# Patient Record
Sex: FEMALE | Race: WHITE | NOT HISPANIC OR LATINO | Employment: OTHER | URBAN - METROPOLITAN AREA
[De-identification: names, ages, dates, MRNs, and addresses within clinical notes are randomized per-mention and may not be internally consistent; named-entity substitution may affect disease eponyms.]

---

## 2017-01-16 RX ORDER — LIRAGLUTIDE 6 MG/ML
INJECTION SUBCUTANEOUS
Qty: 9 PEN | Refills: 1 | Status: SHIPPED | OUTPATIENT
Start: 2017-01-16 | End: 2017-06-19 | Stop reason: SDUPTHER

## 2017-01-19 ENCOUNTER — TELEPHONE (OUTPATIENT)
Dept: ENDOCRINOLOGY | Age: 72
End: 2017-01-19

## 2017-01-19 DIAGNOSIS — IMO0002 UNCONTROLLED TYPE 2 DIABETES MELLITUS WITH DIABETIC NEUROPATHY, WITH LONG-TERM CURRENT USE OF INSULIN: Primary | ICD-10-CM

## 2017-01-19 NOTE — TELEPHONE ENCOUNTER
----- Message from Alva Saez sent at 1/19/2017 10:32 AM EST -----  Contact: patient  harsha LUXEMIR FLEXPEN 100 UNIT/ML solution pen    She wants the flexpen if it last as long as the vials  If not then she wants the vial    30 day supply       [facility] [address] [address] [facility] - [phone]  - [phone]  [phone] (Phone)  [phone] (Fax)      SCRIPT SENT

## 2017-01-23 ENCOUNTER — TELEPHONE (OUTPATIENT)
Dept: ENDOCRINOLOGY | Age: 72
End: 2017-01-23

## 2017-01-23 DIAGNOSIS — IMO0002 UNCONTROLLED TYPE 2 DIABETES MELLITUS WITH DIABETIC NEUROPATHY, WITH LONG-TERM CURRENT USE OF INSULIN: ICD-10-CM

## 2017-01-23 NOTE — TELEPHONE ENCOUNTER
----- Message from Alexandra Celestin sent at 1/23/2017  9:41 AM EST -----  Contact: PATIENT  THE PATIENT IS UNABLE TO GET HER LEVEMIR REFILLED BECAUSE, ACCORDING TO THE PHARMACY, THERE ARE 2 SETS OF DIRECTIONS ON THE SCRIPT. PLEASE CALL TO CLARIFY. THE PATIENT IS OUT OF HER LEVEMIR AND WILL NEED TO PICK THIS UP TODAY.     PHARMACY  [place], [place] - [address] [facility]  [phone] (Phone)  [phone] (Fax)          SCRIPT SENT WITH DIRECTIONS CORRECTED

## 2017-02-14 ENCOUNTER — OFFICE VISIT (OUTPATIENT)
Dept: ENDOCRINOLOGY | Age: 72
End: 2017-02-14

## 2017-02-14 VITALS
BODY MASS INDEX: 37.84 KG/M2 | DIASTOLIC BLOOD PRESSURE: 64 MMHG | SYSTOLIC BLOOD PRESSURE: 122 MMHG | OXYGEN SATURATION: 98 % | HEIGHT: 61 IN | WEIGHT: 200.4 LBS | HEART RATE: 69 BPM

## 2017-02-14 DIAGNOSIS — E03.8 OTHER SPECIFIED HYPOTHYROIDISM: ICD-10-CM

## 2017-02-14 DIAGNOSIS — E16.1 HYPERINSULINISM: ICD-10-CM

## 2017-02-14 DIAGNOSIS — IMO0002 UNCONTROLLED TYPE 2 DIABETES MELLITUS WITH COMPLICATION, WITH LONG-TERM CURRENT USE OF INSULIN: Primary | ICD-10-CM

## 2017-02-14 DIAGNOSIS — Z79.4 ENCOUNTER FOR LONG-TERM (CURRENT) USE OF INSULIN (HCC): ICD-10-CM

## 2017-02-14 DIAGNOSIS — IMO0002 UNCONTROLLED TYPE 2 DIABETES MELLITUS WITH DIABETIC NEUROPATHY, WITH LONG-TERM CURRENT USE OF INSULIN: ICD-10-CM

## 2017-02-14 PROCEDURE — 99214 OFFICE O/P EST MOD 30 MIN: CPT | Performed by: INTERNAL MEDICINE

## 2017-02-14 NOTE — PROGRESS NOTES
71 y.o.    Patient Care Team:  Claudio Anne MD as PCP - General  Claudio Anne MD as PCP - Family Medicine    Chief Complaint:      F/U TYPE 2 DIABETES, UNCONTROLLED.  NO RECENT LABS.  Subjective     HPI     Patient is a 71-year-old white female with a past history of uncontrolled type 2 diabetes mellitus came for followup.  Patient is currently taking Victoza 1.8 mg daily and Levemir 40 units in the morning.  She's not taking any NovoLog any longer.    Uncontrolled type 2 diabetes mellitus with neuropathy.  Patient had symptoms of tingling and numbness in both lower extremities. She's currently not on any medication. She is to take gabapentin in the past.  Fatigue.  Patient reported that fatigue is improved significantly since. Her blood sugars have improved.  Abnormal weight gain.  Patient gained an additional 4 pounds since last visit. She reports noncompliance with her diet but she still feeling very good.  Hypoglycemia.  Patient denied any episodes of hypoglycemia. Since. She stopped taking NovoLog        Interval History    SUMMARY  Patient is a known type II diabetic on oral hypoglycemics. Her blood sugars have been out of control for the past several months most of the sugars are greater than 200. She has been very hesitant to start insulin.  Patient started Levemir 7 units at bedtime along with NovoLog 3 times a day before each meal. She appears to have tolerated Levemir very well. she reported that when the blood sugar dropped to 87 she has a typical hypoglycemic reaction recently.   But she reports that the sugars are still very high. She feels very thirsty and fatigue along with the high numbers.she managed to increase the NovoLog by herself to 8-10 units before meal.patient reports that her insurance company switch the insulin to Humalog since January.  fasting blood sugars are still elevated in the 200 range  patient is currently taking Levemir 35 units at bedtime and NovoLog 14 units before each  meal  Diabetic neuropathy  patient continues to complain of tingling and numbness in both lower extremities. She is currently on gabapentin  Fatigue  Especially worse in the blood sugars are elevated usually towards the end of the day.  Abnormal weight gain  Patient currently weighs 210 pounds   she feels very frustrated regarding the weight gain but is more concerned about her blood sugars at this time.  Patient reports that she has never tried victoza.  She reported no history of pancreatitis or thyroid cancer  patient also reported that due to her 's illness she has been taking him to different office visits and has not been able to focus on herself. She has not been checking her Accu-Cheks on a regular basis. She also reports swelling in the feet for the past few months. Her last hemoglobin A1c was 8.6%      Patient started victoza in the previous visit. She reports no side effects other than mild nausea initially. She reports she lost nearly 12 pounds in the blood sugars have improved significantly. She still currently taking NovoLog 7 units before each meal if the blood sugars are greater than 100 and Levemir 40 units at bedtime.  Her victoza dose is 1.2 mg daily at night      Patient reports that January 2015 and she had a left ankle fracture and has been through rehabilitation for nearly one month and still has pain on walking and weight-bearing.. During that time her blood sugars apparently were high but for the past 2-3 weeks they have significantly improved  Patient had one episode of hypoglycemia 2 days ago when she overreacted to blood sugar of 285 and took nearly 19 units of NovoLog      Interval history  Patient reported that she is currently in the Medicare gap and her insulin and Victoza is costing almost $160 each and is not able to afford that.  She loves Victoza since it made her lose nearly 32 pounds and controls her appetite  She is back on taking Levemir 40 units at night and victoza  1.8 mg daily  Her blood sugars have significantly improved majority are less than 160  She also reported that she is not needing NovoLog at all on most of the days.  She recently received a cortisone injection in the knee in August 2015 and her blood sugars have been elevated for a few days after that.  Patient forgot to bring her glucometer today but most blood sugars are in the 115 range         The following portions of the patient's history were reviewed and updated as appropriate: allergies, current medications, past family history, past medical history, past social history, past surgical history and problem list.    Past Medical History   Diagnosis Date   • Arthropathy of knee 05/22/2014     unspecified   • Arthropathy, multiple sites 04/12/2012     unspecified   • Chronic kidney disease, stage III (moderate) 05/26/2011   • Controlled diabetes mellitus type II without complication    • Depression 04/02/2001   • Essential hypertension    • History of complete eye exam 03/29/2012   • History of gynecological procedure 03/01/2010     special invesitation and examinations; gynecological examination; routine gynecological examination   • Hyperlipidemia      other and unspecified   • Hypothyroidism      unspecified   • Personal history of malignant neoplasm of breast 2001     R breast (negative nodes) [place] with radiation and Tamoxifen   • Type II diabetes mellitus with neurological manifestations, uncontrolled 01/03/2013     Family History   Problem Relation Age of Onset   • Hypertension Sister    • Thyroid disease Daughter      Social History     Social History   • Marital status:      Spouse name: N/A   • Number of children: N/A   • Years of education: N/A     Occupational History   • Not on file.     Social History Main Topics   • Smoking status: Never Smoker   • Smokeless tobacco: Not on file   • Alcohol use No   • Drug use: No   • Sexual activity: Not on file     Other Topics Concern   • Not on file  "    Social History Narrative     No Known Allergies    Current Outpatient Prescriptions:   •  acetaminophen (TYLENOL) 500 MG tablet, Take 1,000 mg by mouth every 8 (eight) hours as needed for mild pain (1-3). Take 2 tablets by oral route every 8 hours as needed, Disp: , Rfl:   •  amLODIPine (NORVASC) 5 MG tablet, Take 1 tablet by mouth Daily for 180 days., Disp: 90 tablet, Rfl: 1  •  aspirin 81 MG EC tablet, Take 81 mg by mouth daily. Take 1 tablet by oral route once daily, Disp: , Rfl:   •  atorvastatin (LIPITOR) 20 MG tablet, Take 1 tablet by mouth Every Night for 180 days., Disp: 90 tablet, Rfl: 1  •  diclofenac (VOLTAREN) 1 % gel gel, , Disp: , Rfl:   •  glucose blood (FREESTYLE LITE) test strip, 1 each by Other route 4 (Four) Times a Day. Use as instructed, Disp: 150 each, Rfl: 6  •  Insulin Aspart (NOVOLOG FLEXPEN) 100 UNIT/ML solution pen-injector, Inject by subcutaneous route as per insulin sliding scale protocol, Disp: , Rfl:   •  insulin detemir (LEVEMIR FLEXPEN) 100 UNIT/ML injection, Inject 40 Units under the skin Every Night., Disp: 5 pen, Rfl: 3  •  Insulin Syringe-Needle U-100 (BD INSULIN SYRINGE ULTRAFINE) 31G X 5/16\" 1 ML misc, Inject 1 each under the skin daily. As directed, Disp: 100 each, Rfl: 1  •  irbesartan (AVAPRO) 300 MG tablet, Take 1 tablet by mouth Daily for 180 days., Disp: 90 tablet, Rfl: 1  •  levothyroxine (SYNTHROID, LEVOTHROID) 75 MCG tablet, Take 1 tablet by mouth Daily for 180 days., Disp: 90 tablet, Rfl: 1  •  ONE TOUCH ULTRA TEST test strip, TEST FOUR TIMES A DAY, Disp: 100 each, Rfl: 3  •  ONETOUCH DELICA LANCETS 33G misc, USE AS DIRECTED FOUR TIMES A DAY, Disp: 150 each, Rfl: 4  •  PARoxetine (PAXIL) 10 MG tablet, Take 1 tablet by mouth Daily for 180 days., Disp: 90 tablet, Rfl: 1  •  VICTOZA 18 MG/3ML solution pen-injector, INJECT 1.8MG UNDER THE SKIN DAILY, Disp: 9 pen, Rfl: 1        Review of Systems   Constitutional: Negative for chills, fatigue and fever. " "  Cardiovascular: Negative for chest pain and palpitations.   Gastrointestinal: Negative for abdominal pain, constipation, nausea and vomiting.   Endocrine: Negative for cold intolerance and heat intolerance.   All other systems reviewed and are negative.      Objective       Vitals:    02/14/17 1117   BP: 122/64   Pulse: 69   SpO2: 98%   Weight: 200 lb 6.4 oz (90.9 kg)   Height: 61\" (154.9 cm)     Body mass index is 37.87 kg/(m^2).      Physical Exam   Constitutional: She is oriented to person, place, and time. She appears well-developed and well-nourished.   obese   HENT:   Head: Normocephalic and atraumatic.   Eyes: EOM are normal. Pupils are equal, round, and reactive to light.   Neck: Normal range of motion. Neck supple. No thyromegaly present.   Cardiovascular: Normal rate, regular rhythm, normal heart sounds and intact distal pulses.    Pulmonary/Chest: Effort normal and breath sounds normal.   Abdominal: Soft. Bowel sounds are normal. She exhibits distension. There is no tenderness.   Musculoskeletal: Normal range of motion. She exhibits no edema.   Neurological: She is alert and oriented to person, place, and time.   Skin: Skin is warm and dry.   Psychiatric: She has a normal mood and affect. Her behavior is normal.   Nursing note and vitals reviewed.    Results Review:     I reviewed the patient's new clinical results.    Medical records reviewed  Summary:      Office Visit on 11/21/2016   Component Date Value Ref Range Status   • Glucose 11/21/2016 133* 65 - 99 mg/dL Final   • BUN 11/21/2016 17  8 - 23 mg/dL Final   • Creatinine 11/21/2016 0.96  0.57 - 1.00 mg/dL Final   • eGFR Non African Am 11/21/2016 57* >60 mL/min/1.73 Final    Comment: The MDRD GFR formula is only valid for adults with stable  renal function between ages 18 and 70.     • eGFR  Am 11/21/2016 69  >60 mL/min/1.73 Final   • BUN/Creatinine Ratio 11/21/2016 17.7  7.0 - 25.0 Final   • Sodium 11/21/2016 141  136 - 145 mmol/L Final   • " Potassium 11/21/2016 4.9  3.5 - 5.2 mmol/L Final   • Chloride 11/21/2016 99  98 - 107 mmol/L Final   • Total CO2 11/21/2016 28.1  22.0 - 29.0 mmol/L Final   • Calcium 11/21/2016 10.1  8.6 - 10.5 mg/dL Final   • Total Protein 11/21/2016 7.0  6.0 - 8.5 g/dL Final   • Albumin 11/21/2016 4.60  3.50 - 5.20 g/dL Final   • Globulin 11/21/2016 2.4  gm/dL Final   • A/G Ratio 11/21/2016 1.9  g/dL Final   • Total Bilirubin 11/21/2016 0.9  0.1 - 1.2 mg/dL Final   • Alkaline Phosphatase 11/21/2016 135* 39 - 117 U/L Final   • AST (SGOT) 11/21/2016 20  1 - 32 U/L Final   • ALT (SGPT) 11/21/2016 24  1 - 33 U/L Final   • Hemoglobin A1C 11/21/2016 5.80* 4.80 - 5.60 % Final    Comment: Hemoglobin A1C Ranges:  Increased Risk for Diabetes  5.7% to 6.4%  Diabetes                     >= 6.5%  Diabetic Goal                < 7.0%     • Creatinine, Urine 11/21/2016 156.9  Not Estab. mg/dL Final   • Microalbumin, Urine 11/21/2016 4.3  Not Estab. ug/mL Final   • Microalbumin/Creatinine Ratio Urine 11/21/2016 2.7  0.0 - 30.0 mg/g creat Final   • Free T4 11/21/2016 1.50  0.93 - 1.70 ng/dL Final   • TSH 11/21/2016 1.380  0.270 - 4.200 mIU/mL Final     Lab Results   Component Value Date    HGBA1C 5.80 (H) 11/21/2016    HGBA1C 6.34 (H) 06/15/2016    HGBA1C 6.2 (H) 12/08/2015     Lab Results   Component Value Date    MICROALBUR 4.3 11/21/2016    CREATININE 0.96 11/21/2016     Imaging Results (most recent)     None                Assessment and Plan:    Iraida was seen today for diabetes.    Diagnoses and all orders for this visit:    Uncontrolled type 2 diabetes mellitus with complication, with long-term current use of insulin    Uncontrolled type 2 diabetes mellitus with diabetic neuropathy, with long-term current use of insulin  -     Comprehensive Metabolic Panel  -     Hemoglobin A1c  -     T4, Free  -     TSH  -     Microalbumin / Creatinine Urine Ratio    Other specified hypothyroidism  -     Comprehensive Metabolic Panel  -     Hemoglobin  A1c  -     T4, Free  -     TSH  -     Microalbumin / Creatinine Urine Ratio    Encounter for long-term (current) use of insulin    Hyperinsulinism        #1 uncontrolled type 2 diabetes mellitus.  #2 uncontrolled diabetic neuropathy. Most likely related to uncontrolled hyperglycemia.   #3 fatigue  #4 abnormal weight gain:   #5 hypoglycemia: not many recently  #6 hypothyroidism on medication  #7 heart murmur ejection systolic         Patient reported that she is currently taking Levemir 40 units at breakfast and victoza 1.8  milligrams at bedtime  Accu-Chek log reviewed  Most of the blood sugars are ranging from   's last hemoglobin A1c was 5.7%    I advised the patient to decrease Levemir to 30 units in the morning.  I also advised her to decrease victoza to 1.2 mg daily.  Patient will get lab testing done today  She will return to follow-up in 6 months.    The total time spent for old record and lab review and face- to- face was more than 25 min of which greater than 50% of time was spent on counseling the patient on recommended evaluation and treatment options, instructions for management/treatment and /or follow up  and importance of compliance with chosen management or treatment options       Robert Garcia MD. FACE    02/14/17      EMR Dragon / transcription disclaimer:    Much of this encounter note is an electronic transcription/ translation of spoken language to printed text.  Electronic translation of spoken language may permit erroneous, or at times, nonsensical words or phrases to be inadvertently transcribed; although I have reviewed the note for such errors, some may still exist.

## 2017-02-15 LAB
ALBUMIN SERPL-MCNC: 4.5 G/DL (ref 3.5–5.2)
ALBUMIN/CREAT UR: 5.2 MG/G CREAT (ref 0–30)
ALBUMIN/GLOB SERPL: 2 G/DL
ALP SERPL-CCNC: 128 U/L (ref 39–117)
ALT SERPL-CCNC: 25 U/L (ref 1–33)
AST SERPL-CCNC: 21 U/L (ref 1–32)
BILIRUB SERPL-MCNC: 0.8 MG/DL (ref 0.1–1.2)
BUN SERPL-MCNC: 18 MG/DL (ref 8–23)
BUN/CREAT SERPL: 16.5 (ref 7–25)
CALCIUM SERPL-MCNC: 9.6 MG/DL (ref 8.6–10.5)
CHLORIDE SERPL-SCNC: 98 MMOL/L (ref 98–107)
CO2 SERPL-SCNC: 27.5 MMOL/L (ref 22–29)
CREAT SERPL-MCNC: 1.09 MG/DL (ref 0.57–1)
CREAT UR-MCNC: 331.9 MG/DL
GLOBULIN SER CALC-MCNC: 2.3 GM/DL
GLUCOSE SERPL-MCNC: 200 MG/DL (ref 65–99)
HBA1C MFR BLD: 6.34 % (ref 4.8–5.6)
MICROALBUMIN UR-MCNC: 17.1 UG/ML
POTASSIUM SERPL-SCNC: 4.9 MMOL/L (ref 3.5–5.2)
PROT SERPL-MCNC: 6.8 G/DL (ref 6–8.5)
SODIUM SERPL-SCNC: 141 MMOL/L (ref 136–145)
T4 FREE SERPL-MCNC: 1.5 NG/DL (ref 0.93–1.7)
TSH SERPL DL<=0.005 MIU/L-ACNC: 2.12 MIU/ML (ref 0.27–4.2)

## 2017-06-17 RX ORDER — LIRAGLUTIDE 6 MG/ML
INJECTION SUBCUTANEOUS
Refills: 0 | Status: CANCELLED | OUTPATIENT
Start: 2017-06-17

## 2017-06-21 ENCOUNTER — OFFICE VISIT (OUTPATIENT)
Dept: FAMILY MEDICINE CLINIC | Facility: CLINIC | Age: 72
End: 2017-06-21

## 2017-06-21 ENCOUNTER — TELEPHONE (OUTPATIENT)
Dept: ENDOCRINOLOGY | Age: 72
End: 2017-06-21

## 2017-06-21 VITALS
BODY MASS INDEX: 38.89 KG/M2 | HEART RATE: 77 BPM | DIASTOLIC BLOOD PRESSURE: 71 MMHG | SYSTOLIC BLOOD PRESSURE: 150 MMHG | WEIGHT: 206 LBS | RESPIRATION RATE: 16 BRPM | TEMPERATURE: 98.3 F | HEIGHT: 61 IN

## 2017-06-21 DIAGNOSIS — I10 ESSENTIAL HYPERTENSION: Primary | Chronic | ICD-10-CM

## 2017-06-21 DIAGNOSIS — F32.5 MAJOR DEPRESSIVE DISORDER WITH SINGLE EPISODE, IN FULL REMISSION (HCC): ICD-10-CM

## 2017-06-21 DIAGNOSIS — E78.49 OTHER HYPERLIPIDEMIA: ICD-10-CM

## 2017-06-21 DIAGNOSIS — Z00.00 MEDICARE ANNUAL WELLNESS VISIT, INITIAL: ICD-10-CM

## 2017-06-21 DIAGNOSIS — E03.9 ACQUIRED HYPOTHYROIDISM: Chronic | ICD-10-CM

## 2017-06-21 PROCEDURE — 99214 OFFICE O/P EST MOD 30 MIN: CPT | Performed by: FAMILY MEDICINE

## 2017-06-21 PROCEDURE — G0438 PPPS, INITIAL VISIT: HCPCS | Performed by: FAMILY MEDICINE

## 2017-06-21 RX ORDER — ATORVASTATIN CALCIUM 20 MG/1
20 TABLET, FILM COATED ORAL NIGHTLY
Qty: 90 TABLET | Refills: 1 | Status: SHIPPED | OUTPATIENT
Start: 2017-06-21 | End: 2017-08-31 | Stop reason: SDUPTHER

## 2017-06-21 RX ORDER — AMLODIPINE BESYLATE 10 MG/1
10 TABLET ORAL DAILY
Qty: 90 TABLET | Refills: 1 | Status: SHIPPED | OUTPATIENT
Start: 2017-06-21 | End: 2017-08-31 | Stop reason: ALTCHOICE

## 2017-06-21 RX ORDER — LEVOTHYROXINE SODIUM 0.07 MG/1
75 TABLET ORAL DAILY
Qty: 90 TABLET | Refills: 1 | Status: SHIPPED | OUTPATIENT
Start: 2017-06-21 | End: 2017-08-31 | Stop reason: SDUPTHER

## 2017-06-21 RX ORDER — PAROXETINE 10 MG/1
10 TABLET, FILM COATED ORAL DAILY
Qty: 90 TABLET | Refills: 1 | Status: SHIPPED | OUTPATIENT
Start: 2017-06-21 | End: 2017-08-31 | Stop reason: SDUPTHER

## 2017-06-21 RX ORDER — IRBESARTAN 300 MG/1
300 TABLET ORAL DAILY
Qty: 90 TABLET | Refills: 1 | Status: SHIPPED | OUTPATIENT
Start: 2017-06-21 | End: 2017-08-31 | Stop reason: ALTCHOICE

## 2017-06-21 NOTE — PATIENT INSTRUCTIONS
Medicare Wellness  Personal Prevention Plan of Service     Date of Office Visit:  2017  Encounter Provider:  Claudio Anne MD  Place of Service:  [facility]  Patient Name: Iraida Elizabeth  :  1945    As part of the Medicare Wellness portion of your visit today, we are providing you with this personalized preventive plan of services (PPPS). This plan is based upon recommendations of the [place] Preventive Services Task Force (USPSTF) and the Advisory Committee on Immunization Practices (ACIP).    This lists the preventive care services that should be considered, and provides dates of when you are due. Items listed as completed are up-to-date and do not require any further intervention.    Health Maintenance   Topic Date Due   • MEDICARE ANNUAL WELLNESS  2016   • LIPID PANEL  06/15/2017   • DIABETIC FOOT EXAM  2017 (Originally 12/10/2016)   • DIABETIC EYE EXAM  2017 (Originally 2017)   • ZOSTER VACCINE  2018 (Originally 2016)   • INFLUENZA VACCINE  2017   • HEMOGLOBIN A1C  2017   • MAMMOGRAM  2018   • URINE MICROALBUMIN  2018   • DXA SCAN  2018   • COLONOSCOPY  2020   • TDAP/TD VACCINES (2 - Td) 2022   • PNEUMOCOCCAL VACCINES (65+ LOW/MEDIUM RISK)  Completed       Orders Placed This Encounter   Procedures   • Comprehensive metabolic panel   • Lipid Panel With LDL/HDL Ratio   • TSH   • CK   • CBC and Differential     Order Specific Question:   Manual Differential     Answer:   No       Return in about 2 months (around 2017) for Recheck blood pressure.      Check on coverage for  zostavax(shingles vaccine)        Exercising to Lose Weight  Exercising can help you to lose weight. In order to lose weight through exercise, you need to do vigorous-intensity exercise. You can tell that you are exercising with vigorous intensity if you are breathing very hard and fast and cannot hold a  conversation while exercising.  Moderate-intensity exercise helps to maintain your current weight. You can tell that you are exercising at a moderate level if you have a higher heart rate and faster breathing, but you are still able to hold a conversation.  HOW OFTEN SHOULD I EXERCISE?  Choose an activity that you enjoy and set realistic goals. Your health care provider can help you to make an activity plan that works for you. Exercise regularly as directed by your health care provider. This may include:  · Doing resistance training twice each week, such as:    Push-ups.    Sit-ups.    Lifting weights.    Using resistance bands.  · Doing a given intensity of exercise for a given amount of time. Choose from these options:    150 minutes of moderate-intensity exercise every week.    75 minutes of vigorous-intensity exercise every week.    A mix of moderate-intensity and vigorous-intensity exercise every week.  Children, pregnant women, people who are out of shape, people who are overweight, and older adults may need to consult a health care provider for individual recommendations. If you have any sort of medical condition, be sure to consult your health care provider before starting a new exercise program.  WHAT ARE SOME ACTIVITIES THAT CAN HELP ME TO LOSE WEIGHT?   · Walking at a rate of at least 4.5 miles an hour.  · Jogging or running at a rate of 5 miles per hour.  · Biking at a rate of at least 10 miles per hour.  · Lap swimming.  · Roller-skating or in-line skating.  · Cross-country skiing.  · Vigorous competitive sports, such as football, basketball, and soccer.  · Jumping rope.  · Aerobic dancing.  HOW CAN I BE MORE ACTIVE IN MY DAY-TO-DAY ACTIVITIES?  · Use the stairs instead of the elevator.  · Take a walk during your lunch break.  · If you drive, park your car farther away from work or school.  · If you take public transportation, get off one stop early and walk the rest of the way.  · Make all of your  phone calls while standing up and walking around.  · Get up, stretch, and walk around every 30 minutes throughout the day.  WHAT GUIDELINES SHOULD I FOLLOW WHILE EXERCISING?  · Do not exercise so much that you hurt yourself, feel dizzy, or get very short of breath.  · Consult your health care provider prior to starting a new exercise program.  · Wear comfortable clothes and shoes with good support.  · Drink plenty of water while you exercise to prevent dehydration or heat stroke. Body water is lost during exercise and must be replaced.  · Work out until you breathe faster and your heart beats faster.     This information is not intended to replace advice given to you by your health care provider. Make sure you discuss any questions you have with your health care provider.     Document Released: 01/20/2012 Document Revised: 01/08/2016 Document Reviewed: 05/21/2015  [employer] Interactive Patient Education ©2017 [employer]      Fall Prevention in the Home   Falls can cause injuries and can affect people from all age groups. There are many simple things that you can do to make your home safe and to help prevent falls.  WHAT CAN I DO ON THE OUTSIDE OF MY HOME?  · Regularly repair the edges of walkways and driveways and fix any cracks.  · Remove high doorway thresholds.  · Trim any shrubbery on the main path into your home.  · Use bright outdoor lighting.  · Clear walkways of debris and clutter, including tools and rocks.  · Regularly check that handrails are securely fastened and in good repair. Both sides of any steps should have handrails.  · Install guardrails along the edges of any raised decks or porches.  · Have leaves, snow, and ice cleared regularly.  · Use sand or salt on walkways during winter months.  · In the garage, clean up any spills right away, including grease or oil spills.  WHAT CAN I DO IN THE BATHROOM?  · Use night lights.  · Install grab bars by the toilet and in the tub and shower. Do not use  towel bars as grab bars.  · Use non-skid mats or decals on the floor of the tub or shower.  · If you need to sit down while you are in the shower, use a plastic, non-slip stool.  · Keep the floor dry. Immediately clean up any water that spills on the floor.  · Remove soap buildup in the tub or shower on a regular basis.  · Attach bath mats securely with double-sided non-slip rug tape.  · Remove throw rugs and other tripping hazards from the floor.  WHAT CAN I DO IN THE BEDROOM?  · Use night lights.  · Make sure that a bedside light is easy to reach.  · Do not use oversized bedding that drapes onto the floor.  · Have a firm chair that has side arms to use for getting dressed.  · Remove throw rugs and other tripping hazards from the floor.  WHAT CAN I DO IN THE KITCHEN?   · Clean up any spills right away.  · Avoid walking on wet floors.  · Place frequently used items in easy-to-reach places.  · If you need to reach for something above you, use a sturdy step stool that has a grab bar.  · Keep electrical cables out of the way.  · Do not use floor polish or wax that makes floors slippery. If you have to use wax, make sure that it is non-skid floor wax.  · Remove throw rugs and other tripping hazards from the floor.  WHAT CAN I DO IN THE STAIRWAYS?  · Do not leave any items on the stairs.  · Make sure that there are handrails on both sides of the stairs. Fix handrails that are broken or loose. Make sure that handrails are as long as the stairways.  · Check any carpeting to make sure that it is firmly attached to the stairs. Fix any carpet that is loose or worn.  · Avoid having throw rugs at the top or bottom of stairways, or secure the rugs with carpet tape to prevent them from moving.  · Make sure that you have a light switch at the top of the stairs and the bottom of the stairs. If you do not have them, have them installed.  WHAT ARE SOME OTHER FALL PREVENTION TIPS?  · Wear closed-toe shoes that fit well and support  your feet. Wear shoes that have rubber soles or low heels.  · When you use a stepladder, make sure that it is completely opened and that the sides are firmly locked. Have someone hold the ladder while you are using it. Do not climb a closed stepladder.  · Add color or contrast paint or tape to grab bars and handrails in your home. Place contrasting color strips on the first and last steps.  · Use mobility aids as needed, such as canes, walkers, scooters, and crutches.  · Turn on lights if it is dark. Replace any light bulbs that burn out.  · Set up furniture so that there are clear paths. Keep the furniture in the same spot.  · Fix any uneven floor surfaces.  · Choose a carpet design that does not hide the edge of steps of a stairway.  · Be aware of any and all pets.  · Review your medicines with your healthcare provider. Some medicines can cause dizziness or changes in blood pressure, which increase your risk of falling.  Talk with your health care provider about other ways that you can decrease your risk of falls. This may include working with a physical therapist or  to improve your strength, balance, and endurance.     This information is not intended to replace advice given to you by your health care provider. Make sure you discuss any questions you have with your health care provider.     Document Released: 12/08/2003 Document Revised: 04/10/2017 Document Reviewed: 01/22/2016  [employer] Interactive Patient Education ©2017 [employer]

## 2017-06-21 NOTE — PROGRESS NOTES
QUICK REFERENCE INFORMATION:  The ABCs of the Annual Wellness Visit    Initial Medicare Wellness Visit    HEALTH RISK ASSESSMENT    1945    Recent Hospitalizations:  No hospitalization(s) within the last year..        Current Medical Providers:  Patient Care Team:  Claudio Anne MD as PCP - General  Claudio Anne MD as PCP - Family Medicine  Robert SHINE MD as Consulting Physician (Endocrinology)        Smoking Status:  History   Smoking Status   • Never Smoker   Smokeless Tobacco   • Never Used       Alcohol Consumption:  History   Alcohol Use No       Depression Screen:   PHQ-9 Depression Screening 6/21/2017   Little interest or pleasure in doing things 0   Feeling down, depressed, or hopeless 0   PHQ-9 Total Score 0       Health Habits and Functional and Cognitive Screening:  Functional & Cognitive Status 6/21/2017   Do you have difficulty preparing food and eating? No   Do you have difficulty bathing yourself? No   Do you have difficulty getting dressed? No   Do you have difficulty using the toilet? No   Do you have difficulty moving around from place to place? No   In the past year have you fallen or experienced a near fall? No   Do you need help using the phone?  No   Are you deaf or do you have serious difficulty hearing?  No   Do you need help with transportation? No   Do you need help shopping? No   Do you need help preparing meals?  No   Do you need help with housework?  No   Do you need help with laundry? No   Do you need help taking your medications? No   Do you need help managing money? No   Do you have difficulty concentrating, remembering or making decisions? No       Health Habits  Current Diet: Unhealthy Diet  Dental Exam: Up to date  Eye Exam: Up to date  Exercise (times per week): 5 times per week  Current Exercise Activities Include: Walking          Does the patient have evidence of cognitive impairment? No    Asiprin use counseling: Taking ASA appropriately as  "indicated      Recent Lab Results:    Visual Acuity:  No exam data present    Age-appropriate Screening Schedule:  Refer to the list below for future screening recommendations based on patient's age, sex and/or medical conditions. Orders for these recommended tests are listed in the plan section. The patient has been provided with a written plan.    Health Maintenance   Topic Date Due   • LIPID PANEL  06/15/2017   • DIABETIC FOOT EXAM  07/20/2017 (Originally 12/10/2016)   • DIABETIC EYE EXAM  08/31/2017 (Originally 1/21/2017)   • ZOSTER VACCINE  11/01/2018 (Originally 2/1/2016)   • INFLUENZA VACCINE  08/01/2017   • HEMOGLOBIN A1C  08/14/2017   • MAMMOGRAM  02/05/2018   • URINE MICROALBUMIN  02/14/2018   • DXA SCAN  06/24/2018   • COLONOSCOPY  01/01/2020   • TDAP/TD VACCINES (2 - Td) 04/12/2022   • PNEUMOCOCCAL VACCINES (65+ LOW/MEDIUM RISK)  Completed        Subjective   History of Present Illness    Iraida Elizabeth is a 72 y.o. female who presents for an Annual Wellness Visit.    The following portions of the patient's history were reviewed and updated as appropriate: allergies, current medications, past family history, past medical history, past social history, past surgical history and problem list.    Outpatient Medications Prior to Visit   Medication Sig Dispense Refill   • acetaminophen (TYLENOL) 500 MG tablet Take 1,000 mg by mouth every 8 (eight) hours as needed for mild pain (1-3). Take 2 tablets by oral route every 8 hours as needed     • aspirin 81 MG EC tablet Take 81 mg by mouth daily. Take 1 tablet by oral route once daily     • glucose blood (FREESTYLE LITE) test strip 1 each by Other route 4 (Four) Times a Day. Use as instructed 150 each 6   • insulin detemir (LEVEMIR FLEXPEN) 100 UNIT/ML injection Inject 40 Units under the skin Every Night. 5 pen 3   • Insulin Syringe-Needle U-100 (BD INSULIN SYRINGE ULTRAFINE) 31G X 5/16\" 1 ML misc Inject 1 each under the skin daily. As directed 100 each 1   • " Liraglutide (VICTOZA) 18 MG/3ML solution pen-injector Inject 1.8 mg under the skin Daily. 9 pen 1   • ONE TOUCH ULTRA TEST test strip TEST FOUR TIMES A  each 3   • ONETOUCH DELICA LANCETS 33G misc USE AS DIRECTED FOUR TIMES A  each 4   • amLODIPine (NORVASC) 5 MG tablet Take 1 tablet by mouth Daily for 180 days. 90 tablet 1   • atorvastatin (LIPITOR) 20 MG tablet Take 1 tablet by mouth Every Night for 180 days. 90 tablet 1   • irbesartan (AVAPRO) 300 MG tablet Take 1 tablet by mouth Daily for 180 days. 90 tablet 1   • levothyroxine (SYNTHROID, LEVOTHROID) 75 MCG tablet Take 1 tablet by mouth Daily for 180 days. 90 tablet 1   • PARoxetine (PAXIL) 10 MG tablet Take 1 tablet by mouth Daily for 180 days. 90 tablet 1     No facility-administered medications prior to visit.        Patient Active Problem List   Diagnosis   • Depression   • Controlled diabetes mellitus type II without complication   • Uncontrolled type 2 diabetes mellitus   • Essential hypertension   • Hyperlipidemia   • Personal history of malignant neoplasm of breast   • Hypothyroidism   • Hypoglycemia due to endogenous hyperinsulinemia   • Systolic murmur   • Grief reaction   • Menopause   • Osteopenia   • Encounter for long-term (current) use of insulin   • Hyperinsulinism   • Type II diabetes mellitus with neurological manifestations, uncontrolled   • Uncontrolled type 2 diabetes mellitus with complication, with long-term current use of insulin       Advance Care Planning:  has NO advance directive - add't info requested. Referral to ACP.    Identification of Risk Factors:  Risk factors include: weight , unhealthy diet, cardiovascular risk, increased fall risk and chronic pain.    Review of Systems    Compared to one year ago, the patient feels her physical health is the same.  Compared to one year ago, the patient feels her mental health is the same.    Objective     Physical Exam    Vitals:    06/21/17 1358   BP: 150/71   Pulse: 77  "  Resp: 16   Temp: 98.3 °F (36.8 °C)   TempSrc: Oral   Weight: 206 lb (93.4 kg)   Height: 61\" (154.9 cm)   PainSc:   6   PainLoc: Leg       Body mass index is 38.92 kg/(m^2).  Discussed the patient's BMI with her. The BMI is above average; BMI management plan is completed.    Assessment/Plan   Patient Self-Management and Personalized Health Advice  The patient has been provided with information about: diet, exercise, weight management and fall prevention and preventive services including:   · Advance directive, Fall Risk assessment done, Fall Risk plan of care done.  Pt is due for shingles vaccine( will get at the pharmacy)  Visit Diagnoses:    ICD-10-CM ICD-9-CM   1. Essential hypertension I10 401.9   2. Acquired hypothyroidism E03.9 244.9   3. Major depressive disorder with single episode, in full remission F32.5 296.26   4. Other hyperlipidemia E78.4 272.4   5. Medicare annual wellness visit, initial Z00.00 V70.0       Orders Placed This Encounter   Procedures   • Comprehensive metabolic panel   • Lipid Panel With LDL/HDL Ratio   • TSH   • CK   • CBC and Differential     Order Specific Question:   Manual Differential     Answer:   No       Outpatient Encounter Prescriptions as of 6/21/2017   Medication Sig Dispense Refill   • acetaminophen (TYLENOL) 500 MG tablet Take 1,000 mg by mouth every 8 (eight) hours as needed for mild pain (1-3). Take 2 tablets by oral route every 8 hours as needed     • aspirin 81 MG EC tablet Take 81 mg by mouth daily. Take 1 tablet by oral route once daily     • glucose blood (FREESTYLE LITE) test strip 1 each by Other route 4 (Four) Times a Day. Use as instructed 150 each 6   • insulin detemir (LEVEMIR FLEXPEN) 100 UNIT/ML injection Inject 40 Units under the skin Every Night. 5 pen 3   • Insulin Syringe-Needle U-100 (BD INSULIN SYRINGE ULTRAFINE) 31G X 5/16\" 1 ML misc Inject 1 each under the skin daily. As directed 100 each 1   • Liraglutide (VICTOZA) 18 MG/3ML solution pen-injector " Inject 1.8 mg under the skin Daily. 9 pen 1   • ONE TOUCH ULTRA TEST test strip TEST FOUR TIMES A  each 3   • ONETOUCH DELICA LANCETS 33G misc USE AS DIRECTED FOUR TIMES A  each 4   • amLODIPine (NORVASC) 10 MG tablet Take 1 tablet by mouth Daily for 180 days. 90 tablet 1   • atorvastatin (LIPITOR) 20 MG tablet Take 1 tablet by mouth Every Night for 180 days. 90 tablet 1   • irbesartan (AVAPRO) 300 MG tablet Take 1 tablet by mouth Daily for 180 days. 90 tablet 1   • levothyroxine (SYNTHROID, LEVOTHROID) 75 MCG tablet Take 1 tablet by mouth Daily for 180 days. 90 tablet 1   • PARoxetine (PAXIL) 10 MG tablet Take 1 tablet by mouth Daily for 180 days. 90 tablet 1   • [DISCONTINUED] amLODIPine (NORVASC) 5 MG tablet Take 1 tablet by mouth Daily for 180 days. 90 tablet 1   • [DISCONTINUED] atorvastatin (LIPITOR) 20 MG tablet Take 1 tablet by mouth Every Night for 180 days. 90 tablet 1   • [DISCONTINUED] irbesartan (AVAPRO) 300 MG tablet Take 1 tablet by mouth Daily for 180 days. 90 tablet 1   • [DISCONTINUED] levothyroxine (SYNTHROID, LEVOTHROID) 75 MCG tablet Take 1 tablet by mouth Daily for 180 days. 90 tablet 1   • [DISCONTINUED] PARoxetine (PAXIL) 10 MG tablet Take 1 tablet by mouth Daily for 180 days. 90 tablet 1     No facility-administered encounter medications on file as of 6/21/2017.        Reviewed use of high risk medication in the elderly: not applicable  Reviewed for potential of harmful drug interactions in the elderly: not applicable    Follow Up:  Return in about 2 months (around 8/21/2017) for Recheck blood pressure.     An After Visit Summary and PPPS with all of these plans were given to the patient.

## 2017-06-21 NOTE — TELEPHONE ENCOUNTER
----- Message from Taylor Harris sent at 6/20/2017  9:11 AM EDT -----  Contact: PT  SAID SHE WENT TO  [facility] FROM PHARMACY AND SHE WAS TOLD SHE IS IN THE DONUT HOLE. SHE IS ASKING FOR SAMPLES OF LEVEMIR AND VICTOZA UNTIL SHE CAN CONTACT [employer], PT WILL START PROCESS FOR PT ASSISTANCE.    WE DO NOT HAVE ANY SAMPLES OF LEVEMIR BUT, WE DO HAVE VICTOZA. PT WAS WONDERING IF THERE IS THERE AN ALTERNATIVE FOR LEVEMIR SHE CAN TAKE FOR THE TIME BEING.    [phone]      Patient picked up samples

## 2017-06-21 NOTE — PROGRESS NOTES
"Chief Complaint   Patient presents with   • Hypertension   • Hyperlipidemia       Subjective   This patient presents the office for medicine refill.  Labs from February of been reviewed but did not include lipids.  We will reorder labs.  Blood pressure is elevated.  We will increase amlodipine to 10 mg daily.  Lipids are stable pending labs.  Depression is controlled with current medication. She remains under the care of endocrinology for her diabetes.     Social History   Substance Use Topics   • Smoking status: Never Smoker   • Smokeless tobacco: Never Used   • Alcohol use No       Review of Systems   Constitutional: Negative for fatigue.   Cardiovascular: Negative for chest pain.   Neurological: Positive for weakness (legs).       Objective   /71  Pulse 77  Temp 98.3 °F (36.8 °C) (Oral)   Resp 16  Ht 61\" (154.9 cm)  Wt 206 lb (93.4 kg)  BMI 38.92 kg/m2  Body mass index is 38.92 kg/(m^2).  Physical Exam   Constitutional: She is cooperative. No distress.   Eyes: Conjunctivae and lids are normal.   Neck: Carotid bruit is not present. No tracheal deviation present.   Cardiovascular: Normal rate, regular rhythm and normal heart sounds.    No murmur heard.  Pulmonary/Chest: Effort normal and breath sounds normal.   Neurological: She is alert. She is not disoriented.   Skin: Skin is warm and dry.   Psychiatric: She has a normal mood and affect. Her speech is normal and behavior is normal.   Vitals reviewed.      Data Reviewed:        Assessment/Plan     Problem List Items Addressed This Visit        Cardiovascular and Mediastinum    Essential hypertension - Primary (Chronic)    Relevant Medications    irbesartan (AVAPRO) 300 MG tablet    amLODIPine (NORVASC) 10 MG tablet    Other Relevant Orders    Comprehensive metabolic panel    CBC and Differential    Hyperlipidemia (Chronic)    Relevant Medications    atorvastatin (LIPITOR) 20 MG tablet    Other Relevant Orders    Lipid Panel With LDL/HDL Ratio    CK    " "   Endocrine    Hypothyroidism (Chronic)    Relevant Medications    levothyroxine (SYNTHROID, LEVOTHROID) 75 MCG tablet    Other Relevant Orders    TSH       Other    Depression (Chronic)    Relevant Medications    PARoxetine (PAXIL) 10 MG tablet          Outpatient Encounter Prescriptions as of 6/21/2017   Medication Sig Dispense Refill   • acetaminophen (TYLENOL) 500 MG tablet Take 1,000 mg by mouth every 8 (eight) hours as needed for mild pain (1-3). Take 2 tablets by oral route every 8 hours as needed     • aspirin 81 MG EC tablet Take 81 mg by mouth daily. Take 1 tablet by oral route once daily     • glucose blood (FREESTYLE LITE) test strip 1 each by Other route 4 (Four) Times a Day. Use as instructed 150 each 6   • insulin detemir (LEVEMIR FLEXPEN) 100 UNIT/ML injection Inject 40 Units under the skin Every Night. 5 pen 3   • Insulin Syringe-Needle U-100 (BD INSULIN SYRINGE ULTRAFINE) 31G X 5/16\" 1 ML misc Inject 1 each under the skin daily. As directed 100 each 1   • Liraglutide (VICTOZA) 18 MG/3ML solution pen-injector Inject 1.8 mg under the skin Daily. 9 pen 1   • ONE TOUCH ULTRA TEST test strip TEST FOUR TIMES A  each 3   • ONETOUCH DELICA LANCETS 33G misc USE AS DIRECTED FOUR TIMES A  each 4   • amLODIPine (NORVASC) 10 MG tablet Take 1 tablet by mouth Daily for 180 days. 90 tablet 1   • atorvastatin (LIPITOR) 20 MG tablet Take 1 tablet by mouth Every Night for 180 days. 90 tablet 1   • irbesartan (AVAPRO) 300 MG tablet Take 1 tablet by mouth Daily for 180 days. 90 tablet 1   • levothyroxine (SYNTHROID, LEVOTHROID) 75 MCG tablet Take 1 tablet by mouth Daily for 180 days. 90 tablet 1   • PARoxetine (PAXIL) 10 MG tablet Take 1 tablet by mouth Daily for 180 days. 90 tablet 1   • [DISCONTINUED] amLODIPine (NORVASC) 5 MG tablet Take 1 tablet by mouth Daily for 180 days. 90 tablet 1   • [DISCONTINUED] atorvastatin (LIPITOR) 20 MG tablet Take 1 tablet by mouth Every Night for 180 days. 90 tablet 1 "   • [DISCONTINUED] irbesartan (AVAPRO) 300 MG tablet Take 1 tablet by mouth Daily for 180 days. 90 tablet 1   • [DISCONTINUED] levothyroxine (SYNTHROID, LEVOTHROID) 75 MCG tablet Take 1 tablet by mouth Daily for 180 days. 90 tablet 1   • [DISCONTINUED] PARoxetine (PAXIL) 10 MG tablet Take 1 tablet by mouth Daily for 180 days. 90 tablet 1     No facility-administered encounter medications on file as of 6/21/2017.        Orders Placed This Encounter   Procedures   • Comprehensive metabolic panel   • Lipid Panel With LDL/HDL Ratio   • TSH   • CK   • CBC and Differential     Order Specific Question:   Manual Differential     Answer:   No       Continue with current treatment plan.         F/U in 2 months

## 2017-07-11 ENCOUNTER — TELEPHONE (OUTPATIENT)
Dept: ENDOCRINOLOGY | Age: 72
End: 2017-07-11

## 2017-07-11 NOTE — TELEPHONE ENCOUNTER
----- Message from Alexandra Fernandes sent at 7/7/2017 10:03 AM EDT -----  Contact: PATIENT  THE PATIENT IS WONDERING IF SHE CAN SUBSTITUTE LEVEMIR WITH EXTRA NOVOLOG THAT SHE HAS. THE LEVEMIR IS TOO EXPENSIVE. PLEASE CALL THE PATIENT TO ADVISE.       SPOKE WITH PATIENT AND ADVISED THAT WE WILL HAVE TO CHANGE INSULIN TO SOMETHING SHE CAN AFORD. DR. ANDREA WILL SEND SCRIPTS TO [place]

## 2017-07-13 DIAGNOSIS — E16.1 HYPOGLYCEMIA DUE TO ENDOGENOUS HYPERINSULINEMIA: ICD-10-CM

## 2017-07-13 RX ORDER — SYRINGE-NEEDLE,INSULIN,0.5 ML 27GX1/2"
SYRINGE, EMPTY DISPOSABLE MISCELLANEOUS
Qty: 200 EACH | Refills: 1 | Status: SHIPPED | OUTPATIENT
Start: 2017-07-13 | End: 2018-04-18 | Stop reason: SDUPTHER

## 2017-07-18 ENCOUNTER — OFFICE VISIT (OUTPATIENT)
Dept: ENDOCRINOLOGY | Age: 72
End: 2017-07-18

## 2017-07-18 VITALS
OXYGEN SATURATION: 97 % | DIASTOLIC BLOOD PRESSURE: 60 MMHG | SYSTOLIC BLOOD PRESSURE: 146 MMHG | HEIGHT: 61 IN | HEART RATE: 71 BPM | WEIGHT: 208 LBS | BODY MASS INDEX: 39.27 KG/M2

## 2017-07-18 DIAGNOSIS — E03.8 OTHER SPECIFIED HYPOTHYROIDISM: Chronic | ICD-10-CM

## 2017-07-18 DIAGNOSIS — IMO0002 UNCONTROLLED TYPE 2 DIABETES MELLITUS WITH DIABETIC NEUROPATHY, WITH LONG-TERM CURRENT USE OF INSULIN: ICD-10-CM

## 2017-07-18 DIAGNOSIS — IMO0002 UNCONTROLLED TYPE 2 DIABETES MELLITUS WITH COMPLICATION, WITH LONG-TERM CURRENT USE OF INSULIN: Primary | ICD-10-CM

## 2017-07-18 DIAGNOSIS — Z79.4 ENCOUNTER FOR LONG-TERM (CURRENT) USE OF INSULIN (HCC): ICD-10-CM

## 2017-07-18 DIAGNOSIS — E16.1 HYPERINSULINISM: ICD-10-CM

## 2017-07-18 PROCEDURE — 99214 OFFICE O/P EST MOD 30 MIN: CPT | Performed by: INTERNAL MEDICINE

## 2017-07-19 LAB
ALBUMIN SERPL-MCNC: 4.4 G/DL (ref 3.5–5.2)
ALBUMIN/GLOB SERPL: 1.7 G/DL
ALP SERPL-CCNC: 119 U/L (ref 39–117)
ALT SERPL-CCNC: 24 U/L (ref 1–33)
AST SERPL-CCNC: 17 U/L (ref 1–32)
BILIRUB SERPL-MCNC: 0.8 MG/DL (ref 0.1–1.2)
BUN SERPL-MCNC: 23 MG/DL (ref 8–23)
BUN/CREAT SERPL: 18.4 (ref 7–25)
CALCIUM SERPL-MCNC: 9.9 MG/DL (ref 8.6–10.5)
CHLORIDE SERPL-SCNC: 100 MMOL/L (ref 98–107)
CO2 SERPL-SCNC: 23.8 MMOL/L (ref 22–29)
CREAT SERPL-MCNC: 1.25 MG/DL (ref 0.57–1)
GLOBULIN SER CALC-MCNC: 2.6 GM/DL
GLUCOSE SERPL-MCNC: 299 MG/DL (ref 65–99)
HBA1C MFR BLD: 7 % (ref 4.8–5.6)
POTASSIUM SERPL-SCNC: 4.7 MMOL/L (ref 3.5–5.2)
PROT SERPL-MCNC: 7 G/DL (ref 6–8.5)
SODIUM SERPL-SCNC: 140 MMOL/L (ref 136–145)
T4 FREE SERPL-MCNC: 1.3 NG/DL (ref 0.93–1.7)
TSH SERPL DL<=0.005 MIU/L-ACNC: 0.98 MIU/ML (ref 0.27–4.2)

## 2017-08-28 LAB
BASOPHILS # BLD AUTO: 0.01 10*3/MM3 (ref 0–0.2)
BASOPHILS NFR BLD AUTO: 0.2 % (ref 0–1.5)
CHOLEST SERPL-MCNC: 150 MG/DL (ref 0–200)
CK SERPL-CCNC: 75 U/L (ref 20–180)
EOSINOPHIL # BLD AUTO: 0.12 10*3/MM3 (ref 0–0.7)
EOSINOPHIL NFR BLD AUTO: 2.3 % (ref 0.3–6.2)
ERYTHROCYTE [DISTWIDTH] IN BLOOD BY AUTOMATED COUNT: 14.2 % (ref 11.7–13)
HCT VFR BLD AUTO: 44.4 % (ref 35.6–45.5)
HDLC SERPL-MCNC: 67 MG/DL (ref 40–60)
HGB BLD-MCNC: 14.4 G/DL (ref 11.9–15.5)
IMM GRANULOCYTES # BLD: 0 10*3/MM3 (ref 0–0.03)
IMM GRANULOCYTES NFR BLD: 0 % (ref 0–0.5)
LDLC SERPL CALC-MCNC: 64 MG/DL (ref 0–100)
LDLC/HDLC SERPL: 0.96 {RATIO}
LYMPHOCYTES # BLD AUTO: 1.2 10*3/MM3 (ref 0.9–4.8)
LYMPHOCYTES NFR BLD AUTO: 22.6 % (ref 19.6–45.3)
MCH RBC QN AUTO: 34 PG (ref 26.9–32)
MCHC RBC AUTO-ENTMCNC: 32.4 G/DL (ref 32.4–36.3)
MCV RBC AUTO: 105 FL (ref 80.5–98.2)
MONOCYTES # BLD AUTO: 0.45 10*3/MM3 (ref 0.2–1.2)
MONOCYTES NFR BLD AUTO: 8.5 % (ref 5–12)
NEUTROPHILS # BLD AUTO: 3.53 10*3/MM3 (ref 1.9–8.1)
NEUTROPHILS NFR BLD AUTO: 66.4 % (ref 42.7–76)
PLATELET # BLD AUTO: 231 10*3/MM3 (ref 140–500)
RBC # BLD AUTO: 4.23 10*6/MM3 (ref 3.9–5.2)
TRIGL SERPL-MCNC: 95 MG/DL (ref 0–150)
VLDLC SERPL CALC-MCNC: 19 MG/DL (ref 5–40)
WBC # BLD AUTO: 5.31 10*3/MM3 (ref 4.5–10.7)

## 2017-08-31 ENCOUNTER — OFFICE VISIT (OUTPATIENT)
Dept: FAMILY MEDICINE CLINIC | Facility: CLINIC | Age: 72
End: 2017-08-31

## 2017-08-31 VITALS
TEMPERATURE: 97.5 F | BODY MASS INDEX: 38.71 KG/M2 | WEIGHT: 205 LBS | HEART RATE: 68 BPM | HEIGHT: 61 IN | RESPIRATION RATE: 16 BRPM | DIASTOLIC BLOOD PRESSURE: 75 MMHG | SYSTOLIC BLOOD PRESSURE: 153 MMHG

## 2017-08-31 DIAGNOSIS — I10 ESSENTIAL HYPERTENSION: Primary | Chronic | ICD-10-CM

## 2017-08-31 DIAGNOSIS — E03.9 ACQUIRED HYPOTHYROIDISM: Chronic | ICD-10-CM

## 2017-08-31 DIAGNOSIS — F32.5 MAJOR DEPRESSIVE DISORDER WITH SINGLE EPISODE, IN FULL REMISSION (HCC): ICD-10-CM

## 2017-08-31 DIAGNOSIS — E78.49 OTHER HYPERLIPIDEMIA: Chronic | ICD-10-CM

## 2017-08-31 PROCEDURE — 99214 OFFICE O/P EST MOD 30 MIN: CPT | Performed by: FAMILY MEDICINE

## 2017-08-31 RX ORDER — ATORVASTATIN CALCIUM 20 MG/1
20 TABLET, FILM COATED ORAL NIGHTLY
Qty: 90 TABLET | Refills: 1 | Status: SHIPPED | OUTPATIENT
Start: 2017-08-31 | End: 2017-11-13 | Stop reason: SDUPTHER

## 2017-08-31 RX ORDER — OLMESARTAN MEDOXOMIL, AMLODIPINE AND HYDROCHLOROTHIAZIDE TABLET 40/10/25 MG 40; 10; 25 MG/1; MG/1; MG/1
1 TABLET ORAL DAILY
Qty: 30 TABLET | Refills: 5 | Status: SHIPPED | OUTPATIENT
Start: 2017-08-31 | End: 2017-11-13 | Stop reason: ALTCHOICE

## 2017-08-31 RX ORDER — PAROXETINE 10 MG/1
10 TABLET, FILM COATED ORAL DAILY
Qty: 90 TABLET | Refills: 1 | Status: SHIPPED | OUTPATIENT
Start: 2017-08-31 | End: 2017-11-13 | Stop reason: SDUPTHER

## 2017-08-31 RX ORDER — LEVOTHYROXINE SODIUM 0.07 MG/1
75 TABLET ORAL DAILY
Qty: 90 TABLET | Refills: 1 | Status: SHIPPED | OUTPATIENT
Start: 2017-08-31 | End: 2017-11-13 | Stop reason: SDUPTHER

## 2017-09-06 DIAGNOSIS — I10 ESSENTIAL HYPERTENSION: Chronic | ICD-10-CM

## 2017-09-06 RX ORDER — OLMESARTAN MEDOXOMIL, AMLODIPINE AND HYDROCHLOROTHIAZIDE TABLET 40/10/25 MG 40; 10; 25 MG/1; MG/1; MG/1
1 TABLET ORAL DAILY
Qty: 30 TABLET | Refills: 5 | OUTPATIENT
Start: 2017-09-06 | End: 2018-03-05

## 2017-09-08 NOTE — TELEPHONE ENCOUNTER
Pt asked if there was anything you could RX that is cheaper, she has continued with her regular medication.  She has a 6wk follow up with you on 11/13/2017

## 2017-09-12 RX ORDER — HYDROCHLOROTHIAZIDE 25 MG/1
25 TABLET ORAL DAILY
Qty: 90 TABLET | Refills: 1 | Status: SHIPPED | OUTPATIENT
Start: 2017-09-12 | End: 2017-11-13 | Stop reason: SDUPTHER

## 2017-11-13 ENCOUNTER — OFFICE VISIT (OUTPATIENT)
Dept: FAMILY MEDICINE CLINIC | Facility: CLINIC | Age: 72
End: 2017-11-13

## 2017-11-13 ENCOUNTER — TELEPHONE (OUTPATIENT)
Dept: FAMILY MEDICINE CLINIC | Facility: CLINIC | Age: 72
End: 2017-11-13

## 2017-11-13 VITALS
BODY MASS INDEX: 40.78 KG/M2 | HEIGHT: 61 IN | RESPIRATION RATE: 16 BRPM | OXYGEN SATURATION: 95 % | SYSTOLIC BLOOD PRESSURE: 113 MMHG | DIASTOLIC BLOOD PRESSURE: 67 MMHG | TEMPERATURE: 97 F | HEART RATE: 80 BPM | WEIGHT: 216 LBS

## 2017-11-13 DIAGNOSIS — F32.5 MAJOR DEPRESSIVE DISORDER WITH SINGLE EPISODE, IN FULL REMISSION (HCC): ICD-10-CM

## 2017-11-13 DIAGNOSIS — E78.49 OTHER HYPERLIPIDEMIA: Chronic | ICD-10-CM

## 2017-11-13 DIAGNOSIS — E03.9 ACQUIRED HYPOTHYROIDISM: Chronic | ICD-10-CM

## 2017-11-13 DIAGNOSIS — I10 ESSENTIAL HYPERTENSION: Primary | Chronic | ICD-10-CM

## 2017-11-13 DIAGNOSIS — I10 ESSENTIAL HYPERTENSION: Chronic | ICD-10-CM

## 2017-11-13 DIAGNOSIS — R06.09 EXERTIONAL DYSPNEA: ICD-10-CM

## 2017-11-13 PROBLEM — IMO0002 UNCONTROLLED TYPE 2 DIABETES MELLITUS WITH COMPLICATION, WITH LONG-TERM CURRENT USE OF INSULIN: Status: RESOLVED | Noted: 2017-02-14 | Resolved: 2017-11-13

## 2017-11-13 PROCEDURE — 99214 OFFICE O/P EST MOD 30 MIN: CPT | Performed by: FAMILY MEDICINE

## 2017-11-13 RX ORDER — HYDROCHLOROTHIAZIDE 25 MG/1
25 TABLET ORAL DAILY
Qty: 90 TABLET | Refills: 1 | Status: SHIPPED | OUTPATIENT
Start: 2017-11-13 | End: 2017-12-14

## 2017-11-13 RX ORDER — ATORVASTATIN CALCIUM 20 MG/1
20 TABLET, FILM COATED ORAL NIGHTLY
Qty: 90 TABLET | Refills: 1 | Status: SHIPPED | OUTPATIENT
Start: 2017-11-13 | End: 2018-05-14 | Stop reason: SDUPTHER

## 2017-11-13 RX ORDER — IRBESARTAN 300 MG/1
300 TABLET ORAL DAILY
Qty: 90 TABLET | Refills: 1 | Status: SHIPPED | OUTPATIENT
Start: 2017-11-13 | End: 2017-11-13 | Stop reason: SDUPTHER

## 2017-11-13 RX ORDER — AMLODIPINE BESYLATE 10 MG/1
10 TABLET ORAL DAILY
Qty: 90 TABLET | Refills: 1 | Status: SHIPPED | OUTPATIENT
Start: 2017-11-13 | End: 2017-11-13 | Stop reason: SDUPTHER

## 2017-11-13 RX ORDER — PAROXETINE 10 MG/1
10 TABLET, FILM COATED ORAL DAILY
Qty: 90 TABLET | Refills: 1 | Status: SHIPPED | OUTPATIENT
Start: 2017-11-13 | End: 2018-05-14 | Stop reason: ALTCHOICE

## 2017-11-13 RX ORDER — IRBESARTAN 300 MG/1
300 TABLET ORAL DAILY
COMMUNITY
End: 2017-11-13 | Stop reason: SDUPTHER

## 2017-11-13 RX ORDER — IRBESARTAN 300 MG/1
300 TABLET ORAL DAILY
Qty: 90 TABLET | Refills: 1 | Status: SHIPPED | OUTPATIENT
Start: 2017-11-13 | End: 2018-05-14 | Stop reason: SDUPTHER

## 2017-11-13 RX ORDER — PAROXETINE 10 MG/1
10 TABLET, FILM COATED ORAL DAILY
Qty: 90 TABLET | Refills: 1 | Status: SHIPPED | OUTPATIENT
Start: 2017-11-13 | End: 2017-11-13 | Stop reason: SDUPTHER

## 2017-11-13 RX ORDER — HYDROCHLOROTHIAZIDE 25 MG/1
25 TABLET ORAL DAILY
Qty: 90 TABLET | Refills: 1 | Status: SHIPPED | OUTPATIENT
Start: 2017-11-13 | End: 2017-11-13 | Stop reason: SDUPTHER

## 2017-11-13 RX ORDER — LEVOTHYROXINE SODIUM 0.07 MG/1
75 TABLET ORAL DAILY
Qty: 90 TABLET | Refills: 1 | Status: SHIPPED | OUTPATIENT
Start: 2017-11-13 | End: 2017-11-13 | Stop reason: SDUPTHER

## 2017-11-13 RX ORDER — AMLODIPINE BESYLATE 10 MG/1
10 TABLET ORAL DAILY
Qty: 90 TABLET | Refills: 1 | Status: SHIPPED | OUTPATIENT
Start: 2017-11-13 | End: 2018-05-14 | Stop reason: SDUPTHER

## 2017-11-13 RX ORDER — ATORVASTATIN CALCIUM 20 MG/1
20 TABLET, FILM COATED ORAL NIGHTLY
Qty: 90 TABLET | Refills: 1 | Status: SHIPPED | OUTPATIENT
Start: 2017-11-13 | End: 2017-11-13 | Stop reason: SDUPTHER

## 2017-11-13 RX ORDER — LEVOTHYROXINE SODIUM 0.07 MG/1
75 TABLET ORAL DAILY
Qty: 90 TABLET | Refills: 1 | Status: SHIPPED | OUTPATIENT
Start: 2017-11-13 | End: 2018-05-14 | Stop reason: SDUPTHER

## 2017-11-13 RX ORDER — AMLODIPINE BESYLATE 10 MG/1
10 TABLET ORAL DAILY
COMMUNITY
End: 2017-11-13 | Stop reason: SDUPTHER

## 2017-11-13 NOTE — PROGRESS NOTES
"Chief Complaint   Patient presents with   • Hypertension   • Depression   • Hyperlipidemia   • Hypothyroidism       Madelaine Khoury presents to the office today to refill her medications. No medication side effects are reported.  Her insurance would not let her take Tribenzor.  She called in and had a new water pill added to her amlodipine and irbesartan.  She is tolerating the medicine and her blood pressures control.  Lipids are stable.  Her depression is controlled on current therapy.  Thyroid is controlled on current therapy. She has nocturia x 4. She is SOA with minimal activity.   She has been having exertional shortness of breath and fatigue over the past month.  Currently she does not have chest pain.  She has noticed slight increased swelling of her legs.  She also has nocturia ×4 each evening.    I have reviewed and updated her medications, medical history and problem list during today's office visit.        Social History   Substance Use Topics   • Smoking status: Never Smoker   • Smokeless tobacco: Never Used   • Alcohol use No       Review of Systems   Constitutional: Negative for fatigue.   Respiratory: Positive for shortness of breath.    Cardiovascular: Negative for chest pain.   Genitourinary: Negative for difficulty urinating.       Objective   /67  Pulse 80  Temp 97 °F (36.1 °C) (Oral)   Resp 16  Ht 61\" (154.9 cm)  Wt 216 lb (98 kg)  SpO2 95% Comment: walking  BMI 40.81 kg/m2  Body mass index is 40.81 kg/(m^2).  Physical Exam   Constitutional: She is cooperative. No distress.   Eyes: Conjunctivae and lids are normal.   Neck: Carotid bruit is not present. No tracheal deviation present.   Cardiovascular: Normal rate, regular rhythm and normal heart sounds.    No murmur heard.  Pulmonary/Chest: Effort normal and breath sounds normal.   Musculoskeletal: She exhibits edema (non pitting).   Neurological: She is alert. She is not disoriented.   Skin: Skin is warm and dry. "   Psychiatric: She has a normal mood and affect. Her speech is normal and behavior is normal.   Vitals reviewed.      Data Reviewed:    CMP:  Lab Results   Component Value Date     (H) 07/18/2017    BUN 23 07/18/2017    CREATININE 1.25 (H) 07/18/2017    EGFRIFNONA 42 (L) 07/18/2017    EGFRIFAFRI 51 (L) 07/18/2017     07/18/2017    K 4.7 07/18/2017     07/18/2017    CALCIUM 9.9 07/18/2017    PROTENTOTREF 7.0 07/18/2017    ALBUMIN 4.40 07/18/2017    LABGLOBREF 2.6 07/18/2017    BILITOT 0.8 07/18/2017    ALKPHOS 119 (H) 07/18/2017    AST 17 07/18/2017    ALT 24 07/18/2017     CBC w/ diff:   Lab Results   Component Value Date    WBC 5.31 08/28/2017    RBC 4.23 08/28/2017    HGB 14.4 08/28/2017    HCT 44.4 08/28/2017    .0 (H) 08/28/2017    MCH 34.0 (H) 08/28/2017    MCHC 32.4 08/28/2017    RDW 14.2 (H) 08/28/2017     08/28/2017    NEUTRORELPCT 66.4 08/28/2017    LYMPHORELPCT 22.6 08/28/2017    MONORELPCT 8.5 08/28/2017    EOSRELPCT 2.3 08/28/2017    BASORELPCT 0.2 08/28/2017     LIPID PANEL:  Lab Results   Component Value Date    CHLPL 150 08/28/2017    TRIG 95 08/28/2017    HDL 67 (H) 08/28/2017    VLDL 19 08/28/2017    LDL 64 08/28/2017    LDLHDL 0.96 08/28/2017     TSH:  Lab Results   Component Value Date    TSH 0.982 07/18/2017       Assessment/Plan     Problem List Items Addressed This Visit        Cardiovascular and Mediastinum    Essential hypertension - Primary (Chronic)    Relevant Orders    Comprehensive metabolic panel    CBC and Differential    Hyperlipidemia (Chronic)    Relevant Orders    Lipid Panel With LDL/HDL Ratio       Respiratory    Exertional dyspnea    Relevant Orders    Ambulatory Referral to Cardiology       Endocrine    Hypothyroidism (Chronic)    Relevant Orders    TSH       Other    Depression (Chronic)          Outpatient Encounter Prescriptions as of 11/13/2017   Medication Sig Dispense Refill   • acetaminophen (TYLENOL) 500 MG tablet Take 1,000 mg by mouth  "every 8 (eight) hours as needed for mild pain (1-3). Take 2 tablets by oral route every 8 hours as needed     • aspirin 81 MG EC tablet Take 81 mg by mouth daily. Take 1 tablet by oral route once daily     • glucose blood (FREESTYLE LITE) test strip 1 each by Other route 4 (Four) Times a Day. Use as instructed 150 each 6   • insulin NPH (NOVOLIN N RELION) 100 UNIT/ML injection 14 UNITS BEFORE BREAKFAST AND BEDTIME SC 10 mL 5   • insulin regular (NOVOLIN R RELION) 100 UNIT/ML injection 6 UNITS SC BEFORE BREAKFAST AND SUPPER DAILY 10 mL 5   • Insulin Syringe-Needle U-100 (BD INSULIN SYRINGE ULTRAFINE) 31G X 5/16\" 1 ML misc 4 times daily sc As directed 200 each 1   • ONE TOUCH ULTRA TEST test strip TEST FOUR TIMES A  each 3   • ONETOUCH DELICA LANCETS 33G misc USE AS DIRECTED FOUR TIMES A  each 4   • [DISCONTINUED] amLODIPine (NORVASC) 10 MG tablet Take 10 mg by mouth Daily.     • [DISCONTINUED] amLODIPine (NORVASC) 10 MG tablet Take 1 tablet by mouth Daily for 180 days. 90 tablet 1   • [DISCONTINUED] atorvastatin (LIPITOR) 20 MG tablet Take 1 tablet by mouth Every Night for 180 days. 90 tablet 1   • [DISCONTINUED] atorvastatin (LIPITOR) 20 MG tablet Take 1 tablet by mouth Every Night for 180 days. 90 tablet 1   • [DISCONTINUED] hydrochlorothiazide (HYDRODIURIL) 25 MG tablet Take 1 tablet by mouth Daily. 90 tablet 1   • [DISCONTINUED] hydrochlorothiazide (HYDRODIURIL) 25 MG tablet Take 1 tablet by mouth Daily. 90 tablet 1   • [DISCONTINUED] irbesartan (AVAPRO) 300 MG tablet Take 300 mg by mouth Daily.     • [DISCONTINUED] irbesartan (AVAPRO) 300 MG tablet Take 1 tablet by mouth Daily for 180 days. 90 tablet 1   • [DISCONTINUED] levothyroxine (SYNTHROID, LEVOTHROID) 75 MCG tablet Take 1 tablet by mouth Daily for 180 days. 90 tablet 1   • [DISCONTINUED] levothyroxine (SYNTHROID, LEVOTHROID) 75 MCG tablet Take 1 tablet by mouth Daily for 180 days. 90 tablet 1   • [DISCONTINUED] PARoxetine (PAXIL) 10 MG " tablet Take 1 tablet by mouth Daily for 180 days. 90 tablet 1   • [DISCONTINUED] PARoxetine (PAXIL) 10 MG tablet Take 1 tablet by mouth Daily for 180 days. 90 tablet 1   • [DISCONTINUED] Olmesartan-Amlodipine-HCTZ 40-10-25 MG tablet Take 1 tablet by mouth Daily for 180 days. 30 tablet 5     No facility-administered encounter medications on file as of 11/13/2017.        Orders Placed This Encounter   Procedures   • Comprehensive metabolic panel     Standing Status:   Future     Standing Expiration Date:   8/10/2018   • Lipid Panel With LDL/HDL Ratio     Standing Status:   Future     Standing Expiration Date:   8/10/2018   • TSH     Standing Status:   Future     Standing Expiration Date:   8/10/2018   • Ambulatory Referral to Cardiology     Referral Priority:   Routine     Referral Type:   Consultation     Referral Reason:   Specialty Services Required     Referred to Provider:   Chuck Gamboa MD     Requested Specialty:   Cardiology     Number of Visits Requested:   1   • CBC and Differential     Standing Status:   Future     Standing Expiration Date:   8/10/2018     Order Specific Question:   Manual Differential     Answer:   No            F/U in 6 months

## 2017-12-14 ENCOUNTER — HOSPITAL ENCOUNTER (OUTPATIENT)
Dept: GENERAL RADIOLOGY | Facility: HOSPITAL | Age: 72
Discharge: HOME | End: 2017-12-14
Attending: INTERNAL MEDICINE | Admitting: INTERNAL MEDICINE

## 2017-12-14 ENCOUNTER — OFFICE VISIT (OUTPATIENT)
Dept: CARDIOLOGY | Facility: CLINIC | Age: 72
End: 2017-12-14

## 2017-12-14 ENCOUNTER — LAB (OUTPATIENT)
Dept: LAB | Facility: HOSPITAL | Age: 72
End: 2017-12-14

## 2017-12-14 VITALS
HEIGHT: 61 IN | SYSTOLIC BLOOD PRESSURE: 160 MMHG | HEART RATE: 64 BPM | WEIGHT: 216.2 LBS | DIASTOLIC BLOOD PRESSURE: 74 MMHG | BODY MASS INDEX: 40.82 KG/M2

## 2017-12-14 DIAGNOSIS — I10 ESSENTIAL HYPERTENSION: Chronic | ICD-10-CM

## 2017-12-14 DIAGNOSIS — R06.09 EXERTIONAL DYSPNEA: Primary | ICD-10-CM

## 2017-12-14 DIAGNOSIS — R06.09 EXERTIONAL DYSPNEA: ICD-10-CM

## 2017-12-14 DIAGNOSIS — E78.49 OTHER HYPERLIPIDEMIA: Chronic | ICD-10-CM

## 2017-12-14 LAB
ANION GAP SERPL CALCULATED.3IONS-SCNC: 13.5 MMOL/L
BUN BLD-MCNC: 18 MG/DL (ref 8–23)
BUN/CREAT SERPL: 17.1 (ref 7–25)
CALCIUM SPEC-SCNC: 9.4 MG/DL (ref 8.6–10.5)
CHLORIDE SERPL-SCNC: 98 MMOL/L (ref 98–107)
CO2 SERPL-SCNC: 27.5 MMOL/L (ref 22–29)
CREAT BLD-MCNC: 1.05 MG/DL (ref 0.57–1)
D DIMER PPP FEU-MCNC: 1.12 MCGFEU/ML (ref 0–0.49)
GFR SERPL CREATININE-BSD FRML MDRD: 51 ML/MIN/1.73
GLUCOSE BLD-MCNC: 123 MG/DL (ref 65–99)
NT-PROBNP SERPL-MCNC: 177.6 PG/ML (ref 0–900)
POTASSIUM BLD-SCNC: 4 MMOL/L (ref 3.5–5.2)
SODIUM BLD-SCNC: 139 MMOL/L (ref 136–145)

## 2017-12-14 PROCEDURE — 83880 ASSAY OF NATRIURETIC PEPTIDE: CPT | Performed by: INTERNAL MEDICINE

## 2017-12-14 PROCEDURE — 36415 COLL VENOUS BLD VENIPUNCTURE: CPT

## 2017-12-14 PROCEDURE — 85379 FIBRIN DEGRADATION QUANT: CPT

## 2017-12-14 PROCEDURE — 71020 HC CHEST PA AND LATERAL: CPT

## 2017-12-14 PROCEDURE — 99204 OFFICE O/P NEW MOD 45 MIN: CPT | Performed by: INTERNAL MEDICINE

## 2017-12-14 PROCEDURE — 93000 ELECTROCARDIOGRAM COMPLETE: CPT | Performed by: INTERNAL MEDICINE

## 2017-12-14 PROCEDURE — 80048 BASIC METABOLIC PNL TOTAL CA: CPT

## 2017-12-14 RX ORDER — CHLORTHALIDONE 50 MG/1
50 TABLET ORAL DAILY
Qty: 30 TABLET | Refills: 6 | Status: SHIPPED | OUTPATIENT
Start: 2017-12-14 | End: 2018-05-14

## 2017-12-14 NOTE — PROGRESS NOTES
Iraida Elizabeth  1945  Date of Office Visit: 12/14/2017  Encounter Provider: Alex Snell MD  Place of Service: [facility] CARDIOLOGY      CHIEF COMPLAINT:  Dyspnea on exertion    HISTORY OF PRESENT ILLNESS:Dear Dr. Anne,     I had the pleasure of seeing your patient in consultation today.  As you well know, she is a very pleasant 72-year-old woman with medical history of diabetes, hypertension, hyperlipidemia who presents to me secondary to dyspnea on exertion.  The patient states that since about June she has noticed increasing intensity dyspnea on exertion.  She states that when she does minimal activity, she has at least moderate dyspnea which requires her to rest.  She has no chest pain with that activity.  She denies any orthopnea or PND.  She does report lower extremity edema which is chronic and more prominent on the left.  She has previously had a normal venous duplex of that leg.      She denies any cough, fever or hemoptysis.  She has had no recent long plane or car trips.  She has never been diagnosed with coronary artery disease in the past.      She states her blood pressure has been fluctuating over the past month or two, and it sounds like most of the time has been slightly elevated.  Today it is 160/74.        Review of Systems   Constitution: Negative for fever, weakness and malaise/fatigue.   HENT: Negative for nosebleeds and sore throat.    Eyes: Negative for blurred vision and double vision.   Cardiovascular: Negative for chest pain, claudication, palpitations and syncope.   Respiratory: Negative for cough, shortness of breath and snoring.    Endocrine: Negative for cold intolerance, heat intolerance and polydipsia.   Skin: Negative for itching, poor wound healing and rash.   Musculoskeletal: Negative for joint pain, joint swelling, muscle weakness and myalgias.   Gastrointestinal: Negative for abdominal pain, melena, nausea and vomiting.    Neurological: Negative for light-headedness, loss of balance, seizures and vertigo.   Psychiatric/Behavioral: Negative for altered mental status and depression.          Past Medical History:   Diagnosis Date   • Arthropathy of knee 05/22/2014    unspecified   • Arthropathy, multiple sites 04/12/2012    unspecified   • Cancer     breast   • Chronic kidney disease, stage III (moderate) 05/26/2011   • Controlled diabetes mellitus type II without complication    • Depression 04/02/2001   • Dyspnea    • Essential hypertension    • History of complete eye exam 03/29/2012   • History of gynecological procedure 03/01/2010    special invesitation and examinations; gynecological examination; routine gynecological examination   • Hyperlipidemia     other and unspecified   • Hypothyroidism     unspecified   • Personal history of malignant neoplasm of breast 2001    R breast (negative nodes) [place] with radiation and Tamoxifen   • Thyroid disease    • Type II diabetes mellitus with neurological manifestations, uncontrolled 01/03/2013       The following portions of the patient's history were reviewed and updated as appropriate: Social history , Family history and Surgical history     Current Outpatient Prescriptions on File Prior to Visit   Medication Sig Dispense Refill   • acetaminophen (TYLENOL) 500 MG tablet Take 1,000 mg by mouth every 8 (eight) hours as needed for mild pain (1-3). Take 2 tablets by oral route every 8 hours as needed     • amLODIPine (NORVASC) 10 MG tablet Take 1 tablet by mouth Daily for 180 days. 90 tablet 1   • aspirin 81 MG EC tablet Take 81 mg by mouth daily. Take 1 tablet by oral route once daily     • atorvastatin (LIPITOR) 20 MG tablet Take 1 tablet by mouth Every Night for 180 days. 90 tablet 1   • glucose blood (FREESTYLE LITE) test strip 1 each by Other route 4 (Four) Times a Day. Use as instructed 150 each 6   • hydrochlorothiazide (HYDRODIURIL) 25 MG tablet Take 1 tablet by mouth Daily. 90  "tablet 1   • insulin NPH (NOVOLIN N RELION) 100 UNIT/ML injection 14 UNITS BEFORE BREAKFAST AND BEDTIME SC 10 mL 5   • insulin regular (NOVOLIN R RELION) 100 UNIT/ML injection 6 UNITS SC BEFORE BREAKFAST AND SUPPER DAILY 10 mL 5   • Insulin Syringe-Needle U-100 (BD INSULIN SYRINGE ULTRAFINE) 31G X 5/16\" 1 ML misc 4 times daily sc As directed 200 each 1   • irbesartan (AVAPRO) 300 MG tablet Take 1 tablet by mouth Daily for 180 days. 90 tablet 1   • levothyroxine (SYNTHROID, LEVOTHROID) 75 MCG tablet Take 1 tablet by mouth Daily for 180 days. 90 tablet 1   • ONE TOUCH ULTRA TEST test strip TEST FOUR TIMES A  each 3   • ONETOUCH DELICA LANCETS 33G misc USE AS DIRECTED FOUR TIMES A  each 4   • PARoxetine (PAXIL) 10 MG tablet Take 1 tablet by mouth Daily for 180 days. 90 tablet 1     No current facility-administered medications on file prior to visit.        No Known Allergies    Vitals:    12/14/17 1303   BP: 160/74   Pulse: 64   Weight: 98.1 kg (216 lb 3.2 oz)   Height: 154.9 cm (61\")     Physical Exam   Constitutional: She is oriented to person, place, and time. She appears well-developed and well-nourished.   HENT:   Head: Normocephalic and atraumatic.   Eyes: Conjunctivae and EOM are normal. No scleral icterus.   Neck: Normal range of motion. Neck supple. Normal carotid pulses, no hepatojugular reflux and no JVD present. Carotid bruit is not present. No tracheal deviation present. No thyromegaly present.   Cardiovascular: Normal rate and regular rhythm.  Exam reveals no gallop and no friction rub.    No murmur heard.  Pulses:       Carotid pulses are 2+ on the right side, and 2+ on the left side.       Radial pulses are 2+ on the right side, and 2+ on the left side.        Femoral pulses are 2+ on the right side, and 2+ on the left side.       Dorsalis pedis pulses are 2+ on the right side, and 2+ on the left side.        Posterior tibial pulses are 2+ on the right side, and 2+ on the left side. "   Pulmonary/Chest: Breath sounds normal. No respiratory distress. She has no decreased breath sounds. She has no wheezes. She has no rhonchi. She has no rales. She exhibits no tenderness.   Abdominal: Soft. Bowel sounds are normal. She exhibits no distension. There is no tenderness. There is no rebound.   Musculoskeletal: She exhibits edema (Trace LLE edema). She exhibits no deformity.   Neurological: She is alert and oriented to person, place, and time. She has normal strength. No sensory deficit.   Skin: No rash noted. No erythema.   Venous stasis changes on the left.   Psychiatric: She has a normal mood and affect. Her behavior is normal.       No components found for: CBC  No results found for: CMP  No components found for: LIPID  No results found for: BMP      ECG 12 Lead  Date/Time: 12/14/2017 1:23 PM  Performed by: MIRNA CADET  Authorized by: MIRNA CADET   Comparison: compared with previous ECG from 1/5/2015  Comparison to previous ECG: Premature ventricular complexes are new  Rhythm: sinus rhythm  Ectopy: unifocal PVCs  Rate: normal  Conduction: 1st degree  QRS axis: normal  Other findings: PRWP  Clinical impression: abnormal ECG              3/31/15  TTE  Conclusions  There is normal left ventricular systolic function.  There is evidence of an atrial septal aneurysm.   The right ventricular cavity size is mildly enlarged.  The right ventricular global systolic function is normal.  There is mild mitral regurgitation.   There is moderate tricuspid regurgitation.    DISCUSSION/SUMMARY  A very pleasant 72-year-old female with a medical history of diabetes mellitus, hypertension, hyperlipidemia, prior breast cancer with lumpectomy, radiation therapy, who presents to me secondary to dyspnea on exertion. She states that her dyspnea has been progressively worsening over the past 6 months. It is at least moderate in intensity. She appears today on my exam to be euvolemic and had no significant  findings on her pulmonary exam. She has no angina with activity.      1. Dyspnea on exertion: Unclear etiology. Previously she has been documented to have normal left ventricular systolic function and mild-to-moderate valvular heart disease. I will repeat an echo to reassess her LV function but also her valvular disease. Chest x-ray, D-dimer and proBNP have been ordered. In the setting of her exertional symptoms, I think that ordering a myocardial perfusion stress test is also reasonable considering her diabetes mellitus. If our workup is unrevealing for a clear cause of dyspnea, I would recommend pulmonary evaluation by one of our pulmonologists including PFTs.   2. Essential hypertension: Not at goal. I will move her hydrochlorothiazide over to chlorthalidone at 50 mg daily. This is more potent and has a longer duration of therapy. I will repeat a BMP on her in 4 weeks to reassess her sodium level but also creatinine with this alteration.      I will see her back in 6 months or earlier with problems

## 2017-12-17 DIAGNOSIS — R06.02 SHORTNESS OF BREATH: Primary | ICD-10-CM

## 2017-12-17 DIAGNOSIS — R79.89 D-DIMER, ELEVATED: ICD-10-CM

## 2017-12-20 ENCOUNTER — HOSPITAL ENCOUNTER (OUTPATIENT)
Dept: CARDIOLOGY | Facility: HOSPITAL | Age: 72
Discharge: HOME | End: 2017-12-20
Attending: INTERNAL MEDICINE | Admitting: INTERNAL MEDICINE

## 2017-12-20 ENCOUNTER — HOSPITAL ENCOUNTER (OUTPATIENT)
Dept: CARDIOLOGY | Facility: HOSPITAL | Age: 72
Discharge: HOME | End: 2017-12-20
Attending: INTERNAL MEDICINE

## 2017-12-20 VITALS
DIASTOLIC BLOOD PRESSURE: 70 MMHG | HEIGHT: 65 IN | WEIGHT: 216 LBS | HEART RATE: 75 BPM | SYSTOLIC BLOOD PRESSURE: 140 MMHG | BODY MASS INDEX: 35.99 KG/M2

## 2017-12-20 DIAGNOSIS — I10 ESSENTIAL HYPERTENSION: Chronic | ICD-10-CM

## 2017-12-20 DIAGNOSIS — R06.09 EXERTIONAL DYSPNEA: ICD-10-CM

## 2017-12-20 LAB
ASCENDING AORTA: 2.4 CM
BH CV ECHO MEAS - ACS: 1.7 CM
BH CV ECHO MEAS - AO MAX PG (FULL): 3.7 MMHG
BH CV ECHO MEAS - AO MAX PG: 10.3 MMHG
BH CV ECHO MEAS - AO MEAN PG (FULL): 3 MMHG
BH CV ECHO MEAS - AO MEAN PG: 6.2 MMHG
BH CV ECHO MEAS - AO ROOT AREA (BSA CORRECTED): 1.4
BH CV ECHO MEAS - AO ROOT AREA: 6.8 CM^2
BH CV ECHO MEAS - AO ROOT DIAM: 2.9 CM
BH CV ECHO MEAS - AO V2 MAX: 160.7 CM/SEC
BH CV ECHO MEAS - AO V2 MEAN: 116.7 CM/SEC
BH CV ECHO MEAS - AO V2 VTI: 37.4 CM
BH CV ECHO MEAS - AVA(I,A): 2 CM^2
BH CV ECHO MEAS - AVA(I,D): 2 CM^2
BH CV ECHO MEAS - AVA(V,A): 2.3 CM^2
BH CV ECHO MEAS - AVA(V,D): 2.3 CM^2
BH CV ECHO MEAS - BSA(HAYCOCK): 2.2 M^2
BH CV ECHO MEAS - BSA: 2 M^2
BH CV ECHO MEAS - BZI_BMI: 35.9 KILOGRAMS/M^2
BH CV ECHO MEAS - BZI_METRIC_HEIGHT: 165.1 CM
BH CV ECHO MEAS - BZI_METRIC_WEIGHT: 98 KG
BH CV ECHO MEAS - CONTRAST EF (2CH): 63.6 ML/M^2
BH CV ECHO MEAS - CONTRAST EF 4CH: 59.6 ML/M^2
BH CV ECHO MEAS - EDV(MOD-SP2): 44 ML
BH CV ECHO MEAS - EDV(MOD-SP4): 47 ML
BH CV ECHO MEAS - EDV(TEICH): 120.8 ML
BH CV ECHO MEAS - EF(CUBED): 74.9 %
BH CV ECHO MEAS - EF(MOD-SP2): 63.6 %
BH CV ECHO MEAS - EF(MOD-SP4): 59.6 %
BH CV ECHO MEAS - EF(TEICH): 66.5 %
BH CV ECHO MEAS - ESV(MOD-SP2): 16 ML
BH CV ECHO MEAS - ESV(MOD-SP4): 19 ML
BH CV ECHO MEAS - ESV(TEICH): 40.5 ML
BH CV ECHO MEAS - FS: 36.9 %
BH CV ECHO MEAS - IVS/LVPW: 0.92
BH CV ECHO MEAS - IVSD: 0.79 CM
BH CV ECHO MEAS - LAT PEAK E' VEL: 10 CM/SEC
BH CV ECHO MEAS - LV DIASTOLIC VOL/BSA (35-75): 23 ML/M^2
BH CV ECHO MEAS - LV MASS(C)D: 143.1 GRAMS
BH CV ECHO MEAS - LV MASS(C)DI: 70 GRAMS/M^2
BH CV ECHO MEAS - LV MAX PG: 6.6 MMHG
BH CV ECHO MEAS - LV MEAN PG: 3.3 MMHG
BH CV ECHO MEAS - LV SYSTOLIC VOL/BSA (12-30): 9.3 ML/M^2
BH CV ECHO MEAS - LV V1 MAX: 128.5 CM/SEC
BH CV ECHO MEAS - LV V1 MEAN: 82.1 CM/SEC
BH CV ECHO MEAS - LV V1 VTI: 26.6 CM
BH CV ECHO MEAS - LVIDD: 5 CM
BH CV ECHO MEAS - LVIDS: 3.2 CM
BH CV ECHO MEAS - LVLD AP2: 6.6 CM
BH CV ECHO MEAS - LVLD AP4: 6.2 CM
BH CV ECHO MEAS - LVLS AP2: 5.1 CM
BH CV ECHO MEAS - LVLS AP4: 5.2 CM
BH CV ECHO MEAS - LVOT AREA (M): 2.8 CM^2
BH CV ECHO MEAS - LVOT AREA: 2.8 CM^2
BH CV ECHO MEAS - LVOT DIAM: 1.9 CM
BH CV ECHO MEAS - LVPWD: 0.86 CM
BH CV ECHO MEAS - MED PEAK E' VEL: 8 CM/SEC
BH CV ECHO MEAS - MV A DUR: 0.11 SEC
BH CV ECHO MEAS - MV A MAX VEL: 86.3 CM/SEC
BH CV ECHO MEAS - MV DEC SLOPE: 410.9 CM/SEC^2
BH CV ECHO MEAS - MV DEC TIME: 0.21 SEC
BH CV ECHO MEAS - MV E MAX VEL: 84.4 CM/SEC
BH CV ECHO MEAS - MV E/A: 0.98
BH CV ECHO MEAS - MV MAX PG: 4 MMHG
BH CV ECHO MEAS - MV MEAN PG: 1.7 MMHG
BH CV ECHO MEAS - MV P1/2T MAX VEL: 84.9 CM/SEC
BH CV ECHO MEAS - MV P1/2T: 60.5 MSEC
BH CV ECHO MEAS - MV V2 MAX: 99.9 CM/SEC
BH CV ECHO MEAS - MV V2 MEAN: 60.9 CM/SEC
BH CV ECHO MEAS - MV V2 VTI: 25.5 CM
BH CV ECHO MEAS - MVA P1/2T LCG: 2.6 CM^2
BH CV ECHO MEAS - MVA(P1/2T): 3.6 CM^2
BH CV ECHO MEAS - MVA(VTI): 3 CM^2
BH CV ECHO MEAS - PA MAX PG (FULL): 5.8 MMHG
BH CV ECHO MEAS - PA MAX PG: 7.5 MMHG
BH CV ECHO MEAS - PA V2 MAX: 136.8 CM/SEC
BH CV ECHO MEAS - PULM A REVS DUR: 0.12 SEC
BH CV ECHO MEAS - PULM A REVS VEL: 32.5 CM/SEC
BH CV ECHO MEAS - PULM DIAS VEL: 50.4 CM/SEC
BH CV ECHO MEAS - PULM S/D: 1.3
BH CV ECHO MEAS - PULM SYS VEL: 64.5 CM/SEC
BH CV ECHO MEAS - PVA(V,A): 1.8 CM^2
BH CV ECHO MEAS - PVA(V,D): 1.8 CM^2
BH CV ECHO MEAS - QP/QS: 0.72
BH CV ECHO MEAS - RAP SYSTOLE: 3 MMHG
BH CV ECHO MEAS - RV MAX PG: 1.7 MMHG
BH CV ECHO MEAS - RV MEAN PG: 0.99 MMHG
BH CV ECHO MEAS - RV V1 MAX: 65 CM/SEC
BH CV ECHO MEAS - RV V1 MEAN: 46.3 CM/SEC
BH CV ECHO MEAS - RV V1 VTI: 14.5 CM
BH CV ECHO MEAS - RVOT AREA: 3.8 CM^2
BH CV ECHO MEAS - RVOT DIAM: 2.2 CM
BH CV ECHO MEAS - RVSP: 32 MMHG
BH CV ECHO MEAS - SI(AO): 123.8 ML/M^2
BH CV ECHO MEAS - SI(CUBED): 47.1 ML/M^2
BH CV ECHO MEAS - SI(LVOT): 37.1 ML/M^2
BH CV ECHO MEAS - SI(MOD-SP2): 13.7 ML/M^2
BH CV ECHO MEAS - SI(MOD-SP4): 13.7 ML/M^2
BH CV ECHO MEAS - SI(TEICH): 39.3 ML/M^2
BH CV ECHO MEAS - SUP REN AO DIAM: 1.4 CM
BH CV ECHO MEAS - SV(AO): 252.9 ML
BH CV ECHO MEAS - SV(CUBED): 96.2 ML
BH CV ECHO MEAS - SV(LVOT): 75.8 ML
BH CV ECHO MEAS - SV(MOD-SP2): 28 ML
BH CV ECHO MEAS - SV(MOD-SP4): 28 ML
BH CV ECHO MEAS - SV(RVOT): 55 ML
BH CV ECHO MEAS - SV(TEICH): 80.3 ML
BH CV ECHO MEAS - TAPSE (>1.6): 1.8 CM2
BH CV ECHO MEAS - TR MAX VEL: 258.4 CM/SEC
BH CV NUCLEAR PRIOR STUDY: 2
BH CV STRESS BP STAGE 1: NORMAL
BH CV STRESS COMMENTS STAGE 1: NORMAL
BH CV STRESS DOSE REGADENOSON STAGE 1: 0.4
BH CV STRESS DURATION MIN STAGE 1: 0
BH CV STRESS DURATION SEC STAGE 1: 15
BH CV STRESS HR STAGE 1: 88
BH CV STRESS PROTOCOL 1: NORMAL
BH CV STRESS RECOVERY BP: NORMAL MMHG
BH CV STRESS RECOVERY HR: 81 BPM
BH CV STRESS STAGE 1: 1
BH CV XLRA - RV BASE: 2.8 CM
BH CV XLRA - TDI S': 13 CM/SEC
E/E' RATIO: 9
LEFT ATRIUM VOLUME INDEX: 21 ML/M2
LV EF 2D ECHO EST: 60 %
LV EF NUC BP: 78 %
MAXIMAL PREDICTED HEART RATE: 146 BPM
PERCENT MAX PREDICTED HR: 60.27 %
SINUS: 2.5 CM
STJ: 2.5 CM
STRESS BASELINE BP: NORMAL MMHG
STRESS BASELINE HR: 72 BPM
STRESS PERCENT HR: 71 %
STRESS POST EXERCISE DUR SEC: 15 SEC
STRESS POST PEAK BP: NORMAL MMHG
STRESS POST PEAK HR: 88 BPM
STRESS TARGET HR: 124 BPM

## 2017-12-20 PROCEDURE — 25010000002 REGADENOSON 0.4 MG/5ML SOLUTION: Performed by: INTERNAL MEDICINE

## 2017-12-20 PROCEDURE — 93306 TTE W/DOPPLER COMPLETE: CPT | Performed by: INTERNAL MEDICINE

## 2017-12-20 PROCEDURE — 93018 CV STRESS TEST I&R ONLY: CPT | Performed by: INTERNAL MEDICINE

## 2017-12-20 PROCEDURE — 93017 CV STRESS TEST TRACING ONLY: CPT

## 2017-12-20 PROCEDURE — 0 RUBIDIUM CHLORIDE: Performed by: INTERNAL MEDICINE

## 2017-12-20 PROCEDURE — 93016 CV STRESS TEST SUPVJ ONLY: CPT | Performed by: INTERNAL MEDICINE

## 2017-12-20 PROCEDURE — 78492 MYOCRD IMG PET MLT RST&STRS: CPT | Performed by: INTERNAL MEDICINE

## 2017-12-20 PROCEDURE — 78492 MYOCRD IMG PET MLT RST&STRS: CPT

## 2017-12-20 PROCEDURE — 93306 TTE W/DOPPLER COMPLETE: CPT

## 2017-12-20 PROCEDURE — A9555 RB82 RUBIDIUM: HCPCS | Performed by: INTERNAL MEDICINE

## 2017-12-20 RX ADMIN — RUBIDIUM CHLORIDE RB-82 1 DOSE: 150 INJECTION, SOLUTION INTRAVENOUS at 12:25

## 2017-12-20 RX ADMIN — RUBIDIUM CHLORIDE RB-82 1 DOSE: 150 INJECTION, SOLUTION INTRAVENOUS at 12:15

## 2017-12-20 RX ADMIN — REGADENOSON 0.4 MG: 0.08 INJECTION, SOLUTION INTRAVENOUS at 12:25

## 2017-12-22 ENCOUNTER — HOSPITAL ENCOUNTER (OUTPATIENT)
Dept: CT IMAGING | Facility: HOSPITAL | Age: 72
Discharge: HOME | End: 2017-12-22
Attending: INTERNAL MEDICINE | Admitting: INTERNAL MEDICINE

## 2017-12-22 DIAGNOSIS — I10 ESSENTIAL HYPERTENSION: Chronic | ICD-10-CM

## 2017-12-22 LAB — CREAT BLDA-MCNC: 1.3 MG/DL (ref 0.6–1.3)

## 2017-12-22 PROCEDURE — 82565 ASSAY OF CREATININE: CPT

## 2017-12-22 PROCEDURE — 0 IOPAMIDOL PER 1 ML: Performed by: INTERNAL MEDICINE

## 2017-12-22 PROCEDURE — 71275 CT ANGIOGRAPHY CHEST: CPT

## 2017-12-22 RX ORDER — IRBESARTAN 300 MG/1
TABLET ORAL
Qty: 90 TABLET | Refills: 0 | OUTPATIENT
Start: 2017-12-22

## 2017-12-22 RX ADMIN — IOPAMIDOL 95 ML: 755 INJECTION, SOLUTION INTRAVENOUS at 14:50

## 2018-01-05 ENCOUNTER — TELEPHONE (OUTPATIENT)
Dept: ENDOCRINOLOGY | Age: 73
End: 2018-01-05

## 2018-01-05 RX ORDER — LANCETS 33 GAUGE
EACH MISCELLANEOUS
Qty: 150 EACH | Refills: 3 | Status: SHIPPED | OUTPATIENT
Start: 2018-01-05 | End: 2018-08-07

## 2018-01-05 NOTE — TELEPHONE ENCOUNTER
PATIENT CALLED IN WITH HIGH BLOOD SUGARS, ADVISED PATIENT TO INCREASE HER INSULIN AS DR. ANDREA HAS ADVISED IN HIS LAST OFFICE NOTE AND I WILL CALL HER ON Monday WITH ANY CHANGES FROM DR. ANDREA.

## 2018-01-11 ENCOUNTER — OFFICE VISIT (OUTPATIENT)
Dept: ENDOCRINOLOGY | Age: 73
End: 2018-01-11

## 2018-01-11 VITALS
WEIGHT: 216.6 LBS | SYSTOLIC BLOOD PRESSURE: 132 MMHG | HEART RATE: 76 BPM | HEIGHT: 61 IN | DIASTOLIC BLOOD PRESSURE: 82 MMHG | BODY MASS INDEX: 40.89 KG/M2

## 2018-01-11 DIAGNOSIS — IMO0002 UNCONTROLLED TYPE 2 DIABETES MELLITUS WITH DIABETIC NEUROPATHY, WITH LONG-TERM CURRENT USE OF INSULIN: ICD-10-CM

## 2018-01-11 DIAGNOSIS — IMO0002 UNCONTROLLED TYPE 2 DIABETES MELLITUS WITH COMPLICATION, WITH LONG-TERM CURRENT USE OF INSULIN: Primary | ICD-10-CM

## 2018-01-11 DIAGNOSIS — E03.9 ACQUIRED HYPOTHYROIDISM: ICD-10-CM

## 2018-01-11 DIAGNOSIS — E16.1 HYPERINSULINISM: ICD-10-CM

## 2018-01-11 PROCEDURE — 99214 OFFICE O/P EST MOD 30 MIN: CPT | Performed by: INTERNAL MEDICINE

## 2018-01-11 NOTE — PROGRESS NOTES
72 y.o.    Patient Care Team:  Claudio Anne MD as PCP - General  Claudio Anne MD as PCP - Family Medicine  Robert SHINE MD as Consulting Physician (Endocrinology)    Chief Complaint:      F/U TYPE 2 DIABETES, UNCONTROLLED.  NO RECENT LABS.  C/O HIGH BLOOD SUGARS, SHORTNESS OF BREATH AND WEAKNESS.  Subjective     HPI   Patient is a 72-year-old white female with a past medical history of uncontrolled type 2 diabetes mellitus came for follow-up.  Patient is currently taking NPH and regular insulin  She stopped taking victoza and Levemir   She reported that she was taking NPH insulin 20 units twice daily and regular insulin 10 units twice daily  Apparently her blood sugars are not under control so starting January 1 week she went back to Lantus and Humalog  She is currently taking Lantus 40 units at night and Humalog 12 units before each meal  She reports her blood sugars have started getting better though still elevated   Uncontrolled type 2 diabetes mellitus with neuropathy  Patient is significantly symptomatic of numbness and tingling and burning in both lower extremities  She is currently not taking any medication but he used to be on gabapentin in the past  Hypothyroidism  Patient is currently on medication for hypothyroidism  She is compliant with the medication  She denies any side effects.  Abnormal weight gain  Patient currently weighs 216 pounds with a BMI of 40.9  Patient has not been able to lose weight      Interval History      SUMMARY  Patient is a known type II diabetic on oral hypoglycemics. Her blood sugars have been out of control for the past several months most of the sugars are greater than 200. She has been very hesitant to start insulin.  Patient started Levemir 7 units at bedtime along with NovoLog 3 times a day before each meal. She appears to have tolerated Levemir very well. she reported that when the blood sugar dropped to 87 she has a typical hypoglycemic reaction recently.    But she reports that the sugars are still very high. She feels very thirsty and fatigue along with the high numbers.she managed to increase the NovoLog by herself to 8-10 units before meal.patient reports that her insurance company switch the insulin to Humalog since January.  fasting blood sugars are still elevated in the 200 range  patient is currently taking Levemir 35 units at bedtime and NovoLog 14 units before each meal  Diabetic neuropathy  patient continues to complain of tingling and numbness in both lower extremities. She is currently on gabapentin  Fatigue  Especially worse in the blood sugars are elevated usually towards the end of the day.  Abnormal weight gain  Patient currently weighs 210 pounds   she feels very frustrated regarding the weight gain but is more concerned about her blood sugars at this time.  Patient reports that she has never tried victoza.  She reported no history of pancreatitis or thyroid cancer  patient also reported that due to her 's illness she has been taking him to different office visits and has not been able to focus on herself. She has not been checking her Accu-Cheks on a regular basis. She also reports swelling in the feet for the past few months. Her last hemoglobin A1c was 8.6%      Patient started victoza in the previous visit. She reports no side effects other than mild nausea initially. She reports she lost nearly 12 pounds in the blood sugars have improved significantly. She still currently taking NovoLog 7 units before each meal if the blood sugars are greater than 100 and Levemir 40 units at bedtime.  Her victoza dose is 1.2 mg daily at night      Patient reports that January 2015 and she had a left ankle fracture and has been through rehabilitation for nearly one month and still has pain on walking and weight-bearing.. During that time her blood sugars apparently were high but for the past 2-3 weeks they have significantly improved  Patient had one  episode of hypoglycemia 2 days ago when she overreacted to blood sugar of 285 and took nearly 19 units of NovoLog      Interval history  Patient reported that she is currently in the Medicare gap and her insulin and Victoza is costing almost $160 each and is not able to afford that.  She loves Victoza since it made her lose nearly 32 pounds and controls her appetite  She is back on taking Levemir 40 units at night and victoza 1.8 mg daily  Her blood sugars have significantly improved majority are less than 160  She also reported that she is not needing NovoLog at all on most of the days.  She recently received a cortisone injection in the knee in August 2015 and her blood sugars have been elevated for a few days after that.  Patient forgot to bring her glucometer today but most blood sugars are in the 115 range   The following portions of the patient's history were reviewed and updated as appropriate: allergies, current medications, past family history, past medical history, past social history, past surgical history and problem list.    Past Medical History:   Diagnosis Date   • Arthropathy of knee 05/22/2014    unspecified   • Arthropathy, multiple sites 04/12/2012    unspecified   • Cancer     breast   • Chronic kidney disease, stage III (moderate) 05/26/2011   • Controlled diabetes mellitus type II without complication    • Depression 04/02/2001   • Dyspnea    • Essential hypertension    • History of complete eye exam 03/29/2012   • History of gynecological procedure 03/01/2010    special invesitation and examinations; gynecological examination; routine gynecological examination   • Hyperlipidemia     other and unspecified   • Hypothyroidism     unspecified   • Personal history of malignant neoplasm of breast 2001    R breast (negative nodes) [place] with radiation and Tamoxifen   • Thyroid disease    • Type II diabetes mellitus with neurological manifestations, uncontrolled 01/03/2013     Family History   Problem  "Relation Age of Onset   • Hypertension Sister    • Thyroid disease Daughter    • Cancer Mother    • Heart disease Father      Social History     Social History   • Marital status:      Spouse name: N/A   • Number of children: 4   • Years of education: N/A     Occupational History   • Not on file.     Social History Main Topics   • Smoking status: Never Smoker   • Smokeless tobacco: Never Used   • Alcohol use No   • Drug use: No   • Sexual activity: Not on file     Other Topics Concern   • Not on file     Social History Narrative     No Known Allergies    Current Outpatient Prescriptions:   •  acetaminophen (TYLENOL) 500 MG tablet, Take 1,000 mg by mouth every 8 (eight) hours as needed for mild pain (1-3). Take 2 tablets by oral route every 8 hours as needed, Disp: , Rfl:   •  amLODIPine (NORVASC) 10 MG tablet, Take 1 tablet by mouth Daily for 180 days., Disp: 90 tablet, Rfl: 1  •  aspirin 81 MG EC tablet, Take 81 mg by mouth daily. Take 1 tablet by oral route once daily, Disp: , Rfl:   •  atorvastatin (LIPITOR) 20 MG tablet, Take 1 tablet by mouth Every Night for 180 days., Disp: 90 tablet, Rfl: 1  •  chlorthalidone (HYGROTEN) 50 MG tablet, Take 1 tablet by mouth Daily., Disp: 30 tablet, Rfl: 6  •  glucose blood (FREESTYLE LITE) test strip, 1 each by Other route 4 (Four) Times a Day. Use as instructed, Disp: 150 each, Rfl: 6  •  insulin NPH (NOVOLIN N RELION) 100 UNIT/ML injection, 14 UNITS BEFORE BREAKFAST AND BEDTIME SC, Disp: 10 mL, Rfl: 5  •  insulin regular (NOVOLIN R RELION) 100 UNIT/ML injection, 6 UNITS SC BEFORE BREAKFAST AND SUPPER DAILY, Disp: 10 mL, Rfl: 5  •  Insulin Syringe-Needle U-100 (BD INSULIN SYRINGE ULTRAFINE) 31G X 5/16\" 1 ML misc, 4 times daily sc As directed, Disp: 200 each, Rfl: 1  •  irbesartan (AVAPRO) 300 MG tablet, Take 1 tablet by mouth Daily for 180 days., Disp: 90 tablet, Rfl: 1  •  levothyroxine (SYNTHROID, LEVOTHROID) 75 MCG tablet, Take 1 tablet by mouth Daily for 180 days., " "Disp: 90 tablet, Rfl: 1  •  ONE TOUCH ULTRA TEST test strip, TEST FOUR TIMES A DAY, Disp: 100 each, Rfl: 3  •  ONETOUCH DELICA LANCETS 33G misc, USE ONE LANCET TO TEST FOUR TIMES A DAY AS DIRECTED BY THE DOCTOR., Disp: 150 each, Rfl: 3  •  PARoxetine (PAXIL) 10 MG tablet, Take 1 tablet by mouth Daily for 180 days., Disp: 90 tablet, Rfl: 1        Review of Systems   Constitutional: Positive for fatigue. Negative for chills and fever.   Cardiovascular: Negative for chest pain and palpitations.   Gastrointestinal: Positive for diarrhea (LOOSE STOOLS). Negative for abdominal pain, constipation, nausea and vomiting.   Endocrine: Negative for cold intolerance and heat intolerance.   Genitourinary: Positive for frequency.   All other systems reviewed and are negative.      Objective       Vitals:    01/11/18 1453   BP: 132/82   Pulse: 76   Weight: 98.2 kg (216 lb 9.6 oz)   Height: 154.9 cm (61\")     Body mass index is 40.93 kg/(m^2).      Physical Exam   Constitutional: She is oriented to person, place, and time. She appears well-developed and well-nourished.   obese   HENT:   Head: Normocephalic and atraumatic.   Eyes: EOM are normal. Pupils are equal, round, and reactive to light.   Neck: Normal range of motion. Neck supple. No thyromegaly present.   Cardiovascular: Normal rate, regular rhythm, normal heart sounds and intact distal pulses.    Pulmonary/Chest: Effort normal and breath sounds normal.   Abdominal: Soft. Bowel sounds are normal. She exhibits distension. There is no tenderness.   Musculoskeletal: Normal range of motion. She exhibits no edema.   Neurological: She is alert and oriented to person, place, and time.   Skin: Skin is warm and dry.   Psychiatric: She has a normal mood and affect. Her behavior is normal.   Nursing note and vitals reviewed.    Results Review:     I reviewed the patient's new clinical results.    Medical records reviewed  Summary:      Hospital Outpatient Visit on 12/22/2017   Component " Date Value Ref Range Status   • Creatinine 12/22/2017 1.30  0.60 - 1.30 mg/dL Final    Serial Number: [id number]:  [id number]     Lab Results   Component Value Date    HGBA1C 7.00 (H) 07/18/2017    HGBA1C 6.34 (H) 02/14/2017    HGBA1C 5.80 (H) 11/21/2016     Lab Results   Component Value Date    MICROALBUR 17.1 02/14/2017    CREATININE 1.30 12/22/2017     Imaging Results (most recent)     None                Assessment and Plan:    Iraida was seen today for diabetes.    Diagnoses and all orders for this visit:    Uncontrolled type 2 diabetes mellitus with complication, with long-term current use of insulin  -     Comprehensive Metabolic Panel  -     Hemoglobin A1c  -     T4, Free  -     TSH  -     Microalbumin / Creatinine Urine Ratio - Urine, Clean Catch    Acquired hypothyroidism  -     Comprehensive Metabolic Panel  -     Hemoglobin A1c  -     T4, Free  -     TSH  -     Microalbumin / Creatinine Urine Ratio - Urine, Clean Catch    Hyperinsulinism    Uncontrolled type 2 diabetes mellitus with diabetic neuropathy, with long-term current use of insulin    Other orders  -     Insulin Glargine (TOUJEO SOLOSTAR) 300 UNIT/ML solution pen-injector; Inject 40 Units under the skin Every Night.  -     Insulin Lispro (HUMALOG KWIKPEN) 200 UNIT/ML solution pen-injector; Inject 12 Units under the skin 3 (Three) Times a Day Before Meals.          #1 uncontrolled type 2 diabetes mellitus.  #2 uncontrolled diabetic neuropathy. Most likely related to uncontrolled hyperglycemia.   #3 fatigue  #4 abnormal weight gain:   #5 hypoglycemia: not many recently  #6 hypothyroidism on medication  #7 heart murmur ejection systolic      Glucometer download reviewed  Patient's blood sugars are in the 200-300 range most of the day  She is very concerned and thinks that the insulin is not working  She's currently taking NPH 20 units twice daily and regular insulin 10 units twice daily  She adjusted the insulin on her own but still not getting  "good results    She wants to go back to insulin pens now that she is out of the Medicare gap  She will take toujeo 40 units at bedtime  She will also take Humalog 12 units before each meal  She will increase the insulin by 2 units every 3 days if the blood sugars are greater than 200  She will decrease the insulin by 5 units if the blood sugar is below 100    Patient will return to follow-up in 3 months  The total time spent for old record and lab review and face- to- face was more than 25 min of which greater than 15 min of time was spent on counseling the patient on recommended evaluation and treatment options, instructions for management/treatment and /or follow up  and importance of compliance with chosen management or treatment options  Robert Garcia MD. FACE    01/11/18      [facility] Dragon / transcription disclaimer:     \"Dictated utilizing Dragon dictation\".         "

## 2018-01-12 LAB
ALBUMIN SERPL-MCNC: 4.2 G/DL (ref 3.5–5.2)
ALBUMIN/CREAT UR: 4.8 MG/G CREAT (ref 0–30)
ALBUMIN/GLOB SERPL: 1.5 G/DL
ALP SERPL-CCNC: 114 U/L (ref 39–117)
ALT SERPL-CCNC: 24 U/L (ref 1–33)
AST SERPL-CCNC: 20 U/L (ref 1–32)
BILIRUB SERPL-MCNC: 0.7 MG/DL (ref 0.1–1.2)
BUN SERPL-MCNC: 33 MG/DL (ref 8–23)
BUN/CREAT SERPL: 21.9 (ref 7–25)
CALCIUM SERPL-MCNC: 9.6 MG/DL (ref 8.6–10.5)
CHLORIDE SERPL-SCNC: 96 MMOL/L (ref 98–107)
CO2 SERPL-SCNC: 27.5 MMOL/L (ref 22–29)
CREAT SERPL-MCNC: 1.51 MG/DL (ref 0.57–1)
CREAT UR-MCNC: 313 MG/DL
GLOBULIN SER CALC-MCNC: 2.8 GM/DL
GLUCOSE SERPL-MCNC: 292 MG/DL (ref 65–99)
HBA1C MFR BLD: 7.93 % (ref 4.8–5.6)
MICROALBUMIN UR-MCNC: 15.1 UG/ML
POTASSIUM SERPL-SCNC: 4.6 MMOL/L (ref 3.5–5.2)
PROT SERPL-MCNC: 7 G/DL (ref 6–8.5)
SODIUM SERPL-SCNC: 139 MMOL/L (ref 136–145)
T4 FREE SERPL-MCNC: 1.42 NG/DL (ref 0.93–1.7)
TSH SERPL DL<=0.005 MIU/L-ACNC: 2.89 MIU/ML (ref 0.27–4.2)

## 2018-01-16 PROBLEM — IMO0002 UNCONTROLLED TYPE 2 DIABETES MELLITUS WITH COMPLICATION, WITH LONG-TERM CURRENT USE OF INSULIN: Status: ACTIVE | Noted: 2018-01-16

## 2018-01-16 PROBLEM — IMO0002 UNCONTROLLED TYPE 2 DIABETES MELLITUS WITH DIABETIC NEUROPATHY, WITH LONG-TERM CURRENT USE OF INSULIN: Status: ACTIVE | Noted: 2018-01-16

## 2018-01-29 DIAGNOSIS — IMO0002 UNCONTROLLED TYPE 2 DIABETES MELLITUS WITH DIABETIC NEUROPATHY, WITH LONG-TERM CURRENT USE OF INSULIN: Primary | ICD-10-CM

## 2018-01-29 RX ORDER — LANCING DEVICE/LANCETS
1 KIT MISCELLANEOUS 4 TIMES DAILY
Qty: 1 EACH | Refills: 1 | Status: SHIPPED | OUTPATIENT
Start: 2018-01-29 | End: 2018-08-07

## 2018-04-05 ENCOUNTER — LAB (OUTPATIENT)
Dept: ENDOCRINOLOGY | Age: 73
End: 2018-04-05

## 2018-04-05 DIAGNOSIS — IMO0002 UNCONTROLLED TYPE 2 DIABETES MELLITUS WITH DIABETIC NEUROPATHY, WITH LONG-TERM CURRENT USE OF INSULIN: Primary | ICD-10-CM

## 2018-04-05 DIAGNOSIS — E03.8 OTHER SPECIFIED HYPOTHYROIDISM: ICD-10-CM

## 2018-04-05 DIAGNOSIS — IMO0002 UNCONTROLLED TYPE 2 DIABETES MELLITUS WITH DIABETIC NEUROPATHY, WITH LONG-TERM CURRENT USE OF INSULIN: ICD-10-CM

## 2018-04-06 LAB
ALBUMIN SERPL-MCNC: 4 G/DL (ref 3.5–5.2)
ALBUMIN/CREAT UR: 7.3 MG/G CREAT (ref 0–30)
ALBUMIN/GLOB SERPL: 1.7 G/DL
ALP SERPL-CCNC: 95 U/L (ref 39–117)
ALT SERPL-CCNC: 22 U/L (ref 1–33)
AST SERPL-CCNC: 19 U/L (ref 1–32)
BILIRUB SERPL-MCNC: 0.8 MG/DL (ref 0.1–1.2)
BUN SERPL-MCNC: 29 MG/DL (ref 8–23)
BUN/CREAT SERPL: 26.9 (ref 7–25)
CALCIUM SERPL-MCNC: 10.2 MG/DL (ref 8.6–10.5)
CHLORIDE SERPL-SCNC: 98 MMOL/L (ref 98–107)
CO2 SERPL-SCNC: 29.4 MMOL/L (ref 22–29)
CREAT SERPL-MCNC: 1.08 MG/DL (ref 0.57–1)
CREAT UR-MCNC: 295.5 MG/DL
GFR SERPLBLD CREATININE-BSD FMLA CKD-EPI: 49 ML/MIN/1.73
GFR SERPLBLD CREATININE-BSD FMLA CKD-EPI: 60 ML/MIN/1.73
GLOBULIN SER CALC-MCNC: 2.3 GM/DL
GLUCOSE SERPL-MCNC: 102 MG/DL (ref 65–99)
HBA1C MFR BLD: 7.17 % (ref 4.8–5.6)
MICROALBUMIN UR-MCNC: 21.5 UG/ML
POTASSIUM SERPL-SCNC: 4.6 MMOL/L (ref 3.5–5.2)
PROT SERPL-MCNC: 6.3 G/DL (ref 6–8.5)
SODIUM SERPL-SCNC: 140 MMOL/L (ref 136–145)
T4 FREE SERPL-MCNC: 1.46 NG/DL (ref 0.93–1.7)
TSH SERPL DL<=0.005 MIU/L-ACNC: 4.1 MIU/ML (ref 0.27–4.2)

## 2018-04-12 ENCOUNTER — OFFICE VISIT (OUTPATIENT)
Dept: ENDOCRINOLOGY | Age: 73
End: 2018-04-12

## 2018-04-12 ENCOUNTER — RESULTS ENCOUNTER (OUTPATIENT)
Dept: FAMILY MEDICINE CLINIC | Facility: CLINIC | Age: 73
End: 2018-04-12

## 2018-04-12 VITALS
HEIGHT: 61 IN | WEIGHT: 222.4 LBS | SYSTOLIC BLOOD PRESSURE: 138 MMHG | BODY MASS INDEX: 41.99 KG/M2 | HEART RATE: 96 BPM | DIASTOLIC BLOOD PRESSURE: 60 MMHG

## 2018-04-12 DIAGNOSIS — E78.49 OTHER HYPERLIPIDEMIA: Chronic | ICD-10-CM

## 2018-04-12 DIAGNOSIS — IMO0002 UNCONTROLLED TYPE 2 DIABETES MELLITUS WITH DIABETIC NEUROPATHY, WITH LONG-TERM CURRENT USE OF INSULIN: ICD-10-CM

## 2018-04-12 DIAGNOSIS — E03.9 ACQUIRED HYPOTHYROIDISM: Chronic | ICD-10-CM

## 2018-04-12 DIAGNOSIS — IMO0002 UNCONTROLLED TYPE 2 DIABETES MELLITUS WITH COMPLICATION, WITH LONG-TERM CURRENT USE OF INSULIN: Primary | ICD-10-CM

## 2018-04-12 DIAGNOSIS — Z79.4 ENCOUNTER FOR LONG-TERM (CURRENT) USE OF INSULIN (HCC): ICD-10-CM

## 2018-04-12 DIAGNOSIS — I10 ESSENTIAL HYPERTENSION: Chronic | ICD-10-CM

## 2018-04-12 DIAGNOSIS — E16.1 HYPERINSULINISM: ICD-10-CM

## 2018-04-12 PROCEDURE — 99214 OFFICE O/P EST MOD 30 MIN: CPT | Performed by: INTERNAL MEDICINE

## 2018-04-12 RX ORDER — GABAPENTIN 300 MG/1
300 CAPSULE ORAL NIGHTLY
Qty: 30 CAPSULE | Refills: 3 | Status: SHIPPED | OUTPATIENT
Start: 2018-04-12 | End: 2018-05-14 | Stop reason: HOSPADM

## 2018-04-12 NOTE — PROGRESS NOTES
73 y.o.    Patient Care Team:  Claudio Anne MD as PCP - General  Claudio Anne MD as PCP - Family Medicine  Robert SHINE MD as Consulting Physician (Endocrinology)    Chief Complaint:      F/U TYPE 2 DIABETES, UNCONTROLLED.  HERE TO DISCUSS LAB RESULTS.  Subjective     HPI   Patient is a 73-year-old white female with a past history of uncontrolled type 2 diabetes mellitus came for follow-up  Patient is currently taking toujeo and Humalog  She takes Humalog 12 units before each meal and toujeo 40 units at bedtime  She reports her blood sugars are mostly in the  range but occasionally she has blood sugars in the 200  Hypoglycemia  Patient has an occasional blood sugar in the 40 range  He occasionally misses during the day in the late afternoon  Uncontrolled type 2 diabetes mellitus with neuropathy  Patient is significantly symptomatic of tingling and numbness and burning in both lower extremities  She used to be on gabapentin but is currently not taking any  Patient denied any knowledge of diabetic nephropathy or retinopathy.  Hypothyroidism  Patient is currently taking levothyroxine 75 µg daily.  She reports compliance with the medication and denies 6.  Abnormal weight gain  Patient currently weighs 222 pounds with a BMI of 42.        Interval History      SUMMARY  Patient is a known type II diabetic on oral hypoglycemics. Her blood sugars have been out of control for the past several months most of the sugars are greater than 200. She has been very hesitant to start insulin.  Patient started Levemir 7 units at bedtime along with NovoLog 3 times a day before each meal. She appears to have tolerated Levemir very well. she reported that when the blood sugar dropped to 87 she has a typical hypoglycemic reaction recently.   But she reports that the sugars are still very high. She feels very thirsty and fatigue along with the high numbers.she managed to increase the NovoLog by herself to 8-10 units  before meal.patient reports that her insurance company switch the insulin to Humalog since January.  fasting blood sugars are still elevated in the 200 range  patient is currently taking Levemir 35 units at bedtime and NovoLog 14 units before each meal  Diabetic neuropathy  patient continues to complain of tingling and numbness in both lower extremities. She is currently on gabapentin  Fatigue  Especially worse in the blood sugars are elevated usually towards the end of the day.  Abnormal weight gain  Patient currently weighs 210 pounds   she feels very frustrated regarding the weight gain but is more concerned about her blood sugars at this time.  Patient reports that she has never tried victoza.  She reported no history of pancreatitis or thyroid cancer  patient also reported that due to her 's illness she has been taking him to different office visits and has not been able to focus on herself. She has not been checking her Accu-Cheks on a regular basis. She also reports swelling in the feet for the past few months. Her last hemoglobin A1c was 8.6%      Patient started victoza in the previous visit. She reports no side effects other than mild nausea initially. She reports she lost nearly 12 pounds in the blood sugars have improved significantly. She still currently taking NovoLog 7 units before each meal if the blood sugars are greater than 100 and Levemir 40 units at bedtime.  Her victoza dose is 1.2 mg daily at night      Patient reports that January 2015 and she had a left ankle fracture and has been through rehabilitation for nearly one month and still has pain on walking and weight-bearing.. During that time her blood sugars apparently were high but for the past 2-3 weeks they have significantly improved  Patient had one episode of hypoglycemia 2 days ago when she overreacted to blood sugar of 285 and took nearly 19 units of NovoLog      Interval history  Patient reported that she is currently in the  Medicare gap and her insulin and Victoza is costing almost $160 each and is not able to afford that.  She loves Victoza since it made her lose nearly 32 pounds and controls her appetite  She is back on taking Levemir 40 units at night and victoza 1.8 mg daily  Her blood sugars have significantly improved majority are less than 160  She also reported that she is not needing NovoLog at all on most of the days.  She recently received a cortisone injection in the knee in August 2015 and her blood sugars have been elevated for a few days after that.  Patient forgot to bring her glucometer today but most blood sugars are in the 115 range   The following portions of the patient's history were reviewed and updated as appropriate: allergies, current medications, past family history, past medical history, past social history, past surgical history and problem list.    Past Medical History:   Diagnosis Date   • Arthropathy of knee 05/22/2014    unspecified   • Arthropathy, multiple sites 04/12/2012    unspecified   • Cancer     breast   • Chronic kidney disease, stage III (moderate) 05/26/2011   • Controlled diabetes mellitus type II without complication    • Depression 04/02/2001   • Dyspnea    • Essential hypertension    • History of complete eye exam 03/29/2012   • History of gynecological procedure 03/01/2010    special invesitation and examinations; gynecological examination; routine gynecological examination   • Hyperlipidemia     other and unspecified   • Hypothyroidism     unspecified   • Personal history of malignant neoplasm of breast 2001    R breast (negative nodes) [place] with radiation and Tamoxifen   • Thyroid disease    • Type II diabetes mellitus with neurological manifestations, uncontrolled 01/03/2013     Family History   Problem Relation Age of Onset   • Hypertension Sister    • Thyroid disease Daughter    • Cancer Mother    • Heart disease Father      Social History     Social History   • Marital status:       Spouse name: N/A   • Number of children: 4   • Years of education: N/A     Occupational History   • Not on file.     Social History Main Topics   • Smoking status: Never Smoker   • Smokeless tobacco: Never Used   • Alcohol use No   • Drug use: No   • Sexual activity: Not on file     Other Topics Concern   • Not on file     Social History Narrative   • No narrative on file     No Known Allergies    Current Outpatient Prescriptions:   •  acetaminophen (TYLENOL) 500 MG tablet, Take 1,000 mg by mouth every 8 (eight) hours as needed for mild pain (1-3). Take 2 tablets by oral route every 8 hours as needed, Disp: , Rfl:   •  amLODIPine (NORVASC) 10 MG tablet, Take 1 tablet by mouth Daily for 180 days., Disp: 90 tablet, Rfl: 1  •  aspirin 81 MG EC tablet, Take 81 mg by mouth daily. Take 1 tablet by oral route once daily, Disp: , Rfl:   •  atorvastatin (LIPITOR) 20 MG tablet, Take 1 tablet by mouth Every Night for 180 days., Disp: 90 tablet, Rfl: 1  •  chlorthalidone (HYGROTEN) 50 MG tablet, Take 1 tablet by mouth Daily., Disp: 30 tablet, Rfl: 6  •  glucose blood (FREESTYLE LITE) test strip, 1 each by Other route 4 (Four) Times a Day. Use as instructed, Disp: 150 each, Rfl: 6  •  Insulin Glargine (TOUJEO SOLOSTAR) 300 UNIT/ML solution pen-injector, Inject 40 Units under the skin Every Night., Disp: 15 mL, Rfl: 4  •  Insulin Lispro (HUMALOG KWIKPEN) 200 UNIT/ML solution pen-injector, Inject 12 Units under the skin 3 (Three) Times a Day Before Meals., Disp: 15 mL, Rfl: 4  •  insulin NPH (NOVOLIN N RELION) 100 UNIT/ML injection, 14 UNITS BEFORE BREAKFAST AND BEDTIME SC (Patient taking differently: 20 UNITS BEFORE BREAKFAST AND BEDTIME SC), Disp: 10 mL, Rfl: 5  •  insulin regular (NOVOLIN R RELION) 100 UNIT/ML injection, 6 UNITS SC BEFORE BREAKFAST AND SUPPER DAILY (Patient taking differently: 12 UNITS SC BEFORE BREAKFAST AND SUPPER DAILY), Disp: 10 mL, Rfl: 5  •  Insulin Syringe-Needle U-100 (BD INSULIN SYRINGE  "ULTRAFINE) 31G X 5/16\" 1 ML misc, 4 times daily sc As directed, Disp: 200 each, Rfl: 1  •  irbesartan (AVAPRO) 300 MG tablet, Take 1 tablet by mouth Daily for 180 days., Disp: 90 tablet, Rfl: 1  •  Lancet Devices (ONE TOUCH DELICA LANCING DEV) misc, 1 each 4 (Four) Times a Day. e11.40, Disp: 1 each, Rfl: 1  •  levothyroxine (SYNTHROID, LEVOTHROID) 75 MCG tablet, Take 1 tablet by mouth Daily for 180 days., Disp: 90 tablet, Rfl: 1  •  ONE TOUCH ULTRA TEST test strip, USE ONE STRIP TO TEST FOUR TIMES A DAY, Disp: 100 each, Rfl: 5  •  ONETOUCH DELICA LANCETS 33G misc, USE ONE LANCET TO TEST FOUR TIMES A DAY AS DIRECTED BY THE DOCTOR., Disp: 150 each, Rfl: 3  •  PARoxetine (PAXIL) 10 MG tablet, Take 1 tablet by mouth Daily for 180 days., Disp: 90 tablet, Rfl: 1        Review of Systems   Constitutional: Negative for chills, fatigue and fever.   Cardiovascular: Negative for chest pain and palpitations.   Gastrointestinal: Negative for abdominal pain, constipation, diarrhea, nausea and vomiting.   Endocrine: Negative for cold intolerance and heat intolerance.   All other systems reviewed and are negative.      Objective       Vitals:    04/12/18 1355   BP: 138/60   Pulse: 96   Weight: 101 kg (222 lb 6.4 oz)   Height: 154.9 cm (61\")     Body mass index is 42.02 kg/m².      Physical Exam   Constitutional: She is oriented to person, place, and time. She appears well-developed and well-nourished.   obese   HENT:   Head: Normocephalic and atraumatic.   Eyes: EOM are normal. Pupils are equal, round, and reactive to light.   Neck: Normal range of motion. Neck supple. No thyromegaly present.   Cardiovascular: Normal rate, regular rhythm, normal heart sounds and intact distal pulses.    Pulmonary/Chest: Effort normal and breath sounds normal.   Abdominal: Soft. Bowel sounds are normal. She exhibits distension. There is no tenderness.   Musculoskeletal: Normal range of motion. She exhibits no edema.   Neurological: She is alert and " oriented to person, place, and time.   Skin: Skin is warm and dry.   Psychiatric: She has a normal mood and affect. Her behavior is normal.   Nursing note and vitals reviewed.    Results Review:     I reviewed the patient's new clinical results.    Medical records reviewed  Summary:      Lab on 04/05/2018   Component Date Value Ref Range Status   • Glucose 04/06/2018 102* 65 - 99 mg/dL Final   • BUN 04/06/2018 29* 8 - 23 mg/dL Final   • Creatinine 04/06/2018 1.08* 0.57 - 1.00 mg/dL Final   • eGFR Non African Am 04/06/2018 49* >60 mL/min/1.73 Final    Comment: The MDRD GFR formula is only valid for adults with stable  renal function between ages 18 and 70.     • eGFR  Am 04/06/2018 60* >60 mL/min/1.73 Final   • BUN/Creatinine Ratio 04/06/2018 26.9* 7.0 - 25.0 Final   • Sodium 04/06/2018 140  136 - 145 mmol/L Final   • Potassium 04/06/2018 4.6  3.5 - 5.2 mmol/L Final   • Chloride 04/06/2018 98  98 - 107 mmol/L Final   • Total CO2 04/06/2018 29.4* 22.0 - 29.0 mmol/L Final   • Calcium 04/06/2018 10.2  8.6 - 10.5 mg/dL Final   • Total Protein 04/06/2018 6.3  6.0 - 8.5 g/dL Final   • Albumin 04/06/2018 4.00  3.50 - 5.20 g/dL Final   • Globulin 04/06/2018 2.3  gm/dL Final   • A/G Ratio 04/06/2018 1.7  g/dL Final   • Total Bilirubin 04/06/2018 0.8  0.1 - 1.2 mg/dL Final   • Alkaline Phosphatase 04/06/2018 95  39 - 117 U/L Final   • AST (SGOT) 04/06/2018 19  1 - 32 U/L Final   • ALT (SGPT) 04/06/2018 22  1 - 33 U/L Final   • Hemoglobin A1C 04/06/2018 7.17* 4.80 - 5.60 % Final    Comment: Hemoglobin A1C Ranges:  Increased Risk for Diabetes  5.7% to 6.4%  Diabetes                     >= 6.5%  Diabetic Goal                < 7.0%     • Free T4 04/06/2018 1.46  0.93 - 1.70 ng/dL Final   • TSH 04/06/2018 4.100  0.270 - 4.200 mIU/mL Final   • Creatinine, Urine 04/06/2018 295.5  Not Estab. mg/dL Final   • Microalbumin, Urine 04/06/2018 21.5  Not Estab. ug/mL Final   • Microalbumin/Creatinine Ratio 04/06/2018 7.3  0.0 - 30.0  mg/g creat Final     Lab Results   Component Value Date    HGBA1C 7.17 (H) 04/05/2018    HGBA1C 7.93 (H) 01/11/2018    HGBA1C 7.00 (H) 07/18/2017     Lab Results   Component Value Date    MICROALBUR 21.5 04/05/2018    CREATININE 1.08 (H) 04/05/2018     Imaging Results (most recent)     None          Assessment and Plan:    Iraida was seen today for diabetes.    Diagnoses and all orders for this visit:    Uncontrolled type 2 diabetes mellitus with complication, with long-term current use of insulin    Uncontrolled type 2 diabetes mellitus with diabetic neuropathy, with long-term current use of insulin    Acquired hypothyroidism    Hyperinsulinism    Encounter for long-term (current) use of insulin    Other orders  -     insulin NPH (NOVOLIN N RELION) 100 UNIT/ML injection; 20 UNITS BEFORE BREAKFAST AND BEDTIME SC  -     Insulin Lispro (HUMALOG KWIKPEN) 200 UNIT/ML solution pen-injector; Inject 12 Units under the skin 3 (Three) Times a Day Before Meals.  -     gabapentin (NEURONTIN) 300 MG capsule; Take 1 capsule by mouth Every Night.       #1 uncontrolled type 2 diabetes mellitus.  #2 uncontrolled diabetic neuropathy. Most likely related to uncontrolled hyperglycemia.   #3 fatigue  #4 abnormal weight gain:   #5 hypoglycemia: not many recently  #6 hypothyroidism on medication  #7 heart murmur ejection systolic      Patient's glucometer download reviewed  Most of his blood sugars are in the  range  She occasionally has blood sugars in the 200 range  She also said blood sugar in the 49 range    Patient is currently taking Humalog insulin 12 units before each meal and toujeo at night.  She takes nearly 40 units at night  She reports that this insulin is costing a lot of money and she is also experiencing daytime hypoglycemia    Patient wants to try NPH insulin  I recommend that she try NPH twice daily before breakfast and bedtime and continue Humalog before breakfast and supper  Patient will call for this insulin  "regimen after the current insulin supply has been used up    Patient will get lab testing done before next visit  She will return to follow-up in 3-4 months    The total time spent for old record and lab review and face- to- face was more than 25 min of which greater than 15 min of time ( greater than 50% of the total time )  was spent face to face with the patient counseling and coordination of care on recommended evaluation and treatment options, instructions for management/treatment and /or follow up  and importance of compliance with chosen management or treatment options    Robert Garcia MD. FACE    04/12/18      EMR Dragon / transcription disclaimer:     \"Dictated utilizing Dragon dictation\".         "

## 2018-04-18 DIAGNOSIS — E16.1 HYPOGLYCEMIA DUE TO ENDOGENOUS HYPERINSULINEMIA: ICD-10-CM

## 2018-04-18 RX ORDER — SYRINGE-NEEDLE,INSULIN,0.5 ML 27GX1/2"
SYRINGE, EMPTY DISPOSABLE MISCELLANEOUS
Qty: 200 EACH | Refills: 1 | Status: SHIPPED | OUTPATIENT
Start: 2018-04-18 | End: 2018-08-07

## 2018-05-14 ENCOUNTER — OFFICE VISIT (OUTPATIENT)
Dept: FAMILY MEDICINE CLINIC | Facility: CLINIC | Age: 73
End: 2018-05-14

## 2018-05-14 VITALS
BODY MASS INDEX: 42.29 KG/M2 | WEIGHT: 224 LBS | TEMPERATURE: 97.7 F | HEART RATE: 71 BPM | HEIGHT: 61 IN | DIASTOLIC BLOOD PRESSURE: 65 MMHG | RESPIRATION RATE: 16 BRPM | SYSTOLIC BLOOD PRESSURE: 119 MMHG

## 2018-05-14 DIAGNOSIS — E03.9 ACQUIRED HYPOTHYROIDISM: ICD-10-CM

## 2018-05-14 DIAGNOSIS — E78.49 OTHER HYPERLIPIDEMIA: ICD-10-CM

## 2018-05-14 DIAGNOSIS — Z12.31 ENCOUNTER FOR SCREENING MAMMOGRAM FOR MALIGNANT NEOPLASM OF BREAST: ICD-10-CM

## 2018-05-14 DIAGNOSIS — I10 ESSENTIAL HYPERTENSION: Primary | Chronic | ICD-10-CM

## 2018-05-14 DIAGNOSIS — F32.5 MAJOR DEPRESSIVE DISORDER WITH SINGLE EPISODE, IN FULL REMISSION (HCC): ICD-10-CM

## 2018-05-14 DIAGNOSIS — M25.50 ARTHRALGIA OF MULTIPLE SITES: ICD-10-CM

## 2018-05-14 PROCEDURE — 99214 OFFICE O/P EST MOD 30 MIN: CPT | Performed by: FAMILY MEDICINE

## 2018-05-14 RX ORDER — DULOXETIN HYDROCHLORIDE 60 MG/1
60 CAPSULE, DELAYED RELEASE ORAL NIGHTLY
Qty: 90 CAPSULE | Refills: 1 | Status: SHIPPED | OUTPATIENT
Start: 2018-05-14 | End: 2018-05-21

## 2018-05-14 RX ORDER — IRBESARTAN 300 MG/1
300 TABLET ORAL DAILY
Qty: 90 TABLET | Refills: 1 | Status: SHIPPED | OUTPATIENT
Start: 2018-05-14 | End: 2018-08-16 | Stop reason: SDUPTHER

## 2018-05-14 RX ORDER — LEVOTHYROXINE SODIUM 0.07 MG/1
75 TABLET ORAL DAILY
Qty: 90 TABLET | Refills: 1 | Status: SHIPPED | OUTPATIENT
Start: 2018-05-14 | End: 2018-08-16 | Stop reason: SDUPTHER

## 2018-05-14 RX ORDER — ATORVASTATIN CALCIUM 20 MG/1
20 TABLET, FILM COATED ORAL NIGHTLY
Qty: 90 TABLET | Refills: 1 | Status: SHIPPED | OUTPATIENT
Start: 2018-05-14 | End: 2018-08-16 | Stop reason: SDUPTHER

## 2018-05-14 RX ORDER — AMLODIPINE BESYLATE 10 MG/1
10 TABLET ORAL DAILY
Qty: 90 TABLET | Refills: 1 | Status: SHIPPED | OUTPATIENT
Start: 2018-05-14 | End: 2018-08-16 | Stop reason: SDUPTHER

## 2018-05-14 NOTE — PATIENT INSTRUCTIONS
Duloxetine delayed-release capsules  What is this medicine?  DULOXETINE (doo LOX e teen) is used to treat depression, anxiety, and different types of chronic pain.  This medicine may be used for other purposes; ask your health care provider or pharmacist if you have questions.  COMMON BRAND NAME(S): Ray Duran  What should I tell my health care provider before I take this medicine?  They need to know if you have any of these conditions:  -bipolar disorder or a family history of bipolar disorder  -glaucoma  -kidney disease  -liver disease  -suicidal thoughts or a previous suicide attempt  -taken medicines called MAOIs like Carbex, Eldepryl, Marplan, Nardil, and Parnate within 14 days  -an unusual reaction to duloxetine, other medicines, foods, dyes, or preservatives  -pregnant or trying to get pregnant  -breast-feeding  How should I use this medicine?  Take this medicine by mouth with a glass of water. Follow the directions on the prescription label. Do not cut, crush or chew this medicine. You can take this medicine with or without food. Take your medicine at regular intervals. Do not take your medicine more often than directed. Do not stop taking this medicine suddenly except upon the advice of your doctor. Stopping this medicine too quickly may cause serious side effects or your condition may worsen.  A special MedGuide will be given to you by the pharmacist with each prescription and refill. Be sure to read this information carefully each time.  Talk to your pediatrician regarding the use of this medicine in children. While this drug may be prescribed for children as young as 7 years of age for selected conditions, precautions do apply.  Overdosage: If you think you have taken too much of this medicine contact a poison control center or emergency room at once.  NOTE: This medicine is only for you. Do not share this medicine with others.  What if I miss a dose?  If you miss a dose, take it as soon as you  can. If it is almost time for your next dose, take only that dose. Do not take double or extra doses.  What may interact with this medicine?  Do not take this medicine with any of the following medications:  -desvenlafaxine  -levomilnacipran  -linezolid  -MAOIs like Carbex, Eldepryl, Marplan, Nardil, and Parnate  -methylene blue (injected into a vein)  -milnacipran  -thioridazine  -venlafaxine  This medicine may also interact with the following medications:  -alcohol  -amphetamines  -aspirin and aspirin-like medicines  -certain antibiotics like ciprofloxacin and enoxacin  -certain medicines for blood pressure, heart disease, irregular heart beat  -certain medicines for depression, anxiety, or psychotic disturbances  -certain medicines for migraine headache like almotriptan, eletriptan, frovatriptan, naratriptan, rizatriptan, sumatriptan, zolmitriptan  -certain medicines that treat or prevent blood clots like warfarin, enoxaparin, and dalteparin  -cimetidine  -fentanyl  -lithium  -NSAIDS, medicines for pain and inflammation, like ibuprofen or naproxen  -phentermine  -procarbazine  -rasagiline  -sibutramine  -Staples's wort  -theophylline  -tramadol  -tryptophan  This list may not describe all possible interactions. Give your health care provider a list of all the medicines, herbs, non-prescription drugs, or dietary supplements you use. Also tell them if you smoke, drink alcohol, or use illegal drugs. Some items may interact with your medicine.  What should I watch for while using this medicine?  Tell your doctor if your symptoms do not get better or if they get worse. Visit your doctor or health care professional for regular checks on your progress. Because it may take several weeks to see the full effects of this medicine, it is important to continue your treatment as prescribed by your doctor.  Patients and their families should watch out for new or worsening thoughts of suicide or depression. Also watch out for  sudden changes in feelings such as feeling anxious, agitated, panicky, irritable, hostile, aggressive, impulsive, severely restless, overly excited and hyperactive, or not being able to sleep. If this happens, especially at the beginning of treatment or after a change in dose, call your health care professional.  You may get drowsy or dizzy. Do not drive, use machinery, or do anything that needs mental alertness until you know how this medicine affects you. Do not stand or sit up quickly, especially if you are an older patient. This reduces the risk of dizzy or fainting spells. Alcohol may interfere with the effect of this medicine. Avoid alcoholic drinks.  This medicine can cause an increase in blood pressure. This medicine can also cause a sudden drop in your blood pressure, which may make you feel faint and increase the chance of a fall. These effects are most common when you first start the medicine or when the dose is increased, or during use of other medicines that can cause a sudden drop in blood pressure. Check with your doctor for instructions on monitoring your blood pressure while taking this medicine.  Your mouth may get dry. Chewing sugarless gum or sucking hard candy, and drinking plenty of water may help. Contact your doctor if the problem does not go away or is severe.  What side effects may I notice from receiving this medicine?  Side effects that you should report to your doctor or health care professional as soon as possible:  -allergic reactions like skin rash, itching or hives, swelling of the face, lips, or tongue  -anxious  -breathing problems  -confusion  -changes in vision  -chest pain  -confusion  -elevated mood, decreased need for sleep, racing thoughts, impulsive behavior  -eye pain  -fast, irregular heartbeat  -feeling faint or lightheaded, falls  -feeling agitated, angry, or irritable  -hallucination, loss of contact with reality  -high blood pressure  -loss of balance or  coordination  -palpitations  -redness, blistering, peeling or loosening of the skin, including inside the mouth  -restlessness, pacing, inability to keep still  -seizures  -stiff muscles  -suicidal thoughts or other mood changes  -trouble passing urine or change in the amount of urine  -trouble sleeping  -unusual bleeding or bruising  -unusually weak or tired  -vomiting  -yellowing of the eyes or skin  Side effects that usually do not require medical attention (report to your doctor or health care professional if they continue or are bothersome):  -change in sex drive or performance  -change in appetite or weight  -constipation  -dizziness  -dry mouth  -headache  -increased sweating  -nausea  -tired  This list may not describe all possible side effects. Call your doctor for medical advice about side effects. You may report side effects to FDA at 8-262-FDA-9997.  Where should I keep my medicine?  Keep out of the reach of children.  Store at room temperature between 20 and 25 degrees C (68 to 77 degrees F). Throw away any unused medicine after the expiration date.  NOTE: This sheet is a summary. It may not cover all possible information. If you have questions about this medicine, talk to your doctor, pharmacist, or health care provider.  © 2018 [employer] (2017-05-18 18:16:03)

## 2018-05-14 NOTE — PROGRESS NOTES
"Chief Complaint   Patient presents with   • Hypertension     Medication refills   • Hyperlipidemia   • Arthritis       Subjective   Patient presents the office to refill medications.  Her recent labs from endocrinology have been reviewed and are seen below.  Her blood pressure is controlled.  Lipids are controlled.  Thyroid is controlled.  She continues to have some pretty significant pain in her knees back and neck.  Tylenol alone is not helpful.  We will switch her depression medicine from peroxide teen to duloxetine causes of its indication for pain.  Short-term follow-up to document continued stability of depression control will be needed.  I have reviewed and updated her medications, medical history and problem list during today's office visit.     Patient Care Team:  Claudio Anne MD as PCP - General  Claudio Anne MD as PCP - Family Medicine  Robert SHINE MD as Consulting Physician (Endocrinology)    Social History   Substance Use Topics   • Smoking status: Never Smoker   • Smokeless tobacco: Never Used   • Alcohol use No       Review of Systems   Constitutional: Negative for fatigue.   Cardiovascular: Negative for chest pain.   Musculoskeletal: Positive for arthralgias.       Objective     /65   Pulse 71   Temp 97.7 °F (36.5 °C) (Oral)   Resp 16   Ht 154.9 cm (61\")   Wt 102 kg (224 lb)   BMI 42.32 kg/m²     Body mass index is 42.32 kg/m².    Physical Exam   Constitutional: She is oriented to person, place, and time. She appears well-developed. No distress.   Eyes: Conjunctivae and lids are normal.   Neck: Carotid bruit is not present.   Cardiovascular: Normal rate, regular rhythm and normal heart sounds.    Pulmonary/Chest: Effort normal and breath sounds normal.   Neurological: She is alert and oriented to person, place, and time.   Skin: Skin is warm and dry.   Psychiatric: She has a normal mood and affect. Her behavior is normal.   Vitals reviewed.       Data Reviewed:     "         Assessment/Plan     Problem List Items Addressed This Visit     Essential hypertension - Primary (Chronic)    Relevant Medications    amLODIPine (NORVASC) 10 MG tablet    irbesartan (AVAPRO) 300 MG tablet    Major depressive disorder with single episode, in full remission    Relevant Medications    DULoxetine (CYMBALTA) 60 MG capsule    Other hyperlipidemia    Relevant Medications    atorvastatin (LIPITOR) 20 MG tablet    Acquired hypothyroidism    Relevant Medications    levothyroxine (SYNTHROID, LEVOTHROID) 75 MCG tablet    Arthralgia of multiple sites    Relevant Medications    DULoxetine (CYMBALTA) 60 MG capsule      Other Visit Diagnoses     Encounter for screening mammogram for malignant neoplasm of breast        Relevant Orders    Mammo Screening Bilateral With CAD          Orders Placed This Encounter   Procedures   • Mammo Screening Bilateral With CAD     Order Specific Question:   Reason for Exam:     Answer:   due for screening         Current Outpatient Prescriptions:   •  acetaminophen (TYLENOL) 500 MG tablet, Take 1,000 mg by mouth every 8 (eight) hours as needed for mild pain (1-3). Take 2 tablets by oral route every 8 hours as needed, Disp: , Rfl:   •  aspirin 81 MG EC tablet, Take 81 mg by mouth daily. Take 1 tablet by oral route once daily, Disp: , Rfl:   •  glucose blood (FREESTYLE LITE) test strip, 1 each by Other route 4 (Four) Times a Day. Use as instructed, Disp: 150 each, Rfl: 6  •  Insulin Lispro (HUMALOG KWIKPEN) 200 UNIT/ML solution pen-injector, Inject 12 Units under the skin 3 (Three) Times a Day Before Meals., Disp: 15 mL, Rfl: 4  •  insulin NPH (NOVOLIN N RELION) 100 UNIT/ML injection, 20 UNITS BEFORE BREAKFAST AND BEDTIME SC, Disp: 20 mL, Rfl: 5  •  insulin regular (NOVOLIN R RELION) 100 UNIT/ML injection, 6 UNITS SC BEFORE BREAKFAST AND SUPPER DAILY (Patient taking differently: 12 UNITS SC BEFORE BREAKFAST AND SUPPER DAILY), Disp: 10 mL, Rfl: 5  •  Insulin Syringe-Needle  "U-100 (BD INSULIN SYRINGE ULTRAFINE) 31G X 5/16\" 1 ML misc, 4 times daily sc As directed, Disp: 200 each, Rfl: 1  •  Lancet Devices (ONE TOUCH DELICA LANCING DEV) misc, 1 each 4 (Four) Times a Day. e11.40, Disp: 1 each, Rfl: 1  •  ONE TOUCH ULTRA TEST test strip, USE ONE STRIP TO TEST FOUR TIMES A DAY, Disp: 100 each, Rfl: 5  •  ONETOUCH DELICA LANCETS 33G misc, USE ONE LANCET TO TEST FOUR TIMES A DAY AS DIRECTED BY THE DOCTOR., Disp: 150 each, Rfl: 3  •  amLODIPine (NORVASC) 10 MG tablet, Take 1 tablet by mouth Daily for 180 days., Disp: 90 tablet, Rfl: 1  •  atorvastatin (LIPITOR) 20 MG tablet, Take 1 tablet by mouth Every Night for 180 days., Disp: 90 tablet, Rfl: 1  •  DULoxetine (CYMBALTA) 60 MG capsule, Take 1 capsule by mouth Every Night for 180 days., Disp: 90 capsule, Rfl: 1  •  irbesartan (AVAPRO) 300 MG tablet, Take 1 tablet by mouth Daily for 180 days., Disp: 90 tablet, Rfl: 1  •  levothyroxine (SYNTHROID, LEVOTHROID) 75 MCG tablet, Take 1 tablet by mouth Daily for 180 days., Disp: 90 tablet, Rfl: 1    Return in about 6 weeks (around 6/25/2018), or if symptoms worsen or fail to improve, for Recheck.               "

## 2018-05-15 ENCOUNTER — TELEPHONE (OUTPATIENT)
Dept: FAMILY MEDICINE CLINIC | Facility: CLINIC | Age: 73
End: 2018-05-15

## 2018-05-15 DIAGNOSIS — F32.5 MAJOR DEPRESSIVE DISORDER WITH SINGLE EPISODE, IN FULL REMISSION (HCC): Primary | ICD-10-CM

## 2018-05-15 NOTE — TELEPHONE ENCOUNTER
Pt states that the new medicine that you gave her yesterday duloxetine is $250 at the pharmacy. Please advise?

## 2018-05-18 ENCOUNTER — APPOINTMENT (OUTPATIENT)
Dept: MAMMOGRAPHY | Facility: HOSPITAL | Age: 73
End: 2018-05-18
Attending: FAMILY MEDICINE

## 2018-05-21 RX ORDER — VENLAFAXINE HYDROCHLORIDE 75 MG/1
75 CAPSULE, EXTENDED RELEASE ORAL DAILY
Qty: 30 CAPSULE | Refills: 1 | Status: SHIPPED | OUTPATIENT
Start: 2018-05-21 | End: 2018-08-07

## 2018-05-21 NOTE — TELEPHONE ENCOUNTER
New prescription of venlafaxine xr sent to pharmacy of choice. Keep same follow up instructions. This medication will replace duloxetine and previous depression medication.

## 2018-07-18 ENCOUNTER — TRANSCRIBE ORDERS (OUTPATIENT)
Dept: ADMINISTRATIVE | Facility: HOSPITAL | Age: 73
End: 2018-07-18

## 2018-07-18 DIAGNOSIS — M54.5 LOW BACK PAIN, UNSPECIFIED BACK PAIN LATERALITY, UNSPECIFIED CHRONICITY, WITH SCIATICA PRESENCE UNSPECIFIED: Primary | ICD-10-CM

## 2018-07-25 ENCOUNTER — HOSPITAL ENCOUNTER (OUTPATIENT)
Dept: MRI IMAGING | Facility: HOSPITAL | Age: 73
Discharge: HOME | End: 2018-07-25
Attending: SPECIALIST | Admitting: SPECIALIST

## 2018-07-25 DIAGNOSIS — M54.5 LOW BACK PAIN, UNSPECIFIED BACK PAIN LATERALITY, UNSPECIFIED CHRONICITY, WITH SCIATICA PRESENCE UNSPECIFIED: ICD-10-CM

## 2018-07-25 PROCEDURE — 82565 ASSAY OF CREATININE: CPT

## 2018-07-25 PROCEDURE — A9577 INJ MULTIHANCE: HCPCS | Performed by: SPECIALIST

## 2018-07-25 PROCEDURE — 72158 MRI LUMBAR SPINE W/O & W/DYE: CPT

## 2018-07-25 PROCEDURE — 0 GADOBENATE DIMEGLUMINE 529 MG/ML SOLUTION: Performed by: SPECIALIST

## 2018-07-25 RX ADMIN — GADOBENATE DIMEGLUMINE 20 ML: 529 INJECTION, SOLUTION INTRAVENOUS at 12:04

## 2018-07-26 LAB — CREAT BLDA-MCNC: 1.2 MG/DL (ref 0.6–1.3)

## 2018-07-30 ENCOUNTER — LAB (OUTPATIENT)
Dept: ENDOCRINOLOGY | Age: 73
End: 2018-07-30

## 2018-07-30 DIAGNOSIS — IMO0002 UNCONTROLLED TYPE 2 DIABETES MELLITUS WITH DIABETIC NEUROPATHY, WITH LONG-TERM CURRENT USE OF INSULIN: Primary | ICD-10-CM

## 2018-07-30 DIAGNOSIS — IMO0002 UNCONTROLLED TYPE 2 DIABETES MELLITUS WITH DIABETIC NEUROPATHY, WITH LONG-TERM CURRENT USE OF INSULIN: ICD-10-CM

## 2018-07-30 DIAGNOSIS — E03.9 ACQUIRED HYPOTHYROIDISM: ICD-10-CM

## 2018-07-31 LAB
ALBUMIN SERPL-MCNC: 4.1 G/DL (ref 3.5–5.2)
ALBUMIN/CREAT UR: 3.1 MG/G CREAT (ref 0–30)
ALBUMIN/GLOB SERPL: 2.1 G/DL
ALP SERPL-CCNC: 78 U/L (ref 39–117)
ALT SERPL-CCNC: 26 U/L (ref 1–33)
AST SERPL-CCNC: 22 U/L (ref 1–32)
BILIRUB SERPL-MCNC: 0.7 MG/DL (ref 0.1–1.2)
BUN SERPL-MCNC: 22 MG/DL (ref 8–23)
BUN/CREAT SERPL: 21.4 (ref 7–25)
CALCIUM SERPL-MCNC: 9.8 MG/DL (ref 8.6–10.5)
CHLORIDE SERPL-SCNC: 98 MMOL/L (ref 98–107)
CO2 SERPL-SCNC: 28.1 MMOL/L (ref 22–29)
CREAT SERPL-MCNC: 1.03 MG/DL (ref 0.57–1)
CREAT UR-MCNC: 157.4 MG/DL
GLOBULIN SER CALC-MCNC: 2 GM/DL
GLUCOSE SERPL-MCNC: 142 MG/DL (ref 65–99)
HBA1C MFR BLD: 7.15 % (ref 4.8–5.6)
MICROALBUMIN UR-MCNC: 4.9 UG/ML
POTASSIUM SERPL-SCNC: 4.8 MMOL/L (ref 3.5–5.2)
PROT SERPL-MCNC: 6.1 G/DL (ref 6–8.5)
SODIUM SERPL-SCNC: 141 MMOL/L (ref 136–145)
T4 FREE SERPL-MCNC: 1.46 NG/DL (ref 0.93–1.7)
TSH SERPL DL<=0.005 MIU/L-ACNC: 3.5 MIU/ML (ref 0.27–4.2)

## 2018-08-06 ENCOUNTER — HOSPITAL ENCOUNTER (EMERGENCY)
Facility: HOSPITAL | Age: 73
Discharge: HOME | End: 2018-08-06
Attending: EMERGENCY MEDICINE | Admitting: EMERGENCY MEDICINE

## 2018-08-06 VITALS
TEMPERATURE: 98.7 F | HEIGHT: 61 IN | OXYGEN SATURATION: 99 % | RESPIRATION RATE: 20 BRPM | DIASTOLIC BLOOD PRESSURE: 96 MMHG | HEART RATE: 88 BPM | BODY MASS INDEX: 35.87 KG/M2 | SYSTOLIC BLOOD PRESSURE: 189 MMHG | WEIGHT: 190 LBS

## 2018-08-06 DIAGNOSIS — M54.50 ACUTE RIGHT-SIDED LOW BACK PAIN WITHOUT SCIATICA: Primary | ICD-10-CM

## 2018-08-06 LAB
HOLD SPECIMEN: NORMAL
HOLD SPECIMEN: NORMAL
WHOLE BLOOD HOLD SPECIMEN: NORMAL
WHOLE BLOOD HOLD SPECIMEN: NORMAL

## 2018-08-06 PROCEDURE — 99283 EMERGENCY DEPT VISIT LOW MDM: CPT

## 2018-08-06 PROCEDURE — 96372 THER/PROPH/DIAG INJ SC/IM: CPT

## 2018-08-06 PROCEDURE — 25010000002 HYDROMORPHONE PER 4 MG: Performed by: EMERGENCY MEDICINE

## 2018-08-06 RX ORDER — TRAMADOL HYDROCHLORIDE 50 MG/1
50 TABLET ORAL NIGHTLY PRN
COMMUNITY
End: 2018-08-10 | Stop reason: HOSPADM

## 2018-08-06 RX ORDER — HYDROMORPHONE HYDROCHLORIDE 1 MG/ML
1 INJECTION, SOLUTION INTRAMUSCULAR; INTRAVENOUS; SUBCUTANEOUS
Status: DISCONTINUED | OUTPATIENT
Start: 2018-08-06 | End: 2018-08-06 | Stop reason: HOSPADM

## 2018-08-06 RX ORDER — HYDROCODONE BITARTRATE AND ACETAMINOPHEN 5; 325 MG/1; MG/1
1 TABLET ORAL EVERY 6 HOURS PRN
Qty: 20 TABLET | Refills: 0 | Status: SHIPPED | OUTPATIENT
Start: 2018-08-06 | End: 2018-08-10 | Stop reason: HOSPADM

## 2018-08-06 RX ORDER — CYCLOBENZAPRINE HCL 10 MG
10 TABLET ORAL ONCE
Status: COMPLETED | OUTPATIENT
Start: 2018-08-06 | End: 2018-08-06

## 2018-08-06 RX ORDER — ONDANSETRON 4 MG/1
4 TABLET, ORALLY DISINTEGRATING ORAL ONCE
Status: COMPLETED | OUTPATIENT
Start: 2018-08-06 | End: 2018-08-06

## 2018-08-06 RX ADMIN — CYCLOBENZAPRINE 10 MG: 10 TABLET, FILM COATED ORAL at 13:45

## 2018-08-06 RX ADMIN — HYDROMORPHONE HYDROCHLORIDE 1 MG: 1 INJECTION, SOLUTION INTRAMUSCULAR; INTRAVENOUS; SUBCUTANEOUS at 13:45

## 2018-08-06 RX ADMIN — ONDANSETRON 4 MG: 4 TABLET, ORALLY DISINTEGRATING ORAL at 13:45

## 2018-08-06 NOTE — ED NOTES
Pt to ED with c/o R lumbar back pain, worse since Sunday am, pt has hx of bulging disc, recent MRI done here. Pt reports has consult on Wednesday with pain management, taking tramadol at home for pain but not improving pain, none taken this am. Denies falls d/t pain. Pain worse with ambulation. Good strength to bilat feet on eval, +distal pulses. Not on blood thinners. Sees matheus rockwell, Irene Love, RN  08/06/18 1340

## 2018-08-06 NOTE — ED PROVIDER NOTES
EMERGENCY DEPARTMENT ENCOUNTER    CHIEF COMPLAINT  Chief Complaint: Back Pain  History given by: Patient   History limited by: none  Room Number: 40/40  PMD: Claudio Anne MD      HPI:  Pt is a 73 y.o. female who presents with hx of chronic back pain complaining of R sided lumbar back pain that has been constant for the past several months and worsened yesterday morning. Pt states pain worsens on exertion and pt has been unable to stand up straight since worsening of pain. Pt confirms pain management appointment in two days but presented to ED due to worsening of pain. Pt states she had MRI done by PCP last week and was given Tramadol. Pt states pain has not improved with Tramadol.     Duration:  Several months  Onset: gradual  Timing: constant  Location: R sided lumbar pain  Radiation: none  Quality: pain  Intensity/Severity: mild  Progression: worsened last night  Associated Symptoms: none  Aggravating Factors: exertion, standing upright  Alleviating Factors: none  Previous Episodes: none  Treatment before arrival: Pt had negative MRI performed last week, and was given Tramadol by PCP, to no effect.     PAST MEDICAL HISTORY  Active Ambulatory Problems     Diagnosis Date Noted   • Major depressive disorder with single episode, in full remission (CMS/[facility]) 04/02/2001   • Controlled type 2 diabetes mellitus with diabetic neuropathy, with long-term current use of insulin (CMS/[facility])    • Essential hypertension    • Other hyperlipidemia    • Personal history of malignant neoplasm of breast 01/01/2001   • Acquired hypothyroidism    • Hypoglycemia due to endogenous hyperinsulinemia 12/10/2015   • Systolic murmur 12/10/2015   • Grief reaction 06/21/2016   • Menopause 06/21/2016   • Osteopenia 07/17/2016   • Encounter for long-term (current) use of insulin (CMS/[facility]) 08/29/2016   • Hyperinsulinism 08/29/2016   • Exertional dyspnea 11/13/2017   • Uncontrolled type 2 diabetes mellitus with diabetic neuropathy, with long-term  current use of insulin (CMS/[facility]) 01/16/2018   • Uncontrolled type 2 diabetes mellitus with complication, with long-term current use of insulin (CMS/[facility]) 01/16/2018   • Arthralgia of multiple sites 05/14/2018     Resolved Ambulatory Problems     Diagnosis Date Noted   • Arthropathy of knee 05/22/2014   • Arthropathy, multiple sites 04/12/2012   • Chronic kidney disease, stage III (moderate) 05/26/2011   • Uncontrolled type 2 diabetes mellitus (CMS/[facility]) 01/03/2013   • History of gynecological procedure 03/01/2010   • Abnormal weight gain 12/10/2015   • Diabetes (CMS/[facility]) 12/10/2015   • Fatigue 12/10/2015   • Type II diabetes mellitus with neurological manifestations, uncontrolled (CMS/[facility]) 08/29/2016   • Uncontrolled type 2 diabetes mellitus with complication, with long-term current use of insulin (CMS/[facility]) 02/14/2017     Past Medical History:   Diagnosis Date   • Arthropathy of knee 05/22/2014   • Arthropathy, multiple sites 04/12/2012   • Bulging lumbar disc    • Cancer (CMS/[facility])    • Chronic kidney disease, stage III (moderate) 05/26/2011   • Controlled diabetes mellitus type II without complication (CMS/[facility])    • Depression 04/02/2001   • Dyspnea    • Essential hypertension    • History of complete eye exam 03/29/2012   • History of gynecological procedure 03/01/2010   • Hyperlipidemia    • Hypothyroidism    • Personal history of malignant neoplasm of breast 2001   • Thyroid disease    • Type II diabetes mellitus with neurological manifestations, uncontrolled (CMS/[facility]) 01/03/2013       PAST SURGICAL HISTORY  Past Surgical History:   Procedure Laterality Date   • BREAST LUMPECTOMY Right 2001   • OTHER SURGICAL HISTORY Left 01/2015    ankle fracture repair   • VARICOSE VEIN SURGERY         FAMILY HISTORY  Family History   Problem Relation Age of Onset   • Hypertension Sister    • Thyroid disease Daughter    • Cancer Mother    • Heart disease Father        SOCIAL HISTORY  Social History     Social History   • Marital  status:      Spouse name: N/A   • Number of children: 4   • Years of education: N/A     Occupational History   • Not on file.     Social History Main Topics   • Smoking status: Never Smoker   • Smokeless tobacco: Never Used   • Alcohol use No   • Drug use: No   • Sexual activity: Defer     Other Topics Concern   • Not on file     Social History Narrative   • No narrative on file       ALLERGIES  Patient has no known allergies.    REVIEW OF SYSTEMS  Review of Systems   Constitutional: Negative for fever.   HENT: Negative for sore throat.    Eyes: Negative.    Respiratory: Negative for cough and shortness of breath.    Cardiovascular: Negative for chest pain.   Gastrointestinal: Negative for abdominal pain, diarrhea and vomiting.   Genitourinary: Negative for dysuria.   Musculoskeletal: Positive for back pain (R sided lumbar pain). Negative for neck pain.   Skin: Negative for rash.   Allergic/Immunologic: Negative.    Neurological: Negative for weakness, numbness and headaches.   Hematological: Negative.    Psychiatric/Behavioral: Negative.    All other systems reviewed and are negative.      PHYSICAL EXAM  ED Triage Vitals [08/06/18 0936]   Temp Heart Rate Resp BP SpO2   98.7 °F (37.1 °C) 88 20 152/92 99 %      Temp src Heart Rate Source Patient Position BP Location FiO2 (%)   Tympanic -- -- -- --       Physical Exam   Constitutional: She is oriented to person, place, and time. She appears distressed (mild, due to pain).   Eyes: EOM are normal.   Neck: Normal range of motion.   Cardiovascular: Normal rate and regular rhythm.    Pulmonary/Chest: Effort normal and breath sounds normal. No respiratory distress.   Musculoskeletal:        Lumbar back: She exhibits tenderness (R SI joint), pain and spasm (at SI joint).   Pt has hard time finding comfortable position.   Neurological: She is alert and oriented to person, place, and time. She has normal sensation and normal strength.   Skin: Skin is warm and dry.    Psychiatric: Affect normal.   Nursing note and vitals reviewed.    Procedures      PROGRESS AND CONSULTS  ED Course as of Aug 06 1417   Mon Aug 06, 2018   1017 Chronic right lower back pain times months. Sunday morning got out of bed and had acute worsening.  Has pain management appointment in 2 days, had MRI through PCP last week.  Ultram not helping. No neuro symptoms  [KA]      ED Course User Index  [KA] Mary Kate Trimble PA     1316: Flexeril, Zofran, and Dilaudid ordered for pain management.     1420: Pt rechecked and resting comfortably, pain improved following medication. Discussed with pt the plan for discharge with several pain pills and outpatient follow up with pain management in 2 days as scheduled. Pt understands and agrees with plan, all questions addressed.       MEDICAL DECISION MAKING  Results were reviewed/discussed with the patient and they were also made aware of online access. Pt also made aware that some labs, such as cultures, will not be resulted during ER visit and follow up with PMD is necessary.     MDM  Number of Diagnoses or Management Options     Amount and/or Complexity of Data Reviewed  Review and summarize past medical records: yes (7/25/18 - MRI done at PCP shows multilevel DJD but no chord compression or stenosis)           DIAGNOSIS  Final diagnoses:   Acute right-sided low back pain without sciatica       DISPOSITION  DISCHARGE    Patient discharged in stable condition.    Reviewed implications of results, diagnosis, meds, responsibility to follow up, warning signs and symptoms of possible worsening, potential complications and reasons to return to ER.    Patient/Family voiced understanding of above instructions.    Discussed plan for discharge, as there is no emergent indication for admission. Patient referred to primary care provider for BP management due to today's BP. Pt/family is agreeable and understands need for follow up and repeat testing.  Pt is aware that discharge  does not mean that nothing is wrong but it indicates no emergency is present that requires admission and they must continue care with follow-up as given below or physician of their choice.     FOLLOW-UP  Claudio Anne MD  [id number] [address]  [phone]    Call            Medication List      New Prescriptions    HYDROcodone-acetaminophen 5-325 MG per tablet  Commonly known as:  NORCO  Take 1 tablet by mouth Every 6 (Six) Hours As Needed for Severe Pain .        Changed    insulin regular 100 UNIT/ML injection  Commonly known as:  NOVOLIN R RELION  6 UNITS SC BEFORE BREAKFAST AND SUPPER DAILY  What changed:  how much to take  additional instructions              Latest Documented Vital Signs:  As of 2:17 PM  BP- 178/97 HR- 88 Temp- 98.7 °F (37.1 °C) (Tympanic) O2 sat- 99%    --  Documentation assistance provided by emily Prince for Dr. Wild.  Information recorded by the scribe was done at my direction and has been verified and validated by me.            Deja Prince  08/06/18 9048       Anthony Wild MD  08/06/18 2678

## 2018-08-07 ENCOUNTER — HOSPITAL ENCOUNTER (OUTPATIENT)
Facility: HOSPITAL | Age: 73
Setting detail: OBSERVATION
Discharge: HOME | End: 2018-08-10
Attending: EMERGENCY MEDICINE | Admitting: EMERGENCY MEDICINE

## 2018-08-07 ENCOUNTER — APPOINTMENT (OUTPATIENT)
Dept: CT IMAGING | Facility: HOSPITAL | Age: 73
End: 2018-08-07

## 2018-08-07 DIAGNOSIS — K52.9 COLITIS: Primary | ICD-10-CM

## 2018-08-07 DIAGNOSIS — M54.50 ACUTE MIDLINE LOW BACK PAIN WITHOUT SCIATICA: ICD-10-CM

## 2018-08-07 DIAGNOSIS — Z74.09 IMPAIRED FUNCTIONAL MOBILITY, BALANCE, GAIT, AND ENDURANCE: ICD-10-CM

## 2018-08-07 PROBLEM — A09 INFECTIOUS COLITIS: Status: ACTIVE | Noted: 2018-08-07

## 2018-08-07 PROBLEM — M51.36 DDD (DEGENERATIVE DISC DISEASE), LUMBAR: Status: ACTIVE | Noted: 2018-08-07

## 2018-08-07 PROBLEM — R29.898 RIGHT LEG WEAKNESS: Status: ACTIVE | Noted: 2018-08-07

## 2018-08-07 LAB
ALBUMIN SERPL-MCNC: 4.4 G/DL (ref 3.5–5.2)
ALBUMIN/GLOB SERPL: 1.8 G/DL
ALP SERPL-CCNC: 87 U/L (ref 39–117)
ALT SERPL W P-5'-P-CCNC: 26 U/L (ref 1–33)
ANION GAP SERPL CALCULATED.3IONS-SCNC: 14.3 MMOL/L
AST SERPL-CCNC: 19 U/L (ref 1–32)
BASOPHILS # BLD AUTO: 0.03 10*3/MM3 (ref 0–0.2)
BASOPHILS NFR BLD AUTO: 0.3 % (ref 0–1.5)
BILIRUB SERPL-MCNC: 0.5 MG/DL (ref 0.1–1.2)
BUN BLD-MCNC: 31 MG/DL (ref 8–23)
BUN/CREAT SERPL: 25.8 (ref 7–25)
CALCIUM SPEC-SCNC: 9.4 MG/DL (ref 8.6–10.5)
CHLORIDE SERPL-SCNC: 98 MMOL/L (ref 98–107)
CO2 SERPL-SCNC: 26.7 MMOL/L (ref 22–29)
CREAT BLD-MCNC: 1.2 MG/DL (ref 0.57–1)
D-LACTATE SERPL-SCNC: 1.9 MMOL/L (ref 0.5–2)
DEPRECATED RDW RBC AUTO: 57.6 FL (ref 37–54)
EOSINOPHIL # BLD AUTO: 0.12 10*3/MM3 (ref 0–0.7)
EOSINOPHIL NFR BLD AUTO: 1 % (ref 0.3–6.2)
ERYTHROCYTE [DISTWIDTH] IN BLOOD BY AUTOMATED COUNT: 15.1 % (ref 11.7–13)
GFR SERPL CREATININE-BSD FRML MDRD: 44 ML/MIN/1.73
GLOBULIN UR ELPH-MCNC: 2.5 GM/DL
GLUCOSE BLD-MCNC: 219 MG/DL (ref 65–99)
HCT VFR BLD AUTO: 44 % (ref 35.6–45.5)
HGB BLD-MCNC: 14 G/DL (ref 11.9–15.5)
IMM GRANULOCYTES # BLD: 0 10*3/MM3 (ref 0–0.03)
IMM GRANULOCYTES NFR BLD: 0 % (ref 0–0.5)
LIPASE SERPL-CCNC: 37 U/L (ref 13–60)
LYMPHOCYTES # BLD AUTO: 0.87 10*3/MM3 (ref 0.9–4.8)
LYMPHOCYTES NFR BLD AUTO: 7.6 % (ref 19.6–45.3)
MCH RBC QN AUTO: 32.9 PG (ref 26.9–32)
MCHC RBC AUTO-ENTMCNC: 31.8 G/DL (ref 32.4–36.3)
MCV RBC AUTO: 103.5 FL (ref 80.5–98.2)
MONOCYTES # BLD AUTO: 0.63 10*3/MM3 (ref 0.2–1.2)
MONOCYTES NFR BLD AUTO: 5.5 % (ref 5–12)
NEUTROPHILS # BLD AUTO: 9.82 10*3/MM3 (ref 1.9–8.1)
NEUTROPHILS NFR BLD AUTO: 85.6 % (ref 42.7–76)
PLATELET # BLD AUTO: 257 10*3/MM3 (ref 140–500)
PMV BLD AUTO: 10.5 FL (ref 6–12)
POTASSIUM BLD-SCNC: 4.3 MMOL/L (ref 3.5–5.2)
PROT SERPL-MCNC: 6.9 G/DL (ref 6–8.5)
RBC # BLD AUTO: 4.25 10*6/MM3 (ref 3.9–5.2)
SODIUM BLD-SCNC: 139 MMOL/L (ref 136–145)
WBC NRBC COR # BLD: 11.47 10*3/MM3 (ref 4.5–10.7)

## 2018-08-07 PROCEDURE — 83605 ASSAY OF LACTIC ACID: CPT | Performed by: EMERGENCY MEDICINE

## 2018-08-07 PROCEDURE — 85025 COMPLETE CBC W/AUTO DIFF WBC: CPT | Performed by: EMERGENCY MEDICINE

## 2018-08-07 PROCEDURE — P9612 CATHETERIZE FOR URINE SPEC: HCPCS

## 2018-08-07 PROCEDURE — G0378 HOSPITAL OBSERVATION PER HR: HCPCS

## 2018-08-07 PROCEDURE — 83690 ASSAY OF LIPASE: CPT | Performed by: EMERGENCY MEDICINE

## 2018-08-07 PROCEDURE — 25010000002 LEVOFLOXACIN PER 250 MG: Performed by: EMERGENCY MEDICINE

## 2018-08-07 PROCEDURE — 99284 EMERGENCY DEPT VISIT MOD MDM: CPT

## 2018-08-07 PROCEDURE — 25010000002 ONDANSETRON PER 1 MG: Performed by: EMERGENCY MEDICINE

## 2018-08-07 PROCEDURE — 74176 CT ABD & PELVIS W/O CONTRAST: CPT

## 2018-08-07 PROCEDURE — 87040 BLOOD CULTURE FOR BACTERIA: CPT | Performed by: EMERGENCY MEDICINE

## 2018-08-07 PROCEDURE — 80053 COMPREHEN METABOLIC PANEL: CPT | Performed by: EMERGENCY MEDICINE

## 2018-08-07 PROCEDURE — 25010000002 HYDROMORPHONE PER 4 MG: Performed by: EMERGENCY MEDICINE

## 2018-08-07 RX ORDER — LEVOTHYROXINE SODIUM 0.07 MG/1
75 TABLET ORAL
Status: DISCONTINUED | OUTPATIENT
Start: 2018-08-08 | End: 2018-08-10 | Stop reason: HOSPADM

## 2018-08-07 RX ORDER — HYDROCHLOROTHIAZIDE 25 MG/1
25 TABLET ORAL DAILY
COMMUNITY
End: 2018-08-16 | Stop reason: SDUPTHER

## 2018-08-07 RX ORDER — LEVOFLOXACIN 5 MG/ML
500 INJECTION, SOLUTION INTRAVENOUS ONCE
Status: COMPLETED | OUTPATIENT
Start: 2018-08-07 | End: 2018-08-07

## 2018-08-07 RX ORDER — PAROXETINE 10 MG/1
10 TABLET, FILM COATED ORAL DAILY
Status: DISCONTINUED | OUTPATIENT
Start: 2018-08-08 | End: 2018-08-10 | Stop reason: HOSPADM

## 2018-08-07 RX ORDER — HYDROCODONE BITARTRATE AND ACETAMINOPHEN 5; 325 MG/1; MG/1
1 TABLET ORAL EVERY 4 HOURS PRN
Status: DISCONTINUED | OUTPATIENT
Start: 2018-08-07 | End: 2018-08-08

## 2018-08-07 RX ORDER — SODIUM CHLORIDE 0.9 % (FLUSH) 0.9 %
1-10 SYRINGE (ML) INJECTION AS NEEDED
Status: DISCONTINUED | OUTPATIENT
Start: 2018-08-07 | End: 2018-08-10 | Stop reason: HOSPADM

## 2018-08-07 RX ORDER — LOSARTAN POTASSIUM 100 MG/1
100 TABLET ORAL
Status: DISCONTINUED | OUTPATIENT
Start: 2018-08-08 | End: 2018-08-10 | Stop reason: HOSPADM

## 2018-08-07 RX ORDER — ONDANSETRON 4 MG/1
4 TABLET, FILM COATED ORAL EVERY 6 HOURS PRN
Status: DISCONTINUED | OUTPATIENT
Start: 2018-08-07 | End: 2018-08-10 | Stop reason: HOSPADM

## 2018-08-07 RX ORDER — HYDROMORPHONE HYDROCHLORIDE 1 MG/ML
1 INJECTION, SOLUTION INTRAMUSCULAR; INTRAVENOUS; SUBCUTANEOUS ONCE
Status: COMPLETED | OUTPATIENT
Start: 2018-08-07 | End: 2018-08-07

## 2018-08-07 RX ORDER — ONDANSETRON 2 MG/ML
4 INJECTION INTRAMUSCULAR; INTRAVENOUS EVERY 6 HOURS PRN
Status: DISCONTINUED | OUTPATIENT
Start: 2018-08-07 | End: 2018-08-10 | Stop reason: HOSPADM

## 2018-08-07 RX ORDER — ACETAMINOPHEN 325 MG/1
650 TABLET ORAL EVERY 4 HOURS PRN
Status: DISCONTINUED | OUTPATIENT
Start: 2018-08-07 | End: 2018-08-10 | Stop reason: HOSPADM

## 2018-08-07 RX ORDER — DEXTROSE MONOHYDRATE 25 G/50ML
25 INJECTION, SOLUTION INTRAVENOUS
Status: DISCONTINUED | OUTPATIENT
Start: 2018-08-07 | End: 2018-08-10 | Stop reason: HOSPADM

## 2018-08-07 RX ORDER — AMLODIPINE BESYLATE 10 MG/1
10 TABLET ORAL DAILY
Status: DISCONTINUED | OUTPATIENT
Start: 2018-08-08 | End: 2018-08-10 | Stop reason: HOSPADM

## 2018-08-07 RX ORDER — ATORVASTATIN CALCIUM 20 MG/1
20 TABLET, FILM COATED ORAL NIGHTLY
Status: DISCONTINUED | OUTPATIENT
Start: 2018-08-07 | End: 2018-08-10 | Stop reason: HOSPADM

## 2018-08-07 RX ORDER — ONDANSETRON 2 MG/ML
4 INJECTION INTRAMUSCULAR; INTRAVENOUS ONCE
Status: COMPLETED | OUTPATIENT
Start: 2018-08-07 | End: 2018-08-07

## 2018-08-07 RX ORDER — PAROXETINE 10 MG/1
10 TABLET, FILM COATED ORAL DAILY
COMMUNITY
End: 2018-08-16 | Stop reason: SDUPTHER

## 2018-08-07 RX ORDER — NICOTINE POLACRILEX 4 MG
15 LOZENGE BUCCAL
Status: DISCONTINUED | OUTPATIENT
Start: 2018-08-07 | End: 2018-08-10 | Stop reason: HOSPADM

## 2018-08-07 RX ORDER — ONDANSETRON 4 MG/1
4 TABLET, ORALLY DISINTEGRATING ORAL EVERY 6 HOURS PRN
Status: DISCONTINUED | OUTPATIENT
Start: 2018-08-07 | End: 2018-08-10 | Stop reason: HOSPADM

## 2018-08-07 RX ORDER — SENNOSIDES 8.6 MG
1300 CAPSULE ORAL EVERY 8 HOURS PRN
COMMUNITY
End: 2018-08-10 | Stop reason: HOSPADM

## 2018-08-07 RX ADMIN — LEVOFLOXACIN 500 MG: 5 INJECTION, SOLUTION INTRAVENOUS at 23:10

## 2018-08-07 RX ADMIN — HYDROMORPHONE HYDROCHLORIDE 1 MG: 1 INJECTION, SOLUTION INTRAMUSCULAR; INTRAVENOUS; SUBCUTANEOUS at 20:57

## 2018-08-07 RX ADMIN — SODIUM CHLORIDE 1000 ML: 9 INJECTION, SOLUTION INTRAVENOUS at 20:54

## 2018-08-07 RX ADMIN — METRONIDAZOLE 500 MG: 500 INJECTION, SOLUTION INTRAVENOUS at 21:58

## 2018-08-07 RX ADMIN — ONDANSETRON HYDROCHLORIDE 4 MG: 2 INJECTION, SOLUTION INTRAMUSCULAR; INTRAVENOUS at 20:57

## 2018-08-08 LAB
ANION GAP SERPL CALCULATED.3IONS-SCNC: 11.3 MMOL/L
BASOPHILS # BLD AUTO: 0.03 10*3/MM3 (ref 0–0.2)
BASOPHILS NFR BLD AUTO: 0.4 % (ref 0–1.5)
BILIRUB UR QL STRIP: NEGATIVE
BUN BLD-MCNC: 24 MG/DL (ref 8–23)
BUN/CREAT SERPL: 23.3 (ref 7–25)
C DIFF TOX GENS STL QL NAA+PROBE: NEGATIVE
CALCIUM SPEC-SCNC: 8.5 MG/DL (ref 8.6–10.5)
CHLORIDE SERPL-SCNC: 101 MMOL/L (ref 98–107)
CLARITY UR: CLEAR
CO2 SERPL-SCNC: 26.7 MMOL/L (ref 22–29)
COLOR UR: YELLOW
CREAT BLD-MCNC: 1.03 MG/DL (ref 0.57–1)
DEPRECATED RDW RBC AUTO: 57.8 FL (ref 37–54)
EOSINOPHIL # BLD AUTO: 0.27 10*3/MM3 (ref 0–0.7)
EOSINOPHIL NFR BLD AUTO: 3.5 % (ref 0.3–6.2)
ERYTHROCYTE [DISTWIDTH] IN BLOOD BY AUTOMATED COUNT: 15.2 % (ref 11.7–13)
GFR SERPL CREATININE-BSD FRML MDRD: 52 ML/MIN/1.73
GLUCOSE BLD-MCNC: 145 MG/DL (ref 65–99)
GLUCOSE BLDC GLUCOMTR-MCNC: 121 MG/DL (ref 70–130)
GLUCOSE BLDC GLUCOMTR-MCNC: 135 MG/DL (ref 70–130)
GLUCOSE BLDC GLUCOMTR-MCNC: 138 MG/DL (ref 70–130)
GLUCOSE BLDC GLUCOMTR-MCNC: 160 MG/DL (ref 70–130)
GLUCOSE BLDC GLUCOMTR-MCNC: 97 MG/DL (ref 70–130)
GLUCOSE UR STRIP-MCNC: NEGATIVE MG/DL
HCT VFR BLD AUTO: 39.8 % (ref 35.6–45.5)
HGB BLD-MCNC: 12.3 G/DL (ref 11.9–15.5)
HGB UR QL STRIP.AUTO: NEGATIVE
IMM GRANULOCYTES # BLD: 0 10*3/MM3 (ref 0–0.03)
IMM GRANULOCYTES NFR BLD: 0 % (ref 0–0.5)
KETONES UR QL STRIP: NEGATIVE
LEUKOCYTE ESTERASE UR QL STRIP.AUTO: NEGATIVE
LYMPHOCYTES # BLD AUTO: 1.31 10*3/MM3 (ref 0.9–4.8)
LYMPHOCYTES NFR BLD AUTO: 17.2 % (ref 19.6–45.3)
MCH RBC QN AUTO: 32.1 PG (ref 26.9–32)
MCHC RBC AUTO-ENTMCNC: 30.9 G/DL (ref 32.4–36.3)
MCV RBC AUTO: 103.9 FL (ref 80.5–98.2)
MONOCYTES # BLD AUTO: 0.6 10*3/MM3 (ref 0.2–1.2)
MONOCYTES NFR BLD AUTO: 7.9 % (ref 5–12)
NEUTROPHILS # BLD AUTO: 5.4 10*3/MM3 (ref 1.9–8.1)
NEUTROPHILS NFR BLD AUTO: 71 % (ref 42.7–76)
NITRITE UR QL STRIP: NEGATIVE
PH UR STRIP.AUTO: <=5 [PH] (ref 5–8)
PLATELET # BLD AUTO: 217 10*3/MM3 (ref 140–500)
PMV BLD AUTO: 10.8 FL (ref 6–12)
POTASSIUM BLD-SCNC: 4.3 MMOL/L (ref 3.5–5.2)
PROT UR QL STRIP: NEGATIVE
RBC # BLD AUTO: 3.83 10*6/MM3 (ref 3.9–5.2)
SODIUM BLD-SCNC: 139 MMOL/L (ref 136–145)
SP GR UR STRIP: >=1.03 (ref 1–1.03)
UROBILINOGEN UR QL STRIP: NORMAL
WBC NRBC COR # BLD: 7.61 10*3/MM3 (ref 4.5–10.7)

## 2018-08-08 PROCEDURE — 97162 PT EVAL MOD COMPLEX 30 MIN: CPT

## 2018-08-08 PROCEDURE — 87046 STOOL CULTR AEROBIC BACT EA: CPT | Performed by: EMERGENCY MEDICINE

## 2018-08-08 PROCEDURE — G0378 HOSPITAL OBSERVATION PER HR: HCPCS

## 2018-08-08 PROCEDURE — 82962 GLUCOSE BLOOD TEST: CPT

## 2018-08-08 PROCEDURE — G8978 MOBILITY CURRENT STATUS: HCPCS

## 2018-08-08 PROCEDURE — 87045 FECES CULTURE AEROBIC BACT: CPT | Performed by: EMERGENCY MEDICINE

## 2018-08-08 PROCEDURE — 87899 AGENT NOS ASSAY W/OPTIC: CPT | Performed by: EMERGENCY MEDICINE

## 2018-08-08 PROCEDURE — 99222 1ST HOSP IP/OBS MODERATE 55: CPT | Performed by: INTERNAL MEDICINE

## 2018-08-08 PROCEDURE — 87493 C DIFF AMPLIFIED PROBE: CPT | Performed by: EMERGENCY MEDICINE

## 2018-08-08 PROCEDURE — 80048 BASIC METABOLIC PNL TOTAL CA: CPT | Performed by: INTERNAL MEDICINE

## 2018-08-08 PROCEDURE — 63710000001 INSULIN ISOPHANE HUMAN PER 5 UNITS: Performed by: INTERNAL MEDICINE

## 2018-08-08 PROCEDURE — 81003 URINALYSIS AUTO W/O SCOPE: CPT | Performed by: EMERGENCY MEDICINE

## 2018-08-08 PROCEDURE — 97110 THERAPEUTIC EXERCISES: CPT

## 2018-08-08 PROCEDURE — 63710000001 INSULIN ASPART PER 5 UNITS: Performed by: INTERNAL MEDICINE

## 2018-08-08 PROCEDURE — G8979 MOBILITY GOAL STATUS: HCPCS

## 2018-08-08 PROCEDURE — 85025 COMPLETE CBC W/AUTO DIFF WBC: CPT | Performed by: INTERNAL MEDICINE

## 2018-08-08 RX ORDER — LIDOCAINE 50 MG/G
1 PATCH TOPICAL
Status: DISCONTINUED | OUTPATIENT
Start: 2018-08-08 | End: 2018-08-10 | Stop reason: HOSPADM

## 2018-08-08 RX ORDER — LEVOFLOXACIN 500 MG/1
500 TABLET, FILM COATED ORAL EVERY 24 HOURS
Status: DISCONTINUED | OUTPATIENT
Start: 2018-08-08 | End: 2018-08-10 | Stop reason: HOSPADM

## 2018-08-08 RX ORDER — METRONIDAZOLE 500 MG/1
500 TABLET ORAL EVERY 8 HOURS SCHEDULED
Status: DISCONTINUED | OUTPATIENT
Start: 2018-08-08 | End: 2018-08-10 | Stop reason: HOSPADM

## 2018-08-08 RX ORDER — HYDROCODONE BITARTRATE AND ACETAMINOPHEN 10; 325 MG/1; MG/1
1 TABLET ORAL EVERY 6 HOURS PRN
Status: DISCONTINUED | OUTPATIENT
Start: 2018-08-08 | End: 2018-08-10

## 2018-08-08 RX ADMIN — ACETAMINOPHEN 650 MG: 325 TABLET, FILM COATED ORAL at 16:01

## 2018-08-08 RX ADMIN — LEVOTHYROXINE SODIUM 75 MCG: 75 TABLET ORAL at 06:26

## 2018-08-08 RX ADMIN — HYDROCODONE BITARTRATE AND ACETAMINOPHEN 1 TABLET: 5; 325 TABLET ORAL at 04:43

## 2018-08-08 RX ADMIN — PAROXETINE HYDROCHLORIDE 10 MG: 10 TABLET, FILM COATED ORAL at 08:45

## 2018-08-08 RX ADMIN — HYDROCODONE BITARTRATE AND ACETAMINOPHEN 1 TABLET: 10; 325 TABLET ORAL at 18:21

## 2018-08-08 RX ADMIN — AMLODIPINE BESYLATE 10 MG: 10 TABLET ORAL at 08:45

## 2018-08-08 RX ADMIN — ATORVASTATIN CALCIUM 20 MG: 20 TABLET, FILM COATED ORAL at 02:08

## 2018-08-08 RX ADMIN — ACETAMINOPHEN 650 MG: 325 TABLET, FILM COATED ORAL at 10:59

## 2018-08-08 RX ADMIN — LOSARTAN POTASSIUM 100 MG: 100 TABLET, FILM COATED ORAL at 08:45

## 2018-08-08 RX ADMIN — METRONIDAZOLE 500 MG: 500 TABLET, FILM COATED ORAL at 23:57

## 2018-08-08 RX ADMIN — HYDROCODONE BITARTRATE AND ACETAMINOPHEN 1 TABLET: 5; 325 TABLET ORAL at 15:29

## 2018-08-08 RX ADMIN — HUMAN INSULIN 20 UNITS: 100 INJECTION, SUSPENSION SUBCUTANEOUS at 22:45

## 2018-08-08 RX ADMIN — LEVOFLOXACIN 500 MG: 500 TABLET, FILM COATED ORAL at 13:30

## 2018-08-08 RX ADMIN — LIDOCAINE 1 PATCH: 50 PATCH CUTANEOUS at 20:16

## 2018-08-08 RX ADMIN — HYDROCODONE BITARTRATE AND ACETAMINOPHEN 1 TABLET: 5; 325 TABLET ORAL at 08:52

## 2018-08-08 RX ADMIN — METRONIDAZOLE 500 MG: 500 TABLET, FILM COATED ORAL at 13:30

## 2018-08-08 RX ADMIN — HYDROCODONE BITARTRATE AND ACETAMINOPHEN 1 TABLET: 5; 325 TABLET ORAL at 00:27

## 2018-08-08 RX ADMIN — INSULIN ASPART 12 UNITS: 100 INJECTION, SOLUTION INTRAVENOUS; SUBCUTANEOUS at 18:20

## 2018-08-08 RX ADMIN — ATORVASTATIN CALCIUM 20 MG: 20 TABLET, FILM COATED ORAL at 20:16

## 2018-08-08 RX ADMIN — INSULIN ASPART 12 UNITS: 100 INJECTION, SOLUTION INTRAVENOUS; SUBCUTANEOUS at 08:45

## 2018-08-08 NOTE — ED NOTES
Pt updated on status, aware that a urine and stool sample are still needed, pt states no need to void or move bowels at this time.     Emily Rae RN  08/07/18 4679

## 2018-08-08 NOTE — PLAN OF CARE
Problem: Patient Care Overview  Goal: Plan of Care Review  Outcome: Ongoing (interventions implemented as appropriate)   08/08/18 0312   Coping/Psychosocial   Plan of Care Reviewed With patient   Plan of Care Review   Progress no change   OTHER   Outcome Summary PO pain medication given x1. No c/o nausea. Up with asst to bathroom. BA present. Woody reg food. Currently resting well.      Goal: Individualization and Mutuality  Outcome: Ongoing (interventions implemented as appropriate)    Goal: Discharge Needs Assessment  Outcome: Ongoing (interventions implemented as appropriate)      Problem: Fall Risk (Adult)  Goal: Identify Related Risk Factors and Signs and Symptoms  Outcome: Outcome(s) achieved Date Met: 08/08/18    Goal: Absence of Fall  Outcome: Ongoing (interventions implemented as appropriate)      Problem: Pain, Acute (Adult)  Goal: Identify Related Risk Factors and Signs and Symptoms  Outcome: Outcome(s) achieved Date Met: 08/08/18    Goal: Acceptable Pain Control/Comfort Level  Outcome: Ongoing (interventions implemented as appropriate)

## 2018-08-08 NOTE — PROGRESS NOTES
"    DAILY PROGRESS NOTE  [facility]    Patient Identification:  Name: Iraida Elizabeth  Age: 73 y.o.  Sex: female  :  1945  MRN: [id number]         Primary Care Physician: Claudio Anne MD    Subjective:  Interval History: Her complaints do not revolve around her abdomen.  She talks about her back pain.  After further history it appears that this is radiating down her right leg and she is unable to get comfortable in any position.  Apparently she is more at ease with bending as opposed to standing vertically.  She denies any saddle anesthesia or bowel incontinence.  No issues with nausea vomiting chest pain or shortness of breath.       Objective:daughter at      Scheduled Meds:  amLODIPine 10 mg Oral Daily   atorvastatin 20 mg Oral Nightly   insulin aspart 12 Units Subcutaneous BID With Meals   insulin NPH 20 Units Subcutaneous BID   levoFLOXacin 500 mg Oral Q24H   levothyroxine 75 mcg Oral Q AM   losartan 100 mg Oral Q24H   metroNIDAZOLE 500 mg Oral Q8H   PARoxetine 10 mg Oral Daily     Continuous Infusions:     Vital signs in last 24 hours:  Temp:  [97.8 °F (36.6 °C)-100.2 °F (37.9 °C)] 98 °F (36.7 °C)  Heart Rate:  [77-99] 78  Resp:  [16-18] 18  BP: (133-181)/(61-88) 133/75    Intake/Output:    Intake/Output Summary (Last 24 hours) at 18 1705  Last data filed at 18 1500   Gross per 24 hour   Intake             1560 ml   Output             1200 ml   Net              360 ml       Exam:  /75 (BP Location: Right arm, Patient Position: Lying)   Pulse 78   Temp 98 °F (36.7 °C) (Oral)   Resp 18   Ht 154.9 cm (61\")   Wt 101 kg (221 lb 9.6 oz)   SpO2 100%   BMI 41.87 kg/m²     General Appearance:    Alert, cooperative, AAOx3, seems to be quite uncomfy - leans forward                          Throat:   Lips, tongue, gums normal; oral mucosa pink and moist                           Neck:   No JVD                         Lungs:    Clear to auscultation bilaterally, " respirations unlabored                          Heart:    Regular rate and rhythm, S1 and S2 normal                  Abdomen:     Soft, non-tender w/out r/g, bowel sounds active, no masses, no                                                        organomegaly                  Extremities:   No cyanosis or edema                        Pulses:   Pulses palpable in lower extremities                         Data Review:  Labs in chart were reviewed.    Assessment:  Active Hospital Problems    Diagnosis Date Noted   • Infectious colitis [A09] 08/07/2018   • DDD (degenerative disc disease), lumbar [M51.36] 08/07/2018   • Right leg weakness [R29.898] 08/07/2018   • Essential hypertension [I10]    • Controlled type 2 diabetes mellitus with diabetic neuropathy, with long-term current use of insulin (CMS/[facility]) [E11.40, Z79.4]       Resolved Hospital Problems    Diagnosis Date Noted Date Resolved   No resolved problems to display.       Plan:  Colitis - short course 5-7days of LQN/Flagyl w/ outpt GI scopes 6-8weeks   -agree w GI    -stool Cx pending - CDiff pending     Radicular Back pain sounds like spinal stenosis per Hx - reviewed previous MRI reviewed    -no reliefs w/ previous steroids    -increase Norco for now - add Lidoderm    -get JOSE CRUZ input    -has rescheduled pain mgnt appt     HTN - controlled     DM2 - #s stable     Dispo - planing for home tomorrow - PT - CCP for DC needs     Shakeel Álvarez MD  8/8/2018  5:05 PM

## 2018-08-08 NOTE — THERAPY EVALUATION
Acute Care - Physical Therapy Initial Evaluation  [facility]     Patient Name: Iraida Elizabeth  : 1945  MRN: [id number]  Today's Date: 2018      Date of Referral to PT: 18  Referring Physician: Dr. Maldonado      Admit Date: 2018    Visit Dx:     ICD-10-CM ICD-9-CM   1. Colitis K52.9 558.9   2. Acute midline low back pain without sciatica M54.5 724.2   3. Impaired functional mobility, balance, gait, and endurance Z74.09 V49.89     Patient Active Problem List   Diagnosis   • Major depressive disorder with single episode, in full remission (CMS/[facility])   • Controlled type 2 diabetes mellitus with diabetic neuropathy, with long-term current use of insulin (CMS/[facility])   • Essential hypertension   • Other hyperlipidemia   • Personal history of malignant neoplasm of breast   • Acquired hypothyroidism   • Hypoglycemia due to endogenous hyperinsulinemia   • Systolic murmur   • Grief reaction   • Menopause   • Osteopenia   • Encounter for long-term (current) use of insulin (CMS/[facility])   • Hyperinsulinism   • Exertional dyspnea   • Uncontrolled type 2 diabetes mellitus with diabetic neuropathy, with long-term current use of insulin (CMS/[facility])   • Uncontrolled type 2 diabetes mellitus with complication, with long-term current use of insulin (CMS/[facility])   • Arthralgia of multiple sites   • Infectious colitis   • DDD (degenerative disc disease), lumbar   • Right leg weakness     Past Medical History:   Diagnosis Date   • Arthropathy of knee 2014    unspecified   • Arthropathy, multiple sites 2012    unspecified   • Bulging lumbar disc    • Cancer (CMS/[facility])     breast   • Chronic kidney disease, stage III (moderate) 2011   • Controlled diabetes mellitus type II without complication (CMS/HCC)    • Depression 2001   • Dyspnea    • Essential hypertension    • History of complete eye exam 2012   • History of gynecological procedure 2010    special invesitation and examinations;  gynecological examination; routine gynecological examination   • Hyperlipidemia     other and unspecified   • Hypothyroidism     unspecified   • Personal history of malignant neoplasm of breast 2001    R breast (negative nodes) TX with radiation and Tamoxifen   • Thyroid disease    • Type II diabetes mellitus with neurological manifestations, uncontrolled (CMS/[facility]) 01/03/2013     Past Surgical History:   Procedure Laterality Date   • BREAST LUMPECTOMY Right 2001   • OTHER SURGICAL HISTORY Left 01/2015    ankle fracture repair   • VARICOSE VEIN SURGERY          PT ASSESSMENT (last 12 hours)      Physical Therapy Evaluation     Row Name 08/08/18 1500          PT Evaluation Time/Intention    Subjective Information complains of;pain   lower back pain  -MA     Document Type evaluation  -MA     Mode of Treatment physical therapy  -MA     Patient Effort good  -MA     Symptoms Noted During/After Treatment increased pain  -MA     Comment Unable to tolerate standing requiring patient to be in a forward flexed posture- described pain as sharp and neurological in origin. Notified RN regarding possible benefit from JOSE CRUZ consult for potential disc herniation.  -MA     Row Name 08/08/18 1500          General Information    Patient Profile Reviewed? yes  -MA     Referring Physician Dr. Maldonado  -MA     Patient Observations alert;cooperative;agree to therapy  -MA     General Observations of Patient Supine in bed with HOB elevated, no acute distress noted at rest, exit alarm on  -MA     Prior Level of Function independent:;all household mobility   very ind  -MA     Equipment Currently Used at Home cane, straight   does not currently use  -MA     Pertinent History of Current Functional Problem Admission from home for infectious colitis. Patient's primary complaint involves lower back pain- describes as stabbing. Apparently  was supposed to attend pain management this week.  -MA     Existing Precautions/Restrictions fall;spinal  -MA      Limitations/Impairments safety/cognitive  -MA     Risks Reviewed patient:  -MA     Benefits Reviewed patient:  -MA     Barriers to Rehab none identified  -MA     Row Name 08/08/18 1500          Home Main Entrance    Number of Stairs, Main Entrance five  -MA     Row Name 08/08/18 1500          Cognitive Assessment/Intervention- PT/OT    Orientation Status (Cognition) oriented x 4  -MA     Follows Commands (Cognition) WFL  -MA     Safety Deficit (Cognitive) mild deficit  -MA     Personal Safety Interventions fall prevention program maintained;gait belt;nonskid shoes/slippers when out of bed  -MA     Row Name 08/08/18 1500          Safety Issues, Functional Mobility    Impairments Affecting Function (Mobility) pain;strength;endurance/activity tolerance  -MA     Row Name 08/08/18 1500          Bed Mobility Assessment/Treatment    Bed Mobility Assessment/Treatment supine-sit;sit-supine  -MA     Supine-Sit [place] (Bed Mobility) supervision;verbal cues  -MA     Sit-Supine [place] (Bed Mobility) supervision;verbal cues  -MA     Assistive Device (Bed Mobility) bed rails;head of bed elevated  -MA     Row Name 08/08/18 1500          Transfer Assessment/Treatment    Transfer Assessment/Treatment sit-stand transfer;stand-sit transfer  -MA     Comment (Transfers) Stood once, unable to achieve upright posture due to level of back pain- demonstrates a forward flexed posture. Deferred further mobility due to level of lower back pain.  -MA     Sit-Stand Baker (Transfers) contact guard;verbal cues  -MA     Stand-Sit Baker (Transfers) contact guard;verbal cues  -MA     Row Name 08/08/18 1500          Sit-Stand Transfer    Assistive Device (Sit-Stand Transfers) walker, front-wheeled  -MA     Row Name 08/08/18 1500          Stand-Sit Transfer    Assistive Device (Stand-Sit Transfers) walker, front-wheeled  -MA     Row Name 08/08/18 1500          Gait/Stairs Assessment/Training    Comment (Gait/Stairs) Did not  trial today due to level of back pain- notified RN regarding presentation and recommendation for further consult.  -MA     Row Name 08/08/18 1500          General ROM    GENERAL ROM COMMENTS B LE WFL  -[facility] 08/08/18 1500          General Assessment (Manual Muscle Testing)    Comment, General Manual Muscle Testing (MMT) Assessment L LE 4+/5, R LE 4+/5 except hip flexion- unable to assess due to pain  -MA     Row Name 08/08/18 1500          Motor Assessment/Intervention    Additional Documentation Balance (Group)  -MA     Row Name 08/08/18 1500          Balance    Balance static sitting balance;static standing balance  -MA     Row Name 08/08/18 1500          Static Sitting Balance    Level of [place] (Unsupported Sitting, Static Balance) supervision  -MA     Sitting Position (Unsupported Sitting, Static Balance) sitting on edge of bed  -MA     Level of [place] (Supported Sitting, Static Balance) supervision  -MA     Sitting Position (Supported Sitting, Static Balance) sitting on edge of bed  -MA     Row Name 08/08/18 1500          Static Standing Balance    Level of [place] (Supported Standing, Static Balance) contact guard assist  -MA     Assistive Device Utilized (Supported Standing, Static Balance) rolling walker  -MA     Row Name 08/08/18 1500          Sensory Assessment/Intervention    Sensory General Assessment no sensation deficits identified  -MA     Row Name 08/08/18 1500          Vision Assessment/Intervention    Visual Impairment/Limitations corrective lenses full time  -MA     Row Name 08/08/18 1500          Pain Assessment    Additional Documentation Pain Scale: FACES Pre/Post-Treatment (Group)  -MA     Row Name 08/08/18 1500          Pain Scale: Numbers Pre/Post-Treatment    Pain Location - Side Left  -MA     Pain Location - Orientation lower  -MA     Pain Location back  -MA     Pain Intervention(s) Repositioned;Ambulation/increased activity;Rest  -MA     Row Name 08/08/18 1500           Pain Scale: FACES Pre/Post-Treatment    Pain: FACES Scale, Pretreatment 10-->hurts worst  -MA     Pain: FACES Scale, Post-Treatment 10-->hurts worst  -MA     Row Name 08/08/18 1500          Plan of Care Review    Plan of Care Reviewed With patient  -MA     Row Name 08/08/18 1500          Physical Therapy Clinical Impression    Date of Referral to PT 08/08/18  -MA     PT Diagnosis (PT Clinical Impression) impaired functional mobility 2' LBP  -MA     Patient/Family Goals Statement (PT Clinical Impression) Return home upon DC  -MA     Criteria for Skilled Interventions Met (PT Clinical Impression) yes;treatment indicated  -MA     Pathology/Pathophysiology Noted (Describe Specifically for Each System) musculoskeletal;neuromuscular  -MA     Impairments Found (describe specific impairments) aerobic capacity/endurance;ergonomics and body mechanics;gait, locomotion, and balance  -MA     Rehab Potential (PT Clinical Summary) good, to achieve stated therapy goals  -MA     Care Plan Review (PT) patient/other agree to care plan  -MA     Row Name 08/08/18 1500          Vital Signs    Pre SpO2 (%) 86  -MA     O2 Delivery Pre Treatment supplemental O2  -MA     Post SpO2 (%) 90  -MA     O2 Delivery Post Treatment supplemental O2  -MA     Row Name 08/08/18 1500          Physical Therapy Goals    Bed Mobility Goal Selection (PT) bed mobility, PT goal 1  -MA     Transfer Goal Selection (PT) transfer, PT goal 1  -MA     Gait Training Goal Selection (PT) gait training, PT goal 1  -MA     Stairs Goal Selection (PT) stairs, PT goal 1  -MA     Additional Documentation Stairs Goal Selection (PT) (Row)  -MA     Row Name 08/08/18 1500          Bed Mobility Goal 1 (PT)    Activity/Assistive Device (Bed Mobility Goal 1, PT) bed mobility activities, all  -MA     [place] Level/Cues Needed (Bed Mobility Goal 1, PT) supervision required  -MA     Time Frame (Bed Mobility Goal 1, PT) 1 week  -MA     Progress/Outcomes (Bed Mobility Goal  1, PT) goal ongoing  -MA     Row Name 08/08/18 1500          Transfer Goal 1 (PT)    Activity/Assistive Device (Transfer Goal 1, PT) transfers, all  -MA     [place] Level/Cues Needed (Transfer Goal 1, PT) supervision required  -MA     Time Frame (Transfer Goal 1, PT) 1 week  -MA     Progress/Outcome (Transfer Goal 1, PT) goal ongoing  -MA     Row Name 08/08/18 1500          Gait Training Goal 1 (PT)    Activity/Assistive Device (Gait Training Goal 1, PT) gait (walking locomotion)  -MA     [place] Level (Gait Training Goal 1, PT) supervision required  -MA     Distance (Gait Goal 1, PT) 150  -MA     Time Frame (Gait Training Goal 1, PT) 1 week  -MA     Progress/Outcome (Gait Training Goal 1, PT) goal ongoing  -MA     Row Name 08/08/18 1500          Stairs Goal 1 (PT)    Activity/Assistive Device (Stairs Goal 1, PT) stairs, all skills  -MA     [place] Level/Cues Needed (Stairs Goal 1, PT) contact guard assist  -MA     Number of Stairs (Stairs Goal 1, PT) 5  -MA     Time Frame (Stairs Goal 1, PT) 1 week  -MA     Progress/Outcome (Stairs Goal 1, PT) goal ongoing  -MA     Row Name 08/08/18 1500          Positioning and Restraints    Pre-Treatment Position in bed  -MA     Post Treatment Position bed  -MA     In Bed notified nsg;fowlers;call light within reach;encouraged to call for assist;exit alarm on  -MA     Row Name 08/08/18 1500          Living Environment    Home Accessibility stairs to enter home  -MA       User Key  (r) = Recorded By, (t) = Taken By, (c) = Cosigned By    Initials Name Provider Type    Guadalupe Diaz, PT Physical Therapist          Physical Therapy Education     Title: PT OT SLP Therapies (Active)     Topic: Physical Therapy (Active)     Point: Mobility training (Active)    Learning Progress Summary     Learner Status Readiness Method Response Comment Documented by    Patient Active Acceptance E NR  MA 08/08/18 2495          Point: Home exercise program (Active)    Learning  "Progress Summary     Learner Status Readiness Method Response Comment Documented by    Patient Active Acceptance E NR  MA 08/08/18 1546          Point: Body mechanics (Active)    Learning Progress Summary     Learner Status Readiness Method Response Comment Documented by    Patient Active Acceptance E NR  MA 08/08/18 1546          Point: Precautions (Active)    Learning Progress Summary     Learner Status Readiness Method Response Comment Documented by    Patient Active Acceptance E NR  MA 08/08/18 1546                      User Key     Initials Effective Dates Name Provider Type Discipline    MA 04/03/18 -  Guadalupe Casey, PT Physical Therapist PT                PT Recommendation and Plan  Anticipated Discharge Disposition (PT): home with OP services, home with assist  Planned Therapy Interventions (PT Eval): balance training, bed mobility training, gait training, home exercise program, patient/family education, postural re-education, stair training, strengthening, transfer training  Therapy Frequency (PT Clinical Impression): 5 times/wk  Outcome Summary/Treatment Plan (PT)  Anticipated Discharge Disposition (PT): home with OP services, home with assist  Plan of Care Reviewed With: patient  Outcome Summary: Patient is a pleasant 73 y.o. female who presents with impaired functional mobility secondary to sharp lower back pain that patient describes as \"catching\". Patient admitted for infectious colitis but her level of back pain appears to be affecting her the most. Denies any recent falls and voiced she is independent with all mobility and ADLs at baseline- appeared to be slightly frustrated regarding the back pain and severity of pain level. Pain appaeared to limit all mobility today- unable to stand upright due to pain level and did not perform ambulation. Notified RN regarding clinical presentation and possible benefit from further consult. May benefit from skilled PT services acutely to address deficits as " able and improve level of independence prior to returning home.          Outcome Measures     Row Name 08/08/18 1500             How much help from another person do you currently need...    Turning from your back to your side while in flat bed without using bedrails? 4  -MA      Moving from lying on back to sitting on the side of a flat bed without bedrails? 4  -MA      Moving to and from a bed to a chair (including a wheelchair)? 2  -MA      Standing up from a chair using your arms (e.g., wheelchair, bedside chair)? 2  -MA      Climbing 3-5 steps with a railing? 1  -MA      To walk in hospital room? 2  -MA      AM-PAC 6 Clicks Score 15  -MA         Functional Assessment    Outcome Measure Options AM-PAC 6 Clicks Basic Mobility (PT)  -MA        User Key  (r) = Recorded By, (t) = Taken By, (c) = Cosigned By    Initials Name Provider Type    Guadalupe Diaz, PT Physical Therapist           Time Calculation:         PT Charges     Row Name 08/08/18 1526             Time Calculation    Start Time 1500  -MA      Stop Time 1526  -MA      Time Calculation (min) 26 min  -MA      PT Received On 08/08/18  -MA      PT - Next Appointment 08/09/18  -MA      PT Goal Re-Cert Due Date 08/15/18  -MA         Time Calculation- PT    Total Timed Code Minutes- PT 20 minute(s)  -MA        User Key  (r) = Recorded By, (t) = Taken By, (c) = Cosigned By    Initials Name Provider Type    Guadalupe Diaz, PT Physical Therapist        Therapy Suggested Charges     Code   Minutes Charges    None           Therapy Charges for Today     Code Description Service Date Service Provider Modifiers Qty    [id number]  PT EVAL MOD COMPLEXITY 2 8/8/2018 Guadalupe Casey, PT GP 1    [id number]  PT THER PROC EA 15 MIN 8/8/2018 Guadalupe Casey, PT GP 1          PT G-Codes  Outcome Measure Options: AM-PAC 6 Clicks Basic Mobility (PT)      Guadalupe Casey PT  8/8/2018

## 2018-08-08 NOTE — ED PROVIDER NOTES
EMERGENCY DEPARTMENT ENCOUNTER    CHIEF COMPLAINT  Chief Complaint: Back pain  History given by: Patient  History limited by: None  Room Number: 32/32  PMD: Claudio Anne MD      HPI:  Pt is a 73 y.o. female who presents complaining of a flare up of chronic back pain which worsens with movement and started today. Pt reports she took prednisone and hydrocodone with no relief. Pt  Also reports that she had dilaudid with no relief yesterday when he visited the ED. Pt also notes a low grade fever (subective) and diarrhea with blood present which started today but denies further sx at this time.    Duration:  1 day  Onset: Gradual  Timing: Constant  Location: Back  Radiation: None specifed  Quality: Pain  Intensity/Severity: Moderate  Progression: Unchanged  Associated Symptoms:  Grade fever (subjective) and diarrhea with blood present  Aggravating Factors: Movement  Alleviating Factors: None specified  Previous Episodes: Pt has hx of chronic back pain  Treatment before arrival: Pt reports she took prednisone and hydrocodone with no relief. Pt  Also reports that she had dilaudid with no relief yesterday when he visited the ED.     PAST MEDICAL HISTORY  Active Ambulatory Problems     Diagnosis Date Noted   • Major depressive disorder with single episode, in full remission (CMS/[facility]) 04/02/2001   • Controlled type 2 diabetes mellitus with diabetic neuropathy, with long-term current use of insulin (CMS/[facility])    • Essential hypertension    • Other hyperlipidemia    • Personal history of malignant neoplasm of breast 01/01/2001   • Acquired hypothyroidism    • Hypoglycemia due to endogenous hyperinsulinemia 12/10/2015   • Systolic murmur 12/10/2015   • Grief reaction 06/21/2016   • Menopause 06/21/2016   • Osteopenia 07/17/2016   • Encounter for long-term (current) use of insulin (CMS/[facility]) 08/29/2016   • Hyperinsulinism 08/29/2016   • Exertional dyspnea 11/13/2017   • Uncontrolled type 2 diabetes mellitus with diabetic  neuropathy, with long-term current use of insulin (CMS/[facility]) 01/16/2018   • Uncontrolled type 2 diabetes mellitus with complication, with long-term current use of insulin (CMS/[facility]) 01/16/2018   • Arthralgia of multiple sites 05/14/2018     Resolved Ambulatory Problems     Diagnosis Date Noted   • Arthropathy of knee 05/22/2014   • Arthropathy, multiple sites 04/12/2012   • Chronic kidney disease, stage III (moderate) 05/26/2011   • Uncontrolled type 2 diabetes mellitus (CMS/[facility]) 01/03/2013   • History of gynecological procedure 03/01/2010   • Abnormal weight gain 12/10/2015   • Diabetes (CMS/[facility]) 12/10/2015   • Fatigue 12/10/2015   • Type II diabetes mellitus with neurological manifestations, uncontrolled (CMS/[facility]) 08/29/2016   • Uncontrolled type 2 diabetes mellitus with complication, with long-term current use of insulin (CMS/[facility]) 02/14/2017     Past Medical History:   Diagnosis Date   • Arthropathy of knee 05/22/2014   • Arthropathy, multiple sites 04/12/2012   • Bulging lumbar disc    • Cancer (CMS/[facility])    • Chronic kidney disease, stage III (moderate) 05/26/2011   • Controlled diabetes mellitus type II without complication (CMS/[facility])    • Depression 04/02/2001   • Dyspnea    • Essential hypertension    • History of complete eye exam 03/29/2012   • History of gynecological procedure 03/01/2010   • Hyperlipidemia    • Hypothyroidism    • Personal history of malignant neoplasm of breast 2001   • Thyroid disease    • Type II diabetes mellitus with neurological manifestations, uncontrolled (CMS/[facility]) 01/03/2013       PAST SURGICAL HISTORY  Past Surgical History:   Procedure Laterality Date   • BREAST LUMPECTOMY Right 2001   • OTHER SURGICAL HISTORY Left 01/2015    ankle fracture repair   • VARICOSE VEIN SURGERY         FAMILY HISTORY  Family History   Problem Relation Age of Onset   • Hypertension Sister    • Thyroid disease Daughter    • Cancer Mother    • Heart disease Father        SOCIAL HISTORY  Social History      Social History   • Marital status:      Spouse name: N/A   • Number of children: 4   • Years of education: N/A     Occupational History   • Not on file.     Social History Main Topics   • Smoking status: Never Smoker   • Smokeless tobacco: Never Used   • Alcohol use No   • Drug use: No   • Sexual activity: Defer     Other Topics Concern   • Not on file     Social History Narrative   • No narrative on file       ALLERGIES  Patient has no known allergies.    REVIEW OF SYSTEMS  Review of Systems   Constitutional: Positive for fever (subjective).   HENT: Negative for sore throat.    Eyes: Negative.    Respiratory: Negative for cough and shortness of breath.    Cardiovascular: Negative for chest pain.   Gastrointestinal: Positive for diarrhea (blood present). Negative for abdominal pain and vomiting.   Genitourinary: Negative for dysuria.   Musculoskeletal: Positive for back pain. Negative for neck pain.   Skin: Negative for rash.   Allergic/Immunologic: Negative.    Neurological: Negative for weakness, numbness and headaches.   Hematological: Negative.    Psychiatric/Behavioral: Negative.    All other systems reviewed and are negative.      PHYSICAL EXAM  ED Triage Vitals   Temp Heart Rate Resp BP SpO2   08/07/18 1957 08/07/18 1957 08/07/18 2016 08/07/18 2017 08/07/18 1957   100.2 °F (37.9 °C) 99 18 (!) 181/88 94 %      Temp src Heart Rate Source Patient Position BP Location FiO2 (%)   -- -- -- -- --              Physical Exam   Constitutional: She is oriented to person, place, and time. No distress.   HENT:   Head: Normocephalic and atraumatic.   Eyes: Pupils are equal, round, and reactive to light. EOM are normal.   Neck: Normal range of motion. Neck supple.   Cardiovascular: Normal rate, regular rhythm and normal heart sounds.    Pulmonary/Chest: Effort normal and breath sounds normal. No respiratory distress.   Abdominal: Soft. There is tenderness (lower). There is no rebound and no guarding.    Musculoskeletal: Normal range of motion. She exhibits no edema.   Lumbar pain to palpation and pain with SLR. Motor strength and pulses of bilateral lower extremities are intact.   Neurological: She is alert and oriented to person, place, and time. She has normal sensation and normal strength.   Skin: Skin is warm and dry. No rash noted.   Psychiatric: Mood and affect normal.   Nursing note and vitals reviewed.      LAB RESULTS  Lab Results (last 24 hours)     Procedure Component Value Units Date/Time    CBC & Differential [[id number]] Collected:  08/07/18 2038    Specimen:  Blood Updated:  08/07/18 2050    Narrative:       The following orders were created for panel order CBC & Differential.  Procedure                               Abnormality         Status                     ---------                               -----------         ------                     CBC Auto Differential[[id number]]        Abnormal            Final result                 Please view results for these tests on the individual orders.    Comprehensive Metabolic Panel [[id number]]  (Abnormal) Collected:  08/07/18 2038    Specimen:  Blood Updated:  08/07/18 2115     Glucose 219 (H) mg/dL      BUN 31 (H) mg/dL      Creatinine 1.20 (H) mg/dL      Sodium 139 mmol/L      Potassium 4.3 mmol/L      Chloride 98 mmol/L      CO2 26.7 mmol/L      Calcium 9.4 mg/dL      Total Protein 6.9 g/dL      Albumin 4.40 g/dL      ALT (SGPT) 26 U/L      AST (SGOT) 19 U/L      Alkaline Phosphatase 87 U/L      Total Bilirubin 0.5 mg/dL      eGFR Non African Amer 44 (L) mL/min/1.73      Globulin 2.5 gm/dL      A/G Ratio 1.8 g/dL      BUN/Creatinine Ratio 25.8 (H)     Anion Gap 14.3 mmol/L     Narrative:       The MDRD GFR formula is only valid for adults with stable renal function between ages 18 and 70.    Lipase [[id number]]  (Normal) Collected:  08/07/18 2038    Specimen:  Blood Updated:  08/07/18 2115     Lipase 37 U/L     Blood Culture - Blood, [[id number]]  Collected:  08/07/18 2038    Specimen:  Blood from Arm, Left Updated:  08/07/18 2042    Lactic Acid, Plasma [[id number]]  (Normal) Collected:  08/07/18 2038    Specimen:  Blood Updated:  08/07/18 2057     Lactate 1.9 mmol/L     CBC Auto Differential [[id number]]  (Abnormal) Collected:  08/07/18 2038    Specimen:  Blood Updated:  08/07/18 2050     WBC 11.47 (H) 10*3/mm3      RBC 4.25 10*6/mm3      Hemoglobin 14.0 g/dL      Hematocrit 44.0 %      .5 (H) fL      MCH 32.9 (H) pg      MCHC 31.8 (L) g/dL      RDW 15.1 (H) %      RDW-SD 57.6 (H) fl      MPV 10.5 fL      Platelets 257 10*3/mm3      Neutrophil % 85.6 (H) %      Lymphocyte % 7.6 (L) %      Monocyte % 5.5 %      Eosinophil % 1.0 %      Basophil % 0.3 %      Immature Grans % 0.0 %      Neutrophils, Absolute 9.82 (H) 10*3/mm3      Lymphocytes, Absolute 0.87 (L) 10*3/mm3      Monocytes, Absolute 0.63 10*3/mm3      Eosinophils, Absolute 0.12 10*3/mm3      Basophils, Absolute 0.03 10*3/mm3      Immature Grans, Absolute 0.00 10*3/mm3     Blood Culture - Blood, [[id number]] Collected:  08/07/18 2102    Specimen:  Blood from Arm, Left Updated:  08/07/18 2106        I ordered the above labs and reviewed the results    RADIOLOGY  CT Abdomen Pelvis Without Contrast            I ordered the above noted radiological studies. Interpreted by radiologist. Reviewed by me in PACS.     PROCEDURES  Procedures    PROGRESS AND CONSULTS   2025-Checked patient and discussed that Dilaudid is the best available pain medication for her chronic back pain and plan to evaluate her fever and bloody diarrhea with further testing. Pt understands and agrees with the plan, all questions answered.    2026-Ordered IVF for hydration, Dilaudid, zofran, CT abd/pel, stool culture, and blood work.     2133-Rechecked patient and discussed plan to admit to treat colitis. Pt understands and agrees with the plan, all questions answered.    2205-Discussed patient's case with Dr. Maldonado who agrees to  admit the admit.      MEDICAL DECISION MAKING  Results were reviewed/discussed with the patient and they were also made aware of online access. Pt also made aware that some labs, such as cultures, will not be resulted during ER visit and follow up with PMD is necessary.     MDM  Number of Diagnoses or Management Options  Acute midline low back pain without sciatica:   Colitis:      Amount and/or Complexity of Data Reviewed  Clinical lab tests: reviewed and ordered (Lactate=1.9, lipase=37, WBC=11.47, BUN=31, Creatinine=1.20, glucose=219)  Tests in the radiology section of CPT®: reviewed and ordered (CT abd/pel shows a decompressed colon with mild thickening of the transverse and transcending consistent with colitis)  Decide to obtain previous medical records or to obtain history from someone other than the patient: yes  Review and summarize past medical records: yes (MRI of back done July 25 results were viewed. Pt has a appointment with pain management tomorrow.)  Discuss the patient with other providers: yes (Dr. Terrell (radiology))    Patient Progress  Patient progress: stable       DIAGNOSIS  Final diagnoses:   Colitis   Acute midline low back pain without sciatica     DISPOSITION  ADMISSION    Discussed treatment plan and reason for admission with pt/family and admitting physician.  Pt/family voiced understanding of the plan for admission for further testing/treatment as needed.     Latest Documented Vital Signs:  As of 10:13 PM  BP- 150/67 HR- 99 Temp- 100.2 °F (37.9 °C) O2 sat- 94%    --  Documentation assistance provided by emily Gerber for Dr Avery.  Information recorded by the scribe was done at my direction and has been verified and validated by me.              Althea Gerber  08/07/18 2064       Tam Avery MD  08/07/18 5162

## 2018-08-08 NOTE — ED NOTES
Attempted to call report to floor, nurse unavailable to take report at this time, will call back.     Emily Rae, RN  08/07/18 3309

## 2018-08-08 NOTE — PLAN OF CARE
"Problem: Patient Care Overview  Goal: Plan of Care Review   08/08/18 1543   Coping/Psychosocial   Plan of Care Reviewed With patient   OTHER   Outcome Summary Patient is a pleasant 73 y.o. female who presents with impaired functional mobility secondary to sharp lower back pain that patient describes as \"catching\". Patient admitted for infectious colitis but her level of back pain appears to be affecting her the most. Denies any recent falls and voiced she is independent with all mobility and ADLs at baseline- appeared to be slightly frustrated regarding the back pain. Mobility limited primarily due to pain today- unable to stand upright due to pain level and did not perform ambulation. Notified RN regarding clinical presentation and debility from back pain. May benefit from skilled PT services acutely to address deficits as able and improve level of independence prior to returning home.         "

## 2018-08-08 NOTE — ED NOTES
Pt placed on O2 at 2LPM for O2 sats 89%. Pt denies SOB, able to hold a conversation with daughter, breathing nonlabored.     Emily Rae RN  08/07/18 6867

## 2018-08-08 NOTE — PLAN OF CARE
Problem: Patient Care Overview  Goal: Plan of Care Review  Outcome: Ongoing (interventions implemented as appropriate)   08/08/18 6190   Coping/Psychosocial   Plan of Care Reviewed With patient   OTHER   Outcome Summary smear of loose BM, voiding w/o difficulty, severe back pain, med increased awaiting results, neurosurgery consult MRI ordered     Goal: Individualization and Mutuality  Outcome: Ongoing (interventions implemented as appropriate)    Goal: Interprofessional Rounds/Family Conf  Outcome: Ongoing (interventions implemented as appropriate)      Problem: Pain, Acute (Adult)  Goal: Acceptable Pain Control/Comfort Level  Outcome: Ongoing (interventions implemented as appropriate)

## 2018-08-08 NOTE — PROGRESS NOTES
Clinical Pharmacy Services: Medication History    Iraida Elizabeth is a 73 y.o. female presenting to [facility] for   Chief Complaint   Patient presents with   • Back Pain   • Black or Bloody Stool       She  has a past medical history of Arthropathy of knee (05/22/2014); Arthropathy, multiple sites (04/12/2012); Bulging lumbar disc; Cancer (CMS/[facility]); Chronic kidney disease, stage III (moderate) (05/26/2011); Controlled diabetes mellitus type II without complication (CMS/[facility]); Depression (04/02/2001); Dyspnea; Essential hypertension; History of complete eye exam (03/29/2012); History of gynecological procedure (03/01/2010); Hyperlipidemia; Hypothyroidism; Personal history of malignant neoplasm of breast (2001); Thyroid disease; and Type II diabetes mellitus with neurological manifestations, uncontrolled (CMS/[facility]) (01/03/2013).    Allergies as of 08/07/2018   • (No Known Allergies)       Medication information was obtained from: Patient, medication bottles  Pharmacy and Phone Number: [facility] [phone]    Prior to Admission Medications     Prescriptions Last Dose Informant Patient Reported? Taking?    acetaminophen (TYLENOL) 650 MG 8 hr tablet  Medication Bottle Yes Yes    Take 1,300 mg by mouth Every 8 (Eight) Hours As Needed for Mild Pain .    amLODIPine (NORVASC) 10 MG tablet  Medication Bottle No Yes    Take 1 tablet by mouth Daily for 180 days.    aspirin 81 MG EC tablet  Medication Bottle Yes Yes    Take 81 mg by mouth daily. Take 1 tablet by oral route once daily    atorvastatin (LIPITOR) 20 MG tablet  Medication Bottle No Yes    Take 1 tablet by mouth Every Night for 180 days.    Black Pepper-Turmeric (TURMERIC COMPLEX/BLACK PEPPER) 3-500 MG capsule  Medication Bottle Yes Yes    Take 1 capsule by mouth 2 (Two) Times a Day.    hydrochlorothiazide (HYDRODIURIL) 25 MG tablet  Medication Bottle Yes Yes    Take 25 mg by mouth Daily.    HYDROcodone-acetaminophen (NORCO) 5-325 MG per tablet   Medication Bottle No Yes    Take 1 tablet by mouth Every 6 (Six) Hours As Needed for Severe Pain .    Insulin Lispro (HUMALOG KWIKPEN) 200 UNIT/ML solution pen-injector  Self No Yes    Inject 12 Units under the skin 3 (Three) Times a Day Before Meals.    Patient taking differently:  Inject 12 Units under the skin into the appropriate area as directed Daily With Breakfast & Lunch.    insulin NPH (humuLIN N,novoLIN N) 100 UNIT/ML injection  Self Yes Yes    Inject 20 Units under the skin into the appropriate area as directed 2 (Two) Times a Day. Before Breakfast and at Bedtime    irbesartan (AVAPRO) 300 MG tablet  Medication Bottle No Yes    Take 1 tablet by mouth Daily for 180 days.    levothyroxine (SYNTHROID, LEVOTHROID) 75 MCG tablet  Medication Bottle No Yes    Take 1 tablet by mouth Daily for 180 days.    PARoxetine (PAXIL) 10 MG tablet  Medication Bottle Yes Yes    Take 10 mg by mouth Daily.    traMADol (ULTRAM) 50 MG tablet  Medication Bottle Yes Yes    Take 50 mg by mouth At Night As Needed for Moderate Pain .            Medication notes: Effexor complete-patient now taking Paxil. Added HCTZ and Turmeric per patient medication bottles.    This medication list is complete to the best of my knowledge as of 8/7/2018    Please call if questions.    Hillary Barrera, Medication History Technician  8/7/2018 9:52 PM

## 2018-08-08 NOTE — ED NOTES
To room 32 with c/o ongoing back pain, NKI not improving with steroid regime. Pt is scheduled to see pain management tomorrow but started having increased back pain and diarrhea. Pt reports noting blood in stool this am. Reports fever today and chills. No N/V.     Emily Rae, RN  08/07/18 2045

## 2018-08-08 NOTE — CONSULTS
[facility], [place], operated by [facility]  Initial Inpatient Consult Note    Referring Provider: Dr Maldonado    Reason for Consultation: Colitis    Subjective     History of present illness:    73 y.o. female with hx of morbid obesity and chronic back pain, presented to ER last evening with worsening back pain.  Apparently she endorsed episodes of bloody diarrhea that have been occurring off/on for few months, but larger volume yesterday.    This prompted CT A/P which showed the followin. The patient is markedly obese. There is a small amount of herniated  fat at the left lung base posteriorly unchanged from the CT scan dated  2017. There is a moderate-sized hiatus hernia measuring 6.3 cm  which is unchanged.  2. The liver, spleen, pancreas, both adrenal glands, both kidneys are  unremarkable without intravenous contrast. The gallbladder shows no  gallstones or wall thickening.  3. There is no aortic aneurysm or retroperitoneal lymphadenopathy. The  large and small bowel loops are normal in caliber. The colon is somewhat  decompressed and there is a suspicion of mild generalized wall  thickening predominantly involving the left portion of the transverse  colon and the descending colon and consistent with nonspecific changes  of mild colitis. The remainder of the pelvis is unremarkable.    WBC normal.  Hb normal.  She has some occasional lower abdominal pain.  She denies fevers/chills. Pt was started on empric abx and stool cultures have been ordered.  She denies recent abx exposure.  Last colonoscopy .      Past Medical History:  Past Medical History:   Diagnosis Date   • Arthropathy of knee 2014    unspecified   • Arthropathy, multiple sites 2012    unspecified   • Bulging lumbar disc    • Cancer (CMS/HCC)     breast   • Chronic kidney disease, stage III (moderate) 2011   • Controlled diabetes mellitus type II without complication (CMS/HCC)    • Depression 2001   • Dyspnea    •  Essential hypertension    • History of complete eye exam 03/29/2012   • History of gynecological procedure 03/01/2010    special invesitation and examinations; gynecological examination; routine gynecological examination   • Hyperlipidemia     other and unspecified   • Hypothyroidism     unspecified   • Personal history of malignant neoplasm of breast 2001    R breast (negative nodes) TX with radiation and Tamoxifen   • Thyroid disease    • Type II diabetes mellitus with neurological manifestations, uncontrolled (CMS/HCC) 01/03/2013     Past Surgical History:  Past Surgical History:   Procedure Laterality Date   • BREAST LUMPECTOMY Right 2001   • OTHER SURGICAL HISTORY Left 01/2015    ankle fracture repair   • VARICOSE VEIN SURGERY        Social History:   Social History   Substance Use Topics   • Smoking status: Never Smoker   • Smokeless tobacco: Never Used   • Alcohol use No      Family History:  Family History   Problem Relation Age of Onset   • Hypertension Sister    • Thyroid disease Daughter    • Cancer Mother    • Heart disease Father        Home Meds:  Prescriptions Prior to Admission   Medication Sig Dispense Refill Last Dose   • acetaminophen (TYLENOL) 650 MG 8 hr tablet Take 1,300 mg by mouth Every 8 (Eight) Hours As Needed for Mild Pain .      • amLODIPine (NORVASC) 10 MG tablet Take 1 tablet by mouth Daily for 180 days. 90 tablet 1 8/5/2018 at Unknown time   • aspirin 81 MG EC tablet Take 81 mg by mouth daily. Take 1 tablet by oral route once daily   8/5/2018 at Unknown time   • atorvastatin (LIPITOR) 20 MG tablet Take 1 tablet by mouth Every Night for 180 days. 90 tablet 1 8/5/2018 at Unknown time   • Black Pepper-Turmeric (TURMERIC COMPLEX/BLACK PEPPER) 3-500 MG capsule Take 1 capsule by mouth 2 (Two) Times a Day.      • hydrochlorothiazide (HYDRODIURIL) 25 MG tablet Take 25 mg by mouth Daily.      • HYDROcodone-acetaminophen (NORCO) 5-325 MG per tablet Take 1 tablet by mouth Every 6 (Six) Hours As  Needed for Severe Pain . 20 tablet 0    • Insulin Lispro (HUMALOG KWIKPEN) 200 UNIT/ML solution pen-injector Inject 12 Units under the skin 3 (Three) Times a Day Before Meals. (Patient taking differently: Inject 12 Units under the skin into the appropriate area as directed Daily With Breakfast & Lunch.) 15 mL 4 8/5/2018 at Unknown time   • insulin NPH (humuLIN N,novoLIN N) 100 UNIT/ML injection Inject 20 Units under the skin into the appropriate area as directed 2 (Two) Times a Day. Before Breakfast and at Bedtime      • irbesartan (AVAPRO) 300 MG tablet Take 1 tablet by mouth Daily for 180 days. 90 tablet 1 8/5/2018 at Unknown time   • levothyroxine (SYNTHROID, LEVOTHROID) 75 MCG tablet Take 1 tablet by mouth Daily for 180 days. 90 tablet 1 8/5/2018 at Unknown time   • PARoxetine (PAXIL) 10 MG tablet Take 10 mg by mouth Daily.      • traMADol (ULTRAM) 50 MG tablet Take 50 mg by mouth At Night As Needed for Moderate Pain .   8/6/2018 at Unknown time     Current Meds:     amLODIPine 10 mg Oral Daily   atorvastatin 20 mg Oral Nightly   insulin aspart 12 Units Subcutaneous BID With Meals   insulin NPH 20 Units Subcutaneous BID   levothyroxine 75 mcg Oral Q AM   losartan 100 mg Oral Q24H   PARoxetine 10 mg Oral Daily     Allergies:  No Known Allergies  Review of Systems  All systems were reviewed and negative except for:  Gastrointestinal: postitive for  bright red blood per rectum and pain  Musculoskeletal: positive for  back pain     Objective     Vital Signs  Temp:  [98.8 °F (37.1 °C)-100.2 °F (37.9 °C)] 98.8 °F (37.1 °C)  Heart Rate:  [84-99] 84  Resp:  [16-18] 16  BP: (150-181)/(61-88) 179/79  Physical Exam:  General Appearance:    Alert, cooperative, in no acute distress   Head:    Normocephalic, without obvious abnormality, atraumatic   Eyes:            Lids and lashes normal, conjunctivae and sclerae normal, no   icterus   Throat:   No oral lesions, no thrush, oral mucosa moist   Neck:   No adenopathy, supple,  trachea midline, no thyromegaly, no   carotid bruit, no JVD   Lungs:     Clear to auscultation,respirations regular, even and                   unlabored    Heart:    Regular rhythm and normal rate, normal S1 and S2, no            murmur, no gallop, no rub, no click   Chest Wall:    No abnormalities observed   Abdomen:     Obese, Normal bowel sounds, no masses, no organomegaly, soft        non-tender, non-distended, no guarding, no rebound                 tenderness   Rectal:     Deferred   Extremities:   no edema, no cyanosis, no redness   Skin:   No bleeding, bruising or rash   Lymph nodes:   No palpable adenopathy   Psychiatric:  Judgement and insight: normal   Orientation to person place and time: normal   Mood and affect: normal   Results Review:   I reviewed the patient's new clinical results.  I reviewed the patient's new imaging results and agree with the interpretation.  (Admission CT A/P)      Results from last 7 days  Lab Units 08/08/18  0534 08/07/18 2038   WBC 10*3/mm3 7.61 11.47*   HEMOGLOBIN g/dL 12.3 14.0   HEMATOCRIT % 39.8 44.0   PLATELETS 10*3/mm3 217 257       Results from last 7 days  Lab Units 08/08/18  0534 08/07/18 2038   SODIUM mmol/L 139 139   POTASSIUM mmol/L 4.3 4.3   CHLORIDE mmol/L 101 98   CO2 mmol/L 26.7 26.7   BUN mg/dL 24* 31*   CREATININE mg/dL 1.03* 1.20*   CALCIUM mg/dL 8.5* 9.4   BILIRUBIN mg/dL  --  0.5   ALK PHOS U/L  --  87   ALT (SGPT) U/L  --  26   AST (SGOT) U/L  --  19   GLUCOSE mg/dL 145* 219*           Lab Results  Lab Value Date/Time   LIPASE 37 08/07/2018 2038       Radiology:  CT Abdomen Pelvis Without Contrast   Final Result          Assessment/Plan   Assessment:   1.  ? Colitis - CT not overly impressive.  WBC normal  2.  Blood in stool - chronic, normal Hb  3. Chronic back pain    Plan:   Await stool studies  Agree with short course of abx, can transition to oral  Will arrange for f/u in office to discuss outpt colonoscopy in 6-8 weeks to evaluate her chronic GI  sx and ? Abnormal CT scan.     I discussed the patients findings and my recommendations with patient.         Alban Harry M.D.  [facility]  [address] [place] [address]  Office: [phone]

## 2018-08-08 NOTE — PROGRESS NOTES
Discharge Planning Assessment  [facility]     Patient Name: Iraida Elizabeth  MRN: [id number]  Today's Date: 8/8/2018    Admit Date: 8/7/2018          Discharge Needs Assessment     Row Name 08/08/18 1621       Living Environment    Lives With alone    Current Living Arrangements home/apartment/condo    Primary Care Provided by self    Provides Primary Care For no one    Family Caregiver if Needed child(geovanna), adult;grandchild(geovanna), adult    Quality of Family Relationships supportive    Able to Return to Prior Arrangements yes       Resource/Environmental Concerns    Transportation Concerns car, none;other (see comments)       Transition Planning    Patient/Family Anticipates Transition to home    Patient/Family Anticipated Services at Transition home health care       Discharge Needs Assessment    Readmission Within the Last 30 Days no previous admission in last 30 days    Concerns to be Addressed adjustment to diagnosis/illness;home safety    Equipment Currently Used at Home --   Pt said she has a cane and walker but does not use them    Anticipated Changes Related to Illness none    Equipment Needed After Discharge none    Discharge Facility/Level of Care Needs home with home health    Offered/Gave Vendor List yes            Discharge Plan     Row Name 08/08/18 6230       Plan    Plan Comments Pt confirmed the address and PCP are correct, she said she uses [facility] on [facility] & can afford her Rx, pt said she lives alone in a ground level apartment, is IADL, uses no DME but has a cane and walker from a previous ankle injury. Pt said she has never had home health but has been to a SNF but can't recall the facility. I advised I spoke with the therapist who did her PT eval and it sounds like she may need therapy at discharge, pt said she hopes to be able to return home at discharge and would be agreeable to home health if needed. Pt said she has 2 dtr and grandchildren that check on her often. Pt said  she does not have a HCS or Medical POA.   Sagrario Dunlap, GEOVANNA      Row Name 08/08/18 1620       Plan    Plan Plans home, agreeable to home health but wants to wait until tomorrow to select  agency                Demographic Summary     Row Name 08/08/18 1627       General Information    Admission Type inpatient    Arrived From home    Row Name 08/08/18 1621       General Information    Admission Type inpatient    Arrived From home    Referral Source admission list    Reason for Consult discharge planning    Preferred Language English     Used During This Interaction no       Contact Information    Permission Granted to Share Info With facility ;family/designee            Functional Status     Row Name 08/08/18 1622       Functional Status, IADL    Medications independent    Meal Preparation independent    Housekeeping independent    Laundry independent    Shopping independent        Patient Forms     Row Name 08/08/18 1621       Patient Forms    Provider Choice List Delivered    Delivered to Patient    Method of delivery In person          Sagrario Dunlap, RN

## 2018-08-08 NOTE — ED TRIAGE NOTES
"Pt to ED with c/o low back pain and diarrhea.  Pt reports she has been treated for the back pain since about June by PCP and recently prescribed prednisone for a month, with no relief.  Pt states \"i can't stand up, I woke up Sunday and that's when it started.\"  Pt also reports she has an appt with pain management tomorrow but was unable to wait r/t the increased back pain and diarrhea.  Pt also reports diarrhea started today and she saw blood in it, also states \"i have a low grade fever.\"  "

## 2018-08-08 NOTE — H&P
Patient Name:  Iraida Elizabeth  YOB: 1945  MRN:  [id number]  Admit Date:  8/7/2018  Patient Care Team:  Claudio Anne MD as PCP - General  Claudio Anne MD as PCP - Family Medicine  Robert Garcia MD as Consulting Physician (Endocrinology)      Chief Complaint   Patient presents with   • Back Pain   • Black or Bloody Stool     Subjective   Ms. Elizabeth is a 73 y.o. female with a history of diabetes, hypertension, chronic back pain, breast cancer, chronic bloody diarrhea for about 6 months he came to the hospital today for worsening back pain.  She had an MRI on July 25 showing mild lumbar spondylosis without significant lumbar canal or foraminal narrowing.  She had mild right and moderate left facet overgrowth at L4-L5.  She had been on tramadol without improvement.  She actually came to the hospital yesterday for worsening back pain and was given Norco.  The pain continued to worsen in which she is having difficulty even walking even with a walker.  She has weakness in the right leg.  The pain is more so on the right.  She had a course of steroids which did not help at all.  She also reports bloody diarrhea for about 6 months but today the blood was much worse than usual.  She had CAT scan in the emergency room which showed mild colitis and admitted to our service for further care.  She did have a temperature of 100 prior to arrival.  Of note, she has appointment with pain management tomorrow at 2 PM.        History of Present Illness    Past Medical History:   Diagnosis Date   • Arthropathy of knee 05/22/2014    unspecified   • Arthropathy, multiple sites 04/12/2012    unspecified   • Bulging lumbar disc    • Cancer (CMS/HCC)     breast   • Chronic kidney disease, stage III (moderate) 05/26/2011   • Controlled diabetes mellitus type II without complication (CMS/HCC)    • Depression 04/02/2001   • Dyspnea    • Essential hypertension    • History of complete eye exam 03/29/2012   • History  of gynecological procedure 03/01/2010    special invesitation and examinations; gynecological examination; routine gynecological examination   • Hyperlipidemia     other and unspecified   • Hypothyroidism     unspecified   • Personal history of malignant neoplasm of breast 2001    R breast (negative nodes) TX with radiation and Tamoxifen   • Thyroid disease    • Type II diabetes mellitus with neurological manifestations, uncontrolled (CMS/HCC) 01/03/2013     Past Surgical History:   Procedure Laterality Date   • BREAST LUMPECTOMY Right 2001   • OTHER SURGICAL HISTORY Left 01/2015    ankle fracture repair   • VARICOSE VEIN SURGERY       Family History   Problem Relation Age of Onset   • Hypertension Sister    • Thyroid disease Daughter    • Cancer Mother    • Heart disease Father      Social History   Substance Use Topics   • Smoking status: Never Smoker   • Smokeless tobacco: Never Used   • Alcohol use No       (Not in a hospital admission)  Allergies:  No Known Allergies    Review of Systems   Constitutional: Positive for activity change, appetite change, fatigue and fever. Negative for unexpected weight change.   HENT: Negative for nosebleeds, sore throat and trouble swallowing.    Eyes: Negative for pain and visual disturbance.   Respiratory: Positive for shortness of breath. Negative for cough and chest tightness.    Cardiovascular: Positive for leg swelling. Negative for chest pain and palpitations.   Gastrointestinal: Positive for abdominal pain, blood in stool and diarrhea. Negative for constipation, nausea and vomiting.   Endocrine:        + for diabetes and thyroid disease   Genitourinary: Negative for hematuria.   Musculoskeletal: Positive for back pain and gait problem. Negative for neck pain and neck stiffness.   Skin: Negative for rash and wound.   Neurological: Positive for dizziness and weakness (Right leg). Negative for syncope, light-headedness and headaches.   Hematological: Negative for  adenopathy. Does not bruise/bleed easily.   Psychiatric/Behavioral: Negative for agitation, behavioral problems and confusion.        Objective    Vital Signs  Temp:  [100.2 °F (37.9 °C)] 100.2 °F (37.9 °C)  Heart Rate:  [99] 99  Resp:  [18] 18  BP: (150-181)/(61-88) 150/67  SpO2:  [94 %-97 %] 95 %  on  Flow (L/min):  [2] 2;   Device (Oxygen Therapy): nasal cannula  Body mass index is 41.87 kg/m².    Physical Exam   Constitutional: She is oriented to person, place, and time. She appears well-developed and well-nourished.   HENT:   Head: Normocephalic and atraumatic.   Mouth/Throat: Oropharynx is clear and moist. No oropharyngeal exudate.   Eyes: Pupils are equal, round, and reactive to light. Conjunctivae and EOM are normal. No scleral icterus.   Neck: Normal range of motion. Neck supple. No JVD present. No thyromegaly present.   Cardiovascular: Normal rate, regular rhythm and normal heart sounds.  Exam reveals no friction rub.    No murmur heard.  Pulmonary/Chest: Effort normal and breath sounds normal. No stridor. No respiratory distress.   Abdominal: Soft. Bowel sounds are normal. She exhibits no distension. There is no tenderness.   Musculoskeletal: She exhibits edema (1+ bilaterally) and tenderness (Ankles and shins tender to palpation).   Paraspinal tenderness along the right lumbar spine which causes radiation of pain down to the right leg   Lymphadenopathy:     She has no cervical adenopathy.   Neurological: She is alert and oriented to person, place, and time. No cranial nerve deficit.   3-4 out of 5 strength in the right lower extremity.  Sensation is intact.  Gait not tested   Skin: Skin is warm and dry.   Psychiatric: She has a normal mood and affect. Her behavior is normal.       Results Review:   I reviewed the patient's new clinical results including all labs and xray's.    Lab Results (last 24 hours)     Procedure Component Value Units Date/Time    CBC & Differential [[id number]] Collected:  08/07/18  2038    Specimen:  Blood Updated:  08/07/18 2050    Narrative:       The following orders were created for panel order CBC & Differential.  Procedure                               Abnormality         Status                     ---------                               -----------         ------                     CBC Auto Differential[[id number]]        Abnormal            Final result                 Please view results for these tests on the individual orders.    Comprehensive Metabolic Panel [[id number]]  (Abnormal) Collected:  08/07/18 2038    Specimen:  Blood Updated:  08/07/18 2115     Glucose 219 (H) mg/dL      BUN 31 (H) mg/dL      Creatinine 1.20 (H) mg/dL      Sodium 139 mmol/L      Potassium 4.3 mmol/L      Chloride 98 mmol/L      CO2 26.7 mmol/L      Calcium 9.4 mg/dL      Total Protein 6.9 g/dL      Albumin 4.40 g/dL      ALT (SGPT) 26 U/L      AST (SGOT) 19 U/L      Alkaline Phosphatase 87 U/L      Total Bilirubin 0.5 mg/dL      eGFR Non African Amer 44 (L) mL/min/1.73      Globulin 2.5 gm/dL      A/G Ratio 1.8 g/dL      BUN/Creatinine Ratio 25.8 (H)     Anion Gap 14.3 mmol/L     Narrative:       The MDRD GFR formula is only valid for adults with stable renal function between ages 18 and 70.    Lipase [[id number]]  (Normal) Collected:  08/07/18 2038    Specimen:  Blood Updated:  08/07/18 2115     Lipase 37 U/L     Blood Culture - Blood, [[id number]] Collected:  08/07/18 2038    Specimen:  Blood from Arm, Left Updated:  08/07/18 2042    Lactic Acid, Plasma [[id number]]  (Normal) Collected:  08/07/18 2038    Specimen:  Blood Updated:  08/07/18 2057     Lactate 1.9 mmol/L     CBC Auto Differential [[id number]]  (Abnormal) Collected:  08/07/18 2038    Specimen:  Blood Updated:  08/07/18 2050     WBC 11.47 (H) 10*3/mm3      RBC 4.25 10*6/mm3      Hemoglobin 14.0 g/dL      Hematocrit 44.0 %      .5 (H) fL      MCH 32.9 (H) pg      MCHC 31.8 (L) g/dL      RDW 15.1 (H) %      RDW-SD 57.6 (H) fl      MPV 10.5  fL      Platelets 257 10*3/mm3      Neutrophil % 85.6 (H) %      Lymphocyte % 7.6 (L) %      Monocyte % 5.5 %      Eosinophil % 1.0 %      Basophil % 0.3 %      Immature Grans % 0.0 %      Neutrophils, Absolute 9.82 (H) 10*3/mm3      Lymphocytes, Absolute 0.87 (L) 10*3/mm3      Monocytes, Absolute 0.63 10*3/mm3      Eosinophils, Absolute 0.12 10*3/mm3      Basophils, Absolute 0.03 10*3/mm3      Immature Grans, Absolute 0.00 10*3/mm3     Blood Culture - Blood, [[id number]] Collected:  08/07/18 2102    Specimen:  Blood from Arm, Left Updated:  08/07/18 2106          CT Abdomen Pelvis Without Contrast           Assessment/Plan   Active Hospital Problems    Diagnosis Date Noted   • Infectious colitis [A09] 08/07/2018   • DDD (degenerative disc disease), lumbar [M51.36] 08/07/2018   • Right leg weakness [R29.898] 08/07/2018   • Essential hypertension [I10]    • Controlled type 2 diabetes mellitus with diabetic neuropathy, with long-term current use of insulin (CMS/[facility]) [E11.40, Z79.4]       Resolved Hospital Problems    Diagnosis Date Noted Date Resolved   No resolved problems to display.       Ms. Elizabeth is a 73 y.o. female 's been admitted to our service for presumed infectious colitis.  We'll continue with Levaquin and Flagyl.  Obtain C. difficile and stool cultures given the recent fever and mildly elevated white blood cell count.  Given the length that the diarrhea with blood has been going on I would favor more of an inflammatory-type colitis.  I will ask GI to see her.  Continue her home medications for diabetes.  Regards to her back pain ultimately she needs to see pain management.  She does have some right-sided weakness in the leg and I've consulted physical therapy.  He management would be unlikely to give her injections while on antibiotics for colitis.  If she does improve and is stable for discharge I hope is to get her discharge 2p.m. tomorrow if she can make the appointment.      I discussed the patients  findings and my recommendations with patient, family and nursing staff.      Nile Maldonado MD  [facility]  08/07/18  11:26 PM

## 2018-08-09 ENCOUNTER — APPOINTMENT (OUTPATIENT)
Dept: MRI IMAGING | Facility: HOSPITAL | Age: 73
End: 2018-08-09
Attending: NEUROLOGICAL SURGERY

## 2018-08-09 LAB
GLUCOSE BLDC GLUCOMTR-MCNC: 103 MG/DL (ref 70–130)
GLUCOSE BLDC GLUCOMTR-MCNC: 122 MG/DL (ref 70–130)
GLUCOSE BLDC GLUCOMTR-MCNC: 137 MG/DL (ref 70–130)
GLUCOSE BLDC GLUCOMTR-MCNC: 158 MG/DL (ref 70–130)
GLUCOSE BLDC GLUCOMTR-MCNC: 84 MG/DL (ref 70–130)

## 2018-08-09 PROCEDURE — 63710000001 INSULIN ASPART PER 5 UNITS: Performed by: INTERNAL MEDICINE

## 2018-08-09 PROCEDURE — 72158 MRI LUMBAR SPINE W/O & W/DYE: CPT

## 2018-08-09 PROCEDURE — G0378 HOSPITAL OBSERVATION PER HR: HCPCS

## 2018-08-09 PROCEDURE — 0 GADOBENATE DIMEGLUMINE 529 MG/ML SOLUTION: Performed by: HOSPITALIST

## 2018-08-09 PROCEDURE — A9577 INJ MULTIHANCE: HCPCS | Performed by: HOSPITALIST

## 2018-08-09 PROCEDURE — 99222 1ST HOSP IP/OBS MODERATE 55: CPT | Performed by: NEUROLOGICAL SURGERY

## 2018-08-09 PROCEDURE — 99231 SBSQ HOSP IP/OBS SF/LOW 25: CPT | Performed by: INTERNAL MEDICINE

## 2018-08-09 PROCEDURE — 97110 THERAPEUTIC EXERCISES: CPT

## 2018-08-09 PROCEDURE — 63710000001 INSULIN ISOPHANE HUMAN PER 5 UNITS: Performed by: INTERNAL MEDICINE

## 2018-08-09 PROCEDURE — 82962 GLUCOSE BLOOD TEST: CPT

## 2018-08-09 RX ADMIN — HUMAN INSULIN 20 UNITS: 100 INJECTION, SUSPENSION SUBCUTANEOUS at 21:54

## 2018-08-09 RX ADMIN — AMLODIPINE BESYLATE 10 MG: 10 TABLET ORAL at 09:18

## 2018-08-09 RX ADMIN — PAROXETINE HYDROCHLORIDE 10 MG: 10 TABLET, FILM COATED ORAL at 09:18

## 2018-08-09 RX ADMIN — LIDOCAINE 1 PATCH: 50 PATCH CUTANEOUS at 20:41

## 2018-08-09 RX ADMIN — LOSARTAN POTASSIUM 100 MG: 100 TABLET, FILM COATED ORAL at 12:48

## 2018-08-09 RX ADMIN — METRONIDAZOLE 500 MG: 500 TABLET, FILM COATED ORAL at 22:46

## 2018-08-09 RX ADMIN — GADOBENATE DIMEGLUMINE 20 ML: 529 INJECTION, SOLUTION INTRAVENOUS at 08:33

## 2018-08-09 RX ADMIN — HYDROCODONE BITARTRATE AND ACETAMINOPHEN 1 TABLET: 10; 325 TABLET ORAL at 12:48

## 2018-08-09 RX ADMIN — METRONIDAZOLE 500 MG: 500 TABLET, FILM COATED ORAL at 05:13

## 2018-08-09 RX ADMIN — METRONIDAZOLE 500 MG: 500 TABLET, FILM COATED ORAL at 14:59

## 2018-08-09 RX ADMIN — ATORVASTATIN CALCIUM 20 MG: 20 TABLET, FILM COATED ORAL at 20:41

## 2018-08-09 RX ADMIN — HYDROCODONE BITARTRATE AND ACETAMINOPHEN 1 TABLET: 10; 325 TABLET ORAL at 23:54

## 2018-08-09 RX ADMIN — LEVOFLOXACIN 500 MG: 500 TABLET, FILM COATED ORAL at 12:49

## 2018-08-09 RX ADMIN — INSULIN ASPART 12 UNITS: 100 INJECTION, SOLUTION INTRAVENOUS; SUBCUTANEOUS at 18:05

## 2018-08-09 RX ADMIN — HYDROCODONE BITARTRATE AND ACETAMINOPHEN 1 TABLET: 10; 325 TABLET ORAL at 00:24

## 2018-08-09 RX ADMIN — HUMAN INSULIN 20 UNITS: 100 INJECTION, SUSPENSION SUBCUTANEOUS at 10:13

## 2018-08-09 RX ADMIN — LEVOTHYROXINE SODIUM 75 MCG: 75 TABLET ORAL at 05:13

## 2018-08-09 RX ADMIN — INSULIN ASPART 12 UNITS: 100 INJECTION, SOLUTION INTRAVENOUS; SUBCUTANEOUS at 09:17

## 2018-08-09 RX ADMIN — HYDROCODONE BITARTRATE AND ACETAMINOPHEN 1 TABLET: 10; 325 TABLET ORAL at 18:05

## 2018-08-09 RX ADMIN — HYDROCODONE BITARTRATE AND ACETAMINOPHEN 1 TABLET: 10; 325 TABLET ORAL at 06:24

## 2018-08-09 NOTE — CONSULTS
Referring Provider: Preeti  Reason for Consultation: LBP    Patient Care Team:  Claudio Anne MD as PCP - General  Claudio Anne MD as PCP - Family Medicine  Robert Garcia MD as Consulting Physician (Endocrinology)    Chief complaint: LBP    Subjective .     History of present illness:  74 yo lady with 6 months of back pain progressing to severe LBP with intermittent radiation into the RLE.  She feels 80% of her complaint is back pain and 20% is leg pain. She is currently on oixygen.  She is a non smoker.  She denies incontinence of b/b.  She has had BRBPR and diagnosed with colitis on this admission as well.  She reports low grade fevers.  She reports her pain is 8/10.  She reports this is 10 with movement.  She appears comfortable in bed.  She reports standing is the worst.  She stands hunched over.  Remote history of breast CA and is s/p XRT.  No chemotherapy.  She is a never smoker.  She has had low grade temps on this admission.  She feels her symptoms have progressed since her last MRI.    Review of Systems  Pertinent items are noted in HPI, all other systems reviewed and negative   14 point ROS is negative.      History  Past Surgical History:   Procedure Laterality Date   • BREAST LUMPECTOMY Right 2001   • OTHER SURGICAL HISTORY Left 01/2015    ankle fracture repair   • VARICOSE VEIN SURGERY     , Family History   Problem Relation Age of Onset   • Hypertension Sister    • Thyroid disease Daughter    • Cancer Mother    • Heart disease Father    , Social History   Substance Use Topics   • Smoking status: Never Smoker   • Smokeless tobacco: Never Used   • Alcohol use No   , Prescriptions Prior to Admission   Medication Sig Dispense Refill Last Dose   • acetaminophen (TYLENOL) 650 MG 8 hr tablet Take 1,300 mg by mouth Every 8 (Eight) Hours As Needed for Mild Pain .      • amLODIPine (NORVASC) 10 MG tablet Take 1 tablet by mouth Daily for 180 days. 90 tablet 1 8/5/2018 at Unknown time   • aspirin  81 MG EC tablet Take 81 mg by mouth daily. Take 1 tablet by oral route once daily   8/5/2018 at Unknown time   • atorvastatin (LIPITOR) 20 MG tablet Take 1 tablet by mouth Every Night for 180 days. 90 tablet 1 8/5/2018 at Unknown time   • Black Pepper-Turmeric (TURMERIC COMPLEX/BLACK PEPPER) 3-500 MG capsule Take 1 capsule by mouth 2 (Two) Times a Day.      • hydrochlorothiazide (HYDRODIURIL) 25 MG tablet Take 25 mg by mouth Daily.      • HYDROcodone-acetaminophen (NORCO) 5-325 MG per tablet Take 1 tablet by mouth Every 6 (Six) Hours As Needed for Severe Pain . 20 tablet 0    • Insulin Lispro (HUMALOG KWIKPEN) 200 UNIT/ML solution pen-injector Inject 12 Units under the skin 3 (Three) Times a Day Before Meals. (Patient taking differently: Inject 12 Units under the skin into the appropriate area as directed Daily With Breakfast & Lunch.) 15 mL 4 8/5/2018 at Unknown time   • insulin NPH (humuLIN N,novoLIN N) 100 UNIT/ML injection Inject 20 Units under the skin into the appropriate area as directed 2 (Two) Times a Day. Before Breakfast and at Bedtime      • irbesartan (AVAPRO) 300 MG tablet Take 1 tablet by mouth Daily for 180 days. 90 tablet 1 8/5/2018 at Unknown time   • levothyroxine (SYNTHROID, LEVOTHROID) 75 MCG tablet Take 1 tablet by mouth Daily for 180 days. 90 tablet 1 8/5/2018 at Unknown time   • PARoxetine (PAXIL) 10 MG tablet Take 10 mg by mouth Daily.      • traMADol (ULTRAM) 50 MG tablet Take 50 mg by mouth At Night As Needed for Moderate Pain .   8/6/2018 at Unknown time   , Scheduled Meds:    amLODIPine 10 mg Oral Daily   atorvastatin 20 mg Oral Nightly   insulin aspart 12 Units Subcutaneous BID With Meals   insulin NPH 20 Units Subcutaneous BID   levoFLOXacin 500 mg Oral Q24H   levothyroxine 75 mcg Oral Q AM   lidocaine 1 patch Transdermal Q24H   losartan 100 mg Oral Q24H   metroNIDAZOLE 500 mg Oral Q8H   PARoxetine 10 mg Oral Daily   , Continuous Infusions:   , PRN Meds:  •  acetaminophen  •   dextrose  •  dextrose  •  glucagon (human recombinant)  •  HYDROcodone-acetaminophen  •  ondansetron **OR** ondansetron ODT **OR** ondansetron  •  sodium chloride and Allergies:  Patient has no known allergies.    Objective     Vital Signs   Temp:  [97.8 °F (36.6 °C)-99 °F (37.2 °C)] 98 °F (36.7 °C)  Heart Rate:  [74-84] 83  Resp:  [18] 18  BP: (133-180)/(68-86) 149/76    Physical Exam:     General Appearance:    Alert, cooperative, in no acute distress, morbid obesity   Head:    Normocephalic, without obvious abnormality, atraumatic   Eyes:            Lids and lashes normal, conjunctivae and sclerae normal, no   icterus, no pallor, corneas clear, PERRLA   Ears:    Ears appear intact with no abnormalities noted   Throat:   No oral lesions, no thrush, oral mucosa moist   Neck:   No adenopathy, supple, trachea midline,  no JVD   Back:     No kyphosis present, no scoliosis present, no skin lesions,      erythema or scars, no tenderness to percussion or                   palpation,   non tender to palpation of bony midline, limited painful ROM   Lungs:     respirations regular, even and                  unlabored    Heart:    Regular rhythm and normal rate   Chest Wall:    No abnormalities observed   Abdomen:     No masses, no organomegaly, soft        non-tender, non-distended, no guarding, no rebound                tenderness   Rectal:     Deferred   Extremities:   Moves all extremities well, no edema, no cyanosis, no             redness   Pulses:   Pulses palpable and equal bilaterally   Skin:   No bleeding, + bruising or rash   Lymph nodes:   No palpable adenopathy   Neurologic:   Mental status is awake and alert, oriented to person place and time  Recall is 3/3 immediate and 3/3 at five minutes  Follows 3 step commands  CN: pupils reactive, EOMI, VF full to confrontation, unable to visualize fundi, v1-3 intact, Face symmetric, Hearing intact to finger rub, Palate raises Sym, Gag deferred, Shrug intact, Tongue  protrudes in midline  Str: 5/5 deltoid/biceps/triceps/wrist extensors/iliopsoas/ quadriceps/hamstrings/dorsiflexors/extensor hallucis longus/plantarflexors  Sensory: intact to light touch/pinprick/proprioception in all extremities  Gait: stooped posture, can take a few steps with Right sided SI joint region pain, can toe and heel raise  Reflexes:no francisco's or clonus  Pain on deep palp of right SI region         Results Review:   I reviewed the patient's new clinical results.  I reviewed the patient's new imaging results and agree with the interpretation.     I reviewed her CT scan and prior MRI.  No acute spinal process identified.  Mild overall ddd with low grade foraminal encroachment  LABS:  Lab Results   Component Value Date    CALCIUM 8.5 (L) 08/08/2018     Results from last 7 days  Lab Units 08/08/18  0534 08/07/18  2038   SODIUM mmol/L 139 139   POTASSIUM mmol/L 4.3 4.3   CHLORIDE mmol/L 101 98   CO2 mmol/L 26.7 26.7   BUN mg/dL 24* 31*   CREATININE mg/dL 1.03* 1.20*   GLUCOSE mg/dL 145* 219*   CALCIUM mg/dL 8.5* 9.4   WBC 10*3/mm3 7.61 11.47*   HEMOGLOBIN g/dL 12.3 14.0   PLATELETS 10*3/mm3 217 257   ALT (SGPT) U/L  --  26   AST (SGOT) U/L  --  19     Lab Results   Component Value Date    CKTOTAL 75 08/28/2017     Estimated Creatinine Clearance: 51.5 mL/min (A) (by C-G formula based on SCr of 1.03 mg/dL (H)).    Assessment/Plan     Active Problems:    Controlled type 2 diabetes mellitus with diabetic neuropathy, with long-term current use of insulin (CMS/[facility])    Essential hypertension    Infectious colitis    DDD (degenerative disc disease), lumbar    Right leg weakness      Will reimage her this am  As it stands now I recommend an KALEN possibly  No acute surgical pathology has been identified.   Etiology of hypoxia?  I discussed same with nursing and patient    Further recs to follow MRI     I discussed the patients findings and my recommendations with patient and nursing staff    Shawn Galvan IV,  MD  08/09/18  7:22 AM    Time: More than 50% of time spent in counseling and coordination of care:  Total face-to-face/floor time 35 min.  Time spent in counseling 20 min. Counseling included the following topics: MRI, low back pain, CT results, hypoxia      MRI: no significant stenosis is noted or new significant change.  Mild edema related to facet on the right may be pain generator but not an operative problem.  I suggest Pain management for facet block or ablation.  Does not look infectious to my read.    Will sign off.

## 2018-08-09 NOTE — THERAPY TREATMENT NOTE
Acute Care - Physical Therapy Treatment Note  [facility]     Patient Name: Iraida Elizabeth  : 1945  MRN: [id number]  Today's Date: 2018     Date of Referral to PT: 18  Referring Physician: Dr. Maldonado    Admit Date: 2018    Visit Dx:    ICD-10-CM ICD-9-CM   1. Colitis K52.9 558.9   2. Acute midline low back pain without sciatica M54.5 724.2   3. Impaired functional mobility, balance, gait, and endurance Z74.09 V49.89     Patient Active Problem List   Diagnosis   • Major depressive disorder with single episode, in full remission (CMS/[facility])   • Controlled type 2 diabetes mellitus with diabetic neuropathy, with long-term current use of insulin (CMS/[facility])   • Essential hypertension   • Other hyperlipidemia   • Personal history of malignant neoplasm of breast   • Acquired hypothyroidism   • Hypoglycemia due to endogenous hyperinsulinemia   • Systolic murmur   • Grief reaction   • Menopause   • Osteopenia   • Encounter for long-term (current) use of insulin (CMS/[facility])   • Hyperinsulinism   • Exertional dyspnea   • Uncontrolled type 2 diabetes mellitus with diabetic neuropathy, with long-term current use of insulin (CMS/[facility])   • Uncontrolled type 2 diabetes mellitus with complication, with long-term current use of insulin (CMS/[facility])   • Arthralgia of multiple sites   • Infectious colitis   • DDD (degenerative disc disease), lumbar   • Right leg weakness       Therapy Treatment          Rehabilitation Treatment Summary     Row Name 18 1522             Treatment Time/Intention    Discipline physical therapy assistant  -SM      Document Type therapy note (daily note)  -SM      Subjective Information complains of;pain  -SM      Mode of Treatment physical therapy  -SM      Patient Effort good  -SM      Existing Precautions/Restrictions fall;spinal  -SM      Recorded by [SM] Carissa Del Valle, KERRY 18 1539      Row Name 18 1522             Cognitive Assessment/Intervention- PT/OT     Orientation Status (Cognition) oriented x 4  -SM      Follows Commands (Cognition) WFL  -      Safety Deficit (Cognitive) mild deficit  -      Personal Safety Interventions fall prevention program maintained;gait belt;nonskid shoes/slippers when out of bed  -SM      Recorded by [SM] Carissa Del Valle, PTA 08/09/18 1539      Row Name 08/09/18 1522             Safety Issues, Functional Mobility    Impairments Affecting Function (Mobility) endurance/activity tolerance;pain;strength  -SM      Recorded by [SM] Carissa Del Valle, PTA 08/09/18 1539      Row Name 08/09/18 1522             Bed Mobility Assessment/Treatment    Bed Mobility Assessment/Treatment rolling left;sidelying-sit;sit-sidelying  -      Rolling Left [place] (Bed Mobility) supervision  -      Sidelying-Sit [place] (Bed Mobility) supervision  -      Sit-Sidelying [place] (Bed Mobility) minimum assist (75% patient effort)   assist w/ R LE only  -      Bed Mobility, Safety Issues decreased use of legs for bridging/pushing  -      Assistive Device (Bed Mobility) bed rails;head of bed elevated  -      Recorded by [SM] Carissa Del Valle, PTA 08/09/18 1539      Row Name 08/09/18 1522             Transfer Assessment/Treatment    Transfer Assessment/Treatment sit-stand transfer;stand-sit transfer  -      Comment (Transfers) increased pain upon standing; pt unable to stand fully erect d/t pain  -SM      Recorded by [SM] Carissa Del Valle, PTA 08/09/18 1539      Row Name 08/09/18 1522             Sit-Stand Transfer    Sit-Stand [place] (Transfers) contact guard  -      Assistive Device (Sit-Stand Transfers) walker, front-wheeled  -      Recorded by [SM] Carissa Del Valle, PTA 08/09/18 1539      Row Name 08/09/18 1522             Stand-Sit Transfer    Stand-Sit [place] (Transfers) contact guard  -      Assistive Device (Stand-Sit Transfers) walker, front-wheeled  -      Recorded by [SM] Carissa Del Valle,  [facility] 08/09/18 1539      Row Name 08/09/18 1522             Gait/Stairs Assessment/Training    Gait/Stairs Assessment/Training gait/ambulation independence;gait/ambulation assistive device;distance ambulated;gait pattern;gait deviations  -      [place] Level (Gait) contact guard  -SM      Assistive Device (Gait) walker, front-wheeled  -SM      Distance in Feet (Gait) 25  -SM      Pattern (Gait) step-to  -SM      Deviations/Abnormal Patterns (Gait) antalgic;doris decreased;stride length decreased  -      Bilateral Gait Deviations forward flexed posture  -      Comment (Gait/Stairs) pt ambulated w/ much forward flexed posture d/t low back pain  -      Recorded by [] Carissa Del Valle, [facility] 08/09/18 1539      Row Name 08/09/18 1522             Positioning and Restraints    Pre-Treatment Position in bed  -SM      Post Treatment Position bed  -SM      In Bed side lying left;call light within reach;encouraged to call for assist;exit alarm on  -SM      Recorded by [] Carissa Del Valle [facility] 08/09/18 1539      Row Name 08/09/18 1522             Pain Assessment    Additional Documentation Pain Scale: Numbers Pre/Post-Treatment (Group)  -      Recorded by [] Carissa Del Valle [facility] 08/09/18 1539      Row Name 08/09/18 1522             Pain Scale: Numbers Pre/Post-Treatment    Pain Scale: Numbers, Pretreatment 3/10  -SM      Pain Scale: Numbers, Post-Treatment 8/10  -SM      Pain Location - Orientation lower  -SM      Pain Location back  -SM      Pain Intervention(s) Repositioned;Ambulation/increased activity;Rest  -SM      Recorded by [] Carissa Del Valle, [facility] 08/09/18 1539        User Key  (r) = Recorded By, (t) = Taken By, (c) = Cosigned By    Initials Name Effective Dates Discipline     Carissa Del Valle, [facility] 03/07/18 -  PT                     Physical Therapy Education     Title: PT OT SLP Therapies (Done)     Topic: Physical Therapy (Done)     Point: Mobility training (Done)    Learning  "Progress Summary     Learner Status Readiness Method Response Comment Documented by    Patient Done Acceptance EDMUNDO WILEY NR   08/09/18 1539     Active Acceptance E NR  MA 08/08/18 1546          Point: Home exercise program (Done)    Learning Progress Summary     Learner Status Readiness Method Response Comment Documented by    Patient Done Acceptance EDMUNDO WILEYNR   08/09/18 1539     Active Acceptance E NR  MA 08/08/18 1546          Point: Body mechanics (Done)    Learning Progress Summary     Learner Status Readiness Method Response Comment Documented by    Patient Done Acceptance EDMUNDO WILEY,NR   08/09/18 1539     Active Acceptance E NR  MA 08/08/18 1546          Point: Precautions (Done)    Learning Progress Summary     Learner Status Readiness Method Response Comment Documented by    Patient Done Acceptance EDMUNDO WILEY,NR   08/09/18 1539     Active Acceptance E NR  MA 08/08/18 1546                      User Key     Initials Effective Dates Name Provider Type Discipline     03/07/18 -  Carissa Del Valle, PTA Physical Therapy Assistant PT    MA 04/03/18 -  Guadalupe Casey PT Physical Therapist PT                    PT Recommendation and Plan     Plan of Care Reviewed With: patient  Progress: improving  Outcome Summary: Pt tolerated treatment fair this date. Ambulated 25ft in room, although w/ very forward flexed posture d/t low back pain. Increased pain upon standing. Pt pushed self to ambulate and stated \"I don't want my legs to get weak\". Once returning to bed, pt was instructed on gentle low back stretch, although did not tolerate d/t increased pain. PT will continue to address functional mobility deficits as tolerated.          Outcome Measures     Row Name 08/09/18 1500 08/08/18 1500          How much help from another person do you currently need...    Turning from your back to your side while in flat bed without using bedrails? 4  -SM 4  -MA     Moving from lying on back to sitting on the side of a flat bed " without bedrails? 4  -SM 4  -MA     Moving to and from a bed to a chair (including a wheelchair)? 3  -SM 2  -MA     Standing up from a chair using your arms (e.g., wheelchair, bedside chair)? 3  -SM 2  -MA     Climbing 3-5 steps with a railing? 1  -SM 1  -MA     To walk in hospital room? 2  -SM 2  -MA     AM-PAC 6 Clicks Score 17  -SM 15  -MA        Functional Assessment    Outcome Measure Options AM-PAC 6 Clicks Basic Mobility (PT)  -SM AM-PAC 6 Clicks Basic Mobility (PT)  -MA       User Key  (r) = Recorded By, (t) = Taken By, (c) = Cosigned By    Initials Name Provider Type    Carissa Sal PTA Physical Therapy Assistant    Guadalupe Diaz, PT Physical Therapist           Time Calculation:         PT Charges     Row Name 08/09/18 1542             Time Calculation    Start Time 1522  -      Stop Time 1535  -      Time Calculation (min) 13 min  -      PT Received On 08/09/18  -      PT - Next Appointment 08/10/18  -        User Key  (r) = Recorded By, (t) = Taken By, (c) = Cosigned By    Initials Name Provider Type    Carissa Sal PTA Physical Therapy Assistant        Therapy Suggested Charges     Code   Minutes Charges    None           Therapy Charges for Today     Code Description Service Date Service Provider Modifiers Qty    08432035062 HC PT THER PROC EA 15 MIN 8/9/2018 Carissa Del Valle PTA GP 1          PT G-Codes  Outcome Measure Options: AM-PAC 6 Clicks Basic Mobility (PT)    Carissa Del Valle PTA  8/9/2018

## 2018-08-09 NOTE — PLAN OF CARE
Problem: Patient Care Overview  Goal: Plan of Care Review   08/09/18 0659   Coping/Psychosocial   Plan of Care Reviewed With patient   Plan of Care Review   Progress improving   OTHER   Outcome Summary PO pain medication given x2. VSS. Voiding freely. No dirrhea. Up with asst to bathroom. MRI this AM.      Goal: Individualization and Mutuality  Outcome: Ongoing (interventions implemented as appropriate)    Goal: Discharge Needs Assessment  Outcome: Ongoing (interventions implemented as appropriate)    Goal: Interprofessional Rounds/Family Conf  Outcome: Ongoing (interventions implemented as appropriate)      Problem: Fall Risk (Adult)  Goal: Absence of Fall  Outcome: Ongoing (interventions implemented as appropriate)

## 2018-08-09 NOTE — PROGRESS NOTES
"    DAILY PROGRESS NOTE  [facility]    Patient Identification:  Name: Iraida Elizabeth  Age: 73 y.o.  Sex: female  :  1945  MRN: [id number]         Primary Care Physician: Claudio Anne MD    Subjective:  Interval History: no new issues - back pain better w/ meds/Lidoderm - no n/v/d/cp/sob    Objective:no fm     Scheduled Meds:  amLODIPine 10 mg Oral Daily   atorvastatin 20 mg Oral Nightly   insulin aspart 12 Units Subcutaneous BID With Meals   insulin NPH 20 Units Subcutaneous BID   levoFLOXacin 500 mg Oral Q24H   levothyroxine 75 mcg Oral Q AM   lidocaine 1 patch Transdermal Q24H   losartan 100 mg Oral Q24H   metroNIDAZOLE 500 mg Oral Q8H   PARoxetine 10 mg Oral Daily     Continuous Infusions:     Vital signs in last 24 hours:  Temp:  [97.8 °F (36.6 °C)-99 °F (37.2 °C)] 98 °F (36.7 °C)  Heart Rate:  [74-83] 83  Resp:  [18] 18  BP: (133-173)/(68-76) 149/76    Intake/Output:    Intake/Output Summary (Last 24 hours) at 18 1037  Last data filed at 18 1500   Gross per 24 hour   Intake              240 ml   Output              400 ml   Net             -160 ml       Exam:  /76 (BP Location: Right arm, Patient Position: Sitting)   Pulse 83   Temp 98 °F (36.7 °C) (Oral)   Resp 18   Ht 154.9 cm (61\")   Wt 101 kg (221 lb 9.6 oz)   SpO2 95%   BMI 41.87 kg/m²     General Appearance:    Alert, cooperative, no distress, AAOx3, not toxic                          Throat:   Lips, tongue, gums normal; oral mucosa pink and moist                           Neck:   No JVD                         Lungs:    Clear to auscultation bilaterally, respirations unlabored                          Heart:    Regular rate and rhythm, S1 and S2 normal                  Abdomen:     Soft, non-tender, bowel sounds active                 Extremities:   SCDs, no cyanosis or edema                        Pulses:   Pulses palpable in lower extremities                            Data Review:  Labs in chart were " reviewed.    Assessment:  Active Hospital Problems    Diagnosis Date Noted   • Infectious colitis [A09] 08/07/2018   • DDD (degenerative disc disease), lumbar [M51.36] 08/07/2018   • Right leg weakness [R29.898] 08/07/2018   • Essential hypertension [I10]    • Controlled type 2 diabetes mellitus with diabetic neuropathy, with long-term current use of insulin (CMS/[facility]) [E11.40, Z79.4]       Resolved Hospital Problems    Diagnosis Date Noted Date Resolved   No resolved problems to display.       Plan:  Colitis - short course 5-7days of LQN/Flagyl w/ outpt GI scopes 6-8weeks              -agree w GI               -stool Cx pending - CDiff negative       Radicular Back pain sounds like spinal stenosis per Hx - reviewed previous MRI reviewed and JOSE CRUZ repeating               -no reliefs w/ previous steroids so ? If KALEN will work               -increased Norco for now and continue Lidoderm               -appreciate JOSE CRUZ input               -has rescheduled pain mgnt appt      HTN - controlled      DM2 - #s stable      Dispo - planing for home tomorrow - PT - CCP for DC needs        Shakeel Álvarez MD  8/9/2018  10:37 AM

## 2018-08-09 NOTE — PLAN OF CARE
"Problem: Patient Care Overview  Goal: Plan of Care Review  Outcome: Ongoing (interventions implemented as appropriate)   08/09/18 6161   Coping/Psychosocial   Plan of Care Reviewed With patient   Plan of Care Review   Progress improving   OTHER   Outcome Summary Pt tolerated treatment fair this date. Ambulated 25ft in room, although w/ very forward flexed posture d/t low back pain. Increased pain upon standing. Pt pushed self to ambulate and stated \"I don't want my legs to get weak\". Once returning to bed, pt was instructed on gentle low back stretch, although did not tolerate d/t increased pain. PT will continue to address functional mobility deficits as tolerated.         "

## 2018-08-09 NOTE — PROGRESS NOTES
Continued Stay Note  [facility]     Patient Name: Iraida Elizabeth  MRN: [id number]  Today's Date: 8/9/2018    Admit Date: 8/7/2018          Discharge Plan     Row Name 08/09/18 1341       Plan    Plan Uncertain    Plan Comments I met with pt as she requested yesterday, she wanted to consider the option of home health at discharge, today pt said she does not want to make any discharge plans until she knows what is wrong with her and what the treatment plan will be.  I advised I will check back tomorrow.            Sagrario Dunlap RN              Discharge Codes    No documentation.           Sagrario Dunlap, RN

## 2018-08-09 NOTE — PROGRESS NOTES
[facility]  Inpatient Progress Note    Reason for Follow Up:  colitis    Subjective     Interval History:   She complains of back pain.  No abdominal pain.  She is tolerating her diet without nausea.  Stools are still loose but much less frequent and improved.    Current Facility-Administered Medications:   •  acetaminophen (TYLENOL) tablet 650 mg, 650 mg, Oral, Q4H PRN, Nile Maldonado MD, 650 mg at 08/08/18 1601  •  amLODIPine (NORVASC) tablet 10 mg, 10 mg, Oral, Daily, Nile Maldonado MD, 10 mg at 08/09/18 0918  •  atorvastatin (LIPITOR) tablet 20 mg, 20 mg, Oral, Nightly, Nile Maldonado MD, 20 mg at 08/08/18 2016  •  dextrose (D50W) solution 25 g, 25 g, Intravenous, Q15 Min PRN, Nile Maldonado MD  •  dextrose (GLUTOSE) oral gel 15 g, 15 g, Oral, Q15 Min PRN, Nile Maldonado MD  •  glucagon (human recombinant) (GLUCAGEN DIAGNOSTIC) injection 1 mg, 1 mg, Subcutaneous, PRN, Nile Maldonado MD  •  HYDROcodone-acetaminophen (NORCO)  MG per tablet 1 tablet, 1 tablet, Oral, Q6H PRN, Shakeel Álvarez MD, 1 tablet at 08/09/18 0624  •  insulin aspart (novoLOG) injection 12 Units, 12 Units, Subcutaneous, BID With Meals, Nile Maldonado MD, 12 Units at 08/09/18 0917  •  insulin NPH (humuLIN N,novoLIN N) injection 20 Units, 20 Units, Subcutaneous, BID, Nile Maldonado MD, 20 Units at 08/09/18 1013  •  levoFLOXacin (LEVAQUIN) tablet 500 mg, 500 mg, Oral, Q24H, Alban Harry MD, 500 mg at 08/08/18 1330  •  levothyroxine (SYNTHROID, LEVOTHROID) tablet 75 mcg, 75 mcg, Oral, Q AM, Nile Maldonado MD, 75 mcg at 08/09/18 0513  •  lidocaine (LIDODERM) 5 % 1 patch, 1 patch, Transdermal, Q24H, Shakeel Álvarez MD, 1 patch at 08/08/18 2016  •  losartan (COZAAR) tablet 100 mg, 100 mg, Oral, Q24H, Nile Maldonado MD, 100 mg at 08/08/18 0845  •  metroNIDAZOLE (FLAGYL) tablet 500 mg, 500 mg, Oral, Q8H, Alban Harry  MD Nile, 500 mg at 08/09/18 0513  •  ondansetron (ZOFRAN) tablet 4 mg, 4 mg, Oral, Q6H PRN **OR** ondansetron ODT (ZOFRAN-ODT) disintegrating tablet 4 mg, 4 mg, Oral, Q6H PRN **OR** ondansetron (ZOFRAN) injection 4 mg, 4 mg, Intravenous, Q6H PRN, Nile Maldonado MD  •  PARoxetine (PAXIL) tablet 10 mg, 10 mg, Oral, Daily, Nile Maldonado MD, 10 mg at 08/09/18 0918  •  sodium chloride 0.9 % flush 1-10 mL, 1-10 mL, Intravenous, PRN, Nile Maldonado MD  Review of Systems:    All systems were reviewed and negative except for:  Musculoskeletal: positive for  back pain    Objective     Vital Signs  Temp:  [97.8 °F (36.6 °C)-99 °F (37.2 °C)] 98 °F (36.7 °C)  Heart Rate:  [74-83] 83  Resp:  [18] 18  BP: (133-173)/(68-76) 149/76  Body mass index is 41.87 kg/m².    Intake/Output Summary (Last 24 hours) at 08/09/18 1051  Last data filed at 08/08/18 1500   Gross per 24 hour   Intake              240 ml   Output              400 ml   Net             -160 ml     No intake/output data recorded.     Physical Exam:   General: patient awake, alert and cooperative   Eyes: Normal lids and lashes, no scleral icterus   Neck: supple, normal ROM   Skin: warm and dry, not jaundiced   Abdomen: soft, nontender, nondistended; normal bowel sounds   Psychiatric: Normal mood and behavior; memory intact     Results Review:     I reviewed the patient's new clinical results.      Results from last 7 days  Lab Units 08/08/18  0534 08/07/18 2038   WBC 10*3/mm3 7.61 11.47*   HEMOGLOBIN g/dL 12.3 14.0   HEMATOCRIT % 39.8 44.0   PLATELETS 10*3/mm3 217 257       Results from last 7 days  Lab Units 08/08/18  0534 08/07/18 2038   SODIUM mmol/L 139 139   POTASSIUM mmol/L 4.3 4.3   CHLORIDE mmol/L 101 98   CO2 mmol/L 26.7 26.7   BUN mg/dL 24* 31*   CREATININE mg/dL 1.03* 1.20*   CALCIUM mg/dL 8.5* 9.4   BILIRUBIN mg/dL  --  0.5   ALK PHOS U/L  --  87   ALT (SGPT) U/L  --  26   AST (SGOT) U/L  --  19   GLUCOSE mg/dL 145* 219*            Lab Results  Lab Value Date/Time   LIPASE 37 08/07/2018 2038       Radiology:  MRI Lumbar Spine With & Without Contrast   Preliminary Result       When compared to the prior study, the previously noted synovial cyst   arising from the left L5-S1 facet joint projecting posteriorly into the   paravertebral musculature contains more fluid signal suggesting that it   may be acutely inflamed. Otherwise, there is no other significant   interval change when compared to the previous exam. Again noted is grade   1 degenerative anterior spondylolisthesis of L4 on L5 and L5 on S1 by a   few millimeters.       Facet hypertrophic changes as well as changes of facet arthritis are   seen within the lower lumbar spine.       Relatively mild degrees of canal and foraminal narrowing and the   remaining degenerative phenomena are as discussed in detail above.          CT Abdomen Pelvis Without Contrast   Final Result          Assessment/Plan     Patient Active Problem List   Diagnosis   • Major depressive disorder with single episode, in full remission (CMS/HCC)   • Controlled type 2 diabetes mellitus with diabetic neuropathy, with long-term current use of insulin (CMS/HCC)   • Essential hypertension   • Other hyperlipidemia   • Personal history of malignant neoplasm of breast   • Acquired hypothyroidism   • Hypoglycemia due to endogenous hyperinsulinemia   • Systolic murmur   • Grief reaction   • Menopause   • Osteopenia   • Encounter for long-term (current) use of insulin (CMS/HCC)   • Hyperinsulinism   • Exertional dyspnea   • Uncontrolled type 2 diabetes mellitus with diabetic neuropathy, with long-term current use of insulin (CMS/HCC)   • Uncontrolled type 2 diabetes mellitus with complication, with long-term current use of insulin (CMS/HCC)   • Arthralgia of multiple sites   • Infectious colitis   • DDD (degenerative disc disease), lumbar   • Right leg weakness     Assessment:   1.  ? Colitis - CT not overly impressive.  WBC  normal  2.  Blood in stool - chronic, normal Hb  3. Chronic back pain    Plan:  - complete 7 days antibiotics  - plan for outpatient c/s in 6-8 weeks for evaluate chronic GI symptoms and abnormal CT scan  - Follow-up scheduled with Dr. aHrry on 8/28/18  - We will sign off, we are available as needed      I discussed the patients findings and my recommendations with patient.    Usha España MD

## 2018-08-10 ENCOUNTER — TELEPHONE (OUTPATIENT)
Dept: FAMILY MEDICINE CLINIC | Facility: CLINIC | Age: 73
End: 2018-08-10

## 2018-08-10 VITALS
HEART RATE: 71 BPM | DIASTOLIC BLOOD PRESSURE: 75 MMHG | SYSTOLIC BLOOD PRESSURE: 123 MMHG | RESPIRATION RATE: 18 BRPM | BODY MASS INDEX: 41.84 KG/M2 | OXYGEN SATURATION: 91 % | TEMPERATURE: 97.7 F | HEIGHT: 61 IN | WEIGHT: 221.6 LBS

## 2018-08-10 LAB
BACTERIA SPEC AEROBE CULT: NORMAL
BACTERIA SPEC AEROBE CULT: NORMAL
GLUCOSE BLDC GLUCOMTR-MCNC: 102 MG/DL (ref 70–130)
GLUCOSE BLDC GLUCOMTR-MCNC: 124 MG/DL (ref 70–130)
GLUCOSE BLDC GLUCOMTR-MCNC: 79 MG/DL (ref 70–130)
GLUCOSE BLDC GLUCOMTR-MCNC: 87 MG/DL (ref 70–130)

## 2018-08-10 PROCEDURE — 63710000001 INSULIN ASPART PER 5 UNITS: Performed by: INTERNAL MEDICINE

## 2018-08-10 PROCEDURE — G0378 HOSPITAL OBSERVATION PER HR: HCPCS

## 2018-08-10 PROCEDURE — 63710000001 INSULIN ISOPHANE HUMAN PER 5 UNITS: Performed by: INTERNAL MEDICINE

## 2018-08-10 PROCEDURE — 82962 GLUCOSE BLOOD TEST: CPT

## 2018-08-10 PROCEDURE — 97110 THERAPEUTIC EXERCISES: CPT

## 2018-08-10 RX ORDER — LEVOFLOXACIN 500 MG/1
500 TABLET, FILM COATED ORAL EVERY 24 HOURS
Qty: 2 TABLET | Refills: 0 | Status: SHIPPED | OUTPATIENT
Start: 2018-08-10 | End: 2018-08-12

## 2018-08-10 RX ORDER — ACETAMINOPHEN 325 MG/1
650 TABLET ORAL EVERY 4 HOURS PRN
Start: 2018-08-10 | End: 2019-04-04

## 2018-08-10 RX ORDER — HYDROCODONE BITARTRATE AND ACETAMINOPHEN 10; 325 MG/1; MG/1
1 TABLET ORAL EVERY 6 HOURS PRN
Qty: 40 TABLET | Refills: 0 | Status: SHIPPED | OUTPATIENT
Start: 2018-08-10 | End: 2018-08-14 | Stop reason: SDUPTHER

## 2018-08-10 RX ORDER — METRONIDAZOLE 500 MG/1
500 TABLET ORAL EVERY 8 HOURS SCHEDULED
Qty: 6 TABLET | Refills: 0 | Status: SHIPPED | OUTPATIENT
Start: 2018-08-10 | End: 2018-08-12

## 2018-08-10 RX ORDER — HYDROCODONE BITARTRATE AND ACETAMINOPHEN 10; 325 MG/1; MG/1
1 TABLET ORAL EVERY 6 HOURS PRN
Status: DISCONTINUED | OUTPATIENT
Start: 2018-08-10 | End: 2018-08-10 | Stop reason: HOSPADM

## 2018-08-10 RX ORDER — HYDROCODONE BITARTRATE AND ACETAMINOPHEN 10; 325 MG/1; MG/1
1 TABLET ORAL EVERY 4 HOURS PRN
Status: DISCONTINUED | OUTPATIENT
Start: 2018-08-10 | End: 2018-08-10

## 2018-08-10 RX ADMIN — PAROXETINE HYDROCHLORIDE 10 MG: 10 TABLET, FILM COATED ORAL at 08:14

## 2018-08-10 RX ADMIN — METRONIDAZOLE 500 MG: 500 TABLET, FILM COATED ORAL at 06:05

## 2018-08-10 RX ADMIN — AMLODIPINE BESYLATE 10 MG: 10 TABLET ORAL at 08:14

## 2018-08-10 RX ADMIN — LIDOCAINE 1 PATCH: 50 PATCH CUTANEOUS at 08:21

## 2018-08-10 RX ADMIN — HUMAN INSULIN 20 UNITS: 100 INJECTION, SUSPENSION SUBCUTANEOUS at 08:14

## 2018-08-10 RX ADMIN — LOSARTAN POTASSIUM 100 MG: 100 TABLET, FILM COATED ORAL at 08:14

## 2018-08-10 RX ADMIN — HYDROCODONE BITARTRATE AND ACETAMINOPHEN 1 TABLET: 10; 325 TABLET ORAL at 14:22

## 2018-08-10 RX ADMIN — LEVOTHYROXINE SODIUM 75 MCG: 75 TABLET ORAL at 06:05

## 2018-08-10 RX ADMIN — LEVOFLOXACIN 500 MG: 500 TABLET, FILM COATED ORAL at 14:22

## 2018-08-10 RX ADMIN — ONDANSETRON 4 MG: 4 TABLET, FILM COATED ORAL at 08:25

## 2018-08-10 RX ADMIN — HYDROCODONE BITARTRATE AND ACETAMINOPHEN 1 TABLET: 10; 325 TABLET ORAL at 04:05

## 2018-08-10 RX ADMIN — INSULIN ASPART 12 UNITS: 100 INJECTION, SOLUTION INTRAVENOUS; SUBCUTANEOUS at 08:13

## 2018-08-10 RX ADMIN — METRONIDAZOLE 500 MG: 500 TABLET, FILM COATED ORAL at 14:22

## 2018-08-10 RX ADMIN — HYDROCODONE BITARTRATE AND ACETAMINOPHEN 1 TABLET: 10; 325 TABLET ORAL at 08:15

## 2018-08-10 NOTE — TELEPHONE ENCOUNTER
Fe Valentine with [facility] requesting skilled nursing. Informed patient needs to be seen within 2 weeks.

## 2018-08-10 NOTE — PLAN OF CARE
Problem: Patient Care Overview  Goal: Plan of Care Review   08/10/18 1152   Coping/Psychosocial   Plan of Care Reviewed With patient   OTHER   Outcome Summary Patient's mobility remains limited secondary to lower back pain and unable to progress ambulation distance. Patient appears to be frustrated with the amount of debility she is presenting with from her lower back pain. Will continue to advance as able.

## 2018-08-10 NOTE — PROGRESS NOTES
Continued Stay Note  [facility]     Patient Name: Iraida Elizabeth  MRN: [id number]  Today's Date: 8/10/2018    Admit Date: 8/7/2018          Discharge Plan     Row Name 08/10/18 1048       Plan    Plan Comments Pt said she plans home at discharge, she said she thinks her copay / visit is $50 for therapy reguardless what home health she uses, she ask that I check her out of pocket cost for each visit (voice mail to [facility]).  Sagrario Dunlap RN      Row Name 08/10/18 1031       Plan    Plan Home with [facility]              Discharge Codes    No documentation.       Expected Discharge Date and Time     Expected Discharge Date Expected Discharge Time    Aug 10, 2018             Sagrario Dunlap RN

## 2018-08-10 NOTE — PROGRESS NOTES
Continued Stay Note  [facility]     Patient Name: Iraida Elizabeth  MRN: [id number]  Today's Date: 8/10/2018    Admit Date: 8/7/2018          Discharge Plan     Row Name 08/10/18 1444       Plan    Plan home w/o needs    Plan Comments IMM Letter reveiwed with pt, she signed and a copy was given to her. Pt confirmed she has declined home health, she feels she will be able to get back up and going w/o PT.    LACE: Hospital follow up with PCP / Dr Anne / Aug 20 @ 1130  Sagrario Dunlap RN      Row Name 08/10/18 1329       Plan    Plan     Plan Comments     Final Discharge Disposition Code               Discharge Codes    No documentation.       Expected Discharge Date and Time     Expected Discharge Date Expected Discharge Time    Aug 10, 2018             Sagrario Dunlap RN

## 2018-08-10 NOTE — PLAN OF CARE
Problem: Patient Care Overview  Goal: Plan of Care Review  Outcome: Ongoing (interventions implemented as appropriate)   08/10/18 0422   Coping/Psychosocial   Plan of Care Reviewed With patient   Plan of Care Review   Progress improving   OTHER   Outcome Summary VSS, Percocet increased to q 4 hrs for back pain, room air, adequate UOP, up w/assist     Goal: Individualization and Mutuality  Outcome: Ongoing (interventions implemented as appropriate)    Goal: Discharge Needs Assessment  Outcome: Ongoing (interventions implemented as appropriate)    Goal: Interprofessional Rounds/Family Conf  Outcome: Ongoing (interventions implemented as appropriate)      Problem: Fall Risk (Adult)  Goal: Absence of Fall  Outcome: Ongoing (interventions implemented as appropriate)      Problem: Pain, Acute (Adult)  Goal: Acceptable Pain Control/Comfort Level  Outcome: Ongoing (interventions implemented as appropriate)

## 2018-08-10 NOTE — DISCHARGE SUMMARY
[facility]    Date of Discharge:  8/10/2018    PCP: Claudio Anne MD    Discharge Diagnosis:   Active Hospital Problems    Diagnosis Date Noted   • Infectious colitis [A09] 08/07/2018   • DDD (degenerative disc disease), lumbar [M51.36] 08/07/2018   • Right leg weakness [R29.898] 08/07/2018   • Essential hypertension [I10]    • Controlled type 2 diabetes mellitus with diabetic neuropathy, with long-term current use of insulin (CMS/[facility]) [E11.40, Z79.4]       Resolved Hospital Problems    Diagnosis Date Noted Date Resolved   No resolved problems to display.     Procedures Performed       Consults     Date and Time Order Name Status Description    8/8/2018 1710 Inpatient Neurosurgery Consult Completed     8/7/2018 2314 Inpatient Gastroenterology Consult Completed     8/7/2018 2133 [facility] (on-call MD unless specified) Completed         Hospital Course  Please see history and physical for details. Patient is a 73 y.o. female initially admitted for complaints of back pain with bloody stool.  CT showed possible aspects for infectious colitis.  GI saw in consultation and recommended a short antibiotic regimen of which the patient will only be treated for 5 days total with Levaquin and Flagyl.  Today I see a different GI physician recommended 7 days treatment but I will keep on the 5 days per the initial recommendation he has had absolutely no GI issues since being here and stool studies were negative as were blood cultures ×2.  They plan to do a endoscopic evaluation at a later date in an outpatient setting and will have follow-up on 8/28/2018.  Ultimately her main complaint seemed to involve around her back pain.  She came to us only on Ultram as she had pain management referrals pending.  She was initially placed on Norco 5 mg and I have increased to 10 mg.  She has further asked to increase dosing to every 4 hours and I declined.  I will keep her on Norco 10 mg every  6 when necessary pain and dispense 40 which is enough to get her to her pain management appointment on 8/20/2018.  Hearing some of her history, it sounded more concerning for spinal stenosis so I consulted neurosurgery.  Discussed the case with Dr. Roc Galvan and I appreciate his input.  At this juncture, he repeated MRI and he feels nothing is surgical and he recommends patient to keep her follow-up with pain management where he thinks a facet block or ablation will be of benefit but he does not feel that there is any type of infectious etiology in the spine.  She currently lives with herself and her physical therapy evaluation seems pretty poor in regards to how much she is ambulating independently.  I offered to give this patient home health to follow and she has declined.  She is completely alert and oriented ×3 and her justification to me is that she is can have additional friends and family check in her on a routine basis.  Her morbid obesity definitely compounds her ability to maneuver as well as compounds her pain.  At this juncture all questions answered to patient and she very much is amenable to going home today again without home health as further recommended.    Condition on Discharge: Improved.     Temp:  [97.7 °F (36.5 °C)-98.6 °F (37 °C)] 97.7 °F (36.5 °C)  Heart Rate:  [71-88] 71  Resp:  [18] 18  BP: (117-148)/(58-75) 123/75  Body mass index is 41.87 kg/m².    Physical Exam   Constitutional: She is oriented to person, place, and time. She appears well-developed and well-nourished.   Morbidly obese   Neck: No JVD present.   Cardiovascular: Normal rate and regular rhythm.    Pulmonary/Chest: Effort normal and breath sounds normal. No respiratory distress.   Abdominal: Soft. Bowel sounds are normal. She exhibits no distension and no mass. There is no tenderness. There is no guarding.   Musculoskeletal: She exhibits no edema.   Neurological: She is alert and oriented to person, place, and time. No cranial  nerve deficit.        Discharge Medications      New Medications      Instructions Start Date   acetaminophen 325 MG tablet  Commonly known as:  TYLENOL  Replaces:  acetaminophen 650 MG 8 hr tablet   650 mg, Oral, Every 4 Hours PRN      HYDROcodone-acetaminophen  MG per tablet  Commonly known as:  NORCO  Replaces:  HYDROcodone-acetaminophen 5-325 MG per tablet   1 tablet, Oral, Every 6 Hours PRN      levoFLOXacin 500 MG tablet  Commonly known as:  LEVAQUIN   500 mg, Oral, Every 24 Hours      metroNIDAZOLE 500 MG tablet  Commonly known as:  FLAGYL   500 mg, Oral, Every 8 Hours Scheduled         Changes to Medications      Instructions Start Date   Insulin Lispro 200 UNIT/ML solution pen-injector  Commonly known as:  HUMALOG KWIKPEN  What changed:  when to take this   12 Units, Subcutaneous, 3 Times Daily Before Meals         Continue These Medications      Instructions Start Date   amLODIPine 10 MG tablet  Commonly known as:  NORVASC   10 mg, Oral, Daily      aspirin 81 MG EC tablet   81 mg, Oral, Daily, Take 1 tablet by oral route once daily      atorvastatin 20 MG tablet  Commonly known as:  LIPITOR   20 mg, Oral, Nightly      hydrochlorothiazide 25 MG tablet  Commonly known as:  HYDRODIURIL   25 mg, Oral, Daily      insulin  UNIT/ML injection  Commonly known as:  humuLIN N,novoLIN N   20 Units, Subcutaneous, 2 Times Daily, Before Breakfast and at Bedtime      irbesartan 300 MG tablet  Commonly known as:  AVAPRO   300 mg, Oral, Daily      levothyroxine 75 MCG tablet  Commonly known as:  SYNTHROID, LEVOTHROID   75 mcg, Oral, Daily      PARoxetine 10 MG tablet  Commonly known as:  PAXIL   10 mg, Oral, Daily      TURMERIC COMPLEX/BLACK PEPPER 3-500 MG capsule  Generic drug:  Black Pepper-Turmeric   1 capsule, Oral, 2 Times Daily         Stop These Medications    acetaminophen 650 MG 8 hr tablet  Commonly known as:  TYLENOL  Replaced by:  acetaminophen 325 MG tablet     HYDROcodone-acetaminophen 5-325 MG  per tablet  Commonly known as:  NORCO  Replaced by:  HYDROcodone-acetaminophen  MG per tablet     traMADol 50 MG tablet  Commonly known as:  ULTRAM             Additional Instructions for the Follow-ups that You Need to Schedule     Discharge Follow-up with PCP    As directed      Currently Documented PCP:  Claudio Anne MD  PCP Phone Number:  [phone]    Follow Up Details:  pcp 1-2 weeks - pain mgnt as scheduled on 8/20 - NES per [facility]           Follow-up Information     Claudio Anne MD .    Specialty:  Family Medicine  Why:  pcp 1-2 weeks - pain mgnt as scheduled on 8/20 - NES per [facility]  Contact information:  [address] [address]  [phone]                 Test Results Pending at Discharge   Order Current Status    Blood Culture - Blood, Preliminary result    Blood Culture - Blood, Preliminary result         Shakeel Álvarez MD  08/10/18  1:04 PM    Discharge time spent greater than 30 minutes.

## 2018-08-10 NOTE — THERAPY TREATMENT NOTE
Acute Care - Physical Therapy Treatment Note  [facility]     Patient Name: Iraida Elizabeth  : 1945  MRN: [id number]  Today's Date: 8/10/2018     Date of Referral to PT: 18  Referring Physician: Dr. Maldonado    Admit Date: 2018    Visit Dx:    ICD-10-CM ICD-9-CM   1. Colitis K52.9 558.9   2. Acute midline low back pain without sciatica M54.5 724.2   3. Impaired functional mobility, balance, gait, and endurance Z74.09 V49.89     Patient Active Problem List   Diagnosis   • Major depressive disorder with single episode, in full remission (CMS/[facility])   • Controlled type 2 diabetes mellitus with diabetic neuropathy, with long-term current use of insulin (CMS/[facility])   • Essential hypertension   • Other hyperlipidemia   • Personal history of malignant neoplasm of breast   • Acquired hypothyroidism   • Hypoglycemia due to endogenous hyperinsulinemia   • Systolic murmur   • Grief reaction   • Menopause   • Osteopenia   • Encounter for long-term (current) use of insulin (CMS/[facility])   • Hyperinsulinism   • Exertional dyspnea   • Uncontrolled type 2 diabetes mellitus with diabetic neuropathy, with long-term current use of insulin (CMS/[facility])   • Uncontrolled type 2 diabetes mellitus with complication, with long-term current use of insulin (CMS/[facility])   • Arthralgia of multiple sites   • Infectious colitis   • DDD (degenerative disc disease), lumbar   • Right leg weakness       Therapy Treatment          Rehabilitation Treatment Summary     Row Name 08/10/18 1148             Treatment Time/Intention    Discipline physical therapist  -MA      Document Type therapy note (daily note)  -MA      Subjective Information complains of;pain  -MA      Mode of Treatment physical therapy  -MA      Patient/Family Observations Supine in bed with HOB elevated, no acute distress noted at rest, no exit alarm  -MA      Care Plan Review patient/other agree to care plan  -MA      Therapy Frequency (PT Clinical Impression) daily  -MA       Patient Effort adequate  -MA      Existing Precautions/Restrictions fall;spinal  -MA      Recorded by [MA] Guadalupe Casey, PT 08/10/18 1152      Row Name 08/10/18 1148             Vital Signs    O2 Delivery Pre Treatment room air  -MA      Recorded by [MA] Guadalupe Casey, PT 08/10/18 1152      Row Name 08/10/18 1148             Cognitive Assessment/Intervention- PT/OT    Orientation Status (Cognition) oriented x 4  -MA      Follows Commands (Cognition) WFL  -MA      Safety Deficit (Cognitive) mild deficit  -MA      Personal Safety Interventions fall prevention program maintained;gait belt;nonskid shoes/slippers when out of bed  -MA      Recorded by [MA] Guadalupe Casey, PT 08/10/18 1152      Row Name 08/10/18 1148             Safety Issues, Functional Mobility    Impairments Affecting Function (Mobility) pain;strength;endurance/activity tolerance  -MA      Recorded by [MA] Guadalupe Casey, PT 08/10/18 1152      Row Name 08/10/18 1148             Bed Mobility Assessment/Treatment    Supine-Sit [place] (Bed Mobility) supervision  -MA      Sit-Supine [place] (Bed Mobility) supervision  -MA      Bed Mobility, Safety Issues decreased use of legs for bridging/pushing  -MA      Assistive Device (Bed Mobility) bed rails;head of bed elevated  -MA      Recorded by [MA] Guadalupe Casey, PT 08/10/18 1152      Row Name 08/10/18 1148             Transfer Assessment/Treatment    Transfer Assessment/Treatment sit-stand transfer;stand-sit transfer  -MA      Comment (Transfers) Incr pain in standing noted, hand placement cues  -MA      Recorded by [MA] Guadalupe Casey, PT 08/10/18 1152      Row Name 08/10/18 1148             Sit-Stand Transfer    Sit-Stand [place] (Transfers) contact guard;verbal cues  -MA      Assistive Device (Sit-Stand Transfers) walker, front-wheeled  -MA      Recorded by [MA] Guadalupe Casey, PT 08/10/18 1152      Row Name 08/10/18 1148             Stand-Sit Transfer     Stand-Sit [place] (Transfers) contact guard;verbal cues  -MA      Assistive Device (Stand-Sit Transfers) walker, front-wheeled  -MA      Recorded by [MA] Guadalupe Casey, PT 08/10/18 1152      Row Name 08/10/18 1148             Gait/Stairs Assessment/Training    Gait/Stairs Assessment/Training gait/ambulation independence  -MA      [place] Level (Gait) contact guard;verbal cues  -MA      Assistive Device (Gait) walker, front-wheeled  -MA      Distance in Feet (Gait) 5  -MA      Pattern (Gait) step-to  -MA      Deviations/Abnormal Patterns (Gait) antalgic;doris decreased  -MA      Bilateral Gait Deviations forward flexed posture  -MA      Comment (Gait/Stairs) Unable to progress distance due to level of lower back pain. RN enter during PT session and notified that patient is not safe to DC home at this time due to debility from LBP.  -MA      Recorded by [MA] Guadalupe Casey, PT 08/10/18 1152      Row Name 08/10/18 1148             Static Sitting Balance    Level of [place] (Unsupported Sitting, Static Balance) standby assist  -MA      Sitting Position (Unsupported Sitting, Static Balance) sitting on edge of bed  -MA      Level of [place] (Supported Sitting, Static Balance) standby assist  -MA      Sitting Position (Supported Sitting, Static Balance) sitting on edge of bed  -MA      Recorded by [MA] Guadalupe Casey, PT 08/10/18 1152      Row Name 08/10/18 1148             Static Standing Balance    Level of [place] (Supported Standing, Static Balance) contact guard assist  -MA      Assistive Device Utilized (Supported Standing, Static Balance) rolling walker  -MA      Recorded by [MA] Guadalupe Casey, PT 08/10/18 1152      Row Name 08/10/18 1148             Positioning and Restraints    Pre-Treatment Position in bed  -MA      Post Treatment Position chair  -MA      In Chair sitting;call light within reach;encouraged to call for assist;legs elevated  -MA      Recorded by  [MA] Guadalupe Casey, PT 08/10/18 1152      Row Name 08/10/18 1148             Pain Scale: Numbers Pre/Post-Treatment    Pain Scale: Numbers, Pretreatment 8/10  -MA      Pain Scale: Numbers, Post-Treatment 8/10  -MA      Pain Location - Orientation lower  -MA      Pain Location back  -MA      Pain Intervention(s) Repositioned;Ambulation/increased activity;Rest  -MA      Recorded by [MA] Guadalupe Casey, PT 08/10/18 1152      Row Name 08/10/18 1148             Plan of Care Review    Plan of Care Reviewed With patient  -MA      Recorded by [MA] Guadalupe Casey, PT 08/10/18 1152      Row Name 08/10/18 1148             Outcome Summary/Treatment Plan (PT)    Daily Summary of Progress (PT) unable to show any progress toward functional goals  -MA      Barriers to Overall Progress (PT) Debility from amount of LBP  -MA      Patient/Family Concerns, Equipment Needs at Discharge (PT) Patient voiced she has family support but unable to help during the day because of work. Appeared worried and anxious if to be DC'd home as her mobility is severely limited.  -MA      Recorded by [MA] Guadalupe Casey, PT 08/10/18 1152        User Key  (r) = Recorded By, (t) = Taken By, (c) = Cosigned By    Initials Name Effective Dates Discipline    Guadalupe Diaz, PT 04/03/18 -  PT                     Physical Therapy Education     Title: PT OT SLP Therapies (Active)     Topic: Physical Therapy (Active)     Point: Mobility training (Active)    Learning Progress Summary     Learner Status Readiness Method Response Comment Documented by    Patient Active Acceptance E NR  MA 08/10/18 1154     Done Acceptance EDMUNDO WILEY NR   08/09/18 1539     Active Acceptance E NR  MA 08/08/18 1546          Point: Home exercise program (Active)    Learning Progress Summary     Learner Status Readiness Method Response Comment Documented by    Patient Active Acceptance E NR  MA 08/10/18 1154     Done Acceptance EDMUNDO WILEY NR   08/09/18 1539      Active Acceptance E NR  MA 08/08/18 1546          Point: Body mechanics (Active)    Learning Progress Summary     Learner Status Readiness Method Response Comment Documented by    Patient Active Acceptance E NR  MA 08/10/18 1154     Done Acceptance EDMUNDO WILEY,NR   08/09/18 1539     Active Acceptance E NR  MA 08/08/18 1546          Point: Precautions (Active)    Learning Progress Summary     Learner Status Readiness Method Response Comment Documented by    Patient Active Acceptance E NR  MA 08/10/18 1154     Done Acceptance EDMUNDO WILEY,NR   08/09/18 1539     Active Acceptance E NR  MA 08/08/18 1546                      User Key     Initials Effective Dates Name Provider Type Discipline     03/07/18 -  Carissa Del Valle, PTA Physical Therapy Assistant PT    MA 04/03/18 -  Guadalupe Casey PT Physical Therapist PT                    PT Recommendation and Plan  Anticipated Discharge Disposition (PT): home with OP services, home with assist  Planned Therapy Interventions (PT Eval): balance training, bed mobility training, gait training, home exercise program, patient/family education, postural re-education, stair training, strengthening, transfer training  Therapy Frequency (PT Clinical Impression): daily  Outcome Summary/Treatment Plan (PT)  Daily Summary of Progress (PT): unable to show any progress toward functional goals  Barriers to Overall Progress (PT): Debility from amount of LBP  Patient/Family Concerns, Equipment Needs at Discharge (PT): Patient voiced she has family support but unable to help during the day because of work. Appeared worried and anxious if to be DC'd home as her mobility is severely limited.  Anticipated Discharge Disposition (PT): home with OP services, home with assist  Plan of Care Reviewed With: patient  Outcome Summary: Patient's mobility remains limited secondary to lower back pain and unable to progress ambulation distance. Patient appears to be frustrated with the amount of debility  she is presenting with from her lower back pain. Will continue to advance as able.          Outcome Measures     Row Name 08/10/18 1100 08/09/18 1500 08/08/18 1500       How much help from another person do you currently need...    Turning from your back to your side while in flat bed without using bedrails? 4  -MA 4  -SM 4  -MA    Moving from lying on back to sitting on the side of a flat bed without bedrails? 3  -MA 4  -SM 4  -MA    Moving to and from a bed to a chair (including a wheelchair)? 3  -MA 3  -SM 2  -MA    Standing up from a chair using your arms (e.g., wheelchair, bedside chair)? 3  -MA 3  -SM 2  -MA    Climbing 3-5 steps with a railing? 1  -MA 1  -SM 1  -MA    To walk in hospital room? 2  -MA 2  -SM 2  -MA    AM-PAC 6 Clicks Score 16  -MA 17  -SM 15  -MA       Functional Assessment    Outcome Measure Options AM-PAC 6 Clicks Basic Mobility (PT)  -MA AM-PAC 6 Clicks Basic Mobility (PT)  -SM AM-PAC 6 Clicks Basic Mobility (PT)  -MA      User Key  (r) = Recorded By, (t) = Taken By, (c) = Cosigned By    Initials Name Provider Type    Carissa Sal, KERRY Physical Therapy Assistant    Guadalupe Diaz, PT Physical Therapist           Time Calculation:         PT Charges     Row Name 08/10/18 1148             Time Calculation    Start Time 1130  -MA      Stop Time 1148  -MA      Time Calculation (min) 18 min  -MA      PT Received On 08/10/18  -MA      PT - Next Appointment 08/11/18  -MA         Time Calculation- PT    Total Timed Code Minutes- PT 15 minute(s)  -MA        User Key  (r) = Recorded By, (t) = Taken By, (c) = Cosigned By    Initials Name Provider Type    Guadalupe Diaz, PT Physical Therapist        Therapy Suggested Charges     Code   Minutes Charges    None           Therapy Charges for Today     Code Description Service Date Service Provider Modifiers Qty    50572568488 HC PT THER PROC EA 15 MIN 8/10/2018 Guadalupe Casey, PT GP 1          PT G-Codes  Outcome Measure  Options: AM-PAC 6 Clicks Basic Mobility (PT)    Guadalupe Casey, PT  8/10/2018

## 2018-08-10 NOTE — PROGRESS NOTES
Continued Stay Note  [facility]     Patient Name: Iraida Elizabeth  MRN: [id number]  Today's Date: 8/10/2018    Admit Date: 8/7/2018          Discharge Plan     Row Name 08/10/18 1329       Plan    Plan Home w/o needs    Plan Comments Fe with [facility] said visits covered at 100% of allowable, she said per attending's note pt has declined home health.  Sagrario Dunlap RN    Final Discharge Disposition Code 01 - home or self-care    Row Name 08/10/18 1048       Plan    Plan Comments               Discharge Codes    No documentation.       Expected Discharge Date and Time     Expected Discharge Date Expected Discharge Time    Aug 10, 2018             Sagrario Dunlap, RN

## 2018-08-11 ENCOUNTER — READMISSION MANAGEMENT (OUTPATIENT)
Dept: CALL CENTER | Facility: HOSPITAL | Age: 73
End: 2018-08-11

## 2018-08-11 NOTE — OUTREACH NOTE
Prep Survey      Responses   Facility patient discharged from?  [place]   Is patient eligible?  Yes   Discharge diagnosis  Infectious colitis ,  DDD (degenerative disc disease), lumbar ,     Does the patient have one of the following disease processes/diagnoses(primary or secondary)?  Other   Does the patient have Home health ordered?  No   Is there a DME ordered?  No   Prep survey completed?  Yes          Kirsten Butler RN

## 2018-08-12 LAB
BACTERIA SPEC AEROBE CULT: NORMAL
BACTERIA SPEC AEROBE CULT: NORMAL

## 2018-08-13 ENCOUNTER — READMISSION MANAGEMENT (OUTPATIENT)
Dept: CALL CENTER | Facility: HOSPITAL | Age: 73
End: 2018-08-13

## 2018-08-13 NOTE — OUTREACH NOTE
Medical Week 1 Survey      Responses   Facility patient discharged from?  [place]   Does the patient have one of the following disease processes/diagnoses(primary or secondary)?  Other   Is there a successful TCM telephone encounter documented?  No   Week 1 attempt successful?  Yes   Call start time  1639   Call end time  1646   Discharge diagnosis  Infectious colitis ,  DDD (degenerative disc disease), lumbar ,     Is patient permission given to speak with other caregiver?  No   Meds reviewed with patient/caregiver?  Yes   Is the patient having any side effects they believe may be caused by any medication additions or changes?  No   Does the patient have all medications ordered at discharge?  Yes   Is the patient taking all medications as directed (includes completed medication regime)?  Yes   Comments regarding appointments  Patient states that she has pain managment appt tomorrow.    Does the patient have a primary care provider?   Yes   Does the patient have an appointment with their PCP within 7 days of discharge?  Greater than 7 days   Comments regarding PCP  PCP Dr Claudio Anne   What is preventing the patient from scheduling follow up appointments within 7 days of discharge?  Earlier appointment not available   Nursing Interventions  Verified appointment date/time/provider   Has the patient kept scheduled appointments due by today?  N/A   Comments  PCP appt 8/20/18, GI appt 8/28/18.    Has home health visited the patient within 72 hours of discharge?  N/A   Psychosocial issues?  No   Did the patient receive a copy of their discharge instructions?  Yes   Nursing interventions  Reviewed instructions with patient   What is the patient's perception of their health status since discharge?  Same   Is the patient/caregiver able to teach back signs and symptoms related to disease process for when to call PCP?  Yes   Is the patient/caregiver able to teach back signs and symptoms related to disease process for when  to call 911?  Yes   Is the patient/caregiver able to teach back the hierarchy of who to call/visit for symptoms/problems? PCP, Specialist, Home health nurse, Urgent Care, ED, 911  Yes   Additional teach back comments  Patient denies any blood in stool, but states that she continues to have the same back pain. Patient states that she is seeing pain management tomorrow.    Week 1 call completed?  Yes          Ofe Lynn RN

## 2018-08-14 ENCOUNTER — OFFICE VISIT (OUTPATIENT)
Dept: PAIN MEDICINE | Facility: CLINIC | Age: 73
End: 2018-08-14

## 2018-08-14 ENCOUNTER — RESULTS ENCOUNTER (OUTPATIENT)
Dept: PAIN MEDICINE | Facility: CLINIC | Age: 73
End: 2018-08-14

## 2018-08-14 VITALS
SYSTOLIC BLOOD PRESSURE: 168 MMHG | WEIGHT: 226 LBS | TEMPERATURE: 97.9 F | HEART RATE: 79 BPM | HEIGHT: 61 IN | DIASTOLIC BLOOD PRESSURE: 78 MMHG | RESPIRATION RATE: 16 BRPM | OXYGEN SATURATION: 96 % | BODY MASS INDEX: 42.67 KG/M2

## 2018-08-14 DIAGNOSIS — M46.1 SACROILIAC INFLAMMATION (HCC): ICD-10-CM

## 2018-08-14 DIAGNOSIS — M51.36 DDD (DEGENERATIVE DISC DISEASE), LUMBAR: ICD-10-CM

## 2018-08-14 DIAGNOSIS — M53.3 SI (SACROILIAC) JOINT DYSFUNCTION: ICD-10-CM

## 2018-08-14 DIAGNOSIS — G89.29 OTHER CHRONIC PAIN: Primary | ICD-10-CM

## 2018-08-14 DIAGNOSIS — G89.29 OTHER CHRONIC PAIN: ICD-10-CM

## 2018-08-14 PROCEDURE — 99204 OFFICE O/P NEW MOD 45 MIN: CPT | Performed by: NURSE PRACTITIONER

## 2018-08-14 RX ORDER — HYDROCODONE BITARTRATE AND ACETAMINOPHEN 10; 325 MG/1; MG/1
1 TABLET ORAL EVERY 6 HOURS PRN
Qty: 40 TABLET | Refills: 0 | Status: SHIPPED | OUTPATIENT
Start: 2018-08-14 | End: 2018-08-30 | Stop reason: SDUPTHER

## 2018-08-14 RX ORDER — TIZANIDINE 4 MG/1
4 TABLET ORAL 2 TIMES DAILY PRN
Qty: 60 TABLET | Refills: 0 | Status: SHIPPED | OUTPATIENT
Start: 2018-08-14 | End: 2018-08-16 | Stop reason: SINTOL

## 2018-08-14 NOTE — PROGRESS NOTES
"CHIEF COMPLAINT  Pt is referred per Dr Leon for R sided LBP and daily R leg pain-   Pt has had LBP for about  1 year but has been self managed until episode of severe R sided LBP while getting out of bed on 8/6/18; Pt went to [facility] ER followed by inpatient stay at [facility] for 3 nights, receiving hydrocodone; admitted for colitis. Pt states R sided LBP has remained severe,being basically wheelchair bound.Pt describes R leg pain as intermittant but has constant heaviness of leg.  Hydrocodone does provide minimal relief.  No back surgery,no previous pain clinics.    Subjective   Iraida Elizabeth is a  RHD 73 y.o. female.   She presents to the office for evaluation of chronic back pain, increased after past few weeks. She was referred here by Dr. Suma Leon.  She is retired from childcare. She lives by herself. Does not smoke. Does drink alcohol, 1/month.  Denies any illicit substance use. Denies any family history of alcoholism or addiction. Family history is negative for back problems.    Complains of pain in her right low back. The pain can radiate into her right groin and right posterior thigh and stops above the knee. The back pain is continuous and the leg pain is intermittent with walking. The pain continues to worsen. Pain increases with walking, standing, and laying flat; pain decreases with changing position, reclining position, heating pad, ice, stretching and medication.  Tried IcyHot with no relief.     Has had back pain for approximately 1 year. Does not remember any specific injury or precipitating event. This was intermittent but continued to worsen. It would interfere with her walking, and she was walking her dog less and less. \"It wasn't severe then.\"  Previously seen by  for left ankle pain(previous break). Ordered lumbar MRI.  On 8-5-18, she got out of bed to go to bathroom \"and it hit me, I couldn't straighten up.\" She went to ED. Was given pain medication and discharged. " "Patient returned the next day for increased back pain. Was found to have infective colitis.     Has tried prednisone for 1 month taper- no relief  Tramadol-- No help (prescribed by Dr. Leon).  Hydrocodone 5/325-- mild help; from ED  Hydrocodone 10/325--- mild help \"It dulls the pain to help me get in a comfortable position but I still can't straighten my back at all.\" Was given temporary supply upon discharge from hospital.    Is taking Hydrocodone 10/325 q6hrs. Notes 50% relief with the medication.   Has not had any recent NSAIDS  Has not tried muscle relaxants.  No relief with PT and it is cost exorbinant. Also has concerns about getting in and out of car for PT.     Has had JOSE CRUZ evaluation(while hospitalized)--no surgical options.   A1C-- 7.15 7-30-18  Has gained approximately 50 lbs in past year. \"I blamed it on the insulin first of all.\" Was trying to do some exercise, but then her back pain worsened. \"my back has worsened over the past 6 months because my back pain is worse.\"     Has been having worsening SOA approximately 6 months ago. Had pulmonary and cardiac evaluation.     Back Pain   This is a chronic (acute on chronic) problem. The current episode started more than 1 year ago (worsened past few weeks). The problem occurs constantly. The problem has been gradually worsening since onset. The pain is present in the lumbar spine and sacro-iliac. The pain is at a severity of 8/10 (ranges from 7-9/10VAS). The pain is severe. The symptoms are aggravated by bending, position, standing and twisting (laying flat). Pertinent negatives include no dysuria or numbness. She has tried analgesics for the symptoms.      PEG Assessment   What number best describes your pain on average in the past week?9  What number best describes how, during the past week, pain has interfered with your enjoyment of life?9  What number best describes how, during the past week, pain has interfered with your general activity?  " 9      Current Outpatient Prescriptions:   •  acetaminophen (TYLENOL) 325 MG tablet, Take 2 tablets by mouth Every 4 (Four) Hours As Needed for Mild Pain ., Disp: , Rfl:   •  amLODIPine (NORVASC) 10 MG tablet, Take 1 tablet by mouth Daily for 180 days., Disp: 90 tablet, Rfl: 1  •  aspirin 81 MG EC tablet, Take 81 mg by mouth daily. Take 1 tablet by oral route once daily, Disp: , Rfl:   •  atorvastatin (LIPITOR) 20 MG tablet, Take 1 tablet by mouth Every Night for 180 days., Disp: 90 tablet, Rfl: 1  •  Black Pepper-Turmeric (TURMERIC COMPLEX/BLACK PEPPER) 3-500 MG capsule, Take 1 capsule by mouth 2 (Two) Times a Day., Disp: , Rfl:   •  hydrochlorothiazide (HYDRODIURIL) 25 MG tablet, Take 25 mg by mouth Daily., Disp: , Rfl:   •  HYDROcodone-acetaminophen (NORCO)  MG per tablet, Take 1 tablet by mouth Every 6 (Six) Hours As Needed for Moderate Pain  (DNF until 8-20-18)., Disp: 40 tablet, Rfl: 0  •  Insulin Lispro (HUMALOG KWIKPEN) 200 UNIT/ML solution pen-injector, Inject 12 Units under the skin 3 (Three) Times a Day Before Meals. (Patient taking differently: Inject 12 Units under the skin into the appropriate area as directed Daily With Breakfast & Lunch.), Disp: 15 mL, Rfl: 4  •  insulin NPH (humuLIN N,novoLIN N) 100 UNIT/ML injection, Inject 20 Units under the skin into the appropriate area as directed 2 (Two) Times a Day. Before Breakfast and at Bedtime, Disp: , Rfl:   •  irbesartan (AVAPRO) 300 MG tablet, Take 1 tablet by mouth Daily for 180 days., Disp: 90 tablet, Rfl: 1  •  levothyroxine (SYNTHROID, LEVOTHROID) 75 MCG tablet, Take 1 tablet by mouth Daily for 180 days., Disp: 90 tablet, Rfl: 1  •  PARoxetine (PAXIL) 10 MG tablet, Take 10 mg by mouth Daily., Disp: , Rfl:   •  tiZANidine (ZANAFLEX) 4 MG tablet, Take 1 tablet by mouth 2 (Two) Times a Day As Needed for Muscle Spasms., Disp: 60 tablet, Rfl: 0    The following portions of the patient's history were reviewed and updated as appropriate: allergies,  current medications, past family history, past medical history, past social history, past surgical history and problem list.      REVIEW OF PERTINENT MEDICAL DATA    Patient was referred by Dr. Suma Leon in orthopedics.  Has a history of chronic low back pain.  Dr. Leon ordered an MRI-lumbar, which was completed.     Review Dr. Grisel office note from 7/31/18--A she presents with a history of chronic low back pain.  Patient had MRI done on 7/26/18 which showed multilevel disc degeneration specifically at L4-L5.  Patient presents today still complaining of low back pain.  Has tried Medrol Dosepak, prednisone, activity modification and she is not getting any better.  Recommend epidural injections to be done at [facility], [place], operated by [facility].  Referral to pain management.    Reviewed ED visit on 8/6/18 with Dr. Anthony Wild.  Patient presented with a history of chronic back pain of acute right-sided low back pain.  Has been constant for several months but worsened yesterday morning.  She has been unable to stand up straight since then.  Patient has pain management appointment in 2 days per presented to ED due to worsening of pain.  Had MRI performed last week by PCP.  Was given tramadol.  Pain is not improved with tramadol.  Bilateral knee she was treated with Flexeril, Zofran, and Dilaudid.  Vy one hour after medication administration, pain was improved.  Patient will be discharged with several pain pills and outpatient follow-up with pain management as scheduled.  Patient was prescribed hydrocodone 5-3 25 every 6 hours when necessary quantity of 20.    Reviewed ED to hospital admission 8-7-18- submitted for infectious colitis, DDD of the lumbar spine, right leg weakness, hypertension and controlled type 2 diabetes with insulin.  Patient presented with low back pain and restore.  CT showed possible infectious colitis.  Treated with Flagyl and Levaquin.  Evaluated by GI.  Ultimately her main complaint seems similar  on her back pain.  She came on Ultram and was pending pain management referral.  Was initially placed on Norco 5 mg and was increased to 10 mg.  Patient was further asked increased dosing every 4 hours and I declined.  We'll keep her on Norco 10 every 6 hours when necessary in the sense #40 which is not to get her chair pain management appointment on 8/20/18.  Have concern for spinal stenosis.  Consult with neurosurgery.  Repeated MRI nothing surgical.  Plan to follow-up with pain management where he thinks a facet block or ablation will be of benefit but does not feel that there is any type of infectious etiology of the spine.  Patient lives by herself in her physical therapy evaluation seems pretty poor in regards to how much she is ambulating independently.  Patient declined home health referral.  Her morbid obesity deftly compounds her ability to maneuver as well as her pain.  Patient is amenable to going home and will be discharged.    MRI LUMBAR SPINE WITH AND WITHOUT CONTRAST 07/25/2018      CLINICAL HISTORY: Back pain, right leg pain, sciatica. Patient reports a  history of breast cancer 10 years ago.     TECHNIQUE: Sagittal T1, proton density and fat-suppressed T2 and  postcontrast sagittal fat-suppressed T1-weighted images were obtained of  the lumbar spine. In addition, thin cut axial T1-weighted images were  obtained angled through the interspaces from L2 to S1 and axial T2 and  postcontrast axial T1-weighted images were obtained from T12 to S1.     COMPARISON: There are no prior lumbar spine imaging studies from [facility] for comparison.     FINDINGS: The distal thoracic cord and the conus is normal in signal  intensity. The conus terminates at the L1-L2 interspace level which is  normal.     There is mild disc space narrowing and anterior degenerative endplate  spurring at T11-T12 and T12-L1. The posterior disc margin and facets are  normal with no canal or foraminal narrowing at  T11-T12 or T12-L1.     At L1-L2, disc space and facets are normal with no canal or foraminal  narrowing.     At L2-L3, there is mild disc desiccation and minimal facet overgrowth,  minimal 1 mm retrolisthesis of L2 on L3 and minimal diffuse annular disc  bulge. There is no central canal or foraminal narrowing.     At L3-L4, there is mild-to-moderate bilateral facet overgrowth. Very  minimal diffuse posterior disc bulge. There is essentially no central  canal narrowing. There is mild right, but no left foraminal narrowing.     At L4-L5, there is mild right and moderate left facet overgrowth, some  fluid in left facet joint. There is a 2-3 mm degenerative  anterolisthesis of L4 on L5. There is minimal if any canal and foraminal  narrowing.     At L5-S1, there is moderate bilateral facet overgrowth, 2-3 mm  degenerative anterolisthesis of L5 on S1, mild posterior disc bulge.  There is no central canal or lateral recess or foraminal narrowing.     Marrow signal intensity in the lumbar spine is normal. There is no  evidence of metastatic disease of the lumbar spine.     IMPRESSION:     1. Mild lumbar spondylosis as described. No lumbar disc herniation. No  significant lumbar canal or foraminal narrowing is seen and there is no  evidence of metastatic disease in the lumbar spine.     2. At L2-L3, there is minimal facet overgrowth, 1 mm retrolisthesis of  L2 on L3, but no canal or foraminal narrowing. At L3-L4, there is  mild-to-moderate bilateral facet overgrowth, minimal diffuse posterior  disc bulge. There is no canal or left foraminal narrowing, only mild  right foraminal narrowing.     3. At L4-L5, there is mild right and moderate left facet overgrowth,  fluid in left facet joint and 3 mm degenerative anterolisthesis of L4 on  L5 and only minimal if any canal and foraminal narrowing. At L5-S1,  there is moderate bilateral facet overgrowth, 3 mm degenerative  anterolisthesis of L5 on S1, but no canal or foraminal  "narrowing.     This report was finalized on 7/25/2018 2:14 PM by Dr. Mati Chavez M.D.    Review of Systems   Constitutional: Positive for activity change (very limited). Negative for appetite change.   HENT: Positive for hearing loss. Negative for trouble swallowing.    Respiratory: Positive for shortness of breath (mild). Negative for apnea.    Gastrointestinal: Positive for diarrhea (colitis). Negative for constipation and nausea.   Genitourinary: Negative for difficulty urinating and dysuria.   Musculoskeletal: Positive for back pain.   Neurological: Positive for dizziness and light-headedness. Negative for seizures and numbness.   Psychiatric/Behavioral: Positive for sleep disturbance. Negative for suicidal ideas. The patient is nervous/anxious.      Vitals:    08/14/18 1344   BP: 168/78   Pulse: 79   Resp: 16   Temp: 97.9 °F (36.6 °C)   SpO2: 96%   Weight: 103 kg (226 lb)   Height: 154.9 cm (60.98\")   PainSc: 8  Comment: R sided LBP ranges from 7-9/10   PainLoc: Back     Objective   Physical Exam   Constitutional: She is oriented to person, place, and time. Vital signs are normal. She appears well-developed and well-nourished. She is cooperative.   HENT:   Head: Normocephalic and atraumatic.   Nose: Nose normal.   Eyes: Pupils are equal, round, and reactive to light. Conjunctivae, EOM and lids are normal.   Neck: Trachea normal. Neck supple.   Cardiovascular: Normal rate, regular rhythm and normal heart sounds.    Pulmonary/Chest: Effort normal and breath sounds normal. No respiratory distress.   Abdominal:   obese   Musculoskeletal:        Lumbar back: She exhibits decreased range of motion, tenderness, bony tenderness, pain and spasm.   Exquisite tenderness of right Si joint, minimal of left. Has difficulty sitting on right buttock and low back (tries to keep weight off there).    Negative SLr bilaterally    +JESSICA on right, unable to perform full MAKI due to pain; negative on left.    Minimal lumbar " facet tenderness    2+ pitting edema of BLE's    OA and surgical changes of left ankle.    Neurological: She is alert and oriented to person, place, and time. She has normal strength. No cranial nerve deficit. Gait (in wheelchair) abnormal.   Reflex Scores:       Patellar reflexes are 1+ on the right side and 1+ on the left side.       Achilles reflexes are 1+ on the right side and 1+ on the left side.  Skin: Skin is warm, dry and intact.   Psychiatric: She has a normal mood and affect. Her speech is normal and behavior is normal. Judgment and thought content normal. Cognition and memory are normal.   Nursing note and vitals reviewed.    Assessment/Plan   Iraida was seen today for back pain.    Diagnoses and all orders for this visit:    Other chronic pain    DDD (degenerative disc disease), lumbar    Sacroiliac inflammation (CMS/HCC)  -     Case Request    SI (sacroiliac) joint dysfunction  -     Case Request    Other orders  -     tiZANidine (ZANAFLEX) 4 MG tablet; Take 1 tablet by mouth 2 (Two) Times a Day As Needed for Muscle Spasms.  -     HYDROcodone-acetaminophen (NORCO)  MG per tablet; Take 1 tablet by mouth Every 6 (Six) Hours As Needed for Moderate Pain  (DNF until 8-20-18).      --- Routine new patient initial oral drug screen in office today as part of monitoring requirements for controlled substances.   This specimen will be sent to [facility] laboratory for confirmation.     --- Right Si joint injection. No blood thinners. Reviewed the procedure at length with the patient.  Included in the review was expectations, complications, risk and benefits.The procedure was described in detail and the risks, benefits and alternatives were discussed with the patient (including but not limited to: bleeding, infection, nerve damage, worsening of pain, inability to perform injection, paralysis, seizures, and death) who agreed to proceed.  Discussed the potential for sedation if warranted/wanted.  Questions  "were answered and in a way the patient could understand.  Patient verbalized understanding and wishes to proceed.  This intervention will be ordered.  --- Trial of Tizanidine 4 mg BID PRN. Discussed medication with the patient.  Included in this discussion was the potential for side effects and adverse events.  Patient verbalized understanding and wished to proceed.  Prescription will be sent to pharmacy.  --- Acute supply of Hydrocodone 10/325 q6hrs PRN #40 DNF until 8-20-18. Discussed with the patient regarding long-term side effects of opioids including but not limited to dependence, addiction, sedation, respiratory depression, opioid induced hormonal suppression, hyperalgesia, and elevated risk of myocardial infarction.  --- Hold on NSAIDS due to recent Colitis diagnosis.   --- Plan to repeat PT when pain has subsided more.  --- Encouraged patient to follow-up with Dr. Anne(PCP) in regards to pedal edema and tenderness of BLE\"s. Patient verbalized understanding.   --- Follow-up after procedure or sooner if needed.    ----------  Education about Sacroiliac joint injections:  This Sacroiliac joint injection (blockade) we have suggested is intended for diagnostic purposes, with the intent of offering the patient Radiofrequency thermal rhizotomy (RF) of the sensory branches to the joint if the block is diagnostically effective.  The diagnostic blockade is necessary to determine the likelihood that RF therapy could be efficacious in providing long term relief to the patient.    In this procedure, the sacroiliac joint is aligned with imaging, and under image guidance a needle is placed with the needle tip into the joint.  The needle position is confirmed to be appropriately in the joint before injection of medication into the joint.  When xray fluoroscopy is used, contrast dye is used to confirm a proper arthrogram (i.e., outline of the joint).  When ultrasound is used, IV fluid (normal saline) is injected to see " the flow of the fluid into the joint.  Once confirmed, then the medication can be injected into the joint.  Oftentimes this medication is a combination of local anesthetics (for diagnostic purposes) and also a steroid (to decrease irritation & inflammation in the joint, also known as sacroilitis).      Medically, a successful RF procedure should provide a patient with 50% pain relief or more for at least 6 months.  Clinical experience suggests that successful patients receive relief more in the range of 12 months on average.  We also discussed that many patients receive therapeutic success from the intraarticular joint injection, and may not require RF ablation.  If a patient receives more than 8 weeks of relief from joint injection, then occasional repeat joint blocks for therapeutic purposes is a very reasonable alternative therapy.  This course of therapy is consistent with our LCDs according to our CMS  in the area, and therefore other insurance providers should follow accordingly.  We will monitor our patients to screen for these therapeutic responders and will offer RF therapy only when necessary.      We discussed that joint injections & also RF procedures are not without risks.  Best practices regarding anticoagulant use & neuraxial procedures will be respected.  Oftentimes a patient on an anticoagulant can be offered a joint injection safely, but again this is not risk-free, and such patients give consent with regards to this increased bleeding risk, which could cause problems including but not limited to worsening of pain, nerve damage, or muscle damage.  Patients that are ill or otherwise may be at risk for sepsis will not have their spines accessed by neuraxial injections of any type.  This patient will not be offered these therapies if there is an increased risk.   We discussed that there is a risk of postprocedural pain and also a risk of worsening of clinical picture with these procedures  as with any neuraxial procedure.    ----------       DONNY REPORT    As part of the patient's treatment plan, I am prescribing controlled substances. The patient has been made aware of appropriate use of such medications, including potential risk of somnolence, limited ability to drive and/or work safely, and the potential for dependence or overdose. It has also bee made clear that these medications are for use by this patient only, without concomitant use of alcohol or other substances unless prescribed.     Patient has completed prescribing agreement detailing terms of continued prescribing of controlled substances, including monitoring DONNY reports, urine drug screening, and pill counts if necessary. The patient is aware that inappropriate use will results in cessation of prescribing such medications.    DONNY report has been reviewed and scanned into the patient's chart.    As the clinician, I personally reviewed the DONNY from 8-13-18 while the patient was in the office today.    History and physical exam exhibit continued safe and appropriate use of controlled substances.         EMR Dragon/Transcription disclaimer:   Much of this encounter note is an electronic transcription/translation of spoken language to printed text. The electronic translation of spoken language may permit erroneous, or at times, nonsensical words or phrases to be inadvertently transcribed; Although I have reviewed the note for such errors, some may still exist.

## 2018-08-16 ENCOUNTER — HOSPITAL ENCOUNTER (EMERGENCY)
Facility: HOSPITAL | Age: 73
Discharge: HOME | End: 2018-08-16
Attending: EMERGENCY MEDICINE | Admitting: EMERGENCY MEDICINE

## 2018-08-16 ENCOUNTER — APPOINTMENT (OUTPATIENT)
Dept: GENERAL RADIOLOGY | Facility: HOSPITAL | Age: 73
End: 2018-08-16

## 2018-08-16 ENCOUNTER — APPOINTMENT (OUTPATIENT)
Dept: CARDIOLOGY | Facility: HOSPITAL | Age: 73
End: 2018-08-16

## 2018-08-16 ENCOUNTER — OFFICE VISIT (OUTPATIENT)
Dept: FAMILY MEDICINE CLINIC | Facility: CLINIC | Age: 73
End: 2018-08-16

## 2018-08-16 VITALS
SYSTOLIC BLOOD PRESSURE: 165 MMHG | OXYGEN SATURATION: 94 % | RESPIRATION RATE: 18 BRPM | WEIGHT: 226 LBS | BODY MASS INDEX: 42.67 KG/M2 | TEMPERATURE: 99.3 F | HEART RATE: 84 BPM | HEIGHT: 61 IN | DIASTOLIC BLOOD PRESSURE: 77 MMHG

## 2018-08-16 VITALS
HEART RATE: 76 BPM | SYSTOLIC BLOOD PRESSURE: 144 MMHG | OXYGEN SATURATION: 84 % | TEMPERATURE: 97.5 F | RESPIRATION RATE: 16 BRPM | HEIGHT: 61 IN | DIASTOLIC BLOOD PRESSURE: 68 MMHG | WEIGHT: 226 LBS | BODY MASS INDEX: 42.67 KG/M2

## 2018-08-16 DIAGNOSIS — F32.5 MAJOR DEPRESSIVE DISORDER WITH SINGLE EPISODE, IN FULL REMISSION (HCC): ICD-10-CM

## 2018-08-16 DIAGNOSIS — E78.49 OTHER HYPERLIPIDEMIA: ICD-10-CM

## 2018-08-16 DIAGNOSIS — E03.9 ACQUIRED HYPOTHYROIDISM: ICD-10-CM

## 2018-08-16 DIAGNOSIS — R60.0 LOCALIZED EDEMA: ICD-10-CM

## 2018-08-16 DIAGNOSIS — I10 ESSENTIAL HYPERTENSION: Chronic | ICD-10-CM

## 2018-08-16 DIAGNOSIS — Z09 HOSPITAL DISCHARGE FOLLOW-UP: Primary | ICD-10-CM

## 2018-08-16 DIAGNOSIS — R60.9 PERIPHERAL EDEMA: Primary | ICD-10-CM

## 2018-08-16 DIAGNOSIS — R06.02 SHORTNESS OF BREATH: ICD-10-CM

## 2018-08-16 LAB
ALBUMIN SERPL-MCNC: 3.8 G/DL (ref 3.5–5.2)
ALBUMIN/GLOB SERPL: 1.4 G/DL
ALP SERPL-CCNC: 65 U/L (ref 39–117)
ALT SERPL W P-5'-P-CCNC: 22 U/L (ref 1–33)
ANION GAP SERPL CALCULATED.3IONS-SCNC: 13.9 MMOL/L
AST SERPL-CCNC: 27 U/L (ref 1–32)
BASOPHILS # BLD AUTO: 0.07 10*3/MM3 (ref 0–0.2)
BASOPHILS NFR BLD AUTO: 0.8 % (ref 0–1.5)
BILIRUB SERPL-MCNC: 0.6 MG/DL (ref 0.1–1.2)
BUN BLD-MCNC: 21 MG/DL (ref 8–23)
BUN/CREAT SERPL: 21 (ref 7–25)
CALCIUM SPEC-SCNC: 8.9 MG/DL (ref 8.6–10.5)
CHLORIDE SERPL-SCNC: 99 MMOL/L (ref 98–107)
CO2 SERPL-SCNC: 26.1 MMOL/L (ref 22–29)
CREAT BLD-MCNC: 1 MG/DL (ref 0.57–1)
DEPRECATED RDW RBC AUTO: 55.7 FL (ref 37–54)
EOSINOPHIL # BLD AUTO: 0.26 10*3/MM3 (ref 0–0.7)
EOSINOPHIL NFR BLD AUTO: 3 % (ref 0.3–6.2)
ERYTHROCYTE [DISTWIDTH] IN BLOOD BY AUTOMATED COUNT: 14.9 % (ref 11.7–13)
GFR SERPL CREATININE-BSD FRML MDRD: 54 ML/MIN/1.73
GLOBULIN UR ELPH-MCNC: 2.7 GM/DL
GLUCOSE BLD-MCNC: 112 MG/DL (ref 65–99)
HCT VFR BLD AUTO: 42.6 % (ref 35.6–45.5)
HGB BLD-MCNC: 13.4 G/DL (ref 11.9–15.5)
HOLD SPECIMEN: NORMAL
HOLD SPECIMEN: NORMAL
IMM GRANULOCYTES # BLD: 0 10*3/MM3 (ref 0–0.03)
IMM GRANULOCYTES NFR BLD: 0 % (ref 0–0.5)
LYMPHOCYTES # BLD AUTO: 1.13 10*3/MM3 (ref 0.9–4.8)
LYMPHOCYTES NFR BLD AUTO: 12.8 % (ref 19.6–45.3)
MCH RBC QN AUTO: 32.3 PG (ref 26.9–32)
MCHC RBC AUTO-ENTMCNC: 31.5 G/DL (ref 32.4–36.3)
MCV RBC AUTO: 102.7 FL (ref 80.5–98.2)
MONOCYTES # BLD AUTO: 1.09 10*3/MM3 (ref 0.2–1.2)
MONOCYTES NFR BLD AUTO: 12.4 % (ref 5–12)
NEUTROPHILS # BLD AUTO: 6.26 10*3/MM3 (ref 1.9–8.1)
NEUTROPHILS NFR BLD AUTO: 71 % (ref 42.7–76)
NT-PROBNP SERPL-MCNC: 171.4 PG/ML (ref 0–1800)
PLATELET # BLD AUTO: 256 10*3/MM3 (ref 140–500)
PMV BLD AUTO: 11.5 FL (ref 6–12)
POTASSIUM BLD-SCNC: 4 MMOL/L (ref 3.5–5.2)
PROT SERPL-MCNC: 6.5 G/DL (ref 6–8.5)
RBC # BLD AUTO: 4.15 10*6/MM3 (ref 3.9–5.2)
SODIUM BLD-SCNC: 139 MMOL/L (ref 136–145)
WBC NRBC COR # BLD: 8.81 10*3/MM3 (ref 4.5–10.7)
WHOLE BLOOD HOLD SPECIMEN: NORMAL
WHOLE BLOOD HOLD SPECIMEN: NORMAL

## 2018-08-16 PROCEDURE — 71046 X-RAY EXAM CHEST 2 VIEWS: CPT

## 2018-08-16 PROCEDURE — 83880 ASSAY OF NATRIURETIC PEPTIDE: CPT | Performed by: NURSE PRACTITIONER

## 2018-08-16 PROCEDURE — 85025 COMPLETE CBC W/AUTO DIFF WBC: CPT | Performed by: NURSE PRACTITIONER

## 2018-08-16 PROCEDURE — 80053 COMPREHEN METABOLIC PANEL: CPT | Performed by: NURSE PRACTITIONER

## 2018-08-16 PROCEDURE — 99283 EMERGENCY DEPT VISIT LOW MDM: CPT

## 2018-08-16 PROCEDURE — 93970 EXTREMITY STUDY: CPT

## 2018-08-16 PROCEDURE — 99215 OFFICE O/P EST HI 40 MIN: CPT | Performed by: FAMILY MEDICINE

## 2018-08-16 RX ORDER — IRBESARTAN 300 MG/1
300 TABLET ORAL DAILY
Qty: 90 TABLET | Refills: 0 | Status: SHIPPED | OUTPATIENT
Start: 2018-08-16 | End: 2018-10-04 | Stop reason: SDUPTHER

## 2018-08-16 RX ORDER — SODIUM CHLORIDE 0.9 % (FLUSH) 0.9 %
10 SYRINGE (ML) INJECTION AS NEEDED
Status: DISCONTINUED | OUTPATIENT
Start: 2018-08-16 | End: 2018-08-16 | Stop reason: HOSPADM

## 2018-08-16 RX ORDER — AMLODIPINE BESYLATE 10 MG/1
10 TABLET ORAL DAILY
Qty: 90 TABLET | Refills: 0 | Status: SHIPPED | OUTPATIENT
Start: 2018-08-16 | End: 2018-10-04 | Stop reason: SDUPTHER

## 2018-08-16 RX ORDER — LEVOTHYROXINE SODIUM 0.07 MG/1
75 TABLET ORAL DAILY
Qty: 90 TABLET | Refills: 0 | Status: SHIPPED | OUTPATIENT
Start: 2018-08-16 | End: 2018-10-04 | Stop reason: SDUPTHER

## 2018-08-16 RX ORDER — PAROXETINE 10 MG/1
10 TABLET, FILM COATED ORAL DAILY
Qty: 90 TABLET | Refills: 0 | Status: SHIPPED | OUTPATIENT
Start: 2018-08-16 | End: 2018-09-24 | Stop reason: SDUPTHER

## 2018-08-16 RX ORDER — HYDROCHLOROTHIAZIDE 25 MG/1
25 TABLET ORAL DAILY
Qty: 90 TABLET | Refills: 0 | Status: SHIPPED | OUTPATIENT
Start: 2018-08-16 | End: 2018-10-04 | Stop reason: SDUPTHER

## 2018-08-16 RX ORDER — ATORVASTATIN CALCIUM 20 MG/1
20 TABLET, FILM COATED ORAL NIGHTLY
Qty: 90 TABLET | Refills: 1 | Status: SHIPPED | OUTPATIENT
Start: 2018-08-16 | End: 2018-10-04 | Stop reason: SDUPTHER

## 2018-08-16 NOTE — ED TRIAGE NOTES
Patient presents to er via private vehicle from pcp.  Patient reports bilateral leg swelling and pain. Pt has had LBP for about  1 year but has been self managed until episode of severe R sided LBP while getting out of bed on 8/6/18; Pt went to [facility] ER followed by inpatient stay at [facility] for 3 nights, receiving hydrocodone; admitted for colitis. Pt states R sided LBP has remained severe,being basically wheelchair bound.Pt describes R leg pain as intermittant but has constant heaviness of leg.

## 2018-08-16 NOTE — DISCHARGE INSTRUCTIONS
Continue current home medications  Elevate legs when resting  Wear compression stockings  Follow up with pmd in 3-5 days if symptoms not improving  Return to er for fever, chills, chest pain, shortness of air, worsening swelling or any new or worsening symptoms

## 2018-08-16 NOTE — ED PROVIDER NOTES
Pt is a 73 y.o. female who presents to the ED complaining of bilateral leg edema that started 1 year ago, worsening recently. The pt went to see her PCP today who sent her here.    On exam,  Constitutional: Awake and alert.   Cardiovascular: HRRR. No murmur, rubs or gallops. Good pulses  Pulmonary: Lungs Clear  Abdomen: Soft, nontender  Skin: Warm and dry.  Musc: Bilateral leg edema  Labs BNP and imaging CXR and doppler reviewed.     Plan: Elevation and compression      MD ATTESTATION NOTE    The RAJESH and I have discussed this patient's history, physical exam, and treatment plan.  I have reviewed the documentation and personally had a face to face interaction with the patient. I affirm the documentation and agree with the treatment and plan.  The attached note describes my personal findings.      Documentation assistance provided by emily Mahmood for Dr. Avery. Information recorded by the scribe was done at my direction and has been verified and validated by me.             Claudio Mahmood  08/16/18 1947       Claudio Mahmood  08/16/18 1948       Tam Avery MD  08/17/18 0000

## 2018-08-16 NOTE — ED PROVIDER NOTES
EMERGENCY DEPARTMENT ENCOUNTER    CHIEF COMPLAINT  Chief Complaint: Bilateral leg swelling  History given by: patient  History limited by: nothing  Room Number:   PMD: Claudio Anne MD      HPI:  Pt is a 73 y.o. female who presents with c/o bilateral leg swelling x 1 week.  Patient also complains of shortness air and recent hospital stay.  Patient denies chest pain, fever, chills, vomiting or any other symptoms.  Patient states that she was discharged from hospital on 8/10/18 and had actually noticed her legs swelling while here.  Patient states that she went to see her PMD today and was told to come here to rule out blood clot.    Past Medical History of DM, CKD, Chronic Back pain    Duration: constant  Timin week  Location: bilateral lower legs  Radiation: none  Quality: swelling  Intensity/Severity: moderate  Progression: unchanged  Associated Symptoms: shortness of breath  Aggravating Factors: exertion, laying down   Alleviating Factors: none  Previous Episodes: yes  Treatment before arrival: none    PAST MEDICAL HISTORY  Active Ambulatory Problems     Diagnosis Date Noted   • Major depressive disorder with single episode, in full remission (CMS/formerly [facility]) 2001   • Controlled type 2 diabetes mellitus with diabetic neuropathy, with long-term current use of insulin (CMS/formerly [facility])    • Essential hypertension    • Other hyperlipidemia    • Personal history of malignant neoplasm of breast 2001   • Acquired hypothyroidism    • Hypoglycemia due to endogenous hyperinsulinemia 12/10/2015   • Systolic murmur 12/10/2015   • Grief reaction 2016   • Menopause 2016   • Osteopenia 2016   • Encounter for long-term (current) use of insulin (CMS/formerly [facility]) 2016   • Hyperinsulinism 2016   • Exertional dyspnea 2017   • Uncontrolled type 2 diabetes mellitus with diabetic neuropathy, with long-term current use of insulin (CMS/formerly [facility]) 2018   • Uncontrolled type 2 diabetes mellitus with  complication, with long-term current use of insulin (CMS/[facility]) 01/16/2018   • Arthralgia of multiple sites 05/14/2018   • Infectious colitis 08/07/2018   • DDD (degenerative disc disease), lumbar 08/07/2018   • Right leg weakness 08/07/2018   • Other chronic pain 08/14/2018   • Localized edema 08/16/2018     Resolved Ambulatory Problems     Diagnosis Date Noted   • Arthropathy of knee 05/22/2014   • Arthropathy, multiple sites 04/12/2012   • Chronic kidney disease, stage III (moderate) 05/26/2011   • Uncontrolled type 2 diabetes mellitus (CMS/[facility]) 01/03/2013   • History of gynecological procedure 03/01/2010   • Abnormal weight gain 12/10/2015   • Diabetes (CMS/[facility]) 12/10/2015   • Fatigue 12/10/2015   • Type II diabetes mellitus with neurological manifestations, uncontrolled (CMS/[facility]) 08/29/2016   • Uncontrolled type 2 diabetes mellitus with complication, with long-term current use of insulin (CMS/[facility]) 02/14/2017     Past Medical History:   Diagnosis Date   • Arthropathy of knee 05/22/2014   • Arthropathy, multiple sites 04/12/2012   • Bulging lumbar disc    • Cancer (CMS/[facility])    • Chronic kidney disease, stage III (moderate) 05/26/2011   • Controlled diabetes mellitus type II without complication (CMS/[facility])    • Depression 04/02/2001   • Dyspnea    • Essential hypertension    • History of complete eye exam 03/29/2012   • History of gynecological procedure 03/01/2010   • Hyperlipidemia    • Hypothyroidism    • Low back pain    • Peripheral neuropathy    • Personal history of malignant neoplasm of breast 2001   • Thyroid disease    • Type II diabetes mellitus with neurological manifestations, uncontrolled (CMS/[facility]) 01/03/2013       PAST SURGICAL HISTORY  Past Surgical History:   Procedure Laterality Date   • BREAST LUMPECTOMY Right 2001   • OTHER SURGICAL HISTORY Left 01/2015    ankle fracture repair   • VARICOSE VEIN SURGERY         FAMILY HISTORY  Family History   Problem Relation Age of Onset   • Hypertension Sister     • Thyroid disease Daughter    • Cancer Mother    • Heart disease Father        SOCIAL HISTORY  Social History     Social History   • Marital status:      Spouse name: N/A   • Number of children: 4   • Years of education: N/A     Occupational History   • Not on file.     Social History Main Topics   • Smoking status: Never Smoker   • Smokeless tobacco: Never Used   • Alcohol use No   • Drug use: No   • Sexual activity: Defer     Other Topics Concern   • Not on file     Social History Narrative   • No narrative on file         ALLERGIES  Tizanidine    REVIEW OF SYSTEMS  Review of Systems   Constitutional: Negative for chills and fever.   HENT: Negative for sore throat.    Respiratory: Positive for shortness of breath.    Cardiovascular: Positive for leg swelling. Negative for chest pain.   Gastrointestinal: Negative for nausea and vomiting.   Genitourinary: Negative for dysuria.   Musculoskeletal: Negative for back pain.   Skin: Negative for rash.   Neurological: Negative for dizziness.   Psychiatric/Behavioral: The patient is not nervous/anxious.        PHYSICAL EXAM  ED Triage Vitals   Temp Heart Rate Resp BP SpO2   08/16/18 1607 08/16/18 1607 08/16/18 1607 08/16/18 1721 08/16/18 1607   99.3 °F (37.4 °C) 103 18 (!) 156/107 94 %       Physical Exam   Constitutional: She is well-developed, well-nourished, and in no distress.   HENT:   Head: Normocephalic.   Mouth/Throat: Mucous membranes are normal.   Eyes: No scleral icterus.   Neck: Normal range of motion.   Cardiovascular: Normal rate, regular rhythm and normal heart sounds.    Pulses:       Dorsalis pedis pulses are 2+ on the right side, and 2+ on the left side.        Posterior tibial pulses are 2+ on the right side, and 2+ on the left side.   Pulmonary/Chest: Effort normal and breath sounds normal.   Musculoskeletal: Normal range of motion.   Moderate swelling noted to bilateral lower legs.   Neurological: She is alert.   Skin: Skin is warm and dry.    Psychiatric: Mood and affect normal.   Nursing note and vitals reviewed.      LAB RESULTS  Recent Results (from the past 24 hour(s))   Light Blue Top    Collection Time: 08/16/18  5:21 PM   Result Value Ref Range    Extra Tube hold for add-on    Green Top (Gel)    Collection Time: 08/16/18  5:21 PM   Result Value Ref Range    Extra Tube Hold for add-ons.    Lavender Top    Collection Time: 08/16/18  5:21 PM   Result Value Ref Range    Extra Tube hold for add-on    Gold Top - SST    Collection Time: 08/16/18  5:21 PM   Result Value Ref Range    Extra Tube Hold for add-ons.    Comprehensive Metabolic Panel    Collection Time: 08/16/18  5:21 PM   Result Value Ref Range    Glucose 112 (H) 65 - 99 mg/dL    BUN 21 8 - 23 mg/dL    Creatinine 1.00 0.57 - 1.00 mg/dL    Sodium 139 136 - 145 mmol/L    Potassium 4.0 3.5 - 5.2 mmol/L    Chloride 99 98 - 107 mmol/L    CO2 26.1 22.0 - 29.0 mmol/L    Calcium 8.9 8.6 - 10.5 mg/dL    Total Protein 6.5 6.0 - 8.5 g/dL    Albumin 3.80 3.50 - 5.20 g/dL    ALT (SGPT) 22 1 - 33 U/L    AST (SGOT) 27 1 - 32 U/L    Alkaline Phosphatase 65 39 - 117 U/L    Total Bilirubin 0.6 0.1 - 1.2 mg/dL    eGFR Non African Amer 54 (L) >60 mL/min/1.73    Globulin 2.7 gm/dL    A/G Ratio 1.4 g/dL    BUN/Creatinine Ratio 21.0 7.0 - 25.0    Anion Gap 13.9 mmol/L   BNP    Collection Time: 08/16/18  5:21 PM   Result Value Ref Range    proBNP 171.4 0.0-1,800.0 pg/mL   CBC Auto Differential    Collection Time: 08/16/18  5:21 PM   Result Value Ref Range    WBC 8.81 4.50 - 10.70 10*3/mm3    RBC 4.15 3.90 - 5.20 10*6/mm3    Hemoglobin 13.4 11.9 - 15.5 g/dL    Hematocrit 42.6 35.6 - 45.5 %    .7 (H) 80.5 - 98.2 fL    MCH 32.3 (H) 26.9 - 32.0 pg    MCHC 31.5 (L) 32.4 - 36.3 g/dL    RDW 14.9 (H) 11.7 - 13.0 %    RDW-SD 55.7 (H) 37.0 - 54.0 fl    MPV 11.5 6.0 - 12.0 fL    Platelets 256 140 - 500 10*3/mm3    Neutrophil % 71.0 42.7 - 76.0 %    Lymphocyte % 12.8 (L) 19.6 - 45.3 %    Monocyte % 12.4 (H) 5.0 - 12.0 %     Eosinophil % 3.0 0.3 - 6.2 %    Basophil % 0.8 0.0 - 1.5 %    Immature Grans % 0.0 0.0 - 0.5 %    Neutrophils, Absolute 6.26 1.90 - 8.10 10*3/mm3    Lymphocytes, Absolute 1.13 0.90 - 4.80 10*3/mm3    Monocytes, Absolute 1.09 0.20 - 1.20 10*3/mm3    Eosinophils, Absolute 0.26 0.00 - 0.70 10*3/mm3    Basophils, Absolute 0.07 0.00 - 0.20 10*3/mm3    Immature Grans, Absolute 0.00 0.00 - 0.03 10*3/mm3   Duplex Venous Lower Extremity - Bilateral    Collection Time: 08/16/18  7:07 PM   Result Value Ref Range    Right Saphenofemoral Junction Spont 1     Right Common Femoral Spont Y     Right Common Femoral Phasic Y     Right Common Femoral Augment Y     Right Common Femoral Competent Y     Right Common Femoral Compress C     Right Saphenofemoral Junction Spont Y     Right Saphenofemoral Junction Phasic Y     Right Saphenofemoral Junction Augment Y     Right Saphenofemoral Junction Competent N     Right Saphenofemoral Junction Compress C     Right Proximal Femoral Compress C     Right Mid Femoral Spont Y     Right Mid Femoral Phasic Y     Right Mid Femoral Augment Y     Right Mid Femoral Competent Y     Right Mid Femoral Compress C     Right Distal Femoral Compress C     Right Popliteal Spont Y     Right Popliteal Phasic Y     Right Popliteal Augment Y     Right Popliteal Competent Y     Right Popliteal Compress C     Right Posterior Tibial Compress C     Right Peroneal Compress C     Right GastronemiusSoleal Compress C     Right Greater Saph AK Compress C     Right Greater Saph BK Compress C     Right Lesser Saph Compress C     Left Common Femoral Spont Y     Left Common Femoral Phasic Y     Left Common Femoral Augment Y     Left Common Femoral Competent Y     Left Common Femoral Compress C     Left Saphenofemoral Junction Spont Y     Left Saphenofemoral Junction Phasic Y     Left Saphenofemoral Junction Augment Y     Left Saphenofemoral Junction Competent Y     Left Saphenofemoral Junction Compress C     Left Proximal  Femoral Compress C     Left Mid Femoral Spont Y     Left Mid Femoral Phasic Y     Left Mid Femoral Augment Y     Left Mid Femoral Competent Y     Left Mid Femoral Compress C     Left Distal Femoral Compress C     Left Popliteal Spont Y     Left Popliteal Phasic Y     Left Popliteal Augment Y     Left Popliteal Competent Y     Left Popliteal Compress C     Left Posterior Tibial Compress C     Left Peroneal Compress C     Left GastronemiusSoleal Compress C     Left Greater Saph AK Compress C     Left Greater Saph BK Compress C     Left Lesser Saph Compress C        I ordered the above labs and reviewed the results    RADIOLOGY  XR Chest 2 View   FINDINGS: 2 views of the chest demonstrates the heart to be at the upper  limits of normal in size. There is no evidence of focal infiltrate,  effusion or of congestive failure. A moderate-sized hilar hernia is  present.          I ordered the above noted radiological studies and reviewed the images on the PACS system.        MEDICAL RECORD REVIEW    Date of Discharge:  8/10/2018     PCP: Claudio Anne MD     Discharge Diagnosis:         Active Hospital Problems     Diagnosis Date Noted   • Infectious colitis [A09] 08/07/2018   • DDD (degenerative disc disease), lumbar [M51.36] 08/07/2018   • Right leg weakness [R29.898] 08/07/2018   • Essential hypertension [I10]     • Controlled type 2 diabetes mellitus with diabetic neuropathy, with long-term current use of insulin (CMS/[facility]) [E11.40, Z79.4]       Hospital Course  Please see history and physical for details. Patient is a 73 y.o. female initially admitted for complaints of back pain with bloody stool.  CT showed possible aspects for infectious colitis.  GI saw in consultation and recommended a short antibiotic regimen of which the patient will only be treated for 5 days total with Levaquin and Flagyl.  Today I see a different GI physician recommended 7 days treatment but I will keep on the 5 days per the initial  recommendation he has had absolutely no GI issues since being here and stool studies were negative as were blood cultures ×2.  They plan to do a endoscopic evaluation at a later date in an outpatient setting and will have follow-up on 8/28/2018.  Ultimately her main complaint seemed to involve around her back pain.  She came to us only on Ultram as she had pain management referrals pending.  She was initially placed on Norco 5 mg and I have increased to 10 mg.  She has further asked to increase dosing to every 4 hours and I declined.  I will keep her on Norco 10 mg every 6 when necessary pain and dispense 40 which is enough to get her to her pain management appointment on 8/20/2018.  Hearing some of her history, it sounded more concerning for spinal stenosis so I consulted neurosurgery.  Discussed the case with Dr. Roc Galvan and I appreciate his input.  At this juncture, he repeated MRI and he feels nothing is surgical and he recommends patient to keep her follow-up with pain management where he thinks a facet block or ablation will be of benefit but he does not feel that there is any type of infectious etiology in the spine.  She currently lives with herself and her physical therapy evaluation seems pretty poor in regards to how much she is ambulating independently.  I offered to give this patient home health to follow and she has declined.  She is completely alert and oriented ×3 and her justification to me is that she is can have additional friends and family check in her on a routine basis.  Her morbid obesity definitely compounds her ability to maneuver as well as compounds her pain.  At this juncture all questions answered to patient and she very much is amenable to going home today again without home health as further recommended.         PROGRESS AND CONSULTS  1910: Spoke with Bernie in Vascular, Bilateral lower extremity dopplers Negative for DVT.  1945: Reviewed pt's history and workup with Dr. Avery.  At  "bedside evaluation, they agree with the plan of care.    1955:   Reviewed implications of results, diagnosis, meds, responsibility to follow up, warning signs and symptoms of possible worsening, potential complications and reasons to return to ER with patient.  Discussed all results and noted any abnormalities with patient.  Discussed absolute need to recheck abnormalities with PMD    Discussed plan for discharge, as there is no emergent indication for admission.  Pt is agreeable and understands need for follow up and repeat testing.  Pt is aware that discharge does not mean that nothing is wrong but it indicates no emergency is present.  Pt is discharged with instructions to follow up with primary care doctor to have their blood pressure rechecked.       DIAGNOSIS  Final diagnoses:   Peripheral edema   Shortness of breath       FOLLOW UP   Claudio Anne MD  [id number] [id number] [phone]    Schedule an appointment as soon as possible for a visit   As needed          COURSE & MEDICAL DECISION MAKING  Pertinent Labs and Imaging studies that were ordered and reviewed are noted above.  Results were reviewed/discussed with the patient and they were also made aware of online assess.   Pt also made aware that some labs, such as cultures, will not be resulted during ER visit and follow up with PMD is necessary.     MEDICATIONS GIVEN IN ER  Medications   sodium chloride 0.9 % flush 10 mL (not administered)       BP (!) 156/107 (BP Location: Right arm, Patient Position: Sitting)   Pulse 103   Temp 99.3 °F (37.4 °C) (Tympanic)   Resp 18   Ht 154.9 cm (61\")   Wt 103 kg (226 lb)   SpO2 94%   BMI 42.70 kg/m²         Written by NORMA Bernard on 8/16/2018 at 7:45 PM.       Deja Morales, APRN  08/16/18 2206    "

## 2018-08-16 NOTE — PROGRESS NOTES
Chief Complaint   Patient presents with   • Hospital Follow Up   • Leg Swelling       Subjective   Presents to the office with a chief complaint of swelling of legs left greater than right for the past week.  She is also here to follow-up on 2 recent hospital visits.  She was seen on August 6 in the emergency room and was discharged.  The chief complaint on that day was significant back pain with radiculopathy.  She was sent home and then came back the next day with unresolved pain.  She was further admitted and treated.  The details of the hospital stay have been reviewed and are seen below.  The MRIs done in July and early August have been reviewed and are seen below.  There was a synovial cyst inflammation.  She has continued back pain that is causing her to have difficulty with ambulation.  She has also been treated for colitis with antibiotics. That course of antibiotics has been discontinued and she has no further blood in her stool.  She has follow-up with gastroenterology on the 28th of this month.  She remains under the care of pain management and will soon receive back injections to help resolve her pain.  According to the neurosurgeon she is not a surgical candidate at this time for her neurosurgical condition.  Recent labs have been reviewed and show an improvement of her blood sugar and blood count.  While in the hospital she had elevation of her blood sugar.  She is also here today to refill her current medications. 45 minutes spent reviewing the records and consulting with the patient. Pt is here with her daughter in law in the office. Pt is living by herself at home. She requires use of walker for ambulation  Current outpatient and discharge medications have been reconciled for the patient.  Reviewed by: Claudio Anne MD  .  I have reviewed and updated her medications, medical history and problem list during today's office visit.   Hospital Discharge Information:  8/7/2018 - 8/10/2018 (3  days)  [facility]  Date of Discharge:  8/10/2018     PCP: Claudio Anne MD     Discharge Diagnosis:         Active Hospital Problems     Diagnosis Date Noted   • Infectious colitis [A09] 08/07/2018   • DDD (degenerative disc disease), lumbar [M51.36] 08/07/2018   • Right leg weakness [R29.898] 08/07/2018   • Essential hypertension [I10]     • Controlled type 2 diabetes mellitus with diabetic neuropathy, with long-term current use of insulin (CMS/[facility]) [E11.40, Z79.4]     Hospital Course  Please see history and physical for details. Patient is a 73 y.o. female initially admitted for complaints of back pain with bloody stool.  CT showed possible aspects for infectious colitis.  GI saw in consultation and recommended a short antibiotic regimen of which the patient will only be treated for 5 days total with Levaquin and Flagyl.  Today I see a different GI physician recommended 7 days treatment but I will keep on the 5 days per the initial recommendation he has had absolutely no GI issues since being here and stool studies were negative as were blood cultures ×2.  They plan to do a endoscopic evaluation at a later date in an outpatient setting and will have follow-up on 8/28/2018.  Ultimately her main complaint seemed to involve around her back pain.  She came to us only on Ultram as she had pain management referrals pending.  She was initially placed on Norco 5 mg and I have increased to 10 mg.  She has further asked to increase dosing to every 4 hours and I declined.  I will keep her on Norco 10 mg every 6 when necessary pain and dispense 40 which is enough to get her to her pain management appointment on 8/20/2018.  Hearing some of her history, it sounded more concerning for spinal stenosis so I consulted neurosurgery.  Discussed the case with Dr. Roc Galvan and I appreciate his input.  At this juncture, he repeated MRI and he feels nothing is surgical and he recommends patient to keep her follow-up with  "pain management where he thinks a facet block or ablation will be of benefit but he does not feel that there is any type of infectious etiology in the spine.  She currently lives with herself and her physical therapy evaluation seems pretty poor in regards to how much she is ambulating independently.  I offered to give this patient home health to follow and she has declined.  She is completely alert and oriented ×3 and her justification to me is that she is can have additional friends and family check in her on a routine basis.  Her morbid obesity definitely compounds her ability to maneuver as well as compounds her pain.  At this juncture all questions answered to patient and she very much is amenable to going home today again without home health as further recommended.    Patient Care Team:  Claudio Anne MD as PCP - General  Claudio Anne MD as PCP - Family Medicine  Robert Garcia MD as Consulting Physician (Endocrinology)  Shawn Galvan IV, MD as Surgeon (Neurosurgery)    Social History   Substance Use Topics   • Smoking status: Never Smoker   • Smokeless tobacco: Never Used   • Alcohol use No       Review of Systems   Constitutional: Positive for fatigue.   Respiratory: Positive for shortness of breath (preceded the swelling).    Cardiovascular: Positive for leg swelling.   Gastrointestinal: Negative for blood in stool.   Musculoskeletal: Positive for back pain.   Neurological: Positive for weakness.   All other systems reviewed and are negative.      Objective     /68   Pulse 88   Temp 97.5 °F (36.4 °C) (Oral)   Resp 16   Ht 154.9 cm (60.98\")   Wt 103 kg (226 lb)   BMI 42.73 kg/m²     Body mass index is 42.73 kg/m².    Physical Exam   Constitutional: She is oriented to person, place, and time. She appears well-developed. No distress.   Eyes: Conjunctivae and lids are normal.   Neck: Carotid bruit is not present.   Cardiovascular: Normal rate, regular rhythm and normal heart sounds. "    Leg swelling left greater than right   Pulmonary/Chest: Effort normal and breath sounds normal.   Neurological: She is alert and oriented to person, place, and time.   Skin: Skin is warm and dry.   Psychiatric: She has a normal mood and affect. Her behavior is normal.   Vitals reviewed.       Data Reviewed:    Mri Lumbar Spine With & Without Contrast    Result Date: 8/9/2018  Impression:  When compared to the prior study, the previously noted synovial cyst arising from the left L5-S1 facet joint projecting posteriorly into the paravertebral musculature contains more fluid signal suggesting that it may be acutely inflamed. Otherwise, there is no other significant interval change when compared to the previous exam. Again noted is grade 1 degenerative anterior spondylolisthesis of L4 on L5 and L5 on S1 by a few millimeters.  Facet hypertrophic changes as well as changes of facet arthritis are seen within the lower lumbar spine.  Relatively mild degrees of canal and foraminal narrowing and the remaining degenerative phenomena are as discussed in detail above.  This report was finalized on 8/9/2018 1:04 PM by Dr. Feng Mclaughlin M.D.      Mri Lumbar Spine With & Without Contrast    Result Date: 7/25/2018  Impression:  1. Mild lumbar spondylosis as described. No lumbar disc herniation. No significant lumbar canal or foraminal narrowing is seen and there is no evidence of metastatic disease in the lumbar spine.  2. At L2-L3, there is minimal facet overgrowth, 1 mm retrolisthesis of L2 on L3, but no canal or foraminal narrowing. At L3-L4, there is mild-to-moderate bilateral facet overgrowth, minimal diffuse posterior disc bulge. There is no canal or left foraminal narrowing, only mild right foraminal narrowing.  3. At L4-L5, there is mild right and moderate left facet overgrowth, fluid in left facet joint and 3 mm degenerative anterolisthesis of L4 on L5 and only minimal if any canal and foraminal narrowing. At L5-S1,  there is moderate bilateral facet overgrowth, 3 mm degenerative anterolisthesis of L5 on S1, but no canal or foraminal narrowing.  This report was finalized on 7/25/2018 2:14 PM by Dr. Mati Chavez M.D.            Assessment/Plan     Problem List Items Addressed This Visit     Essential hypertension (Chronic)    Relevant Medications    amLODIPine (NORVASC) 10 MG tablet    hydrochlorothiazide (HYDRODIURIL) 25 MG tablet    irbesartan (AVAPRO) 300 MG tablet    Major depressive disorder with single episode, in full remission (CMS/HCC)    Relevant Medications    PARoxetine (PAXIL) 10 MG tablet    Other hyperlipidemia    Relevant Medications    atorvastatin (LIPITOR) 20 MG tablet    Acquired hypothyroidism    Relevant Medications    levothyroxine (SYNTHROID, LEVOTHROID) 75 MCG tablet    Localized edema      Other Visit Diagnoses     Hospital discharge follow-up    -  Primary          No orders of the defined types were placed in this encounter.        Current Outpatient Prescriptions:   •  acetaminophen (TYLENOL) 325 MG tablet, Take 2 tablets by mouth Every 4 (Four) Hours As Needed for Mild Pain ., Disp: , Rfl:   •  amLODIPine (NORVASC) 10 MG tablet, Take 1 tablet by mouth Daily for 90 days., Disp: 90 tablet, Rfl: 0  •  aspirin 81 MG EC tablet, Take 81 mg by mouth daily. Take 1 tablet by oral route once daily, Disp: , Rfl:   •  atorvastatin (LIPITOR) 20 MG tablet, Take 1 tablet by mouth Every Night for 90 days., Disp: 90 tablet, Rfl: 1  •  Black Pepper-Turmeric (TURMERIC COMPLEX/BLACK PEPPER) 3-500 MG capsule, Take 1 capsule by mouth 2 (Two) Times a Day., Disp: , Rfl:   •  hydrochlorothiazide (HYDRODIURIL) 25 MG tablet, Take 1 tablet by mouth Daily for 90 days., Disp: 90 tablet, Rfl: 0  •  HYDROcodone-acetaminophen (NORCO)  MG per tablet, Take 1 tablet by mouth Every 6 (Six) Hours As Needed for Moderate Pain  (DNF until 8-20-18)., Disp: 40 tablet, Rfl: 0  •  Insulin Lispro (HUMALOG KWIKPEN) 200 UNIT/ML solution  pen-injector, Inject 12 Units under the skin 3 (Three) Times a Day Before Meals. (Patient taking differently: Inject 12 Units under the skin into the appropriate area as directed Daily With Breakfast & Lunch.), Disp: 15 mL, Rfl: 4  •  insulin NPH (humuLIN N,novoLIN N) 100 UNIT/ML injection, Inject 20 Units under the skin into the appropriate area as directed 2 (Two) Times a Day. Before Breakfast and at Bedtime, Disp: , Rfl:   •  irbesartan (AVAPRO) 300 MG tablet, Take 1 tablet by mouth Daily for 90 days., Disp: 90 tablet, Rfl: 0  •  levothyroxine (SYNTHROID, LEVOTHROID) 75 MCG tablet, Take 1 tablet by mouth Daily for 90 days., Disp: 90 tablet, Rfl: 0  •  PARoxetine (PAXIL) 10 MG tablet, Take 1 tablet by mouth Daily for 90 days., Disp: 90 tablet, Rfl: 0    No Follow-up on file.

## 2018-08-17 LAB
BH CV LOW VAS RIGHT SAPHENOFEMORAL JUNCTION SPONT: 1
BH CV LOWER VASCULAR LEFT COMMON FEMORAL AUGMENT: NORMAL
BH CV LOWER VASCULAR LEFT COMMON FEMORAL COMPETENT: NORMAL
BH CV LOWER VASCULAR LEFT COMMON FEMORAL COMPRESS: NORMAL
BH CV LOWER VASCULAR LEFT COMMON FEMORAL PHASIC: NORMAL
BH CV LOWER VASCULAR LEFT COMMON FEMORAL SPONT: NORMAL
BH CV LOWER VASCULAR LEFT DISTAL FEMORAL COMPRESS: NORMAL
BH CV LOWER VASCULAR LEFT GASTRONEMIUS COMPRESS: NORMAL
BH CV LOWER VASCULAR LEFT GREATER SAPH AK COMPRESS: NORMAL
BH CV LOWER VASCULAR LEFT GREATER SAPH BK COMPRESS: NORMAL
BH CV LOWER VASCULAR LEFT LESSER SAPH COMPRESS: NORMAL
BH CV LOWER VASCULAR LEFT MID FEMORAL AUGMENT: NORMAL
BH CV LOWER VASCULAR LEFT MID FEMORAL COMPETENT: NORMAL
BH CV LOWER VASCULAR LEFT MID FEMORAL COMPRESS: NORMAL
BH CV LOWER VASCULAR LEFT MID FEMORAL PHASIC: NORMAL
BH CV LOWER VASCULAR LEFT MID FEMORAL SPONT: NORMAL
BH CV LOWER VASCULAR LEFT PERONEAL COMPRESS: NORMAL
BH CV LOWER VASCULAR LEFT POPLITEAL AUGMENT: NORMAL
BH CV LOWER VASCULAR LEFT POPLITEAL COMPETENT: NORMAL
BH CV LOWER VASCULAR LEFT POPLITEAL COMPRESS: NORMAL
BH CV LOWER VASCULAR LEFT POPLITEAL PHASIC: NORMAL
BH CV LOWER VASCULAR LEFT POPLITEAL SPONT: NORMAL
BH CV LOWER VASCULAR LEFT POSTERIOR TIBIAL COMPRESS: NORMAL
BH CV LOWER VASCULAR LEFT PROXIMAL FEMORAL COMPRESS: NORMAL
BH CV LOWER VASCULAR LEFT SAPHENOFEMORAL JUNCTION AUGMENT: NORMAL
BH CV LOWER VASCULAR LEFT SAPHENOFEMORAL JUNCTION COMPETENT: NORMAL
BH CV LOWER VASCULAR LEFT SAPHENOFEMORAL JUNCTION COMPRESS: NORMAL
BH CV LOWER VASCULAR LEFT SAPHENOFEMORAL JUNCTION PHASIC: NORMAL
BH CV LOWER VASCULAR LEFT SAPHENOFEMORAL JUNCTION SPONT: NORMAL
BH CV LOWER VASCULAR RIGHT COMMON FEMORAL AUGMENT: NORMAL
BH CV LOWER VASCULAR RIGHT COMMON FEMORAL COMPETENT: NORMAL
BH CV LOWER VASCULAR RIGHT COMMON FEMORAL COMPRESS: NORMAL
BH CV LOWER VASCULAR RIGHT COMMON FEMORAL PHASIC: NORMAL
BH CV LOWER VASCULAR RIGHT COMMON FEMORAL SPONT: NORMAL
BH CV LOWER VASCULAR RIGHT DISTAL FEMORAL COMPRESS: NORMAL
BH CV LOWER VASCULAR RIGHT GASTRONEMIUS COMPRESS: NORMAL
BH CV LOWER VASCULAR RIGHT GREATER SAPH AK COMPRESS: NORMAL
BH CV LOWER VASCULAR RIGHT GREATER SAPH BK COMPRESS: NORMAL
BH CV LOWER VASCULAR RIGHT LESSER SAPH COMPRESS: NORMAL
BH CV LOWER VASCULAR RIGHT MID FEMORAL AUGMENT: NORMAL
BH CV LOWER VASCULAR RIGHT MID FEMORAL COMPETENT: NORMAL
BH CV LOWER VASCULAR RIGHT MID FEMORAL COMPRESS: NORMAL
BH CV LOWER VASCULAR RIGHT MID FEMORAL PHASIC: NORMAL
BH CV LOWER VASCULAR RIGHT MID FEMORAL SPONT: NORMAL
BH CV LOWER VASCULAR RIGHT PERONEAL COMPRESS: NORMAL
BH CV LOWER VASCULAR RIGHT POPLITEAL AUGMENT: NORMAL
BH CV LOWER VASCULAR RIGHT POPLITEAL COMPETENT: NORMAL
BH CV LOWER VASCULAR RIGHT POPLITEAL COMPRESS: NORMAL
BH CV LOWER VASCULAR RIGHT POPLITEAL PHASIC: NORMAL
BH CV LOWER VASCULAR RIGHT POPLITEAL SPONT: NORMAL
BH CV LOWER VASCULAR RIGHT POSTERIOR TIBIAL COMPRESS: NORMAL
BH CV LOWER VASCULAR RIGHT PROXIMAL FEMORAL COMPRESS: NORMAL
BH CV LOWER VASCULAR RIGHT SAPHENOFEMORAL JUNCTION AUGMENT: NORMAL
BH CV LOWER VASCULAR RIGHT SAPHENOFEMORAL JUNCTION COMPETENT: NORMAL
BH CV LOWER VASCULAR RIGHT SAPHENOFEMORAL JUNCTION COMPRESS: NORMAL
BH CV LOWER VASCULAR RIGHT SAPHENOFEMORAL JUNCTION PHASIC: NORMAL
BH CV LOWER VASCULAR RIGHT SAPHENOFEMORAL JUNCTION SPONT: NORMAL

## 2018-08-22 ENCOUNTER — DOCUMENTATION (OUTPATIENT)
Dept: PAIN MEDICINE | Facility: CLINIC | Age: 73
End: 2018-08-22

## 2018-08-22 ENCOUNTER — OUTSIDE FACILITY SERVICE (OUTPATIENT)
Dept: PAIN MEDICINE | Facility: CLINIC | Age: 73
End: 2018-08-22

## 2018-08-22 PROCEDURE — 27096 INJECT SACROILIAC JOINT: CPT | Performed by: PAIN MEDICINE

## 2018-08-24 ENCOUNTER — READMISSION MANAGEMENT (OUTPATIENT)
Dept: CALL CENTER | Facility: HOSPITAL | Age: 73
End: 2018-08-24

## 2018-08-24 NOTE — OUTREACH NOTE
Medical Week 2 Survey      Responses   Facility patient discharged from?  [place]   Does the patient have one of the following disease processes/diagnoses(primary or secondary)?  Other   Week 2 attempt successful?  Yes   Call start time  1726   Discharge diagnosis  Infectious colitis ,  DDD (degenerative disc disease), lumbar ,     Call end time  1730   Meds reviewed with patient/caregiver?  Yes   Is the patient having any side effects they believe may be caused by any medication additions or changes?  No   Does the patient have all medications ordered at discharge?  Yes   Is the patient taking all medications as directed (includes completed medication regime)?  Yes   Does the patient have a primary care provider?   Yes   Does the patient have an appointment with their PCP within 7 days of discharge?  Greater than 7 days   Comments regarding PCP  has seen PCP, Dr Anne   What is preventing the patient from scheduling follow up appointments within 7 days of discharge?  Earlier appointment not available   Nursing Interventions  Verified appointment date/time/provider   Has the patient kept scheduled appointments due by today?  Yes   Comments  PCP appt 8/20/18, GI appt 8/28/18.    Comments  has had 1st injection from pain management for back pain on 8/22, and has not had relief yet, but was told it could take up to 3-4 days, if no results by then, will have 1 more additional shot   Did the patient receive a copy of their discharge instructions?  Yes   Nursing interventions  Reviewed instructions with patient   What is the patient's perception of their health status since discharge?  Improving   Is the patient/caregiver able to teach back signs and symptoms related to disease process for when to call PCP?  Yes   Is the patient/caregiver able to teach back signs and symptoms related to disease process for when to call 911?  Yes   Is the patient/caregiver able to teach back the hierarchy of who to call/visit for  symptoms/problems? PCP, Specialist, Home health nurse, Urgent Care, ED, 911  Yes   Week 2 Call Completed?  Yes          Birgit Boo RN

## 2018-08-30 ENCOUNTER — OFFICE VISIT (OUTPATIENT)
Dept: PAIN MEDICINE | Facility: CLINIC | Age: 73
End: 2018-08-30

## 2018-08-30 ENCOUNTER — HOSPITAL ENCOUNTER (OUTPATIENT)
Dept: GENERAL RADIOLOGY | Facility: HOSPITAL | Age: 73
Discharge: HOME | End: 2018-08-30
Admitting: NURSE PRACTITIONER

## 2018-08-30 VITALS
SYSTOLIC BLOOD PRESSURE: 137 MMHG | BODY MASS INDEX: 42.67 KG/M2 | TEMPERATURE: 98 F | HEART RATE: 79 BPM | WEIGHT: 226 LBS | OXYGEN SATURATION: 95 % | HEIGHT: 61 IN | RESPIRATION RATE: 16 BRPM | DIASTOLIC BLOOD PRESSURE: 76 MMHG

## 2018-08-30 DIAGNOSIS — M25.551 RIGHT HIP PAIN: ICD-10-CM

## 2018-08-30 DIAGNOSIS — M51.36 DDD (DEGENERATIVE DISC DISEASE), LUMBAR: ICD-10-CM

## 2018-08-30 DIAGNOSIS — G89.29 OTHER CHRONIC PAIN: Primary | ICD-10-CM

## 2018-08-30 DIAGNOSIS — M47.816 LUMBAR FACET ARTHROPATHY: ICD-10-CM

## 2018-08-30 PROCEDURE — 99214 OFFICE O/P EST MOD 30 MIN: CPT | Performed by: NURSE PRACTITIONER

## 2018-08-30 PROCEDURE — 73502 X-RAY EXAM HIP UNI 2-3 VIEWS: CPT

## 2018-08-30 RX ORDER — HYDROCODONE BITARTRATE AND ACETAMINOPHEN 10; 325 MG/1; MG/1
1 TABLET ORAL EVERY 6 HOURS PRN
Qty: 60 TABLET | Refills: 0 | Status: SHIPPED | OUTPATIENT
Start: 2018-08-30 | End: 2018-10-17 | Stop reason: ALTCHOICE

## 2018-08-30 NOTE — PROGRESS NOTES
"CHIEF COMPLAINT  Pt continues with significant R sidedLBP and R leg weakness,no change since the 8/21/18 R S. I. Injection.    Subjective   Iraida Elizabeth is a 73 y.o. female  who presents to the office for follow-up of procedure.  She completed a right SI joint injection   on  8/21/2018 performed by Dr. Casarez for management of right back pain, SI joint pain. Patient reports NO relief from the procedure.     C/o right sided lumbosacral area pain that radiates into the groin with any movement. Feels like something is \"broken\" and \"catching\".  Walking and standing are completely intolerable.  It is the sharp pain in the groin that is her primary complaint.  This pain has been going on at this severe of a degree for about a month (has had back pain for the past year, but nothing like this).  She does not recall any trauma or incident.      Saw Dr. Galvan (neurosurgery) while in hospital.  Recommended right sided facet block.      Has been taking Hydrocodone 10/325 4/day, has not been supplementing with Tylenol (worried about total acetaminophen intake).  Tolerating the medication ok, it does make her tired. No constipation. Provides moderate relief.      History of Present Illness         PEG Assessment   What number best describes your pain on average in the past week?7  What number best describes how, during the past week, pain has interfered with your enjoyment of life?8  What number best describes how, during the past week, pain has interfered with your general activity?  7    The following portions of the patient's history were reviewed and updated as appropriate: allergies, current medications, past family history, past medical history, past social history, past surgical history and problem list.    Review of Systems   Constitutional: Positive for activity change (very limited). Negative for appetite change.   HENT: Positive for hearing loss. Negative for trouble swallowing.    Respiratory: Positive for shortness " "of breath (mild). Negative for apnea.    Gastrointestinal: Negative for constipation, diarrhea (colitis) and nausea.   Genitourinary: Negative for difficulty urinating and dysuria.   Musculoskeletal: Positive for back pain.   Neurological: Positive for weakness (R leg) and numbness (R leg). Negative for dizziness, seizures and light-headedness.   Psychiatric/Behavioral: Positive for sleep disturbance. Negative for suicidal ideas. The patient is nervous/anxious.      Vitals:    08/30/18 1234   BP: 137/76   Pulse: 79   Resp: 16   Temp: 98 °F (36.7 °C)   SpO2: 95%   Weight: 103 kg (226 lb)   Height: 154.9 cm (60.98\")   PainSc: 7  Comment: R sided LBP ranges from 7-9/10   PainLoc: Back     Objective   Physical Exam   Constitutional: She is oriented to person, place, and time. She appears well-developed and well-nourished.   HENT:   Head: Normocephalic and atraumatic.   Eyes: Pupils are equal, round, and reactive to light. Conjunctivae and EOM are normal.   Neck: Neck supple.   Cardiovascular: Normal rate.    Pulmonary/Chest: Effort normal. No respiratory distress.   Musculoskeletal:        Right hip: She exhibits decreased range of motion (pain with internal and external rotation) and tenderness.        Lumbar back: She exhibits tenderness, bony tenderness and pain.   +RIGHT SIDED LUMBAR FACET TENDERNESS/LOADING     Neurological: She is alert and oriented to person, place, and time. She has normal strength. No sensory deficit. Gait (wheelchair) abnormal.   Skin: Skin is warm and dry.   Psychiatric: She has a normal mood and affect. Her behavior is normal.   Nursing note and vitals reviewed.      Assessment/Plan   Iraida was seen today for back pain.    Diagnoses and all orders for this visit:    Other chronic pain    DDD (degenerative disc disease), lumbar    Right hip pain  -     XR hip w or wo pelvis 2-3 view right; Future    Lumbar facet arthropathy  -     Case Request    Other orders  -     HYDROcodone-acetaminophen " "(NORCO)  MG per tablet; Take 1 tablet by mouth Every 6 (Six) Hours As Needed for Moderate Pain .      --- Right L2-L5 MBB  -------  Education about Medial Branch Blockade and RF Therapy:    This medial branch blockade (MBB) suggested is intended for diagnostic purposes, with the intent of offering the patient Radiofrequency thermal rhizotomy (RF) if the MBB is diagnostically effective.  The diagnostic blockade is necessary to determine the likelihood that RF therapy could be efficacious in providing long term relief to the patient.    Medial branches are sensory nerve branches that connect to a facet joint and transmit sensations & pain signals from that joint.  Facet is a term for the type of joints found in the spine.  Medial branches are the nerves that go to a facet, and therefore are also sometimes called \"facet joint nerves\" (FJNs).      In a medial branch blockade procedure, xray fluoroscopy is used to verify the locations of the outside of the joint lines which are being targeted.  Under xray guidance, needles are placed to these areas.  Contrast dye is injected to confirm proper placement, with dye flowing over the joint area, and to ensure that the dye does not flow into unintended areas such as a vein.  When this is confirmed, local anesthetic is injected to block the medial branch at that joint level.      If MBBs are diagnostically successful in blocking pain, then the patient is most likely a great candidate for Radiofrequency of those facet joint nerves.  In the RF procedure, needles are placed to the joint lines in the same fashion, and after testing, the needle tips are heated to thermally treat the nerves, blocking the nerves by in essence damaging the nerves with the heat treatment.       Medically, a successful RF procedure should provide a patient with 50% pain relief or more for at least 6 months.  Clinical experience suggests that successful patients receive relief more in the range of " 12 months on average.  We also discussed that a fortunate minority of patients receive therapeutic success from the MBB, and may not require RF ablation.  If a patient receives more than 8 weeks of relief from MBB, then occasional repeat MBB for therapeutic purposes is a very reasonable alternative therapy.  This course of therapy is consistent with our LCDs according to our CMS  in the area, and therefore other insurance providers should follow accordingly.  We will monitor our patients to screen for these therapeutic responders and will offer RF therapy only when necessary.      We discussed that MBB & RF are not without risks.  Guidelines regarding anticoagulant use & neuraxial procedures will be respected.  Patients that are ill or otherwise may be at risk for sepsis will not have their spines accessed by neuraxial injections of any type.  This patient will not be offered these therapies if there is an increased risk.   We discussed that there is a risk of postprocedural pain and also a risk of worsening of clinical picture with these procedures as with any neuraxial procedure.    -------    --- Xray right hip  --- Refill Hydrocodone. Patient appears stable with current regimen. No adverse effects. Regarding continuation of opioids, there is no evidence of aberrant behavior or any red flags.  The patient continues with appropriate response to opioid therapy. ADL's remain intact by self.   --- PRESCRIBING AGREEMENT NEXT VISIT IF CONTINUING PAIN MEDICAIOTN  --- The urine drug screen confirmation from 8/14/18 has been reviewed and the result is appropriate based on patient history and DONNY report  --- Follow-up after imaging/procedure          DONNY REPORT    As part of the patient's treatment plan, I am prescribing controlled substances. The patient has been made aware of appropriate use of such medications, including potential risk of somnolence, limited ability to drive and/or work safely, and  the potential for dependence or overdose. It has also bee made clear that these medications are for use by this patient only, without concomitant use of alcohol or other substances unless prescribed.     Patient has completed prescribing agreement detailing terms of continued prescribing of controlled substances, including monitoring DONNY reports, urine drug screening, and pill counts if necessary. The patient is aware that inappropriate use will results in cessation of prescribing such medications.    DONNY report has been reviewed and scanned into the patient's chart.    As the clinician, I personally reviewed the DONNY from 8/29/2018 while the patient was in the office today.    History and physical exam exhibit continued safe and appropriate use of controlled substances.     EMR Dragon/Transcription disclaimer:   Much of this encounter note is an electronic transcription/translation of spoken language to printed text. The electronic translation of spoken language may permit erroneous, or at times, nonsensical words or phrases to be inadvertently transcribed; Although I have reviewed the note for such errors, some may still exist.        INITIAL VISIT WITH ART VALDEZ

## 2018-09-04 ENCOUNTER — TELEPHONE (OUTPATIENT)
Dept: PAIN MEDICINE | Facility: CLINIC | Age: 73
End: 2018-09-04

## 2018-09-05 ENCOUNTER — TELEPHONE (OUTPATIENT)
Dept: PAIN MEDICINE | Facility: CLINIC | Age: 73
End: 2018-09-05

## 2018-09-05 ENCOUNTER — READMISSION MANAGEMENT (OUTPATIENT)
Dept: CALL CENTER | Facility: HOSPITAL | Age: 73
End: 2018-09-05

## 2018-09-05 NOTE — OUTREACH NOTE
Medical Week 3 Survey      Responses   Facility patient discharged from?  [place]   Does the patient have one of the following disease processes/diagnoses(primary or secondary)?  Other   Week 3 attempt successful?  Yes   Call start time  0917   Call end time  0921   Discharge diagnosis  Infectious colitis ,  DDD (degenerative disc disease), lumbar ,     Meds reviewed with patient/caregiver?  Yes   Is the patient taking all medications as directed (includes completed medication regime)?  Yes   Has the patient kept scheduled appointments due by today?  Yes   What is the patient's perception of their health status since discharge?  Improving   Additional teach back comments  Pain is improved now that she has begun treatment at pain management. Still has it daily but less severe.    Week 3 Call Completed?  Yes          Kacey Cedillo RN

## 2018-09-05 NOTE — TELEPHONE ENCOUNTER
Shows nothing acute. Some arthritis.  She can proceed with right sided lumbar MBB as Dr. Galvan recommended. May need to consider right intra-articular hip injection in the future.

## 2018-09-19 ENCOUNTER — OUTSIDE FACILITY SERVICE (OUTPATIENT)
Dept: PAIN MEDICINE | Facility: CLINIC | Age: 73
End: 2018-09-19

## 2018-09-19 ENCOUNTER — DOCUMENTATION (OUTPATIENT)
Dept: PAIN MEDICINE | Facility: CLINIC | Age: 73
End: 2018-09-19

## 2018-09-19 PROCEDURE — 64494 INJ PARAVERT F JNT L/S 2 LEV: CPT | Performed by: PAIN MEDICINE

## 2018-09-19 PROCEDURE — 64493 INJ PARAVERT F JNT L/S 1 LEV: CPT | Performed by: PAIN MEDICINE

## 2018-09-19 PROCEDURE — 64495 INJ PARAVERT F JNT L/S 3 LEV: CPT | Performed by: PAIN MEDICINE

## 2018-09-19 NOTE — PROGRESS NOTES
RIGHT L2-5 Lumbar Medial Branch Blockade  [facility]    PREOPERATIVE DIAGNOSIS:  Lumbar spondylosis without myelopathy    POSTOPERATIVE DIAGNOSIS:  Lumbar spondylosis without myelopathy    PROCEDURE:   Diagnostic Right Lumbar Medial Branch Nerve Blockades, with fluoroscopy:  L2, L3, L4, and L5 nerves (at the L3, L4, L5 transverse processes and the sacral alar groove) to block facet joints L3-4, L4-5, and L5-S1  1. 70645 -- Lumbar Facet block, 1st Level  2. 55703 -- Lumbar Facet block, 2nd  Level  3. 60893 -- Lumbar Facet block, 3rd Level    PRE-PROCEDURE DISCUSSION WITH PATIENT:    Risks and complications were discussed with the patient prior to starting the procedure and informed consent was obtained.      SURGEON:   Eve Casarez MD    REASON FOR PROCEDURE:    The patient complains of pain that seems to have a significant axial component, Increased back pain on range of motion exams, Pain on extension of the lumbar spine and Positive lumbar facet loading maneuver    SEDATION:  Versed 2mg IV  ANESTHETIC:  Marcaine 0.25%  STEROID:  Methylprednisolone (DEPO MEDROL) 40mg/ml  TOTAL VOLUME OF SOLUTION:  4 mL    DESCRIPTON OF PROCEDURE:  After obtaining informed consent, IV access was obtained in the preoperative area.   The patient was taken to the operating room.  The patient was placed in the prone position with a pillow under the abdomen. All pressure points were well padded.  EKG, blood pressure, and pulse oximeter were monitored.  The patient was monitored and sedated by the RN under my direction. The lumbosacral area was prepped with Chloraprep and draped in a sterile fashion. Under fluoroscopic guidance the transverse processes of the L3, L4, and L5 vertebrae at the junctions of the superior articular processes were identified on the affected side.  Also identified was the groove between the ala and the superior articular process of the sacrum on the ipsilateral side.  Skin and  subcutaneous tissue were anesthetized with 1% lidocaine above each of these points. A spinal needle was introduced under fluoroscopic guidance at the above junctions. Aspiration was negative for blood and CSF.  After confirming the position of the needle with fluoroscope in all views, 1 mL of the anesthetic solution noted above was injected at each of these points.  Needles were removed intact from each of the areas.   Onset of analgesia was noted.  Vital signs remained stable throughout.      ESTIMATED BLOOD LOSS:  <5 mL  SPECIMENS:  none    COMPLICATIONS:   No complications were noted.    TOLERANCE & DISCHARGE CONDITION:    The patient tolerated the procedure well.  The patient was transported to the recovery area without difficulties.  The patient was discharged to home under the care of family in stable and satisfactory condition.    PLAN OF CARE:  1. The patient was given our standard instruction sheet.  2. We discussed that Lumbar Medial Branch Blockade is a diagnostic procedure in consideration for radiofrequency ablation if two diagnostic procedures prove to be positive for significant benefit.  If sustained relief of 6 to eight weeks is obtained, then an alternative plan could be therapeutic lumbar branch blockades.  3. The patient is asked to keep a pain log each hour for 8 hours after the procedure today.  4. The patient will  Return to clinic 4-6 wks  5. The patient will resume all medications as per the medication reconciliation sheet.

## 2018-09-21 ENCOUNTER — TELEPHONE (OUTPATIENT)
Dept: PAIN MEDICINE | Facility: CLINIC | Age: 73
End: 2018-09-21

## 2018-09-21 DIAGNOSIS — M54.50 CHRONIC BILATERAL LOW BACK PAIN WITHOUT SCIATICA: ICD-10-CM

## 2018-09-21 DIAGNOSIS — R29.898 RIGHT LEG WEAKNESS: Primary | ICD-10-CM

## 2018-09-21 DIAGNOSIS — G89.29 CHRONIC BILATERAL LOW BACK PAIN WITHOUT SCIATICA: ICD-10-CM

## 2018-09-21 DIAGNOSIS — M51.36 DDD (DEGENERATIVE DISC DISEASE), LUMBAR: ICD-10-CM

## 2018-09-21 NOTE — TELEPHONE ENCOUNTER
"I am glad her pain has improved.   - as for her leg weakness, the injection will likely NOT help with this. This is something I recommend she see PT for and work on conditioning and strength building. With the pain improved she should be able to tolerate PT better.   - for her feeling \"wired\" and insomnia, this is common after steroid injection- likely side effect from the large steroid dose. It should only last a couple days so should be resolved over next day or two. "

## 2018-09-21 NOTE — TELEPHONE ENCOUNTER
"Pt had an injection on Wednesday. States it has helped her pain, but she still has weakness in her legs. She is wondering if this was supposed to help that and why her legs are still so weak. It has helped the pain tho.   She also c/o being very \"wired\" and not being able to sleep. I recommend possibly trying melatonin. She has tried other OTC sleep medicines in the past which havent helped. She wanted to know how long this might last but I did not have an answer for her since this is not a common complaint I hear. What do you advise?  "

## 2018-09-24 ENCOUNTER — OFFICE VISIT (OUTPATIENT)
Dept: FAMILY MEDICINE CLINIC | Facility: CLINIC | Age: 73
End: 2018-09-24

## 2018-09-24 VITALS
BODY MASS INDEX: 41.16 KG/M2 | TEMPERATURE: 97.9 F | HEIGHT: 61 IN | HEART RATE: 79 BPM | WEIGHT: 218 LBS | SYSTOLIC BLOOD PRESSURE: 152 MMHG | DIASTOLIC BLOOD PRESSURE: 77 MMHG | RESPIRATION RATE: 16 BRPM

## 2018-09-24 DIAGNOSIS — F32.1 MODERATE SINGLE CURRENT EPISODE OF MAJOR DEPRESSIVE DISORDER (HCC): ICD-10-CM

## 2018-09-24 DIAGNOSIS — F32.5 MAJOR DEPRESSIVE DISORDER WITH SINGLE EPISODE, IN FULL REMISSION (HCC): ICD-10-CM

## 2018-09-24 DIAGNOSIS — G47.01 INSOMNIA DUE TO MEDICAL CONDITION: Primary | ICD-10-CM

## 2018-09-24 PROCEDURE — 99214 OFFICE O/P EST MOD 30 MIN: CPT | Performed by: FAMILY MEDICINE

## 2018-09-24 RX ORDER — PAROXETINE HYDROCHLORIDE 20 MG/1
20 TABLET, FILM COATED ORAL NIGHTLY
Qty: 90 TABLET | Refills: 0 | Status: SHIPPED | OUTPATIENT
Start: 2018-09-24 | End: 2018-10-04 | Stop reason: SDUPTHER

## 2018-09-24 RX ORDER — SYRINGE AND NEEDLE,INSULIN,1ML 31 GX5/16"
SYRINGE, EMPTY DISPOSABLE MISCELLANEOUS
COMMUNITY
Start: 2018-08-27 | End: 2018-12-19

## 2018-09-24 NOTE — ASSESSMENT & PLAN NOTE
Medication changes per order.  She'll reminded to avoid caffeine.  She was also reminded to avoid eating after 8:00 at night.  I also encouraged her to avoid stimulating television within an hour or 2 bedtime.

## 2018-09-24 NOTE — PROGRESS NOTES
Chief Complaint   Patient presents with   • Insomnia   • Anxiety       Subjective   This patient presents the office with a chief complaint of worsening control of her depression with new onset of anxiousness.  She also has not been sleeping well since her back pain for the past 2 months.  Over-the-counter medicines have been ineffective.  Her current dose of paroxetine is not fully effective.  I have reviewed and updated her medications, medical history and problem list during today's office visit.   PHQ-9 Depression Screening  Little interest or pleasure in doing things? 3   Feeling down, depressed, or hopeless? 1   Trouble falling or staying asleep, or sleeping too much? 3   Feeling tired or having little energy? 3   Poor appetite or overeating? 0   Feeling bad about yourself - or that you are a failure or have let yourself or your family down? 3   Trouble concentrating on things, such as reading the newspaper or watching television? 1   Moving or speaking so slowly that other people could have noticed? Or the opposite - being so fidgety or restless that you have been moving around a lot more than usual? 1   Thoughts that you would be better off dead, or of hurting yourself in some way? 0   PHQ-9 Total Score 15   If you checked off any problems, how difficult have these problems made it for you to do your work, take care of things at home, or get along with other people? Very difficult     Patient Care Team:  Claudio Anne MD as PCP - General  Claudio Anne MD as PCP - Family Medicine  Robert Garcia MD as Consulting Physician (Endocrinology)  Shawn Galvan IV, MD as Surgeon (Neurosurgery)    Social History   Substance Use Topics   • Smoking status: Never Smoker   • Smokeless tobacco: Never Used   • Alcohol use No       Review of Systems   Constitutional: Positive for fatigue.   Psychiatric/Behavioral: Positive for dysphoric mood. Negative for suicidal ideas. The patient is nervous/anxious.   "      Objective     /77   Pulse 79   Temp 97.9 °F (36.6 °C) (Oral)   Resp 16   Ht 154.9 cm (60.98\")   Wt 98.9 kg (218 lb)   BMI 41.22 kg/m²     Body mass index is 41.22 kg/m².    Physical Exam   Constitutional: She is oriented to person, place, and time. She appears well-developed. No distress.   Eyes: Conjunctivae and lids are normal.   Neck: Carotid bruit is not present.   Cardiovascular: Normal rate, regular rhythm and normal heart sounds.    Pulmonary/Chest: Effort normal and breath sounds normal.   Neurological: She is alert and oriented to person, place, and time.   Skin: Skin is warm and dry.   Psychiatric: She has a normal mood and affect. Her speech is normal and behavior is normal. Thought content normal. She is attentive.   Vitals reviewed.       Data Reviewed:             Assessment/Plan     Problem List Items Addressed This Visit     Moderate single current episode of major depressive disorder (CMS/HCC)     Psychological condition is worsening.  Medication changes per orders. paroxetine increased to 20 mg each evening.  Psychological condition  will be reassessed in 4 weeks.         Relevant Medications    PARoxetine (PAXIL) 20 MG tablet    Insomnia due to medical condition - Primary     Medication changes per order.  She'll reminded to avoid caffeine.  She was also reminded to avoid eating after 8:00 at night.  I also encouraged her to avoid stimulating television within an hour or 2 bedtime.               No orders of the defined types were placed in this encounter.        Current Outpatient Prescriptions:   •  acetaminophen (TYLENOL) 325 MG tablet, Take 2 tablets by mouth Every 4 (Four) Hours As Needed for Mild Pain ., Disp: , Rfl:   •  amLODIPine (NORVASC) 10 MG tablet, Take 1 tablet by mouth Daily for 90 days., Disp: 90 tablet, Rfl: 0  •  aspirin 81 MG EC tablet, Take 81 mg by mouth daily. Take 1 tablet by oral route once daily, Disp: , Rfl:   •  atorvastatin (LIPITOR) 20 MG tablet, " "Take 1 tablet by mouth Every Night for 90 days., Disp: 90 tablet, Rfl: 1  •  Black Pepper-Turmeric (TURMERIC COMPLEX/BLACK PEPPER) 3-500 MG capsule, Take 1 capsule by mouth 2 (Two) Times a Day., Disp: , Rfl:   •  hydrochlorothiazide (HYDRODIURIL) 25 MG tablet, Take 1 tablet by mouth Daily for 90 days., Disp: 90 tablet, Rfl: 0  •  HYDROcodone-acetaminophen (NORCO)  MG per tablet, Take 1 tablet by mouth Every 6 (Six) Hours As Needed for Moderate Pain ., Disp: 60 tablet, Rfl: 0  •  Insulin Lispro (HUMALOG KWIKPEN) 200 UNIT/ML solution pen-injector, Inject 12 Units under the skin 3 (Three) Times a Day Before Meals. (Patient taking differently: Inject 12 Units under the skin into the appropriate area as directed Daily With Breakfast & Lunch.), Disp: 15 mL, Rfl: 4  •  insulin NPH (humuLIN N,novoLIN N) 100 UNIT/ML injection, Inject 20 Units under the skin into the appropriate area as directed 2 (Two) Times a Day. Before Breakfast and at Bedtime, Disp: , Rfl:   •  irbesartan (AVAPRO) 300 MG tablet, Take 1 tablet by mouth Daily for 90 days., Disp: 90 tablet, Rfl: 0  •  levothyroxine (SYNTHROID, LEVOTHROID) 75 MCG tablet, Take 1 tablet by mouth Daily for 90 days., Disp: 90 tablet, Rfl: 0  •  PARoxetine (PAXIL) 20 MG tablet, Take 1 tablet by mouth Every Night for 90 days., Disp: 90 tablet, Rfl: 0  •  TRUEPLUS INSULIN SYRINGE 31G X 5/16\" 1 ML misc, , Disp: , Rfl:     Return in about 4 weeks (around 10/22/2018), or if symptoms worsen or fail to improve, for Recheck.               "

## 2018-09-24 NOTE — ASSESSMENT & PLAN NOTE
Psychological condition is worsening.  Medication changes per orders. paroxetine increased to 20 mg each evening.  Psychological condition  will be reassessed in 4 weeks.

## 2018-09-26 ENCOUNTER — TELEPHONE (OUTPATIENT)
Dept: FAMILY MEDICINE CLINIC | Facility: CLINIC | Age: 73
End: 2018-09-26

## 2018-09-26 ENCOUNTER — TELEPHONE (OUTPATIENT)
Dept: PAIN MEDICINE | Facility: CLINIC | Age: 73
End: 2018-09-26

## 2018-09-26 DIAGNOSIS — F32.1 MODERATE SINGLE CURRENT EPISODE OF MAJOR DEPRESSIVE DISORDER (HCC): Primary | ICD-10-CM

## 2018-09-26 DIAGNOSIS — G47.01 INSOMNIA DUE TO MEDICAL CONDITION: ICD-10-CM

## 2018-09-26 RX ORDER — ALPRAZOLAM 0.5 MG/1
0.5 TABLET ORAL NIGHTLY
Qty: 3 TABLET | Refills: 0 | Status: SHIPPED | OUTPATIENT
Start: 2018-09-26 | End: 2018-09-29

## 2018-09-26 NOTE — TELEPHONE ENCOUNTER
Pt states that you upped her paxil dose and she is even more agitated and cant sleep and she wants something for it. Please advise.

## 2018-09-26 NOTE — TELEPHONE ENCOUNTER
I will send in 3 day prescription of alprazolam at bedtime. Tell her to continue Paxil. Make appointment to see me next Thursday.

## 2018-10-04 ENCOUNTER — OFFICE VISIT (OUTPATIENT)
Dept: FAMILY MEDICINE CLINIC | Facility: CLINIC | Age: 73
End: 2018-10-04

## 2018-10-04 VITALS
TEMPERATURE: 97.7 F | SYSTOLIC BLOOD PRESSURE: 128 MMHG | WEIGHT: 219 LBS | HEART RATE: 72 BPM | DIASTOLIC BLOOD PRESSURE: 66 MMHG | HEIGHT: 61 IN | BODY MASS INDEX: 41.35 KG/M2 | RESPIRATION RATE: 16 BRPM

## 2018-10-04 DIAGNOSIS — E78.49 OTHER HYPERLIPIDEMIA: ICD-10-CM

## 2018-10-04 DIAGNOSIS — F32.5 MAJOR DEPRESSIVE DISORDER WITH SINGLE EPISODE, IN FULL REMISSION (HCC): ICD-10-CM

## 2018-10-04 DIAGNOSIS — Z23 NEED FOR IMMUNIZATION AGAINST INFLUENZA: ICD-10-CM

## 2018-10-04 DIAGNOSIS — E03.9 ACQUIRED HYPOTHYROIDISM: ICD-10-CM

## 2018-10-04 DIAGNOSIS — G47.01 INSOMNIA DUE TO MEDICAL CONDITION: ICD-10-CM

## 2018-10-04 DIAGNOSIS — I10 ESSENTIAL HYPERTENSION: Chronic | ICD-10-CM

## 2018-10-04 DIAGNOSIS — Z12.31 ENCOUNTER FOR SCREENING MAMMOGRAM FOR MALIGNANT NEOPLASM OF BREAST: ICD-10-CM

## 2018-10-04 DIAGNOSIS — F32.1 MODERATE SINGLE CURRENT EPISODE OF MAJOR DEPRESSIVE DISORDER (HCC): Primary | ICD-10-CM

## 2018-10-04 DIAGNOSIS — Z78.0 MENOPAUSE: ICD-10-CM

## 2018-10-04 PROBLEM — A09 INFECTIOUS COLITIS: Status: RESOLVED | Noted: 2018-08-07 | Resolved: 2018-10-04

## 2018-10-04 PROCEDURE — 90662 IIV NO PRSV INCREASED AG IM: CPT | Performed by: FAMILY MEDICINE

## 2018-10-04 PROCEDURE — 99214 OFFICE O/P EST MOD 30 MIN: CPT | Performed by: FAMILY MEDICINE

## 2018-10-04 PROCEDURE — G0008 ADMIN INFLUENZA VIRUS VAC: HCPCS | Performed by: FAMILY MEDICINE

## 2018-10-04 RX ORDER — AMLODIPINE BESYLATE 10 MG/1
10 TABLET ORAL DAILY
Qty: 90 TABLET | Refills: 1 | Status: SHIPPED | OUTPATIENT
Start: 2018-10-04 | End: 2019-04-04 | Stop reason: SDUPTHER

## 2018-10-04 RX ORDER — CHOLECALCIFEROL (VITAMIN D3) 125 MCG
5 CAPSULE ORAL NIGHTLY
Qty: 30 TABLET | Refills: 11
Start: 2018-10-04 | End: 2019-05-30

## 2018-10-04 RX ORDER — ATORVASTATIN CALCIUM 20 MG/1
20 TABLET, FILM COATED ORAL NIGHTLY
Qty: 90 TABLET | Refills: 1 | Status: SHIPPED | OUTPATIENT
Start: 2018-10-04 | End: 2019-04-04 | Stop reason: SDUPTHER

## 2018-10-04 RX ORDER — PAROXETINE HYDROCHLORIDE 20 MG/1
20 TABLET, FILM COATED ORAL NIGHTLY
Qty: 90 TABLET | Refills: 1 | Status: SHIPPED | OUTPATIENT
Start: 2018-10-04 | End: 2019-04-04 | Stop reason: SDUPTHER

## 2018-10-04 RX ORDER — IRBESARTAN 300 MG/1
300 TABLET ORAL DAILY
Qty: 90 TABLET | Refills: 1 | Status: SHIPPED | OUTPATIENT
Start: 2018-10-04 | End: 2019-01-02 | Stop reason: SDUPTHER

## 2018-10-04 RX ORDER — LEVOTHYROXINE SODIUM 0.07 MG/1
75 TABLET ORAL DAILY
Qty: 90 TABLET | Refills: 1 | Status: SHIPPED | OUTPATIENT
Start: 2018-10-04 | End: 2019-04-04 | Stop reason: SDUPTHER

## 2018-10-04 RX ORDER — HYDROCHLOROTHIAZIDE 25 MG/1
25 TABLET ORAL DAILY
Qty: 90 TABLET | Refills: 1 | Status: SHIPPED | OUTPATIENT
Start: 2018-10-04 | End: 2019-04-04 | Stop reason: SDUPTHER

## 2018-10-04 NOTE — PROGRESS NOTES
"Chief Complaint   Patient presents with   • Insomnia       Subjective     Iraida Elizabeth presents to the office today to refill her medications. No medication side effects are reported.he is doing much better from depression.  Please see pH Q9 score below.  Her sleep continues to be a problem.  She will try over-the-counter melatonin.      I have reviewed and updated her medications, medical history and problem list during today's office visit.     Patient Care Team:  Claudio Anne MD as PCP - General  Claudio Anne MD as PCP - Family Medicine  Robert Garcia MD as Consulting Physician (Endocrinology)  Shawn Galvan IV, MD as Surgeon (Neurosurgery)    Social History   Substance Use Topics   • Smoking status: Never Smoker   • Smokeless tobacco: Never Used   • Alcohol use No       Review of Systems   Constitutional: Negative for fatigue.   Cardiovascular: Negative for chest pain.       Objective     /66   Pulse 72   Temp 97.7 °F (36.5 °C) (Oral)   Resp 16   Ht 154.9 cm (60.98\")   Wt 99.3 kg (219 lb)   BMI 41.41 kg/m²     Body mass index is 41.41 kg/m².    Physical Exam   Constitutional: She is oriented to person, place, and time. She appears well-developed. No distress.   Eyes: Conjunctivae and lids are normal.   Neck: Carotid bruit is not present.   Cardiovascular: Normal rate, regular rhythm and normal heart sounds.    Pulmonary/Chest: Effort normal and breath sounds normal.   Neurological: She is alert and oriented to person, place, and time.   Skin: Skin is warm and dry.   Psychiatric: She has a normal mood and affect. Her behavior is normal.   Vitals reviewed.      Data Reviewed:         CMP:  Lab Results   Component Value Date     (H) 07/30/2018    BUN 21 08/16/2018    CREATININE 1.00 08/16/2018    EGFRIFNONA 54 (L) 08/16/2018    EGFRIFAFRI 63 07/30/2018     08/16/2018    K 4.0 08/16/2018    CL 99 08/16/2018    CALCIUM 8.9 08/16/2018    PROTENTOTREF 6.1 07/30/2018    " ALBUMIN 3.80 08/16/2018    LABGLOBREF 2.0 07/30/2018    BILITOT 0.6 08/16/2018    ALKPHOS 65 08/16/2018    AST 27 08/16/2018    ALT 22 08/16/2018     CBC w/ diff:   Lab Results   Component Value Date    WBC 8.81 08/16/2018    RBC 4.15 08/16/2018    HGB 13.4 08/16/2018    HCT 42.6 08/16/2018    .7 (H) 08/16/2018    MCH 32.3 (H) 08/16/2018    MCHC 31.5 (L) 08/16/2018    RDW 14.9 (H) 08/16/2018     08/16/2018    NEUTRORELPCT 71.0 08/16/2018    AUTOIGPER 0.0 08/16/2018    LYMPHORELPCT 12.8 (L) 08/16/2018    MONORELPCT 12.4 (H) 08/16/2018    EOSRELPCT 3.0 08/16/2018    BASORELPCT 0.8 08/16/2018     LIPID PANEL:  Lab Results   Component Value Date    CHLPL 150 08/28/2017    TRIG 95 08/28/2017    HDL 67 (H) 08/28/2017    VLDL 19 08/28/2017    LDL 64 08/28/2017    LDLHDL 0.96 08/28/2017     TSH:  Lab Results   Component Value Date    TSH 3.500 07/30/2018     HGBA1C (LAST 3):  Lab Results   Component Value Date    HGBA1C 7.15 (H) 07/30/2018    HGBA1C 7.17 (H) 04/05/2018    HGBA1C 7.93 (H) 01/11/2018     MICROALBUMIN SPOT URINE:  Lab Results   Component Value Date    MICROALBUR 4.9 07/30/2018       Assessment/Plan     Problem List Items Addressed This Visit     Essential hypertension (Chronic)     Hypertension is unchanged.  Continue current treatment regimen.  Blood pressure will be reassessed at the next regular appointment.         Relevant Medications    amLODIPine (NORVASC) 10 MG tablet    hydrochlorothiazide (HYDRODIURIL) 25 MG tablet    irbesartan (AVAPRO) 300 MG tablet    Other Relevant Orders    Comprehensive metabolic panel    CBC and Differential    Moderate single current episode of major depressive disorder (CMS/HCC) - Primary     Psychological condition is improving with treatment.  Continue current treatment regimen.  Psychological condition  will be reassessed at the next regular appointment.         Relevant Medications    PARoxetine (PAXIL) 20 MG tablet    Other hyperlipidemia     Lipid  abnormalities are unchanged.  Pharmacotherapy as ordered.  Lipids will be reassessed in 6 months.         Relevant Medications    atorvastatin (LIPITOR) 20 MG tablet    Other Relevant Orders    Lipid Panel With LDL/HDL Ratio    Acquired hypothyroidism     The current medical regimen is effective;  continue present plan and medications.           Relevant Medications    levothyroxine (SYNTHROID, LEVOTHROID) 75 MCG tablet    Other Relevant Orders    TSH    Insomnia due to medical condition     Add melatonin         Relevant Medications    melatonin 5 MG tablet tablet    Menopause    Relevant Orders    DEXA Bone Density Axial      Other Visit Diagnoses     Major depressive disorder with single episode, in full remission (CMS/[facility])        Relevant Medications    PARoxetine (PAXIL) 20 MG tablet    Encounter for screening mammogram for malignant neoplasm of breast        Relevant Orders    Mammo Screening Bilateral With CAD    Need for immunization against influenza        Relevant Orders    Fluzone High Dose =>65Years          Orders Placed This Encounter   Procedures   • Mammo Screening Bilateral With CAD     Order Specific Question:   Reason for Exam:     Answer:   screening   • DEXA Bone Density Axial     Standing Status:   Future     Order Specific Question:   Reason for Exam:     Answer:   screen for osteoporosis, menopausal   • Fluzone High Dose =>65Years     Standing Status:   Standing     Number of Occurrences:   1   • Comprehensive metabolic panel     Standing Status:   Future     Standing Expiration Date:   7/1/2019   • Lipid Panel With LDL/HDL Ratio     Standing Status:   Future     Standing Expiration Date:   7/1/2019   • TSH     Standing Status:   Future     Standing Expiration Date:   7/1/2019   • CBC and Differential     Standing Status:   Future     Standing Expiration Date:   7/1/2019     Order Specific Question:   Manual Differential     Answer:   No         Current Outpatient Prescriptions:   •   "acetaminophen (TYLENOL) 325 MG tablet, Take 2 tablets by mouth Every 4 (Four) Hours As Needed for Mild Pain ., Disp: , Rfl:   •  amLODIPine (NORVASC) 10 MG tablet, Take 1 tablet by mouth Daily for 180 days., Disp: 90 tablet, Rfl: 1  •  aspirin 81 MG EC tablet, Take 81 mg by mouth daily. Take 1 tablet by oral route once daily, Disp: , Rfl:   •  atorvastatin (LIPITOR) 20 MG tablet, Take 1 tablet by mouth Every Night for 180 days., Disp: 90 tablet, Rfl: 1  •  Black Pepper-Turmeric (TURMERIC COMPLEX/BLACK PEPPER) 3-500 MG capsule, Take 1 capsule by mouth 2 (Two) Times a Day., Disp: , Rfl:   •  hydrochlorothiazide (HYDRODIURIL) 25 MG tablet, Take 1 tablet by mouth Daily for 180 days., Disp: 90 tablet, Rfl: 1  •  HYDROcodone-acetaminophen (NORCO)  MG per tablet, Take 1 tablet by mouth Every 6 (Six) Hours As Needed for Moderate Pain ., Disp: 60 tablet, Rfl: 0  •  Insulin Lispro (HUMALOG KWIKPEN) 200 UNIT/ML solution pen-injector, Inject 12 Units under the skin 3 (Three) Times a Day Before Meals. (Patient taking differently: Inject 12 Units under the skin into the appropriate area as directed Daily With Breakfast & Lunch.), Disp: 15 mL, Rfl: 4  •  insulin NPH (humuLIN N,novoLIN N) 100 UNIT/ML injection, Inject 20 Units under the skin into the appropriate area as directed 2 (Two) Times a Day. Before Breakfast and at Bedtime, Disp: , Rfl:   •  irbesartan (AVAPRO) 300 MG tablet, Take 1 tablet by mouth Daily for 180 days., Disp: 90 tablet, Rfl: 1  •  levothyroxine (SYNTHROID, LEVOTHROID) 75 MCG tablet, Take 1 tablet by mouth Daily for 180 days., Disp: 90 tablet, Rfl: 1  •  PARoxetine (PAXIL) 20 MG tablet, Take 1 tablet by mouth Every Night for 180 days., Disp: 90 tablet, Rfl: 1  •  TRUEPLUS INSULIN SYRINGE 31G X 5/16\" 1 ML misc, , Disp: , Rfl:   •  melatonin 5 MG tablet tablet, Take 1 tablet by mouth Every Night., Disp: 30 tablet, Rfl: 11    Return in about 6 months (around 4/4/2019) for Medicare Wellness and regular " visit, 30 minutes.

## 2018-10-17 ENCOUNTER — OFFICE VISIT (OUTPATIENT)
Dept: PAIN MEDICINE | Facility: CLINIC | Age: 73
End: 2018-10-17

## 2018-10-17 VITALS
TEMPERATURE: 98.9 F | DIASTOLIC BLOOD PRESSURE: 85 MMHG | RESPIRATION RATE: 15 BRPM | SYSTOLIC BLOOD PRESSURE: 130 MMHG | HEIGHT: 61 IN | WEIGHT: 219 LBS | BODY MASS INDEX: 41.35 KG/M2 | HEART RATE: 79 BPM | OXYGEN SATURATION: 94 %

## 2018-10-17 DIAGNOSIS — M51.36 DDD (DEGENERATIVE DISC DISEASE), LUMBAR: ICD-10-CM

## 2018-10-17 DIAGNOSIS — G89.29 OTHER CHRONIC PAIN: Primary | ICD-10-CM

## 2018-10-17 DIAGNOSIS — M47.816 LUMBAR FACET ARTHROPATHY: ICD-10-CM

## 2018-10-17 DIAGNOSIS — M25.551 RIGHT HIP PAIN: ICD-10-CM

## 2018-10-17 PROCEDURE — 99214 OFFICE O/P EST MOD 30 MIN: CPT | Performed by: NURSE PRACTITIONER

## 2018-10-17 RX ORDER — HYDROCODONE BITARTRATE AND ACETAMINOPHEN 5; 325 MG/1; MG/1
1 TABLET ORAL 2 TIMES DAILY PRN
Qty: 60 TABLET | Refills: 0 | Status: SHIPPED | OUTPATIENT
Start: 2018-10-17 | End: 2018-10-17 | Stop reason: SDUPTHER

## 2018-10-17 RX ORDER — HYDROCODONE BITARTRATE AND ACETAMINOPHEN 5; 325 MG/1; MG/1
1 TABLET ORAL 2 TIMES DAILY PRN
Qty: 60 TABLET | Refills: 0 | Status: SHIPPED | OUTPATIENT
Start: 2018-10-17 | End: 2018-12-05 | Stop reason: SDUPTHER

## 2018-10-17 RX ORDER — MELOXICAM 15 MG/1
15 TABLET ORAL DAILY
Qty: 30 TABLET | Refills: 1 | Status: SHIPPED | OUTPATIENT
Start: 2018-10-17 | End: 2018-12-05 | Stop reason: SDUPTHER

## 2018-10-17 NOTE — PROGRESS NOTES
CHIEF COMPLAINT  F/U back pain. Pt states injection helped for 1.5-2 weeks.     Subjective   Iraida Elizabeth is a 73 y.o. female  who presents to the office for follow-up of procedure.  She completed a  RIGHT L2-5 Lumbar Medial Branch Blockade  on  9-19-18 performed by Dr. Casarez for management of back pain. Patient reports 65-70% relief from the procedure for 1.5-2 weeks. Reports that she was able to walk and move better, thought she was feeling better overall.  Now the pain has extended across the entire low back.      Has been taking Hydrocodone 10/325 4/day, has been supplementing with Tylenol.  No constipation. Provides moderate relief.  Asks about other medication for arthritis.  Also reports that she did better taking hydrocodone 5/325, the 10 mg makes her too tired.      Back Pain   This is a chronic (acute on chronic) problem. The current episode started more than 1 year ago (worsened past few weeks). The problem occurs constantly. The problem has been gradually worsening since onset. The pain is present in the lumbar spine and sacro-iliac. The pain is at a severity of 6/10 (ranges from 7-9/10VAS). The pain is severe. The symptoms are aggravated by bending, position, standing and twisting (laying flat). Associated symptoms include numbness (R leg) and weakness (R leg). Pertinent negatives include no dysuria or headaches. She has tried analgesics for the symptoms.     PEG Assessment   What number best describes your pain on average in the past week?7  What number best describes how, during the past week, pain has interfered with your enjoyment of life?10  What number best describes how, during the past week, pain has interfered with your general activity?  9    The following portions of the patient's history were reviewed and updated as appropriate: allergies, current medications, past family history, past medical history, past social history, past surgical history and problem list.    Review of Systems  "  Constitutional: Positive for activity change (very limited). Negative for appetite change.   HENT: Positive for hearing loss. Negative for trouble swallowing.    Respiratory: Positive for shortness of breath (mild). Negative for apnea.    Gastrointestinal: Negative for constipation, diarrhea (colitis) and nausea.   Genitourinary: Negative for difficulty urinating and dysuria.   Musculoskeletal: Positive for back pain.   Neurological: Positive for weakness (R leg) and numbness (R leg). Negative for dizziness, seizures, light-headedness and headaches.   Psychiatric/Behavioral: Positive for sleep disturbance. Negative for suicidal ideas. The patient is nervous/anxious.        Vitals:    10/17/18 1444   BP: 130/85   Pulse: 79   Resp: 15   Temp: 98.9 °F (37.2 °C)   SpO2: 94%   Weight: 99.3 kg (219 lb)   Height: 154.9 cm (60.98\")   PainSc:   6   PainLoc: Back         Objective   Physical Exam   Constitutional: She is oriented to person, place, and time. She appears well-developed and well-nourished. No distress.   HENT:   Head: Normocephalic and atraumatic.   Eyes: Conjunctivae and EOM are normal.   Neck: Neck supple.   Cardiovascular: Normal rate.    Pulmonary/Chest: Effort normal. No respiratory distress.   Musculoskeletal:        Lumbar back: She exhibits tenderness, bony tenderness and pain.   +LUMBAR FACET TENDERNESS/LOADING     Neurological: She is alert and oriented to person, place, and time. She has normal strength. No sensory deficit. Gait (wheelchair) abnormal.   Skin: Skin is warm and dry.   Psychiatric: She has a normal mood and affect. Her behavior is normal.   Nursing note and vitals reviewed.      Assessment/Plan   Iraida was seen today for back pain.    Diagnoses and all orders for this visit:    Other chronic pain    DDD (degenerative disc disease), lumbar    Right hip pain    Lumbar facet arthropathy  -     Case Request    Other orders  -     meloxicam (MOBIC) 15 MG tablet; Take 1 tablet by mouth " "Daily.  -     Discontinue: HYDROcodone-acetaminophen (NORCO) 5-325 MG per tablet; Take 1 tablet by mouth 2 (Two) Times a Day As Needed for Severe Pain .  -     HYDROcodone-acetaminophen (NORCO) 5-325 MG per tablet; Take 1 tablet by mouth 2 (Two) Times a Day As Needed for Severe Pain .      --- Bilateral L2-L5 MBB  -------  Education about Medial Branch Blockade and RF Therapy:    This medial branch blockade (MBB) suggested is intended for diagnostic purposes, with the intent of offering the patient Radiofrequency thermal rhizotomy (RF) if the MBB is diagnostically effective.  The diagnostic blockade is necessary to determine the likelihood that RF therapy could be efficacious in providing long term relief to the patient.    Medial branches are sensory nerve branches that connect to a facet joint and transmit sensations & pain signals from that joint.  Facet is a term for the type of joints found in the spine.  Medial branches are the nerves that go to a facet, and therefore are also sometimes called \"facet joint nerves\" (FJNs).      In a medial branch blockade procedure, xray fluoroscopy is used to verify the locations of the outside of the joint lines which are being targeted.  Under xray guidance, needles are placed to these areas.  Contrast dye is injected to confirm proper placement, with dye flowing over the joint area, and to ensure that the dye does not flow into unintended areas such as a vein.  When this is confirmed, local anesthetic is injected to block the medial branch at that joint level.      If MBBs are diagnostically successful in blocking pain, then the patient is most likely a great candidate for Radiofrequency of those facet joint nerves.  In the RF procedure, needles are placed to the joint lines in the same fashion, and after testing, the needle tips are heated to thermally treat the nerves, blocking the nerves by in essence damaging the nerves with the heat treatment.       Medically, a " successful RF procedure should provide a patient with 50% pain relief or more for at least 6 months.  Clinical experience suggests that successful patients receive relief more in the range of 12 months on average.  We also discussed that a fortunate minority of patients receive therapeutic success from the MBB, and may not require RF ablation.  If a patient receives more than 8 weeks of relief from MBB, then occasional repeat MBB for therapeutic purposes is a very reasonable alternative therapy.  This course of therapy is consistent with our LCDs according to our CMS  in the area, and therefore other insurance providers should follow accordingly.  We will monitor our patients to screen for these therapeutic responders and will offer RF therapy only when necessary.        We discussed that MBB & RF are not without risks.  Guidelines regarding anticoagulant use & neuraxial procedures will be respected.  Patients that are ill or otherwise may be at risk for sepsis will not have their spines accessed by neuraxial injections of any type.  This patient will not be offered these therapies if there is an increased risk.   We discussed that there is a risk of postprocedural pain and also a risk of worsening of clinical picture with these procedures as with any neuraxial procedure.    -------      --- Refill hydrocodone, reduce dose to 5/325 bid prn.  Patient appears stable with current regimen. No adverse effects. Regarding continuation of opioids, there is no evidence of aberrant behavior or any red flags.  The patient continues with appropriate response to opioid therapy. ADL's remain intact by self.   --- The urine drug screen confirmation from 8/14/18 has been reviewed and the result is appropriate based on patient history and DONNY report  --- Trial Meloxicam. Discussed medication with the patient.  Included in this discussion was the potential for side effects and adverse events.  Patient verbalized  understanding and wished to proceed.  Prescription will be sent to pharmacy.  --- The patient has signed a copy of our prescribing agreement.  The has stated both verbal and written understanding that prescription pain medication may be stopped if - pain does not significantly decrease and/or function increase, there is evidence of diverting medication, if She obtained controlled substances from another licensed practitioner without my knowledge and approval, if I feel that it is in the patient's best interest, if She exhibits any aggressive behavior to any provider or staff member in this office.  The patient agrees to only use the medication exactly as prescribed, to fill controlled substances at the same pharmacy, to keep medication in the original container, and to store controlled substances in a locked cabinet or other secure storage unit. The patient agrees to notify the office immediately if medication is lost or stolen and will be asked to produce an official police report prior to replacing/continuing lost/stolen controlled substances.  The patient understands that there will be routine monitoring including urine drug screens, pill counts, and review of pharmacy report.  The patient understands that She may be asked to submit to random drug screen or pill count. The patient will not seek early refills.  The patient will not use illegal street drugs while receiving controlled substances from this practice.  The patient understands that failure to adhere with this agreement may result in cessation of therapy with controlled substance prescribing.     --- Follow-up 2 months         DONNY REPORT    As part of the patient's treatment plan, I am prescribing controlled substances. The patient has been made aware of appropriate use of such medications, including potential risk of somnolence, limited ability to drive and/or work safely, and the potential for dependence or overdose. It has also bee made clear that  these medications are for use by this patient only, without concomitant use of alcohol or other substances unless prescribed.     Patient has completed prescribing agreement detailing terms of continued prescribing of controlled substances, including monitoring DONNY reports, urine drug screening, and pill counts if necessary. The patient is aware that inappropriate use will results in cessation of prescribing such medications.    DONNY report has been reviewed and scanned into the patient's chart.    As the clinician, I personally reviewed the DONNY from 10/16/2018 while the patient was in the office today.    History and physical exam exhibit continued safe and appropriate use of controlled substances.     EMR Dragon/Transcription disclaimer:   Much of this encounter note is an electronic transcription/translation of spoken language to printed text. The electronic translation of spoken language may permit erroneous, or at times, nonsensical words or phrases to be inadvertently transcribed; Although I have reviewed the note for such errors, some may still exist.

## 2018-11-02 ENCOUNTER — APPOINTMENT (OUTPATIENT)
Dept: BONE DENSITY | Facility: HOSPITAL | Age: 73
End: 2018-11-02
Attending: FAMILY MEDICINE

## 2018-11-21 ENCOUNTER — OUTSIDE FACILITY SERVICE (OUTPATIENT)
Dept: PAIN MEDICINE | Facility: CLINIC | Age: 73
End: 2018-11-21

## 2018-11-21 ENCOUNTER — DOCUMENTATION (OUTPATIENT)
Dept: PAIN MEDICINE | Facility: CLINIC | Age: 73
End: 2018-11-21

## 2018-11-21 PROCEDURE — 64494 INJ PARAVERT F JNT L/S 2 LEV: CPT | Performed by: PAIN MEDICINE

## 2018-11-21 PROCEDURE — 64493 INJ PARAVERT F JNT L/S 1 LEV: CPT | Performed by: PAIN MEDICINE

## 2018-11-21 NOTE — PROGRESS NOTES
Bilateral L3-5 Lumbar Medial Branch Blockade  [facility]    PREOPERATIVE DIAGNOSIS:  Lumbar spondylosis without myelopathy    POSTOPERATIVE DIAGNOSIS:  Lumbar spondylosis without myelopathy    PROCEDURE:   Diagnostic Bilateral Lumbar Medial Branch Nerve Blockades, with fluoroscopy:  L2, L3, L4, and L5 nerves (at the L3, L4, L5 transverse processes and the sacral alar groove) to block facet joints L3-4, L4-5, and L5-S1  1. 23244-22 -- Bilateral Lumbar Facet blocks, 1st Level  2. 01372-45 -- Bilateral Lumbar Facet blocks, 2nd  Level      PRE-PROCEDURE DISCUSSION WITH PATIENT:    Risks and complications were discussed with the patient prior to starting the procedure and informed consent was obtained.      SURGEON:  Eve Casarez MD    REASON FOR PROCEDURE:   The patient complains of pain that seems to have a significant axial component, Pain on extension of the lumbar spine and Positive lumbar facet loading maneuver    SEDATION:  Versed 2mg IV  ANESTHETIC:  Marcaine 0.25%  STEROID:  Methylprednisolone (DEPO MEDROL) 80mg/ml  TOTAL VOLUME OF SOLUTION: 8ml    DESCRIPTON OF PROCEDURE:  After obtaining informed consent, IV access was obtained in the preoperative area.   The patient was taken to the operating room.  The patient was placed in the prone position with a pillow under the abdomen. All pressure points were well padded.  EKG, blood pressure, and pulse oximeter were monitored.  The patient was monitored and sedated by the RN under my direction. The lumbosacral area was prepped with Chloraprep and draped in a sterile fashion. Under fluoroscopic guidance the transverse processes of the L3, L4, and L5 vertebrae at the junctions of the superior articular processes were identified on the right. Also identified was the groove between the ala and the superior articular process of the sacrum on the ipsilateral side.  Skin and subcutaneous tissue were anesthetized with 1% lidocaine above each of these  points. A 22-gauge spinal needle was introduced under fluoroscopic guidance at the above junctions. Aspiration was negative for blood and CSF.  After confirming the position of the needle with fluoroscope in all views, 1 mL of the anesthetic solution noted above was injected at each of these points.  Needles were removed intact from each of the areas.  A similar procedure was repeated to block the L3, L4, and L5 nerves on the contralateral side.   Onset of analgesia was noted.  Vital signs remained stable throughout.      ESTIMATED BLOOD LOSS:  <5 mL  SPECIMENS:  none    COMPLICATIONS:   No complications were noted.    TOLERANCE & DISCHARGE CONDITION:    The patient tolerated the procedure well.  The patient was transported to the recovery area without difficulties.  The patient was discharged to home under the care of family in stable and satisfactory condition.    PLAN OF CARE:  1. The patient was given our standard instruction sheet.  2. We discussed that Lumbar Medial Branch Blockade is a diagnostic procedure in consideration for radiofrequency ablation if two diagnostic procedures prove to be positive for significant benefit.  If sustained relief of 6 to eight weeks is obtained, then an alternative plan could be therapeutic lumbar branch blockades.  3. The patient is asked to keep a pain log each hour for 8 hours after the procedure today.  4. The patient will  Return to clinic 4 wks.  5. The patient will resume all medications as per the medication reconciliation sheet.

## 2018-12-05 ENCOUNTER — OFFICE VISIT (OUTPATIENT)
Dept: PAIN MEDICINE | Facility: CLINIC | Age: 73
End: 2018-12-05

## 2018-12-05 VITALS
HEART RATE: 92 BPM | HEIGHT: 61 IN | BODY MASS INDEX: 40.86 KG/M2 | WEIGHT: 216.4 LBS | TEMPERATURE: 98.2 F | OXYGEN SATURATION: 96 % | SYSTOLIC BLOOD PRESSURE: 117 MMHG | RESPIRATION RATE: 15 BRPM | DIASTOLIC BLOOD PRESSURE: 74 MMHG

## 2018-12-05 DIAGNOSIS — G89.29 CHRONIC BILATERAL LOW BACK PAIN WITH BILATERAL SCIATICA: ICD-10-CM

## 2018-12-05 DIAGNOSIS — M25.50 ARTHRALGIA OF MULTIPLE SITES: ICD-10-CM

## 2018-12-05 DIAGNOSIS — Z79.4 ENCOUNTER FOR LONG-TERM (CURRENT) USE OF INSULIN (HCC): ICD-10-CM

## 2018-12-05 DIAGNOSIS — G89.29 OTHER CHRONIC PAIN: Primary | ICD-10-CM

## 2018-12-05 DIAGNOSIS — M54.42 CHRONIC BILATERAL LOW BACK PAIN WITH BILATERAL SCIATICA: ICD-10-CM

## 2018-12-05 DIAGNOSIS — M54.41 CHRONIC BILATERAL LOW BACK PAIN WITH BILATERAL SCIATICA: ICD-10-CM

## 2018-12-05 PROCEDURE — 99214 OFFICE O/P EST MOD 30 MIN: CPT | Performed by: NURSE PRACTITIONER

## 2018-12-05 RX ORDER — MELOXICAM 15 MG/1
15 TABLET ORAL DAILY
Qty: 30 TABLET | Refills: 1 | Status: SHIPPED | OUTPATIENT
Start: 2018-12-05 | End: 2019-02-05 | Stop reason: SDUPTHER

## 2018-12-05 RX ORDER — HYDROCODONE BITARTRATE AND ACETAMINOPHEN 5; 325 MG/1; MG/1
1 TABLET ORAL 2 TIMES DAILY PRN
Qty: 60 TABLET | Refills: 0 | Status: SHIPPED | OUTPATIENT
Start: 2018-12-05 | End: 2018-12-19

## 2018-12-05 NOTE — PROGRESS NOTES
CHIEF COMPLAINT  F/U back pain. Pt got mild relief the first day and then pain has increased. C/O weakness in legs which is an ongoing issue but getting worse.    Subjective   Iraida Elizabeth is a 73 y.o. female  who presents to the office for follow-up of procedure.  She completed a Bilateral L3-5 Lumbar Medial Branch Blockade   on  11-21-18 performed by Dr. Casarez for management of back pain. Patient reports some minimal relief from the procedure for the day of the injection.      RIGHT L2-5 Lumbar Medial Branch Blockade  on  9-19-18 performed by Dr. Casarez for management of back pain. Patient reports moderate relief from the procedure for a week.     Right SI joint injection 8/21/2018 performed by Dr. Casarez for management of right back pain, SI joint pain. Patient reports NO relief from the procedure.     C/o back pain.  Reports that her back and both legs are really bothering her lately, pain not just low back.  Has been taking Hydrocodone 5/325 2/day, has been supplementing with Tylenol, Meloxicam helping (started a few months ago).  No constipation. Provides moderate relief.  was too drowsy with Hydrocodone 10/325.      Back Pain   This is a chronic (acute on chronic) problem. The current episode started more than 1 year ago (worsened past few weeks). The problem occurs constantly. The problem has been gradually worsening since onset. The pain is present in the lumbar spine and sacro-iliac. Radiates to: entire legs bilaterally - worst posterior/laterally. The pain is at a severity of 8/10 (ranges from 7-9/10VAS). The pain is severe. The symptoms are aggravated by bending, position, standing and twisting (laying flat). Associated symptoms include numbness (R leg) and weakness (bilateral legs). Pertinent negatives include no chest pain, dysuria or headaches. She has tried analgesics for the symptoms.      PEG Assessment   What number best describes your pain on average in the past week?8-9  What number best  "describes how, during the past week, pain has interfered with your enjoyment of life?10  What number best describes how, during the past week, pain has interfered with your general activity?  9    The following portions of the patient's history were reviewed and updated as appropriate: allergies, current medications, past family history, past medical history, past social history, past surgical history and problem list.    Review of Systems   Constitutional: Positive for activity change (very limited). Negative for appetite change.   HENT: Positive for hearing loss. Negative for trouble swallowing.    Respiratory: Positive for shortness of breath (mild). Negative for apnea.    Cardiovascular: Negative for chest pain.   Gastrointestinal: Negative for constipation, diarrhea (colitis) and nausea.   Genitourinary: Negative for difficulty urinating and dysuria.   Musculoskeletal: Positive for back pain.   Neurological: Positive for weakness (bilateral legs) and numbness (R leg). Negative for dizziness, seizures, light-headedness and headaches.   Psychiatric/Behavioral: Positive for sleep disturbance. Negative for suicidal ideas. The patient is nervous/anxious.      Vitals:    12/05/18 1339   BP: 117/74   Pulse: 92   Resp: 15   Temp: 98.2 °F (36.8 °C)   SpO2: 96%   Weight: 98.2 kg (216 lb 6.4 oz)   Height: 154.9 cm (60.98\")   PainSc:   8   PainLoc: Back     Objective   Physical Exam   Constitutional: She is oriented to person, place, and time. She appears well-developed and well-nourished. No distress.   HENT:   Head: Normocephalic and atraumatic.   Eyes: Conjunctivae and EOM are normal.   Neck: Neck supple.   Cardiovascular: Normal rate.   Pulmonary/Chest: Effort normal. No respiratory distress.   Musculoskeletal:        Lumbar back: She exhibits tenderness, bony tenderness and pain.   +SLR bilaterally    Neurological: She is alert and oriented to person, place, and time. She has normal strength. No sensory deficit. Gait " (ambulating with cane) abnormal.   Skin: Skin is warm and dry.   Psychiatric: She has a normal mood and affect. Her behavior is normal.   Nursing note and vitals reviewed.      Assessment/Plan   Iraida was seen today for back pain.    Diagnoses and all orders for this visit:    Other chronic pain    Chronic bilateral low back pain with bilateral sciatica  -     Case Request    Arthralgia of multiple sites    Encounter for long-term (current) use of insulin (CMS/[facility])    Other orders  -     HYDROcodone-acetaminophen (NORCO) 5-325 MG per tablet; Take 1 tablet by mouth 2 (Two) Times a Day As Needed for Severe Pain .  -     meloxicam (MOBIC) 15 MG tablet; Take 1 tablet by mouth Daily.      ---L4/5 LESI -- Reviewed the procedure at length with the patient.  Included in the review was expectations, complications, risk and benefits.The procedure was described in detail and the risks, benefits and alternatives were discussed with the patient (including but not limited to: bleeding, infection, nerve damage, worsening of pain, inability to perform injection, paralysis, seizures, and death) who agreed to proceed.  Discussed the potential for sedation if warranted/wanted.  The procedure will plan to be performed at [facility] with fluoroscopic guidance(unless ultrasound is indicated). Questions were answered and in a way the patient could understand.  Patient verbalized understanding and wishes to proceed.  This intervention will be ordered.  --- If not improving after injection, may need to consider re-evaluation with neurosurgeon  --- Refill Hydrocodone. Patient appears stable with current regimen. No adverse effects. Regarding continuation of opioids, there is no evidence of aberrant behavior or any red flags.  The patient continues with appropriate response to opioid therapy. ADL's remain intact by self.   --- The urine drug screen confirmation from 8/14/18 has been reviewed and the result is  appropriate based on patient history and DONNY report  --- Follow-up 2 months          DONNY REPORT    As part of the patient's treatment plan, I am prescribing controlled substances. The patient has been made aware of appropriate use of such medications, including potential risk of somnolence, limited ability to drive and/or work safely, and the potential for dependence or overdose. It has also bee made clear that these medications are for use by this patient only, without concomitant use of alcohol or other substances unless prescribed.     Patient has completed prescribing agreement detailing terms of continued prescribing of controlled substances, including monitoring DONNY reports, urine drug screening, and pill counts if necessary. The patient is aware that inappropriate use will results in cessation of prescribing such medications.    DONNY report has been reviewed and scanned into the patient's chart.    As the clinician, I personally reviewed the DONNY from 12/4/2018 while the patient was in the office today.    History and physical exam exhibit continued safe and appropriate use of controlled substances.       EMR Dragon/Transcription disclaimer:   Much of this encounter note is an electronic transcription/translation of spoken language to printed text. The electronic translation of spoken language may permit erroneous, or at times, nonsensical words or phrases to be inadvertently transcribed; Although I have reviewed the note for such errors, some may still exist.

## 2018-12-11 ENCOUNTER — TELEPHONE (OUTPATIENT)
Dept: ENDOCRINOLOGY | Age: 73
End: 2018-12-11

## 2018-12-11 DIAGNOSIS — IMO0002 UNCONTROLLED TYPE 2 DIABETES MELLITUS WITH DIABETIC NEUROPATHY, WITH LONG-TERM CURRENT USE OF INSULIN: Primary | ICD-10-CM

## 2018-12-11 DIAGNOSIS — E03.8 OTHER SPECIFIED HYPOTHYROIDISM: ICD-10-CM

## 2018-12-11 NOTE — TELEPHONE ENCOUNTER
----- Message from Radha S Filipe sent at 12/11/2018 10:33 AM EST -----  Contact: patient  I called patient and she has an epidural tomorrow at 9:45am and unable to come. As per my conversation with you, I offered her 12-19-18 and she accepted 12:00pm. I scheduled lab appt 12-13-18. Will you please enter lab order? Thank you   ----- Message -----  From: Lilian Walls MA  Sent: 12/10/2018   4:33 PM  To: Radha Dent    WE ARE GOING TO OPEN UP Wednesday MORNING IF YOU CAN SCHEDULE HER FOR THAT MORNING. I WILL HAVE TO GET SKIP TO DO THAT IN THE MORNING.  ----- Message -----  From: Radha Dent  Sent: 12/10/2018  11:19 AM  To: Lilian Walls MA    Patient said she had written down that her appointment with Dr. Smith was 12-26-18, 11:30am. I told her she had a No Show appt with Dr. Smith on 11-26-18, 11:30am.    She said she feels she needs an insulin adjustment due ro blood sugar has been dropping for the last month or 1 1/2 months. She said she hurt her back in July.  She said she has been getting steroid shots in her back since July at least once a month and the last shot was 2 weeks ago.     She gets steroid shots in her knee every 3 months.   I scheduled her first available appt with Dr. Smith on 3-18-19 with lab appt on 3-4-19.    She was going to give me blood sugar readings and I  got 11-22-18, after lunch 146 and her meter went off and she could not get it pulled up again. She said she will call back with readings when she gets it to come back on.     In the last 2 months she said at times her blood sugars have dropped so low that she could not get a reading and she has glucose pills that she takes.   She said the lowest her blood sugar readings have been that she has taken is 65 and that was 2 days ago.   Pt ph. - [phone]      Labs ordered

## 2018-12-12 ENCOUNTER — DOCUMENTATION (OUTPATIENT)
Dept: PAIN MEDICINE | Facility: CLINIC | Age: 73
End: 2018-12-12

## 2018-12-12 ENCOUNTER — OUTSIDE FACILITY SERVICE (OUTPATIENT)
Dept: PAIN MEDICINE | Facility: CLINIC | Age: 73
End: 2018-12-12

## 2018-12-12 PROCEDURE — 62323 NJX INTERLAMINAR LMBR/SAC: CPT | Performed by: PAIN MEDICINE

## 2018-12-12 NOTE — PROGRESS NOTES
Lumbar Epidural Steroid Injection  [facility]    PREOPERATIVE DIAGNOSIS:   Chronic low back pain  POSTOPERATIVE DIAGNOSIS:  Same as preop diagnosis    PROCEDURE:   Lumbar Epidural Steroid Injection, Therapeutic Translaminar Injection, with epidurogram, at  L5/S1 level    PRE-PROCEDURE DISCUSSION WITH PATIENT:    Risks and complications were discussed with the patient prior to starting the procedure and informed consent was obtained.  We discussed various topics including but not limited to bleeding, infection, injury, paralysis, nerve injury, dural puncture, coma, death, worsening of clinical picture, lack of pain relief, and postprocedural soreness.    SURGEON:  Eve Casarez MD    REASON FOR PROCEDURE:    Diagnostic injection at this level is needed    SEDATION:  Versed 2mg & Fentanyl 50 mcg IV  ANESTHETIC:  NONE  STEROID:   Methylprednisolone (DEPO MEDROL) 80mg/ml    DESCRIPTON OF PROCEDURE:    After obtaining informed consent, I.V. was started in the preop area.   The patient was taken to the operating room and placed in the prone position.  EKG, blood pressure, and pulse oximeter were monitored throughout, and sedation was provided as needed by the RN under my guidance. All pressure points were well padded.  The lumbar spine area was prepped with Chloraprep and draped in a sterile fashion.  Under fluoroscopic guidance, the above mentioned interlaminar space was identified. Skin and subcutaneous tissues were anesthetized with 1% lidocaine in the middle of the space. A Tuohy needle was introduced through the skin and advanced to this interlaminar space and into the epidural space under fluoroscopic guidance and verified with loss-of-resistance technique to air.  After confirming the position of the needle with the fluoroscope with all the views, and after aspiration was confirmed negative for blood and CSF, 1.5 mL of Omnipaque was injected.  After seeing appropriate epidurogram with  lateral and PA views, a total of 3 cc solution was injected, consisting of 0cc of local anesthetic as above, with normal saline and injectable steroid as above.     ESTIMATED BLOOD LOSS:  <5 mL  SPECIMENS:  None    COMPLICATIONS:     No complications were noted.    TOLERANCE & DISCHARGE CONDITION:    The patient tolerated the procedure well.  The patient was transported to the recovery area without difficulties.  The patient was discharged to home under the care of family in stable and satisfactory condition.    PLAN OF CARE:  1. The patient was given our standard instruction sheet.  2. The patient will Return to clinic 4-6 wks  3. The patient will resume all medications as per the medication reconciliation sheet.

## 2018-12-13 ENCOUNTER — RESULTS ENCOUNTER (OUTPATIENT)
Dept: ENDOCRINOLOGY | Age: 73
End: 2018-12-13

## 2018-12-13 DIAGNOSIS — E03.8 OTHER SPECIFIED HYPOTHYROIDISM: ICD-10-CM

## 2018-12-13 DIAGNOSIS — IMO0002 UNCONTROLLED TYPE 2 DIABETES MELLITUS WITH DIABETIC NEUROPATHY, WITH LONG-TERM CURRENT USE OF INSULIN: ICD-10-CM

## 2018-12-18 LAB
ALBUMIN SERPL-MCNC: 3.6 G/DL (ref 3.5–5.2)
ALBUMIN/GLOB SERPL: 1.6 G/DL
ALP SERPL-CCNC: 85 U/L (ref 39–117)
ALT SERPL-CCNC: 20 U/L (ref 1–33)
AST SERPL-CCNC: 18 U/L (ref 1–32)
BILIRUB SERPL-MCNC: 0.9 MG/DL (ref 0.1–1.2)
BUN SERPL-MCNC: 27 MG/DL (ref 8–23)
BUN/CREAT SERPL: 22.7 (ref 7–25)
CALCIUM SERPL-MCNC: 9.1 MG/DL (ref 8.6–10.5)
CHLORIDE SERPL-SCNC: 97 MMOL/L (ref 98–107)
CO2 SERPL-SCNC: 26.9 MMOL/L (ref 22–29)
CREAT SERPL-MCNC: 1.19 MG/DL (ref 0.57–1)
CREAT UR-MCNC: NORMAL MG/DL
GLOBULIN SER CALC-MCNC: 2.3 GM/DL
GLUCOSE SERPL-MCNC: 238 MG/DL (ref 65–99)
HBA1C MFR BLD: 7 % (ref 4.8–5.6)
MICROALBUMIN UR-MCNC: NORMAL
POTASSIUM SERPL-SCNC: 4.6 MMOL/L (ref 3.5–5.2)
PROT SERPL-MCNC: 5.9 G/DL (ref 6–8.5)
REQUEST PROBLEM: NORMAL
SODIUM SERPL-SCNC: 137 MMOL/L (ref 136–145)
T4 FREE SERPL-MCNC: 1.45 NG/DL (ref 0.93–1.7)
TSH SERPL DL<=0.005 MIU/L-ACNC: 1.64 MIU/ML (ref 0.27–4.2)

## 2018-12-19 ENCOUNTER — OFFICE VISIT (OUTPATIENT)
Dept: ENDOCRINOLOGY | Age: 73
End: 2018-12-19

## 2018-12-19 VITALS
HEIGHT: 61 IN | HEART RATE: 75 BPM | BODY MASS INDEX: 41.08 KG/M2 | SYSTOLIC BLOOD PRESSURE: 126 MMHG | WEIGHT: 217.6 LBS | DIASTOLIC BLOOD PRESSURE: 68 MMHG

## 2018-12-19 DIAGNOSIS — E11.69 HYPERLIPIDEMIA ASSOCIATED WITH TYPE 2 DIABETES MELLITUS (HCC): ICD-10-CM

## 2018-12-19 DIAGNOSIS — IMO0002 UNCONTROLLED TYPE 2 DIABETES MELLITUS WITH DIABETIC NEUROPATHY, WITH LONG-TERM CURRENT USE OF INSULIN: ICD-10-CM

## 2018-12-19 DIAGNOSIS — E16.1 HYPERINSULINISM: ICD-10-CM

## 2018-12-19 DIAGNOSIS — Z79.4 ENCOUNTER FOR LONG-TERM (CURRENT) USE OF INSULIN (HCC): ICD-10-CM

## 2018-12-19 DIAGNOSIS — IMO0002 UNCONTROLLED TYPE 2 DIABETES MELLITUS WITH COMPLICATION, WITH LONG-TERM CURRENT USE OF INSULIN: Primary | ICD-10-CM

## 2018-12-19 DIAGNOSIS — E78.5 HYPERLIPIDEMIA ASSOCIATED WITH TYPE 2 DIABETES MELLITUS (HCC): ICD-10-CM

## 2018-12-19 DIAGNOSIS — E03.9 ACQUIRED HYPOTHYROIDISM: ICD-10-CM

## 2018-12-19 PROCEDURE — 99214 OFFICE O/P EST MOD 30 MIN: CPT | Performed by: INTERNAL MEDICINE

## 2018-12-19 NOTE — PATIENT INSTRUCTIONS
HUMALOG ONLY 5 UNITS AT LUNCH    CONTINUE HUMALOG 12 FOR BREAKFAST AND DINNER    IF LOW BG IS NOT IMPROVING THEN  DECREASE NPH TO 15 AT BREAKFAST

## 2018-12-19 NOTE — PROGRESS NOTES
73 y.o.    Patient Care Team:  Claudio Anne MD as PCP - General  Claudio Anne MD as PCP - Family Medicine  Robert Garcia MD as Consulting Physician (Endocrinology)  Shawn Galvan IV, MD as Surgeon (Neurosurgery)    Chief Complaint:      F/U TYPE 2 DIABETES, UNCONTROLLED.  HERE TO DISCUSS LAB RESULTS  Subjective     HPI   Patient is a 73-year-old white female with a past history of uncontrolled type 2 diabetes mellitus came for follow-up    Uncontrolled type 2 diabetes mellitus  Patient is currently taking NPH insulin 20 units before breakfast and bedtime and Humalog 12 units before each meal 3 times daily  She reports that her blood sugars have been in the 200-250 range for the past several weeks  Hypoglycemia  Patient is expressing hypoglycemia around 3-4 PM daily  She has blood sugars as low as 40  Uncontrolled type 2 diabetes mellitus with neuropathy  Patient reports symptoms of tingling numbness and burning in both lower extremities she used to be on gabapentin in the past but not currently on  Patient denied any knowledge of diabetic nephropathy or retinopathy  Hypothyroidism  Patient is currently taking levothyroxine 75 µg daily.  She reports compliance with medication  Denies any side effects    Patient reported that she had several courses of steroid injections for the past 4 months in the ankle, knee and back due to multiple joint issues  Her last epidural injection was one week ago  She reports some improvement with the back pain after the epidural injection    Abnormal weight gain  Patient currently weighs 217 pounds with a BMI of 41.1      Interval History    SUMMARY  Patient is a known type II diabetic on oral hypoglycemics. Her blood sugars have been out of control for the past several months most of the sugars are greater than 200. She has been very hesitant to start insulin.  Patient started Levemir 7 units at bedtime along with NovoLog 3 times a day before each meal. She appears  to have tolerated Levemir very well. she reported that when the blood sugar dropped to 87 she has a typical hypoglycemic reaction recently.   But she reports that the sugars are still very high. She feels very thirsty and fatigue along with the high numbers.she managed to increase the NovoLog by herself to 8-10 units before meal.patient reports that her insurance company switch the insulin to Humalog since January.  fasting blood sugars are still elevated in the 200 range  patient is currently taking Levemir 35 units at bedtime and NovoLog 14 units before each meal  Diabetic neuropathy  patient continues to complain of tingling and numbness in both lower extremities. She is currently on gabapentin  Fatigue  Especially worse in the blood sugars are elevated usually towards the end of the day.  Abnormal weight gain  Patient currently weighs 210 pounds   she feels very frustrated regarding the weight gain but is more concerned about her blood sugars at this time.  Patient reports that she has never tried victoza.  She reported no history of pancreatitis or thyroid cancer  patient also reported that due to her 's illness she has been taking him to different office visits and has not been able to focus on herself. She has not been checking her Accu-Cheks on a regular basis. She also reports swelling in the feet for the past few months. Her last hemoglobin A1c was 8.6%      Patient started victoza in the previous visit. She reports no side effects other than mild nausea initially. She reports she lost nearly 12 pounds in the blood sugars have improved significantly. She still currently taking NovoLog 7 units before each meal if the blood sugars are greater than 100 and Levemir 40 units at bedtime.  Her victoza dose is 1.2 mg daily at night      Patient reports that January 2015 and she had a left ankle fracture and has been through rehabilitation for nearly one month and still has pain on walking and  weight-bearing.. During that time her blood sugars apparently were high but for the past 2-3 weeks they have significantly improved  Patient had one episode of hypoglycemia 2 days ago when she overreacted to blood sugar of 285 and took nearly 19 units of NovoLog      Interval history  Patient reported that she is currently in the Medicare gap and her insulin and Victoza is costing almost $160 each and is not able to afford that.  She loves Victoza since it made her lose nearly 32 pounds and controls her appetite  She is back on taking Levemir 40 units at night and victoza 1.8 mg daily  Her blood sugars have significantly improved majority are less than 160  She also reported that she is not needing NovoLog at all on most of the days.  She recently received a cortisone injection in the knee in August 2015 and her blood sugars have been elevated for a few days after that.  Patient forgot to bring her glucometer today but most blood sugars are in the 115 range         The following portions of the patient's history were reviewed and updated as appropriate: allergies, current medications, past family history, past medical history, past social history, past surgical history and problem list.    Past Medical History:   Diagnosis Date   • Arthropathy of knee 05/22/2014    unspecified   • Arthropathy, multiple sites 04/12/2012    unspecified   • Bulging lumbar disc    • Cancer (CMS/[facility])     breast   • Chronic kidney disease, stage III (moderate) (CMS/HCC) 05/26/2011   • Controlled diabetes mellitus type II without complication (CMS/HCC)    • Depression 04/02/2001   • Dyspnea    • Essential hypertension    • History of complete eye exam 03/29/2012   • History of gynecological procedure 03/01/2010    special invesitation and examinations; gynecological examination; routine gynecological examination   • Hyperlipidemia     other and unspecified   • Hypothyroidism     unspecified   • Low back pain    • Peripheral neuropathy    •  Personal history of malignant neoplasm of breast 2001    R breast (negative nodes) [place] with radiation and Tamoxifen   • Thyroid disease    • Type II diabetes mellitus with neurological manifestations, uncontrolled (CMS/HCC) 01/03/2013     Family History   Problem Relation Age of Onset   • Hypertension Sister    • Thyroid disease Daughter    • Cancer Mother    • Heart disease Father      Social History     Socioeconomic History   • Marital status:      Spouse name: Not on file   • Number of children: 4   • Years of education: Not on file   • Highest education level: Not on file   Social Needs   • Financial resource strain: Not on file   • Food insecurity - worry: Not on file   • Food insecurity - inability: Not on file   • Transportation needs - medical: Not on file   • Transportation needs - non-medical: Not on file   Occupational History   • Not on file   Tobacco Use   • Smoking status: Never Smoker   • Smokeless tobacco: Never Used   Substance and Sexual Activity   • Alcohol use: No   • Drug use: No   • Sexual activity: Defer   Other Topics Concern   • Not on file   Social History Narrative   • Not on file     Allergies   Allergen Reactions   • Tizanidine Hallucinations       Current Outpatient Medications:   •  acetaminophen (TYLENOL) 325 MG tablet, Take 2 tablets by mouth Every 4 (Four) Hours As Needed for Mild Pain ., Disp: , Rfl:   •  amLODIPine (NORVASC) 10 MG tablet, Take 1 tablet by mouth Daily for 180 days., Disp: 90 tablet, Rfl: 1  •  aspirin 81 MG EC tablet, Take 81 mg by mouth daily. Take 1 tablet by oral route once daily, Disp: , Rfl:   •  atorvastatin (LIPITOR) 20 MG tablet, Take 1 tablet by mouth Every Night for 180 days., Disp: 90 tablet, Rfl: 1  •  Black Pepper-Turmeric (TURMERIC COMPLEX/BLACK PEPPER) 3-500 MG capsule, Take 1 capsule by mouth 2 (Two) Times a Day., Disp: , Rfl:   •  hydrochlorothiazide (HYDRODIURIL) 25 MG tablet, Take 1 tablet by mouth Daily for 180 days., Disp: 90 tablet,  "Rfl: 1  •  HYDROcodone-acetaminophen (NORCO) 5-325 MG per tablet, Take 1 tablet by mouth 2 (Two) Times a Day As Needed for Severe Pain ., Disp: 60 tablet, Rfl: 0  •  Insulin Lispro (HUMALOG KWIKPEN) 200 UNIT/ML solution pen-injector, Inject 12 Units under the skin 3 (Three) Times a Day Before Meals. (Patient taking differently: Inject 12 Units under the skin into the appropriate area as directed Daily With Breakfast & Lunch.), Disp: 15 mL, Rfl: 4  •  insulin NPH (humuLIN N,novoLIN N) 100 UNIT/ML injection, Inject 20 Units under the skin into the appropriate area as directed 2 (Two) Times a Day. Before Breakfast and at Bedtime, Disp: , Rfl:   •  irbesartan (AVAPRO) 300 MG tablet, Take 1 tablet by mouth Daily for 180 days., Disp: 90 tablet, Rfl: 1  •  levothyroxine (SYNTHROID, LEVOTHROID) 75 MCG tablet, Take 1 tablet by mouth Daily for 180 days., Disp: 90 tablet, Rfl: 1  •  melatonin 5 MG tablet tablet, Take 1 tablet by mouth Every Night., Disp: 30 tablet, Rfl: 11  •  meloxicam (MOBIC) 15 MG tablet, Take 1 tablet by mouth Daily., Disp: 30 tablet, Rfl: 1  •  PARoxetine (PAXIL) 20 MG tablet, Take 1 tablet by mouth Every Night for 180 days., Disp: 90 tablet, Rfl: 1  •  TRUEPLUS INSULIN SYRINGE 31G X 5/16\" 1 ML misc, , Disp: , Rfl:         Review of Systems   Constitutional: Positive for fatigue. Negative for chills and fever.   Cardiovascular: Negative for chest pain and palpitations.   Gastrointestinal: Negative for abdominal pain, constipation, diarrhea, nausea and vomiting.   Endocrine: Negative for cold intolerance and heat intolerance.   All other systems reviewed and are negative.      Objective       Vitals:    12/19/18 1152   BP: 126/68   Pulse: 75   Weight: 98.7 kg (217 lb 9.6 oz)   Height: 154.9 cm (61\")     Body mass index is 41.12 kg/m².      Physical Exam   Constitutional: She is oriented to person, place, and time. She appears well-developed and well-nourished.   obese   HENT:   Head: Normocephalic and " atraumatic.   Eyes: EOM are normal. Pupils are equal, round, and reactive to light.   Neck: Normal range of motion. Neck supple. No thyromegaly present.   Cardiovascular: Normal rate, regular rhythm, normal heart sounds and intact distal pulses.   Pulmonary/Chest: Effort normal and breath sounds normal.   Abdominal: Soft. Bowel sounds are normal. She exhibits distension. There is no tenderness.   Musculoskeletal: Normal range of motion. She exhibits no edema.   Neurological: She is alert and oriented to person, place, and time.   Skin: Skin is warm and dry.   Psychiatric: She has a normal mood and affect. Her behavior is normal.   Nursing note and vitals reviewed.    Results Review:     I reviewed the patient's new clinical results.    Medical records reviewed  Summary:      Results Encounter on 12/13/2018   Component Date Value Ref Range Status   • Glucose 12/13/2018 238* 65 - 99 mg/dL Final   • BUN 12/13/2018 27* 8 - 23 mg/dL Final   • Creatinine 12/13/2018 1.19* 0.57 - 1.00 mg/dL Final   • eGFR Non African Am 12/13/2018 44* >60 mL/min/1.73 Final    Comment: The MDRD GFR formula is only valid for adults with stable  renal function between ages 18 and 70.     • eGFR  Am 12/13/2018 54* >60 mL/min/1.73 Final   • BUN/Creatinine Ratio 12/13/2018 22.7  7.0 - 25.0 Final   • Sodium 12/13/2018 137  136 - 145 mmol/L Final   • Potassium 12/13/2018 4.6  3.5 - 5.2 mmol/L Final   • Chloride 12/13/2018 97* 98 - 107 mmol/L Final   • Total CO2 12/13/2018 26.9  22.0 - 29.0 mmol/L Final   • Calcium 12/13/2018 9.1  8.6 - 10.5 mg/dL Final   • Total Protein 12/13/2018 5.9* 6.0 - 8.5 g/dL Final   • Albumin 12/13/2018 3.60  3.50 - 5.20 g/dL Final   • Globulin 12/13/2018 2.3  gm/dL Final   • A/G Ratio 12/13/2018 1.6  g/dL Final   • Total Bilirubin 12/13/2018 0.9  0.1 - 1.2 mg/dL Final   • Alkaline Phosphatase 12/13/2018 85  39 - 117 U/L Final   • AST (SGOT) 12/13/2018 18  1 - 32 U/L Final   • ALT (SGPT) 12/13/2018 20  1 - 33 U/L  Final   • Hemoglobin A1C 12/13/2018 7.00* 4.80 - 5.60 % Final    Comment: Hemoglobin A1C Ranges:  Increased Risk for Diabetes  5.7% to 6.4%  Diabetes                     >= 6.5%  Diabetic Goal                < 7.0%     • Free T4 12/13/2018 1.45  0.93 - 1.70 ng/dL Final   • TSH 12/13/2018 1.640  0.270 - 4.200 mIU/mL Final   • Creatinine, Urine 12/13/2018 CANCELED  mg/dL Final-Edited    Comment: [facility] was unable to collect sufficient specimen to perform the  following test(s), and is providing the patient with re-collection  instructions.    Result canceled by the ancillary.     • Microalbumin, Urine 12/13/2018 CANCELED   Final-Edited    Comment: Test not performed    Result canceled by the ancillary.     • Request Problem 12/13/2018 CANCELED   Final-Edited    Comment: [facility] was unable to collect sufficient specimen to perform the  following test(s), and is providing the patient with re-collection  instructions.        TEST:  [id number]  Albumin/Creatinine Ratio,Urine    Result canceled by the ancillary.       Lab Results   Component Value Date    HGBA1C 7.00 (H) 12/13/2018    HGBA1C 7.15 (H) 07/30/2018    HGBA1C 7.17 (H) 04/05/2018     Lab Results   Component Value Date    MICROALBUR CANCELED 12/13/2018    CREATININE 1.19 (H) 12/13/2018     Imaging Results (most recent)     None          Assessment and Plan:    Iraida was seen today for diabetes.    Diagnoses and all orders for this visit:    Uncontrolled type 2 diabetes mellitus with complication, with long-term current use of insulin (CMS/[facility])    Uncontrolled type 2 diabetes mellitus with diabetic neuropathy, with long-term current use of insulin (CMS/[facility])    Acquired hypothyroidism    Hyperinsulinism    Encounter for long-term (current) use of insulin (CMS/[facility])    Hyperlipidemia associated with type 2 diabetes mellitus (CMS/[facility])       #1 uncontrolled type 2 diabetes mellitus.  #2 uncontrolled diabetic neuropathy. Most likely related to uncontrolled hyperglycemia.  "  #3 fatigue  #4 abnormal weight gain:   #5 hypoglycemia: not many recently  #6 hypothyroidism on medication  #7 heart murmur ejection systolic      Patient forgot to bring her glucometer today  She reports her blood sugars have been in the 200-250 range for several days  She reported that since August 2018 she received several steroid injections in the back and ankle and knee and recently last week she received an epidural injections in the back which apparently helped the back pain  Her blood sugars have been elevated  Patient also reports hypoglycemia typically between 3 and 4 PM daily  She is currently taking NPH 20 units twice daily and Humalog 12 units before each meal    I advised the patient to decrease the insulin Humalog 5 units at lunch  If this does not take care of the hypoglycemia in the mid afternoon and then she is advised to decrease the NPH insulin to 15 units at breakfast as well  Advised the patient to monitor the tics closely and then call us back if she continues to be hypoglycemic  Patient will return to follow-up as scheduled in 3 months.    The total time spent  was more than 25 min of which greater than 15 min of time (greater than 50% of the total time)  was spent face to face with the patient counseling and coordination of care on recommended evaluation and treatment options, instructions for management/treatment and /or follow up and importance of compliance with chosen management or treatment options  Robert Garcia MD. FACE    12/19/18      [facility] Dragon / transcription disclaimer:     \"Dictated utilizing Dragon dictation\".         "

## 2019-01-02 ENCOUNTER — TELEPHONE (OUTPATIENT)
Dept: FAMILY MEDICINE CLINIC | Facility: CLINIC | Age: 74
End: 2019-01-02

## 2019-01-02 DIAGNOSIS — I10 ESSENTIAL HYPERTENSION: Chronic | ICD-10-CM

## 2019-01-02 RX ORDER — IRBESARTAN 150 MG/1
300 TABLET ORAL DAILY
Qty: 180 TABLET | Refills: 0 | Status: SHIPPED | OUTPATIENT
Start: 2019-01-02 | End: 2019-04-04 | Stop reason: SDUPTHER

## 2019-02-05 ENCOUNTER — OFFICE VISIT (OUTPATIENT)
Dept: PAIN MEDICINE | Facility: CLINIC | Age: 74
End: 2019-02-05

## 2019-02-05 VITALS
RESPIRATION RATE: 16 BRPM | SYSTOLIC BLOOD PRESSURE: 155 MMHG | WEIGHT: 219 LBS | BODY MASS INDEX: 41.35 KG/M2 | TEMPERATURE: 98.4 F | HEART RATE: 84 BPM | OXYGEN SATURATION: 96 % | DIASTOLIC BLOOD PRESSURE: 71 MMHG | HEIGHT: 61 IN

## 2019-02-05 DIAGNOSIS — G89.29 CHRONIC BILATERAL LOW BACK PAIN WITHOUT SCIATICA: ICD-10-CM

## 2019-02-05 DIAGNOSIS — M51.36 DDD (DEGENERATIVE DISC DISEASE), LUMBAR: ICD-10-CM

## 2019-02-05 DIAGNOSIS — M54.50 CHRONIC BILATERAL LOW BACK PAIN WITHOUT SCIATICA: ICD-10-CM

## 2019-02-05 DIAGNOSIS — G89.29 OTHER CHRONIC PAIN: Primary | ICD-10-CM

## 2019-02-05 PROCEDURE — 99214 OFFICE O/P EST MOD 30 MIN: CPT | Performed by: NURSE PRACTITIONER

## 2019-02-05 RX ORDER — HYDROCODONE BITARTRATE AND ACETAMINOPHEN 5; 325 MG/1; MG/1
1 TABLET ORAL 2 TIMES DAILY PRN
COMMUNITY
End: 2019-02-05 | Stop reason: SDUPTHER

## 2019-02-05 RX ORDER — HYDROCODONE BITARTRATE AND ACETAMINOPHEN 5; 325 MG/1; MG/1
1 TABLET ORAL 2 TIMES DAILY PRN
Qty: 60 TABLET | Refills: 0 | Status: SHIPPED | OUTPATIENT
Start: 2019-02-05 | End: 2019-03-11 | Stop reason: SDUPTHER

## 2019-02-05 RX ORDER — MELOXICAM 15 MG/1
15 TABLET ORAL DAILY
Qty: 30 TABLET | Refills: 1 | OUTPATIENT
Start: 2019-02-05 | End: 2019-03-24

## 2019-02-05 NOTE — PROGRESS NOTES
"CHIEF COMPLAINT  Pt states she received no relief from 12/12/18 L5/S1 KALEN  Walking has become \"unbearable\".    Subjective   Iraida Elizabeth is a 73 y.o. female  who presents to the office for follow-up of procedure.  She completed a LESI   on  12/12/18 performed by Dr. Casarez for management of back pain. Patient reports NO relief from the procedure.      Bilateral L3-5 Lumbar Medial Branch Blockade   on  11-21-18 performed by Dr. Casarez for management of back pain. Patient reports some minimal relief from the procedure for the day of the injection.       RIGHT L2-5 Lumbar Medial Branch Blockade  on  9-19-18 performed by Dr. Casarez for management of back pain. Patient reports minimal relief from the procedure for a week.      Right SI joint injection 8/21/2018 performed by Dr. Casarez for management of right back pain, SI joint pain. Patient reports NO relief from the procedure.      C/o back pain, severe, worsening.  Has been taking Hydrocodone 5/325 1-2/day, has been supplementing with Tylenol, Meloxicam helping (started a few months ago).  No constipation. Provides moderate relief.  was too drowsy with Hydrocodone 10/325.      Has previously failed physical therapy.      Back Pain   This is a chronic (chronic) problem. The current episode started more than 1 year ago. The problem occurs constantly. The problem has been gradually worsening since onset. The pain is present in the lumbar spine and sacro-iliac. The pain radiates to the left foot, left thigh, left knee, right foot, right knee and right thigh. The pain is at a severity of 8/10. The pain is severe. The symptoms are aggravated by bending, position, standing and twisting (laying flat). Associated symptoms include weakness (bilateral legs). Pertinent negatives include no chest pain, dysuria, headaches or numbness (R leg). She has tried analgesics for the symptoms.      PEG Assessment   What number best describes your pain on average in the past week?8  What " "number best describes how, during the past week, pain has interfered with your enjoyment of life?9  What number best describes how, during the past week, pain has interfered with your general activity?  9    The following portions of the patient's history were reviewed and updated as appropriate: allergies, current medications, past family history, past medical history, past social history, past surgical history and problem list.    Review of Systems   Constitutional: Positive for activity change (very limited). Negative for appetite change.   HENT: Positive for hearing loss. Negative for trouble swallowing.    Respiratory: Positive for shortness of breath (mild). Negative for apnea.    Cardiovascular: Negative for chest pain.   Gastrointestinal: Negative for constipation, diarrhea (colitis) and nausea.   Genitourinary: Negative for difficulty urinating and dysuria.   Musculoskeletal: Positive for back pain.   Neurological: Positive for weakness (bilateral legs). Negative for dizziness, seizures, light-headedness, numbness (R leg) and headaches.   Psychiatric/Behavioral: Positive for sleep disturbance. Negative for suicidal ideas. The patient is nervous/anxious.      Vitals:    02/05/19 1334   BP: 155/71   Pulse: 84   Resp: 16   Temp: 98.4 °F (36.9 °C)   SpO2: 96%   Weight: 99.3 kg (219 lb)   Height: 154.9 cm (60.98\")   PainSc:   8   PainLoc: Back  Comment: LBP ranges from 5-8/10       Objective   Physical Exam   Constitutional: She is oriented to person, place, and time. She appears well-developed and well-nourished. No distress.   HENT:   Head: Normocephalic and atraumatic.   Eyes: Conjunctivae and EOM are normal.   Neck: Neck supple.   Cardiovascular: Normal rate.   Pulmonary/Chest: Effort normal. No respiratory distress.   Musculoskeletal:        Lumbar back: She exhibits tenderness, bony tenderness and pain.   Neurological: She is alert and oriented to person, place, and time. She has normal strength. No " sensory deficit. Gait (ambulating with cane) abnormal.   Skin: Skin is warm and dry.   Psychiatric: She has a normal mood and affect. Her behavior is normal.   Nursing note and vitals reviewed.      Assessment/Plan   Iraida was seen today for back pain.    Diagnoses and all orders for this visit:    Other chronic pain    Chronic bilateral low back pain without sciatica    DDD (degenerative disc disease), lumbar  -     Ambulatory Referral to Neurosurgery    Other orders  -     HYDROcodone-acetaminophen (NORCO) 5-325 MG per tablet; Take 1 tablet by mouth 2 (Two) Times a Day As Needed for Severe Pain .  -     meloxicam (MOBIC) 15 MG tablet; Take 1 tablet by mouth Daily.      --- Refer to neurosurgery for evaluation - worsening back and bilateral LE pain, has failed mulitiple injections (KALEN, SI joint injection, MBB), PT  --- Refill Hydrocodone. Patient appears stable with current regimen. No adverse effects. Regarding continuation of opioids, there is no evidence of aberrant behavior or any red flags.  The patient continues with appropriate response to opioid therapy. ADL's remain intact by self.   --- The urine drug screen confirmation from 8/14/18 has been reviewed and the result is appropriate based on patient history and DONNY report  --- Follow-up 2 months or sooner if needed          DONNY REPORT    As part of the patient's treatment plan, I am prescribing controlled substances. The patient has been made aware of appropriate use of such medications, including potential risk of somnolence, limited ability to drive and/or work safely, and the potential for dependence or overdose. It has also bee made clear that these medications are for use by this patient only, without concomitant use of alcohol or other substances unless prescribed.     Patient has completed prescribing agreement detailing terms of continued prescribing of controlled substances, including monitoring DONNY reports, urine drug screening, and  pill counts if necessary. The patient is aware that inappropriate use will results in cessation of prescribing such medications.    DONNY report has been reviewed and scanned into the patient's chart.    As the clinician, I personally reviewed the DONNY from 2/4/19 while the patient was in the office today.    History and physical exam exhibit continued safe and appropriate use of controlled substances.     EMR Dragon/Transcription disclaimer:   Much of this encounter note is an electronic transcription/translation of spoken language to printed text. The electronic translation of spoken language may permit erroneous, or at times, nonsensical words or phrases to be inadvertently transcribed; Although I have reviewed the note for such errors, some may still exist.

## 2019-02-11 ENCOUNTER — TELEPHONE (OUTPATIENT)
Dept: ENDOCRINOLOGY | Age: 74
End: 2019-02-11

## 2019-02-11 NOTE — TELEPHONE ENCOUNTER
----- Message from Radha Dent sent at 2/11/2019  2:05 PM EST -----  Contact: patient   Jamila - Pharmacist with [employer] [phone] called for clarification. She said she has deleted the script that was sent for Humulin R and needs another script.   ----- Message -----  From: Radha Dent  Sent: 2/11/2019   1:51 PM  To: Lilian Walls MA    Patient said she requested Humalog Kwikpen last Wednesday 12 units morning, 6 units lunch and 6 units at dinner, said she gets 2 in box, [employer] - [phone].    She said the wrong script has been sent for Humulin R.     She said she takes Novolin N and Humalog.   She said she is out of Humalog and used the last she had this morning.       Pt - [phone]      SCRIPT SENT

## 2019-02-14 ENCOUNTER — TELEPHONE (OUTPATIENT)
Dept: ENDOCRINOLOGY | Age: 74
End: 2019-02-14

## 2019-02-14 NOTE — TELEPHONE ENCOUNTER
----- Message from Iram Walter sent at 2/14/2019  2:56 PM EST -----  Contact: Patient  Pt went to get her Novolin N prescription and her insurance no longer covers it, she gave me a number to call to do a PA but she said she is out of her medicine. Can she get samples or do you want to change it?    [phone]    Script sent for relion brand insulin

## 2019-02-22 ENCOUNTER — TELEPHONE (OUTPATIENT)
Dept: ENDOCRINOLOGY | Age: 74
End: 2019-02-22

## 2019-02-22 ENCOUNTER — PRIOR AUTHORIZATION (OUTPATIENT)
Dept: ENDOCRINOLOGY | Age: 74
End: 2019-02-22

## 2019-03-03 ENCOUNTER — RESULTS ENCOUNTER (OUTPATIENT)
Dept: FAMILY MEDICINE CLINIC | Facility: CLINIC | Age: 74
End: 2019-03-03

## 2019-03-03 DIAGNOSIS — I10 ESSENTIAL HYPERTENSION: Chronic | ICD-10-CM

## 2019-03-03 DIAGNOSIS — E03.9 ACQUIRED HYPOTHYROIDISM: ICD-10-CM

## 2019-03-03 DIAGNOSIS — E78.49 OTHER HYPERLIPIDEMIA: ICD-10-CM

## 2019-03-04 ENCOUNTER — RESULTS ENCOUNTER (OUTPATIENT)
Dept: ENDOCRINOLOGY | Age: 74
End: 2019-03-04

## 2019-03-04 DIAGNOSIS — E03.9 ACQUIRED HYPOTHYROIDISM: ICD-10-CM

## 2019-03-04 DIAGNOSIS — IMO0002 UNCONTROLLED TYPE 2 DIABETES MELLITUS WITH COMPLICATION, WITH LONG-TERM CURRENT USE OF INSULIN: Primary | ICD-10-CM

## 2019-03-04 DIAGNOSIS — IMO0002 UNCONTROLLED TYPE 2 DIABETES MELLITUS WITH COMPLICATION, WITH LONG-TERM CURRENT USE OF INSULIN: ICD-10-CM

## 2019-03-11 NOTE — TELEPHONE ENCOUNTER
Medication Refill Request    Date of phone call: 2-05-19    Medication being requested: Hydrocodone-apap 5-325 mg sig: Take 1 tablet by mouth 2 (Two) Times a Day As Needed for Severe Pain   Qty: 60 tablet    Date of last visit: 2-05-19    Date of last refill: 2-5-19    DONNY up to date?: 2-04-19    Next Follow up?: not scheduled    Any new pertinent information? (i.e, new medication allergies, new use of medications, change in patient's health or condition, non-compliance or inconsistency with prescribing agreement?): n/a

## 2019-03-12 LAB
ALBUMIN SERPL-MCNC: 4 G/DL (ref 3.5–5.2)
ALBUMIN/CREAT UR: 7.2 MG/G CREAT (ref 0–30)
ALBUMIN/GLOB SERPL: 1.7 G/DL
ALP SERPL-CCNC: 135 U/L (ref 39–117)
ALT SERPL-CCNC: 15 U/L (ref 1–33)
AST SERPL-CCNC: 17 U/L (ref 1–32)
BILIRUB SERPL-MCNC: 0.4 MG/DL (ref 0.1–1.2)
BUN SERPL-MCNC: 28 MG/DL (ref 8–23)
BUN/CREAT SERPL: 21.5 (ref 7–25)
CALCIUM SERPL-MCNC: 9.5 MG/DL (ref 8.6–10.5)
CHLORIDE SERPL-SCNC: 99 MMOL/L (ref 98–107)
CO2 SERPL-SCNC: 24.9 MMOL/L (ref 22–29)
CREAT SERPL-MCNC: 1.3 MG/DL (ref 0.57–1)
CREAT UR-MCNC: 308.1 MG/DL
GLOBULIN SER CALC-MCNC: 2.4 GM/DL
GLUCOSE SERPL-MCNC: 151 MG/DL (ref 65–99)
HBA1C MFR BLD: 7.16 % (ref 4.8–5.6)
MICROALBUMIN UR-MCNC: 22.3 UG/ML
POTASSIUM SERPL-SCNC: 4.5 MMOL/L (ref 3.5–5.2)
PROT SERPL-MCNC: 6.4 G/DL (ref 6–8.5)
SODIUM SERPL-SCNC: 140 MMOL/L (ref 136–145)
T4 FREE SERPL-MCNC: 1.24 NG/DL (ref 0.93–1.7)
TSH SERPL DL<=0.005 MIU/L-ACNC: 1.98 MIU/ML (ref 0.27–4.2)

## 2019-03-12 RX ORDER — HYDROCODONE BITARTRATE AND ACETAMINOPHEN 5; 325 MG/1; MG/1
1 TABLET ORAL 2 TIMES DAILY PRN
Qty: 60 TABLET | Refills: 0 | Status: SHIPPED | OUTPATIENT
Start: 2019-03-12 | End: 2019-04-04

## 2019-03-18 ENCOUNTER — OFFICE VISIT (OUTPATIENT)
Dept: ENDOCRINOLOGY | Age: 74
End: 2019-03-18

## 2019-03-18 VITALS
WEIGHT: 215.2 LBS | HEIGHT: 61 IN | BODY MASS INDEX: 40.63 KG/M2 | HEART RATE: 75 BPM | DIASTOLIC BLOOD PRESSURE: 60 MMHG | SYSTOLIC BLOOD PRESSURE: 118 MMHG

## 2019-03-18 DIAGNOSIS — E78.5 HYPERLIPIDEMIA ASSOCIATED WITH TYPE 2 DIABETES MELLITUS (HCC): ICD-10-CM

## 2019-03-18 DIAGNOSIS — E03.9 ACQUIRED HYPOTHYROIDISM: ICD-10-CM

## 2019-03-18 DIAGNOSIS — Z79.4 ENCOUNTER FOR LONG-TERM (CURRENT) USE OF INSULIN (HCC): ICD-10-CM

## 2019-03-18 DIAGNOSIS — IMO0002 UNCONTROLLED TYPE 2 DIABETES MELLITUS WITH DIABETIC NEUROPATHY, WITH LONG-TERM CURRENT USE OF INSULIN: Primary | ICD-10-CM

## 2019-03-18 DIAGNOSIS — E11.69 HYPERLIPIDEMIA ASSOCIATED WITH TYPE 2 DIABETES MELLITUS (HCC): ICD-10-CM

## 2019-03-18 DIAGNOSIS — IMO0002 UNCONTROLLED TYPE 2 DIABETES MELLITUS WITH COMPLICATION, WITH LONG-TERM CURRENT USE OF INSULIN: ICD-10-CM

## 2019-03-18 DIAGNOSIS — E16.1 HYPERINSULINISM: ICD-10-CM

## 2019-03-18 PROCEDURE — 99214 OFFICE O/P EST MOD 30 MIN: CPT | Performed by: INTERNAL MEDICINE

## 2019-03-18 RX ORDER — CALCIUM CARB/VITAMIN D3/VIT K1 500-100-40
TABLET,CHEWABLE ORAL
Qty: 200 EACH | Refills: 4 | Status: SHIPPED | OUTPATIENT
Start: 2019-03-18 | End: 2019-03-29 | Stop reason: SDUPTHER

## 2019-03-18 NOTE — PROGRESS NOTES
74 y.o.    Patient Care Team:  Claudio Anne MD as PCP - General  Claudio Anne MD as PCP - Family Medicine  Robert Garcia MD as Consulting Physician (Endocrinology)  Shawn Galvan IV, MD as Surgeon (Neurosurgery)    Chief Complaint:      F/U TYPE 2 DIABETES, UNCONTROLLED.  HYPOTHYROIDISM.  HERE TO DISCUSS LAB RESULTS     Subjective     HPI   Patient is a 74-year-old white female with a past history of uncontrolled type 2 diabetes mellitus came for follow-up    Uncontrolled type 2 diabetes mellitus  Patient is currently taking NPH insulin 20 units twice daily and Humalog 12 units before breakfast and 60 units before lunch and dinner  She reports that most of her blood sugars are less than 200  Hypoglycemia  Patient occasionally has hypoglycemia with a blood sugar dropping below 70  Uncontrolled type 2 diabetes mellitus neuropathy  Patient reports symptoms of tingling numbness and burning in both lower extremities  She has tried Neurontin in the past  Patient denies any knowledge of diabetic nephropathy or retinopathy  Hypothyroidism  She is currently taking levothyroxine 75 mcg daily.  She reports compliance with medication  Denies any side effects  Abnormal weight gain  Patient currently weighs 215 pounds with a BMI of 41        Interval History    SUMMARY  Patient is a known type II diabetic on oral hypoglycemics. Her blood sugars have been out of control for the past several months most of the sugars are greater than 200. She has been very hesitant to start insulin.  Patient started Levemir 7 units at bedtime along with NovoLog 3 times a day before each meal. She appears to have tolerated Levemir very well. she reported that when the blood sugar dropped to 87 she has a typical hypoglycemic reaction recently.   But she reports that the sugars are still very high. She feels very thirsty and fatigue along with the high numbers.she managed to increase the NovoLog by herself to 8-10 units before  meal.patient reports that her insurance company switch the insulin to Humalog since January.  fasting blood sugars are still elevated in the 200 range  patient is currently taking Levemir 35 units at bedtime and NovoLog 14 units before each meal  Diabetic neuropathy  patient continues to complain of tingling and numbness in both lower extremities. She is currently on gabapentin  Fatigue  Especially worse in the blood sugars are elevated usually towards the end of the day.  Abnormal weight gain  Patient currently weighs 210 pounds   she feels very frustrated regarding the weight gain but is more concerned about her blood sugars at this time.  Patient reports that she has never tried victoza.  She reported no history of pancreatitis or thyroid cancer  patient also reported that due to her 's illness she has been taking him to different office visits and has not been able to focus on herself. She has not been checking her Accu-Cheks on a regular basis. She also reports swelling in the feet for the past few months. Her last hemoglobin A1c was 8.6%      Patient started victoza in the previous visit. She reports no side effects other than mild nausea initially. She reports she lost nearly 12 pounds in the blood sugars have improved significantly. She still currently taking NovoLog 7 units before each meal if the blood sugars are greater than 100 and Levemir 40 units at bedtime.  Her victoza dose is 1.2 mg daily at night      Patient reports that January 2015 and she had a left ankle fracture and has been through rehabilitation for nearly one month and still has pain on walking and weight-bearing.. During that time her blood sugars apparently were high but for the past 2-3 weeks they have significantly improved  Patient had one episode of hypoglycemia 2 days ago when she overreacted to blood sugar of 285 and took nearly 19 units of NovoLog      Interval history  Patient reported that she is currently in the Medicare  gap and her insulin and Victoza is costing almost $160 each and is not able to afford that.  She loves Victoza since it made her lose nearly 32 pounds and controls her appetite  She is back on taking Levemir 40 units at night and victoza 1.8 mg daily  Her blood sugars have significantly improved majority are less than 160  She also reported that she is not needing NovoLog at all on most of the days.  She recently received a cortisone injection in the knee in August 2015 and her blood sugars have been elevated for a few days after that.  Patient forgot to bring her glucometer today but most blood sugars are in the 115 range         The following portions of the patient's history were reviewed and updated as appropriate: allergies, current medications, past family history, past medical history, past social history, past surgical history and problem list.    Past Medical History:   Diagnosis Date   • Arthropathy of knee 05/22/2014    unspecified   • Arthropathy, multiple sites 04/12/2012    unspecified   • Bulging lumbar disc    • Cancer (CMS/HCC)     breast   • Chronic kidney disease, stage III (moderate) (CMS/HCC) 05/26/2011   • Controlled diabetes mellitus type II without complication (CMS/HCC)    • Depression 04/02/2001   • Dyspnea    • Essential hypertension    • History of complete eye exam 03/29/2012   • History of gynecological procedure 03/01/2010    special invesitation and examinations; gynecological examination; routine gynecological examination   • Hyperlipidemia     other and unspecified   • Hypothyroidism     unspecified   • Low back pain    • Peripheral neuropathy    • Personal history of malignant neoplasm of breast 2001    R breast (negative nodes) [place] with radiation and Tamoxifen   • Thyroid disease    • Type II diabetes mellitus with neurological manifestations, uncontrolled (CMS/HCC) 01/03/2013     Family History   Problem Relation Age of Onset   • Hypertension Sister    • Thyroid disease Daughter     • Cancer Mother    • Heart disease Father      Social History     Socioeconomic History   • Marital status:      Spouse name: Not on file   • Number of children: 4   • Years of education: Not on file   • Highest education level: Not on file   Social Needs   • Financial resource strain: Not on file   • Food insecurity - worry: Not on file   • Food insecurity - inability: Not on file   • Transportation needs - medical: Not on file   • Transportation needs - non-medical: Not on file   Occupational History   • Not on file   Tobacco Use   • Smoking status: Never Smoker   • Smokeless tobacco: Never Used   Substance and Sexual Activity   • Alcohol use: No   • Drug use: No   • Sexual activity: Defer   Other Topics Concern   • Not on file   Social History Narrative   • Not on file     Allergies   Allergen Reactions   • Tizanidine Hallucinations       Current Outpatient Medications:   •  acetaminophen (TYLENOL) 325 MG tablet, Take 2 tablets by mouth Every 4 (Four) Hours As Needed for Mild Pain ., Disp: , Rfl:   •  amLODIPine (NORVASC) 10 MG tablet, Take 1 tablet by mouth Daily for 180 days., Disp: 90 tablet, Rfl: 1  •  aspirin 81 MG EC tablet, Take 81 mg by mouth daily. Take 1 tablet by oral route once daily, Disp: , Rfl:   •  atorvastatin (LIPITOR) 20 MG tablet, Take 1 tablet by mouth Every Night for 180 days., Disp: 90 tablet, Rfl: 1  •  hydrochlorothiazide (HYDRODIURIL) 25 MG tablet, Take 1 tablet by mouth Daily for 180 days., Disp: 90 tablet, Rfl: 1  •  HYDROcodone-acetaminophen (NORCO) 5-325 MG per tablet, Take 1 tablet by mouth 2 (Two) Times a Day As Needed for Severe Pain ., Disp: 60 tablet, Rfl: 0  •  Insulin Lispro (HUMALOG KWIKPEN) 200 UNIT/ML solution pen-injector, Inject 12 Units under the skin into the appropriate area as directed 3 (Three) Times a Day Before Meals., Disp: 15 mL, Rfl: 4  •  insulin lispro (HUMALOG) 100 UNIT/ML injection, Inject 12 Units under the skin into the appropriate area as  "directed 3 (Three) Times a Day Before Meals., Disp: 30 mL, Rfl: 1  •  insulin NPH (humuLIN N,novoLIN N) 100 UNIT/ML injection, Inject 20 Units under the skin into the appropriate area as directed 2 (Two) Times a Day. Before Breakfast and at Bedtime, Disp: , Rfl:   •  insulin NPH (humuLIN N,novoLIN N) 100 UNIT/ML injection, Inject 20 Units under the skin into the appropriate area as directed 2 (Two) Times a Day Before Meals., Disp: 20 mL, Rfl: 12  •  insulin NPH (NOVOLIN N) 100 UNIT/ML injection, INJECT 14 UNITS SUBCUTANEOUSLY  BEFORE BREAKFAST AND BEDTIME, Disp: 10 mL, Rfl: 3  •  insulin regular (HUMULIN R) 100 UNIT/ML injection, INJECT 6 UNITS SUBCUTANEOUSLY DAILY BEFORE BREAKFAST AND BEFORE SUPPER, Disp: 10 mL, Rfl: 2  •  irbesartan (AVAPRO) 150 MG tablet, Take 2 tablets by mouth Daily for 180 days., Disp: 180 tablet, Rfl: 0  •  levothyroxine (SYNTHROID, LEVOTHROID) 75 MCG tablet, Take 1 tablet by mouth Daily for 180 days., Disp: 90 tablet, Rfl: 1  •  melatonin 5 MG tablet tablet, Take 1 tablet by mouth Every Night., Disp: 30 tablet, Rfl: 11  •  meloxicam (MOBIC) 15 MG tablet, Take 1 tablet by mouth Daily., Disp: 30 tablet, Rfl: 1  •  PARoxetine (PAXIL) 20 MG tablet, Take 1 tablet by mouth Every Night for 180 days., Disp: 90 tablet, Rfl: 1        Review of Systems   Constitutional: Negative for chills, fatigue and fever.   Cardiovascular: Negative for chest pain and palpitations.   Gastrointestinal: Negative for abdominal pain, constipation, diarrhea, nausea and vomiting.   Endocrine: Negative for cold intolerance and heat intolerance.   All other systems reviewed and are negative.      Objective       Vitals:    03/18/19 1354   BP: 118/60   Pulse: 75   Weight: 97.6 kg (215 lb 3.2 oz)   Height: 154.9 cm (61\")     Body mass index is 40.66 kg/m².      Physical Exam   Constitutional: She is oriented to person, place, and time. She appears well-developed and well-nourished.   obese   HENT:   Head: Normocephalic and " atraumatic.   Eyes: EOM are normal. Pupils are equal, round, and reactive to light.   Neck: Normal range of motion. Neck supple. No thyromegaly present.   Cardiovascular: Normal rate, regular rhythm, normal heart sounds and intact distal pulses.   Pulmonary/Chest: Effort normal and breath sounds normal.   Abdominal: Soft. Bowel sounds are normal. She exhibits distension. There is no tenderness.   Musculoskeletal: Normal range of motion. She exhibits no edema.   Neurological: She is alert and oriented to person, place, and time.   Skin: Skin is warm and dry.   Psychiatric: She has a normal mood and affect. Her behavior is normal.   Nursing note and vitals reviewed.    Results Review:     I reviewed the patient's new clinical results.    Medical records reviewed  Summary:      Results Encounter on 03/04/2019   Component Date Value Ref Range Status   • Glucose 03/11/2019 151* 65 - 99 mg/dL Final   • BUN 03/11/2019 28* 8 - 23 mg/dL Final   • Creatinine 03/11/2019 1.30* 0.57 - 1.00 mg/dL Final   • eGFR Non African Am 03/11/2019 40* >60 mL/min/1.73 Final    Comment: The MDRD GFR formula is only valid for adults with stable  renal function between ages 18 and 70.     • eGFR  Am 03/11/2019 48* >60 mL/min/1.73 Final   • BUN/Creatinine Ratio 03/11/2019 21.5  7.0 - 25.0 Final   • Sodium 03/11/2019 140  136 - 145 mmol/L Final   • Potassium 03/11/2019 4.5  3.5 - 5.2 mmol/L Final   • Chloride 03/11/2019 99  98 - 107 mmol/L Final   • Total CO2 03/11/2019 24.9  22.0 - 29.0 mmol/L Final   • Calcium 03/11/2019 9.5  8.6 - 10.5 mg/dL Final   • Total Protein 03/11/2019 6.4  6.0 - 8.5 g/dL Final   • Albumin 03/11/2019 4.00  3.50 - 5.20 g/dL Final   • Globulin 03/11/2019 2.4  gm/dL Final   • A/G Ratio 03/11/2019 1.7  g/dL Final   • Total Bilirubin 03/11/2019 0.4  0.1 - 1.2 mg/dL Final   • Alkaline Phosphatase 03/11/2019 135* 39 - 117 U/L Final   • AST (SGOT) 03/11/2019 17  1 - 32 U/L Final   • ALT (SGPT) 03/11/2019 15  1 - 33 U/L  "Final   • Hemoglobin A1C 03/11/2019 7.16* 4.80 - 5.60 % Final    Comment: Hemoglobin A1C Ranges:  Increased Risk for Diabetes  5.7% to 6.4%  Diabetes                     >= 6.5%  Diabetic Goal                < 7.0%     • Free T4 03/11/2019 1.24  0.93 - 1.70 ng/dL Final   • TSH 03/11/2019 1.980  0.270 - 4.200 mIU/mL Final   • Creatinine, Urine 03/11/2019 308.1  Not Estab. mg/dL Final   • Microalbumin, Urine 03/11/2019 22.3  Not Estab. ug/mL Final   • Microalbumin/Creatinine Ratio 03/11/2019 7.2  0.0 - 30.0 mg/g creat Final    Comment:                      Normal:                0.0 -  30.0                       Albuminuria:          31.0 - 300.0                       Clinical albuminuria:       >300.0       Lab Results   Component Value Date    HGBA1C 7.16 (H) 03/11/2019    HGBA1C 7.00 (H) 12/13/2018    HGBA1C 7.15 (H) 07/30/2018     Lab Results   Component Value Date    MICROALBUR 22.3 03/11/2019    CREATININE 1.30 (H) 03/11/2019     Imaging Results (most recent)     None                  Assessment and Plan:    Iraida was seen today for diabetes and hypothyroidism.    Diagnoses and all orders for this visit:    Uncontrolled type 2 diabetes mellitus with diabetic neuropathy, with long-term current use of insulin (CMS/[facility])  -     Foot Care Products (DIABETIC INSOLES) misc; 1 pair of diabetic shoe  Dx E11.45    Uncontrolled type 2 diabetes mellitus with complication, with long-term current use of insulin (CMS/[facility])  -     Foot Care Products (DIABETIC INSOLES) misc; 1 pair of diabetic shoe  Dx E11.45    Acquired hypothyroidism    Hyperinsulinism    Hyperlipidemia associated with type 2 diabetes mellitus (CMS/[facility])    Encounter for long-term (current) use of insulin (CMS/[facility])    Other orders  -     insulin NPH (HUMULIN N) 100 UNIT/ML injection; 14 UNITS WITH BREAKFAST AND 10 UNITS AT DINNER SC  -     Insulin Syringe 31G X 5/16\" 0.3 ML misc; 4 SC INJECTIONS OF INSULIN DAILY    #1 uncontrolled type 2 diabetes mellitus.  #2 " "uncontrolled diabetic neuropathy. Most likely related to uncontrolled hyperglycemia.   #3 fatigue  #4 abnormal weight gain:   #5 hypoglycemia: not many recently  #6 hypothyroidism on medication  #7 heart murmur ejection systolic    Glucose log reviewed  Patient's blood sugars are in the  range  She is currently taking NPH insulin 20 units twice daily and Humalog 12 units before breakfast and 6 before lunch and 6 before dinner  Patient wants to try Novolin R insulin and stop Humalog due to cost    Patient's hemoglobin A1c is 7.1%  I advised the patient to continue NPH 20 units before breakfast and bedtime  She will take Novolin R insulin 14 units before breakfast and 10 units before supper  Patient was given written instructions  Patient will return to follow-up in 3 months.    The total time spent  was more than 25 min of which greater than 15 min of time (greater than 50% of the total time)  was spent face to face with the patient counseling and coordination of care on recommended evaluation and treatment options, instructions for management/treatment and /or follow up and importance of compliance with chosen management or treatment options    Robert Garcia MD. FACE    03/18/19      EMR Dragon / transcription disclaimer:     \"Dictated utilizing Dragon dictation\".         "

## 2019-03-18 NOTE — PATIENT INSTRUCTIONS
NPH INSULIN 20 UNITS TWICE DAILY    Reular Humulin insulin 14 units before breakfast and 10 units before dinner

## 2019-03-24 ENCOUNTER — APPOINTMENT (OUTPATIENT)
Dept: CARDIOLOGY | Facility: HOSPITAL | Age: 74
End: 2019-03-24

## 2019-03-24 ENCOUNTER — HOSPITAL ENCOUNTER (EMERGENCY)
Facility: HOSPITAL | Age: 74
Discharge: HOME | End: 2019-03-24
Attending: EMERGENCY MEDICINE | Admitting: EMERGENCY MEDICINE

## 2019-03-24 ENCOUNTER — APPOINTMENT (OUTPATIENT)
Dept: CT IMAGING | Facility: HOSPITAL | Age: 74
End: 2019-03-24

## 2019-03-24 ENCOUNTER — APPOINTMENT (OUTPATIENT)
Dept: GENERAL RADIOLOGY | Facility: HOSPITAL | Age: 74
End: 2019-03-24

## 2019-03-24 VITALS
HEIGHT: 61 IN | SYSTOLIC BLOOD PRESSURE: 152 MMHG | DIASTOLIC BLOOD PRESSURE: 60 MMHG | HEART RATE: 74 BPM | WEIGHT: 200 LBS | OXYGEN SATURATION: 97 % | BODY MASS INDEX: 37.76 KG/M2 | TEMPERATURE: 97.5 F | RESPIRATION RATE: 16 BRPM

## 2019-03-24 DIAGNOSIS — I82.4Z1 ACUTE DEEP VEIN THROMBOSIS (DVT) OF DISTAL VEIN OF RIGHT LOWER EXTREMITY (HCC): Primary | ICD-10-CM

## 2019-03-24 DIAGNOSIS — D64.9 ANEMIA, UNSPECIFIED TYPE: ICD-10-CM

## 2019-03-24 LAB
ANION GAP SERPL CALCULATED.3IONS-SCNC: 13 MMOL/L
BASOPHILS # BLD AUTO: 0.05 10*3/MM3 (ref 0–0.2)
BASOPHILS NFR BLD AUTO: 0.6 % (ref 0–1.5)
BH CV LOW VAS RIGHT GASTRONEMIUS VESSEL: 1
BH CV LOW VAS RIGHT SAPHENOFEMORAL JUNCTION SPONT: 1
BH CV LOWER VASCULAR LEFT COMMON FEMORAL AUGMENT: NORMAL
BH CV LOWER VASCULAR LEFT COMMON FEMORAL COMPETENT: NORMAL
BH CV LOWER VASCULAR LEFT COMMON FEMORAL COMPRESS: NORMAL
BH CV LOWER VASCULAR LEFT COMMON FEMORAL PHASIC: NORMAL
BH CV LOWER VASCULAR LEFT COMMON FEMORAL SPONT: NORMAL
BH CV LOWER VASCULAR RIGHT COMMON FEMORAL AUGMENT: NORMAL
BH CV LOWER VASCULAR RIGHT COMMON FEMORAL COMPETENT: NORMAL
BH CV LOWER VASCULAR RIGHT COMMON FEMORAL COMPRESS: NORMAL
BH CV LOWER VASCULAR RIGHT COMMON FEMORAL PHASIC: NORMAL
BH CV LOWER VASCULAR RIGHT COMMON FEMORAL SPONT: NORMAL
BH CV LOWER VASCULAR RIGHT DISTAL FEMORAL COMPRESS: NORMAL
BH CV LOWER VASCULAR RIGHT GASTRONEMIUS COMPRESS: NORMAL
BH CV LOWER VASCULAR RIGHT GASTRONEMIUS THROMBUS: NORMAL
BH CV LOWER VASCULAR RIGHT GREATER SAPH AK COMPRESS: NORMAL
BH CV LOWER VASCULAR RIGHT GREATER SAPH BK COMPRESS: NORMAL
BH CV LOWER VASCULAR RIGHT LESSER SAPH COMPRESS: NORMAL
BH CV LOWER VASCULAR RIGHT MID FEMORAL AUGMENT: NORMAL
BH CV LOWER VASCULAR RIGHT MID FEMORAL COMPETENT: NORMAL
BH CV LOWER VASCULAR RIGHT MID FEMORAL COMPRESS: NORMAL
BH CV LOWER VASCULAR RIGHT MID FEMORAL PHASIC: NORMAL
BH CV LOWER VASCULAR RIGHT MID FEMORAL SPONT: NORMAL
BH CV LOWER VASCULAR RIGHT PERONEAL COMPRESS: NORMAL
BH CV LOWER VASCULAR RIGHT POPLITEAL AUGMENT: NORMAL
BH CV LOWER VASCULAR RIGHT POPLITEAL COMPETENT: NORMAL
BH CV LOWER VASCULAR RIGHT POPLITEAL COMPRESS: NORMAL
BH CV LOWER VASCULAR RIGHT POPLITEAL PHASIC: NORMAL
BH CV LOWER VASCULAR RIGHT POPLITEAL SPONT: NORMAL
BH CV LOWER VASCULAR RIGHT POSTERIOR TIBIAL COMPRESS: NORMAL
BH CV LOWER VASCULAR RIGHT PROXIMAL FEMORAL COMPRESS: NORMAL
BH CV LOWER VASCULAR RIGHT SAPHENOFEMORAL JUNCTION AUGMENT: NORMAL
BH CV LOWER VASCULAR RIGHT SAPHENOFEMORAL JUNCTION COMPETENT: NORMAL
BH CV LOWER VASCULAR RIGHT SAPHENOFEMORAL JUNCTION COMPRESS: NORMAL
BH CV LOWER VASCULAR RIGHT SAPHENOFEMORAL JUNCTION PHASIC: NORMAL
BH CV LOWER VASCULAR RIGHT SAPHENOFEMORAL JUNCTION SPONT: NORMAL
BUN BLD-MCNC: 31 MG/DL (ref 8–23)
BUN/CREAT SERPL: 24.4 (ref 7–25)
CALCIUM SPEC-SCNC: 8.8 MG/DL (ref 8.6–10.5)
CHLORIDE SERPL-SCNC: 102 MMOL/L (ref 98–107)
CO2 SERPL-SCNC: 26 MMOL/L (ref 22–29)
CREAT BLD-MCNC: 1.27 MG/DL (ref 0.57–1)
DEPRECATED RDW RBC AUTO: 48.6 FL (ref 37–54)
EOSINOPHIL # BLD AUTO: 0.32 10*3/MM3 (ref 0–0.4)
EOSINOPHIL NFR BLD AUTO: 3.9 % (ref 0.3–6.2)
ERYTHROCYTE [DISTWIDTH] IN BLOOD BY AUTOMATED COUNT: 15.5 % (ref 12.3–15.4)
GFR SERPL CREATININE-BSD FRML MDRD: 41 ML/MIN/1.73
GLUCOSE BLD-MCNC: 238 MG/DL (ref 65–99)
HCT VFR BLD AUTO: 35.7 % (ref 34–46.6)
HGB BLD-MCNC: 10.6 G/DL (ref 12–15.9)
IMM GRANULOCYTES # BLD AUTO: 0.04 10*3/MM3 (ref 0–0.05)
IMM GRANULOCYTES NFR BLD AUTO: 0.5 % (ref 0–0.5)
LYMPHOCYTES # BLD AUTO: 0.92 10*3/MM3 (ref 0.7–3.1)
LYMPHOCYTES NFR BLD AUTO: 11.2 % (ref 19.6–45.3)
MCH RBC QN AUTO: 25.6 PG (ref 26.6–33)
MCHC RBC AUTO-ENTMCNC: 29.7 G/DL (ref 31.5–35.7)
MCV RBC AUTO: 86.2 FL (ref 79–97)
MONOCYTES # BLD AUTO: 0.47 10*3/MM3 (ref 0.1–0.9)
MONOCYTES NFR BLD AUTO: 5.7 % (ref 5–12)
NEUTROPHILS # BLD AUTO: 6.44 10*3/MM3 (ref 1.4–7)
NEUTROPHILS NFR BLD AUTO: 78.1 % (ref 42.7–76)
NRBC BLD AUTO-RTO: 0 /100 WBC (ref 0–0)
PLATELET # BLD AUTO: 320 10*3/MM3 (ref 140–450)
PMV BLD AUTO: 11.5 FL (ref 6–12)
POTASSIUM BLD-SCNC: 4.6 MMOL/L (ref 3.5–5.2)
RBC # BLD AUTO: 4.14 10*6/MM3 (ref 3.77–5.28)
SODIUM BLD-SCNC: 141 MMOL/L (ref 136–145)
WBC NRBC COR # BLD: 8.24 10*3/MM3 (ref 3.4–10.8)

## 2019-03-24 PROCEDURE — 93005 ELECTROCARDIOGRAM TRACING: CPT | Performed by: NURSE PRACTITIONER

## 2019-03-24 PROCEDURE — 71275 CT ANGIOGRAPHY CHEST: CPT

## 2019-03-24 PROCEDURE — 36415 COLL VENOUS BLD VENIPUNCTURE: CPT

## 2019-03-24 PROCEDURE — 0 IOPAMIDOL PER 1 ML: Performed by: EMERGENCY MEDICINE

## 2019-03-24 PROCEDURE — 93971 EXTREMITY STUDY: CPT

## 2019-03-24 PROCEDURE — 85025 COMPLETE CBC W/AUTO DIFF WBC: CPT | Performed by: NURSE PRACTITIONER

## 2019-03-24 PROCEDURE — 80048 BASIC METABOLIC PNL TOTAL CA: CPT | Performed by: NURSE PRACTITIONER

## 2019-03-24 PROCEDURE — 73560 X-RAY EXAM OF KNEE 1 OR 2: CPT

## 2019-03-24 PROCEDURE — 93010 ELECTROCARDIOGRAM REPORT: CPT | Performed by: INTERNAL MEDICINE

## 2019-03-24 PROCEDURE — 99284 EMERGENCY DEPT VISIT MOD MDM: CPT

## 2019-03-24 RX ORDER — HYDROCODONE BITARTRATE AND ACETAMINOPHEN 7.5; 325 MG/1; MG/1
1 TABLET ORAL ONCE
Status: COMPLETED | OUTPATIENT
Start: 2019-03-24 | End: 2019-03-24

## 2019-03-24 RX ORDER — HYDROCODONE BITARTRATE AND ACETAMINOPHEN 5; 325 MG/1; MG/1
1 TABLET ORAL EVERY 4 HOURS PRN
Qty: 12 TABLET | Refills: 0 | OUTPATIENT
Start: 2019-03-24 | End: 2019-04-14

## 2019-03-24 RX ORDER — SODIUM CHLORIDE 0.9 % (FLUSH) 0.9 %
10 SYRINGE (ML) INJECTION AS NEEDED
Status: DISCONTINUED | OUTPATIENT
Start: 2019-03-24 | End: 2019-03-24 | Stop reason: HOSPADM

## 2019-03-24 RX ADMIN — HYDROCODONE BITARTRATE AND ACETAMINOPHEN 1 TABLET: 7.5; 325 TABLET ORAL at 12:51

## 2019-03-24 RX ADMIN — IOPAMIDOL 95 ML: 755 INJECTION, SOLUTION INTRAVENOUS at 14:02

## 2019-03-29 DIAGNOSIS — E16.1 HYPOGLYCEMIA DUE TO ENDOGENOUS HYPERINSULINEMIA: ICD-10-CM

## 2019-03-29 RX ORDER — CALCIUM CARB/VITAMIN D3/VIT K1 500-100-40
TABLET,CHEWABLE ORAL
Qty: 200 EACH | Refills: 4 | Status: SHIPPED | OUTPATIENT
Start: 2019-03-29 | End: 2020-10-07 | Stop reason: SDUPTHER

## 2019-03-29 RX ORDER — SYRINGE-NEEDLE,INSULIN,0.5 ML 27GX1/2"
SYRINGE, EMPTY DISPOSABLE MISCELLANEOUS
Qty: 100 EACH | Refills: 3 | Status: SHIPPED | OUTPATIENT
Start: 2019-03-29

## 2019-03-29 NOTE — TELEPHONE ENCOUNTER
"----- Message from Alva Saez sent at 3/29/2019 12:22 PM EDT -----  Contact: patient  Please send in script  They never got it      Insulin Syringe 31G X \" 0.3 ML misc 200 each 4 3/18/2019    Si SC INJECTIONS OF INSULIN DAILY   Class: Print   Pharmacy     [address] [address] [facility] [place] [phone] [facility] [phone] FX      SCRIPT SENT      "

## 2019-04-04 ENCOUNTER — OFFICE VISIT (OUTPATIENT)
Dept: FAMILY MEDICINE CLINIC | Facility: CLINIC | Age: 74
End: 2019-04-04

## 2019-04-04 VITALS
WEIGHT: 202 LBS | HEIGHT: 61 IN | DIASTOLIC BLOOD PRESSURE: 68 MMHG | HEART RATE: 96 BPM | RESPIRATION RATE: 20 BRPM | OXYGEN SATURATION: 96 % | SYSTOLIC BLOOD PRESSURE: 128 MMHG | BODY MASS INDEX: 38.14 KG/M2 | TEMPERATURE: 97.3 F

## 2019-04-04 DIAGNOSIS — I10 ESSENTIAL HYPERTENSION: Chronic | ICD-10-CM

## 2019-04-04 DIAGNOSIS — E78.49 OTHER HYPERLIPIDEMIA: ICD-10-CM

## 2019-04-04 DIAGNOSIS — E03.9 ACQUIRED HYPOTHYROIDISM: ICD-10-CM

## 2019-04-04 DIAGNOSIS — I82.4Y1 ACUTE DEEP VEIN THROMBOSIS (DVT) OF PROXIMAL VEIN OF RIGHT LOWER EXTREMITY (HCC): Primary | ICD-10-CM

## 2019-04-04 DIAGNOSIS — F32.5 MAJOR DEPRESSIVE DISORDER WITH SINGLE EPISODE, IN FULL REMISSION (HCC): ICD-10-CM

## 2019-04-04 PROBLEM — I82.4Y9 ACUTE DEEP VEIN THROMBOSIS (DVT) OF PROXIMAL VEIN OF LOWER EXTREMITY (HCC): Status: ACTIVE | Noted: 2019-03-24

## 2019-04-04 PROCEDURE — 99214 OFFICE O/P EST MOD 30 MIN: CPT | Performed by: FAMILY MEDICINE

## 2019-04-04 RX ORDER — PAROXETINE HYDROCHLORIDE 20 MG/1
20 TABLET, FILM COATED ORAL NIGHTLY
Qty: 90 TABLET | Refills: 1 | Status: SHIPPED | OUTPATIENT
Start: 2019-04-04 | End: 2019-10-02 | Stop reason: SDUPTHER

## 2019-04-04 RX ORDER — HYDROCHLOROTHIAZIDE 25 MG/1
25 TABLET ORAL DAILY
Qty: 90 TABLET | Refills: 1 | Status: SHIPPED | OUTPATIENT
Start: 2019-04-04 | End: 2019-10-02 | Stop reason: ALTCHOICE

## 2019-04-04 RX ORDER — IRBESARTAN 150 MG/1
300 TABLET ORAL DAILY
Qty: 180 TABLET | Refills: 1 | Status: SHIPPED | OUTPATIENT
Start: 2019-04-04 | End: 2019-04-11 | Stop reason: RX

## 2019-04-04 RX ORDER — LEVOTHYROXINE SODIUM 0.07 MG/1
75 TABLET ORAL DAILY
Qty: 90 TABLET | Refills: 1 | Status: SHIPPED | OUTPATIENT
Start: 2019-04-04 | End: 2019-10-02 | Stop reason: SDUPTHER

## 2019-04-04 RX ORDER — AMLODIPINE BESYLATE 10 MG/1
10 TABLET ORAL DAILY
Qty: 90 TABLET | Refills: 1 | Status: SHIPPED | OUTPATIENT
Start: 2019-04-04 | End: 2019-10-02 | Stop reason: ALTCHOICE

## 2019-04-04 RX ORDER — IRBESARTAN 150 MG/1
TABLET ORAL
Qty: 180 TABLET | Refills: 0 | Status: SHIPPED | OUTPATIENT
Start: 2019-04-04 | End: 2019-04-04 | Stop reason: SDUPTHER

## 2019-04-04 RX ORDER — ATORVASTATIN CALCIUM 20 MG/1
20 TABLET, FILM COATED ORAL NIGHTLY
Qty: 90 TABLET | Refills: 1 | Status: SHIPPED | OUTPATIENT
Start: 2019-04-04 | End: 2019-10-02 | Stop reason: SDUPTHER

## 2019-04-04 NOTE — PROGRESS NOTES
"Chief Complaint   Patient presents with   • Hospital F/U     Pt. had a blood clot        Subjective     Iraida Elizabeth presents to the office today to refill her medications. No medication side effects are reported.  This patient returns the office after her emergency room visit.  She was diagnosed with acute DVT of the right lower calf.  Pulmonary embolus was ruled out.  She was started on Xarelto starter pack and is here for further evaluation.  Current problem is still the discomfort in the right leg especially around the right knee area.  She does have some swelling of the leg.  No side effects with the Xarelto so far.    I have reviewed and updated her medications, medical history and problem list during today's office visit.     Patient Care Team:  Claudio Anne MD as PCP - General  Claudio Anne MD as PCP - Family Medicine  Robert Garcia MD as Consulting Physician (Endocrinology)  Shawn Galvan IV, MD as Surgeon (Neurosurgery)    Social History     Tobacco Use   • Smoking status: Never Smoker   • Smokeless tobacco: Never Used   Substance Use Topics   • Alcohol use: No       Review of Systems   Constitutional:        See HPI   Respiratory: Positive for shortness of breath.        Objective     /68   Pulse 96   Temp 97.3 °F (36.3 °C) (Oral)   Resp 20   Ht 154.9 cm (61\")   Wt 91.6 kg (202 lb)   SpO2 96%   BMI 38.17 kg/m²     Body mass index is 38.17 kg/m².    Physical Exam   Constitutional: She is oriented to person, place, and time. She appears well-developed. No distress.   Eyes: Conjunctivae and lids are normal.   Neck: Carotid bruit is not present.   Cardiovascular: Normal rate, regular rhythm and normal heart sounds.   Pulmonary/Chest: Effort normal and breath sounds normal.   Musculoskeletal:   Tender right calf.  Patient has tenderness along the patella as well.  Some edema noted right calf.   Neurological: She is alert and oriented to person, place, and time.   Skin: Skin is " warm and dry.   Psychiatric: She has a normal mood and affect. Her behavior is normal.   Vitals reviewed.      Data Reviewed:                    Assessment/Plan     Diagnoses and all orders for this visit:    1. Acute deep vein thrombosis (DVT) of proximal vein of right lower extremity (CMS/HCC) (Primary)  -     rivaroxaban (XARELTO) 20 MG tablet; Take 1 tablet by mouth Daily for 90 days.  Dispense: 30 tablet; Refill: 2    2. Major depressive disorder with single episode, in full remission (CMS/HCC)  -     PARoxetine (PAXIL) 20 MG tablet; Take 1 tablet by mouth Every Night for 180 days.  Dispense: 90 tablet; Refill: 1    3. Acquired hypothyroidism  -     levothyroxine (SYNTHROID, LEVOTHROID) 75 MCG tablet; Take 1 tablet by mouth Daily for 180 days.  Dispense: 90 tablet; Refill: 1    4. Essential hypertension  -     irbesartan (AVAPRO) 150 MG tablet; Take 2 tablets by mouth Daily for 180 days.  Dispense: 180 tablet; Refill: 1  -     hydrochlorothiazide (HYDRODIURIL) 25 MG tablet; Take 1 tablet by mouth Daily for 180 days.  Dispense: 90 tablet; Refill: 1  -     amLODIPine (NORVASC) 10 MG tablet; Take 1 tablet by mouth Daily for 180 days.  Dispense: 90 tablet; Refill: 1  -     Comprehensive Metabolic Panel; Future  -     CBC & Differential; Future    5. Other hyperlipidemia  -     atorvastatin (LIPITOR) 20 MG tablet; Take 1 tablet by mouth Every Night for 180 days.  Dispense: 90 tablet; Refill: 1  -     NMR LipoProfile; Future          Orders Placed This Encounter   Procedures   • Comprehensive Metabolic Panel     Standing Status:   Future     Standing Expiration Date:   10/1/2019   • NMR LipoProfile     Standing Status:   Future     Standing Expiration Date:   4/4/2020   • CBC & Differential     Standing Status:   Future     Standing Expiration Date:   10/1/2019     Order Specific Question:   Manual Differential     Answer:   No         Current Outpatient Medications:   •  amLODIPine (NORVASC) 10 MG tablet, Take 1  "tablet by mouth Daily for 180 days., Disp: 90 tablet, Rfl: 1  •  atorvastatin (LIPITOR) 20 MG tablet, Take 1 tablet by mouth Every Night for 180 days., Disp: 90 tablet, Rfl: 1  •  hydrochlorothiazide (HYDRODIURIL) 25 MG tablet, Take 1 tablet by mouth Daily for 180 days., Disp: 90 tablet, Rfl: 1  •  HYDROcodone-acetaminophen (NORCO) 5-325 MG per tablet, Take 1 tablet by mouth Every 4 (Four) Hours As Needed for Moderate Pain ., Disp: 12 tablet, Rfl: 0  •  insulin NPH (humuLIN N,novoLIN N) 100 UNIT/ML injection, Inject 100 Units under the skin into the appropriate area as directed 2 (Two) Times a Day. Before Breakfast and at Bedtime , Disp: , Rfl:   •  insulin regular (HUMULIN R) 100 UNIT/ML injection, INJECT 6 UNITS SUBCUTANEOUSLY DAILY BEFORE BREAKFAST AND BEFORE SUPPER (Patient taking differently: INJECT 14 UNITS SUBCUTANEOUSLY DAILY BEFORE BREAKFAST AND BEFORE SUPPER), Disp: 10 mL, Rfl: 2  •  Insulin Syringe 31G X 5/16\" 0.3 ML misc, 4 SC INJECTIONS OF INSULIN DAILY, Disp: 200 each, Rfl: 4  •  Insulin Syringe-Needle U-100 (TRUEPLUS INSULIN SYRINGE) 31G X 5/16\" 1 ML misc, USE FOUR TIMES A DAY AS DIRECTED, Disp: 100 each, Rfl: 3  •  irbesartan (AVAPRO) 150 MG tablet, Take 2 tablets by mouth Daily for 180 days., Disp: 180 tablet, Rfl: 1  •  levothyroxine (SYNTHROID, LEVOTHROID) 75 MCG tablet, Take 1 tablet by mouth Daily for 180 days., Disp: 90 tablet, Rfl: 1  •  melatonin 5 MG tablet tablet, Take 1 tablet by mouth Every Night., Disp: 30 tablet, Rfl: 11  •  ONE TOUCH ULTRA TEST test strip, USE TO TEST BLOOD SUGAR FOUR TIMES DAILY AS DIRECTED, Disp: 100 each, Rfl: 4  •  PARoxetine (PAXIL) 20 MG tablet, Take 1 tablet by mouth Every Night for 180 days., Disp: 90 tablet, Rfl: 1  •  Rivaroxaban (XARELTO STARTER PACK) tablet therapy pack starter pack, Take one 15 mg tablet twice daily with food for 21 days.  Followed by one 20 mg tablet by mouth once daily with food. Take as directed, Disp: 51 each, Rfl: 0  •  Foot Care " Products (DIABETIC INSOLES) misc, 1 pair of diabetic shoe Dx E11.45, Disp: 1 each, Rfl: 0  •  insulin NPH (HUMULIN N) 100 UNIT/ML injection, 14 UNITS WITH BREAKFAST AND 10 UNITS AT DINNER SC, Disp: 10 mL, Rfl: 5  •  rivaroxaban (XARELTO) 20 MG tablet, Take 1 tablet by mouth Daily for 90 days., Disp: 30 tablet, Rfl: 2    Return in about 6 weeks (around 5/16/2019) for Recheck.

## 2019-04-09 ENCOUNTER — TELEPHONE (OUTPATIENT)
Dept: FAMILY MEDICINE CLINIC | Facility: CLINIC | Age: 74
End: 2019-04-09

## 2019-04-09 DIAGNOSIS — I82.4Y1 ACUTE DEEP VEIN THROMBOSIS (DVT) OF PROXIMAL VEIN OF RIGHT LOWER EXTREMITY (HCC): Primary | ICD-10-CM

## 2019-04-10 ENCOUNTER — TELEPHONE (OUTPATIENT)
Dept: FAMILY MEDICINE CLINIC | Facility: CLINIC | Age: 74
End: 2019-04-10

## 2019-04-10 DIAGNOSIS — I10 ESSENTIAL HYPERTENSION: Primary | Chronic | ICD-10-CM

## 2019-04-10 RX ORDER — TRAMADOL HYDROCHLORIDE 50 MG/1
TABLET ORAL
Qty: 24 TABLET | Refills: 0 | Status: SHIPPED | OUTPATIENT
Start: 2019-04-10 | End: 2019-05-01 | Stop reason: ALTCHOICE

## 2019-04-10 NOTE — TELEPHONE ENCOUNTER
Emergency prescription tramadol sent to pharmacy of choice, call and let her know. Appointment Monday to recheck.

## 2019-04-11 RX ORDER — TELMISARTAN 80 MG/1
80 TABLET ORAL DAILY
Qty: 30 TABLET | Refills: 1 | Status: SHIPPED | OUTPATIENT
Start: 2019-04-11 | End: 2019-05-06

## 2019-04-12 ENCOUNTER — TELEPHONE (OUTPATIENT)
Dept: FAMILY MEDICINE CLINIC | Facility: CLINIC | Age: 74
End: 2019-04-12

## 2019-04-12 NOTE — TELEPHONE ENCOUNTER
Pt ask if you can send something more cost effective she says that telmasrtan is $42. She states she cant afford it.

## 2019-04-14 ENCOUNTER — APPOINTMENT (OUTPATIENT)
Dept: CARDIOLOGY | Facility: HOSPITAL | Age: 74
End: 2019-04-14

## 2019-04-14 ENCOUNTER — HOSPITAL ENCOUNTER (EMERGENCY)
Facility: HOSPITAL | Age: 74
Discharge: HOME | End: 2019-04-14
Attending: EMERGENCY MEDICINE | Admitting: EMERGENCY MEDICINE

## 2019-04-14 VITALS
SYSTOLIC BLOOD PRESSURE: 137 MMHG | RESPIRATION RATE: 16 BRPM | OXYGEN SATURATION: 95 % | WEIGHT: 199 LBS | BODY MASS INDEX: 37.57 KG/M2 | HEART RATE: 74 BPM | HEIGHT: 61 IN | DIASTOLIC BLOOD PRESSURE: 81 MMHG | TEMPERATURE: 98.8 F

## 2019-04-14 DIAGNOSIS — I82.441 ACUTE DEEP VEIN THROMBOSIS (DVT) OF TIBIAL VEIN OF RIGHT LOWER EXTREMITY (HCC): ICD-10-CM

## 2019-04-14 DIAGNOSIS — M25.561 ACUTE PAIN OF RIGHT KNEE: Primary | ICD-10-CM

## 2019-04-14 DIAGNOSIS — R60.0 LEG EDEMA, RIGHT: ICD-10-CM

## 2019-04-14 LAB
ALBUMIN SERPL-MCNC: 3.7 G/DL (ref 3.5–5.2)
ALBUMIN/GLOB SERPL: 1.2 G/DL
ALP SERPL-CCNC: 102 U/L (ref 39–117)
ALT SERPL W P-5'-P-CCNC: 15 U/L (ref 1–33)
ANION GAP SERPL CALCULATED.3IONS-SCNC: 15 MMOL/L
APTT PPP: 31.9 SECONDS (ref 22.7–35.4)
AST SERPL-CCNC: 17 U/L (ref 1–32)
BASOPHILS # BLD AUTO: 0.03 10*3/MM3 (ref 0–0.2)
BASOPHILS NFR BLD AUTO: 0.3 % (ref 0–1.5)
BH CV LOW VAS RIGHT SAPHENOFEMORAL JUNCTION SPONT: 1
BH CV LOWER VASCULAR LEFT COMMON FEMORAL AUGMENT: NORMAL
BH CV LOWER VASCULAR LEFT COMMON FEMORAL COMPETENT: NORMAL
BH CV LOWER VASCULAR LEFT COMMON FEMORAL COMPRESS: NORMAL
BH CV LOWER VASCULAR LEFT COMMON FEMORAL PHASIC: NORMAL
BH CV LOWER VASCULAR LEFT COMMON FEMORAL SPONT: NORMAL
BH CV LOWER VASCULAR RIGHT COMMON FEMORAL AUGMENT: NORMAL
BH CV LOWER VASCULAR RIGHT COMMON FEMORAL COMPETENT: NORMAL
BH CV LOWER VASCULAR RIGHT COMMON FEMORAL COMPRESS: NORMAL
BH CV LOWER VASCULAR RIGHT COMMON FEMORAL PHASIC: NORMAL
BH CV LOWER VASCULAR RIGHT COMMON FEMORAL SPONT: NORMAL
BH CV LOWER VASCULAR RIGHT DISTAL FEMORAL COMPRESS: NORMAL
BH CV LOWER VASCULAR RIGHT GASTRONEMIUS COMPRESS: NORMAL
BH CV LOWER VASCULAR RIGHT GREATER SAPH AK COMPRESS: NORMAL
BH CV LOWER VASCULAR RIGHT MID FEMORAL AUGMENT: NORMAL
BH CV LOWER VASCULAR RIGHT MID FEMORAL COMPETENT: NORMAL
BH CV LOWER VASCULAR RIGHT MID FEMORAL COMPRESS: NORMAL
BH CV LOWER VASCULAR RIGHT MID FEMORAL PHASIC: NORMAL
BH CV LOWER VASCULAR RIGHT MID FEMORAL SPONT: NORMAL
BH CV LOWER VASCULAR RIGHT PERONEAL COMPRESS: NORMAL
BH CV LOWER VASCULAR RIGHT POPLITEAL AUGMENT: NORMAL
BH CV LOWER VASCULAR RIGHT POPLITEAL COMPETENT: NORMAL
BH CV LOWER VASCULAR RIGHT POPLITEAL COMPRESS: NORMAL
BH CV LOWER VASCULAR RIGHT POPLITEAL PHASIC: NORMAL
BH CV LOWER VASCULAR RIGHT POPLITEAL SPONT: NORMAL
BH CV LOWER VASCULAR RIGHT POSTERIOR TIBIAL COMPRESS: NORMAL
BH CV LOWER VASCULAR RIGHT PROXIMAL FEMORAL COMPRESS: NORMAL
BH CV LOWER VASCULAR RIGHT SAPHENOFEMORAL JUNCTION AUGMENT: NORMAL
BH CV LOWER VASCULAR RIGHT SAPHENOFEMORAL JUNCTION COMPETENT: NORMAL
BH CV LOWER VASCULAR RIGHT SAPHENOFEMORAL JUNCTION COMPRESS: NORMAL
BH CV LOWER VASCULAR RIGHT SAPHENOFEMORAL JUNCTION PHASIC: NORMAL
BH CV LOWER VASCULAR RIGHT SAPHENOFEMORAL JUNCTION SPONT: NORMAL
BH CV LOWER VASCULAR RIGHT VARICOSITY AK COMPRESS: NORMAL
BILIRUB SERPL-MCNC: 0.4 MG/DL (ref 0.2–1.2)
BUN BLD-MCNC: 32 MG/DL (ref 8–23)
BUN/CREAT SERPL: 26 (ref 7–25)
CALCIUM SPEC-SCNC: 9.3 MG/DL (ref 8.6–10.5)
CHLORIDE SERPL-SCNC: 96 MMOL/L (ref 98–107)
CO2 SERPL-SCNC: 26 MMOL/L (ref 22–29)
CREAT BLD-MCNC: 1.23 MG/DL (ref 0.57–1)
DEPRECATED RDW RBC AUTO: 47.7 FL (ref 37–54)
EOSINOPHIL # BLD AUTO: 0.32 10*3/MM3 (ref 0–0.4)
EOSINOPHIL NFR BLD AUTO: 3.7 % (ref 0.3–6.2)
ERYTHROCYTE [DISTWIDTH] IN BLOOD BY AUTOMATED COUNT: 15.9 % (ref 12.3–15.4)
GFR SERPL CREATININE-BSD FRML MDRD: 42 ML/MIN/1.73
GLOBULIN UR ELPH-MCNC: 3 GM/DL
GLUCOSE BLD-MCNC: 164 MG/DL (ref 65–99)
HCT VFR BLD AUTO: 34.2 % (ref 34–46.6)
HGB BLD-MCNC: 10.4 G/DL (ref 12–15.9)
IMM GRANULOCYTES # BLD AUTO: 0.02 10*3/MM3 (ref 0–0.05)
IMM GRANULOCYTES NFR BLD AUTO: 0.2 % (ref 0–0.5)
INR PPP: 2.28 (ref 0.9–1.1)
LYMPHOCYTES # BLD AUTO: 1.24 10*3/MM3 (ref 0.7–3.1)
LYMPHOCYTES NFR BLD AUTO: 14.2 % (ref 19.6–45.3)
MCH RBC QN AUTO: 25.2 PG (ref 26.6–33)
MCHC RBC AUTO-ENTMCNC: 30.4 G/DL (ref 31.5–35.7)
MCV RBC AUTO: 82.8 FL (ref 79–97)
MONOCYTES # BLD AUTO: 0.72 10*3/MM3 (ref 0.1–0.9)
MONOCYTES NFR BLD AUTO: 8.2 % (ref 5–12)
NEUTROPHILS # BLD AUTO: 6.42 10*3/MM3 (ref 1.4–7)
NEUTROPHILS NFR BLD AUTO: 73.4 % (ref 42.7–76)
NRBC BLD AUTO-RTO: 0 /100 WBC (ref 0–0)
PLATELET # BLD AUTO: 367 10*3/MM3 (ref 140–450)
PMV BLD AUTO: 10.8 FL (ref 6–12)
POTASSIUM BLD-SCNC: 3.8 MMOL/L (ref 3.5–5.2)
PROT SERPL-MCNC: 6.7 G/DL (ref 6–8.5)
PROTHROMBIN TIME: 24.8 SECONDS (ref 11.7–14.2)
RBC # BLD AUTO: 4.13 10*6/MM3 (ref 3.77–5.28)
SODIUM BLD-SCNC: 137 MMOL/L (ref 136–145)
WBC NRBC COR # BLD: 8.75 10*3/MM3 (ref 3.4–10.8)

## 2019-04-14 PROCEDURE — 96375 TX/PRO/DX INJ NEW DRUG ADDON: CPT

## 2019-04-14 PROCEDURE — 93971 EXTREMITY STUDY: CPT

## 2019-04-14 PROCEDURE — 85025 COMPLETE CBC W/AUTO DIFF WBC: CPT | Performed by: EMERGENCY MEDICINE

## 2019-04-14 PROCEDURE — 99284 EMERGENCY DEPT VISIT MOD MDM: CPT

## 2019-04-14 PROCEDURE — 85730 THROMBOPLASTIN TIME PARTIAL: CPT | Performed by: EMERGENCY MEDICINE

## 2019-04-14 PROCEDURE — 85610 PROTHROMBIN TIME: CPT | Performed by: EMERGENCY MEDICINE

## 2019-04-14 PROCEDURE — 96374 THER/PROPH/DIAG INJ IV PUSH: CPT

## 2019-04-14 PROCEDURE — 25010000002 MORPHINE PER 10 MG: Performed by: EMERGENCY MEDICINE

## 2019-04-14 PROCEDURE — 25010000002 ONDANSETRON PER 1 MG: Performed by: EMERGENCY MEDICINE

## 2019-04-14 PROCEDURE — 80053 COMPREHEN METABOLIC PANEL: CPT | Performed by: EMERGENCY MEDICINE

## 2019-04-14 RX ORDER — HYDROCODONE BITARTRATE AND ACETAMINOPHEN 5; 325 MG/1; MG/1
1 TABLET ORAL EVERY 6 HOURS PRN
Qty: 20 TABLET | Refills: 0 | Status: SHIPPED | OUTPATIENT
Start: 2019-04-14 | End: 2019-05-01 | Stop reason: SDUPTHER

## 2019-04-14 RX ORDER — MORPHINE SULFATE 2 MG/ML
2 INJECTION, SOLUTION INTRAMUSCULAR; INTRAVENOUS ONCE
Status: COMPLETED | OUTPATIENT
Start: 2019-04-14 | End: 2019-04-14

## 2019-04-14 RX ORDER — ONDANSETRON 2 MG/ML
4 INJECTION INTRAMUSCULAR; INTRAVENOUS ONCE
Status: COMPLETED | OUTPATIENT
Start: 2019-04-14 | End: 2019-04-14

## 2019-04-14 RX ADMIN — ONDANSETRON 4 MG: 2 INJECTION INTRAMUSCULAR; INTRAVENOUS at 14:10

## 2019-04-14 RX ADMIN — MORPHINE SULFATE 2 MG: 2 INJECTION, SOLUTION INTRAMUSCULAR; INTRAVENOUS at 14:11

## 2019-04-18 ENCOUNTER — RESULTS ENCOUNTER (OUTPATIENT)
Dept: FAMILY MEDICINE CLINIC | Facility: CLINIC | Age: 74
End: 2019-04-18

## 2019-04-18 DIAGNOSIS — I10 ESSENTIAL HYPERTENSION: Chronic | ICD-10-CM

## 2019-04-18 DIAGNOSIS — E78.49 OTHER HYPERLIPIDEMIA: ICD-10-CM

## 2019-04-30 ENCOUNTER — TELEPHONE (OUTPATIENT)
Dept: ENDOCRINOLOGY | Age: 74
End: 2019-04-30

## 2019-04-30 NOTE — TELEPHONE ENCOUNTER
----- Message from Yonatan Wagner sent at 4/29/2019 10:05 AM EDT -----  Patient would like to know how to take her HUMULIN R & HUMULIN N correctly. She said that she used to always take insulin before bed and now she's just confused. From what I seen in the chart I explained to her those meds were to be taken before breakfast and dinner. She asked if she could speak with Dr. Sen or his assistant.     Thanks,  Vy     SPOKE WITH AND ADVISED THAT SHE WILL NEED TO TAKE THR R  INSULIN WITH MEALS.

## 2019-05-01 ENCOUNTER — OFFICE VISIT (OUTPATIENT)
Dept: PAIN MEDICINE | Facility: CLINIC | Age: 74
End: 2019-05-01

## 2019-05-01 VITALS
OXYGEN SATURATION: 96 % | TEMPERATURE: 98.3 F | BODY MASS INDEX: 39.95 KG/M2 | HEART RATE: 90 BPM | RESPIRATION RATE: 18 BRPM | WEIGHT: 211.6 LBS | HEIGHT: 61 IN | SYSTOLIC BLOOD PRESSURE: 137 MMHG | DIASTOLIC BLOOD PRESSURE: 71 MMHG

## 2019-05-01 DIAGNOSIS — Z79.899 ENCOUNTER FOR LONG-TERM CURRENT USE OF HIGH RISK MEDICATION: ICD-10-CM

## 2019-05-01 DIAGNOSIS — M54.50 CHRONIC BILATERAL LOW BACK PAIN WITHOUT SCIATICA: ICD-10-CM

## 2019-05-01 DIAGNOSIS — G89.29 CHRONIC BILATERAL LOW BACK PAIN WITHOUT SCIATICA: ICD-10-CM

## 2019-05-01 DIAGNOSIS — M51.36 DDD (DEGENERATIVE DISC DISEASE), LUMBAR: ICD-10-CM

## 2019-05-01 DIAGNOSIS — G89.29 OTHER CHRONIC PAIN: Primary | ICD-10-CM

## 2019-05-01 PROCEDURE — 99214 OFFICE O/P EST MOD 30 MIN: CPT | Performed by: NURSE PRACTITIONER

## 2019-05-01 RX ORDER — HYDROCODONE BITARTRATE AND ACETAMINOPHEN 5; 325 MG/1; MG/1
TABLET ORAL
Qty: 60 TABLET | Refills: 0 | Status: SHIPPED | OUTPATIENT
Start: 2019-05-01 | End: 2019-05-28 | Stop reason: SDUPTHER

## 2019-05-02 ENCOUNTER — TELEPHONE (OUTPATIENT)
Dept: FAMILY MEDICINE CLINIC | Facility: CLINIC | Age: 74
End: 2019-05-02

## 2019-05-02 DIAGNOSIS — I82.4Y1 ACUTE DEEP VEIN THROMBOSIS (DVT) OF PROXIMAL VEIN OF RIGHT LOWER EXTREMITY (HCC): Primary | ICD-10-CM

## 2019-05-02 DIAGNOSIS — I87.2 VENOUS INSUFFICIENCY OF BOTH LOWER EXTREMITIES: ICD-10-CM

## 2019-05-06 ENCOUNTER — TELEPHONE (OUTPATIENT)
Dept: FAMILY MEDICINE CLINIC | Facility: CLINIC | Age: 74
End: 2019-05-06

## 2019-05-06 RX ORDER — LOSARTAN POTASSIUM 100 MG/1
100 TABLET ORAL DAILY
Qty: 30 TABLET | Refills: 1 | Status: SHIPPED | OUTPATIENT
Start: 2019-05-06 | End: 2019-07-01 | Stop reason: SDUPTHER

## 2019-05-16 ENCOUNTER — OFFICE VISIT (OUTPATIENT)
Dept: FAMILY MEDICINE CLINIC | Facility: CLINIC | Age: 74
End: 2019-05-16

## 2019-05-16 VITALS
HEART RATE: 93 BPM | TEMPERATURE: 97.5 F | BODY MASS INDEX: 39.98 KG/M2 | DIASTOLIC BLOOD PRESSURE: 78 MMHG | RESPIRATION RATE: 20 BRPM | SYSTOLIC BLOOD PRESSURE: 132 MMHG | OXYGEN SATURATION: 95 % | HEIGHT: 61 IN

## 2019-05-16 DIAGNOSIS — I10 ESSENTIAL HYPERTENSION: Primary | Chronic | ICD-10-CM

## 2019-05-16 DIAGNOSIS — R06.02 EXERTIONAL SHORTNESS OF BREATH: ICD-10-CM

## 2019-05-16 PROCEDURE — 99213 OFFICE O/P EST LOW 20 MIN: CPT | Performed by: FAMILY MEDICINE

## 2019-05-28 NOTE — TELEPHONE ENCOUNTER
Medication Refill Request    Date of phone call: 5/28/2019    Medication being requested: Norco 5-325mg sig: Take 1 tablet PRN   Qty: 60    Date of last visit: 5/1/2019     Date of last refill: 5/1/2019    DONNY up to date?: yes    Next Follow up?: 7/1/2019    Any new pertinent information? (i.e, new medication allergies, new use of medications, change in patient's health or condition, non-compliance or inconsistency with prescribing agreement?):

## 2019-05-29 RX ORDER — HYDROCODONE BITARTRATE AND ACETAMINOPHEN 5; 325 MG/1; MG/1
TABLET ORAL
Qty: 60 TABLET | Refills: 0 | Status: SHIPPED | OUTPATIENT
Start: 2019-05-29 | End: 2019-07-01 | Stop reason: SDUPTHER

## 2019-05-30 ENCOUNTER — OFFICE VISIT (OUTPATIENT)
Dept: CARDIOLOGY | Facility: CLINIC | Age: 74
End: 2019-05-30

## 2019-05-30 VITALS
SYSTOLIC BLOOD PRESSURE: 128 MMHG | OXYGEN SATURATION: 97 % | HEART RATE: 72 BPM | HEIGHT: 61 IN | WEIGHT: 212 LBS | BODY MASS INDEX: 40.02 KG/M2 | DIASTOLIC BLOOD PRESSURE: 76 MMHG

## 2019-05-30 DIAGNOSIS — I82.4Y1 ACUTE DEEP VEIN THROMBOSIS (DVT) OF PROXIMAL VEIN OF RIGHT LOWER EXTREMITY (HCC): Primary | ICD-10-CM

## 2019-05-30 DIAGNOSIS — I10 ESSENTIAL HYPERTENSION: Chronic | ICD-10-CM

## 2019-05-30 PROCEDURE — 99214 OFFICE O/P EST MOD 30 MIN: CPT | Performed by: PHYSICIAN ASSISTANT

## 2019-05-30 PROCEDURE — 93000 ELECTROCARDIOGRAM COMPLETE: CPT | Performed by: PHYSICIAN ASSISTANT

## 2019-05-30 NOTE — PROGRESS NOTES
Date of Office Visit: 2019  Encounter Provider: REN Davalos  Place of Service: [facility] CARDIOLOGY  Patient Name: Iraida Elizabeth  :1945    Chief Complaint   Patient presents with   • Hypertension     1 year follow up   :     HPI: Iraida Elizabeth is a 74 y.o. female, new to me, who presents today for follow-up.  Old records have been obtained and reviewed by me.  She is a patient who was first evaluated by Dr. Snell in 2017.  This was for dyspnea on exertion.  She has hypertension, diabetes, and hyperlipidemia.  As part of her work-up she had an echocardiogram and a nuclear stress test, both of these were normal.  She has not been seen in our office since.  She did present to the emergency room in March of this year with posterior knee pain and ruled in for a DVT.  She had a CT angiogram of the chest which showed no evidence of pulmonary emboli.  She did have a moderate sized hiatal hernia.  She was also anemic which was new.  She was started on Xarelto.  She is here today for yearly follow-up.   She is here today at the request of her primary care physician.  She has shortness of breath on exertion.  She states that over several years her shortness of breath been stable, however it is getting a little bit worse.  She states that just lifting her arms above her head to fix her hair in the morning because of shortness of breath.  She had to stop a couple of times when unloading the  because of shortness of breath.  She denies any chest pain, palpitations, dizziness, or syncope.  She does have varicose veins and is going to be seeing a vascular surgeon about this.  She is tolerating Xarelto without difficulty.      Past Medical History:   Diagnosis Date   • Arthropathy of knee 2014    unspecified   • Arthropathy, multiple sites 2012    unspecified   • Bulging lumbar disc    • Cancer (CMS/HCC)     breast   • Chronic kidney disease,  stage III (moderate) (CMS/[facility]) 05/26/2011   • Controlled diabetes mellitus type II without complication (CMS/[facility])    • Deep vein blood clot of left lower extremity (CMS/[facility]) 2019   • Depression 04/02/2001   • Dyspnea    • Essential hypertension    • Fall 2015    FRACTURED ANKLE   • History of complete eye exam 03/29/2012   • History of gynecological procedure 03/01/2010    special invesitation and examinations; gynecological examination; routine gynecological examination   • Hyperlipidemia     other and unspecified   • Hypothyroidism     unspecified   • Low back pain    • Peripheral neuropathy    • Personal history of malignant neoplasm of breast 2001    R breast (negative nodes) [place] with radiation and Tamoxifen   • Thyroid disease    • Type II diabetes mellitus with neurological manifestations, uncontrolled (CMS/[facility]) 01/03/2013       Past Surgical History:   Procedure Laterality Date   • BREAST LUMPECTOMY Right 2001   • OTHER SURGICAL HISTORY Left 01/2015    ankle fracture repair   • VARICOSE VEIN SURGERY         Social History     Socioeconomic History   • Marital status:      Spouse name: Not on file   • Number of children: 4   • Years of education: 12   • Highest education level: High school graduate   Occupational History   • Occupation: RETIRED   Social Needs   • Financial resource strain: Patient refused   • Food insecurity:     Worry: Patient refused     Inability: Patient refused   • Transportation needs:     Medical: Patient refused     Non-medical: Patient refused   Tobacco Use   • Smoking status: Never Smoker   • Smokeless tobacco: Never Used   Substance and Sexual Activity   • Alcohol use: No   • Drug use: No   • Sexual activity: Defer   Social History Narrative    LIVES ALONE       Family History   Problem Relation Age of Onset   • Hypertension Sister    • Thyroid disease Daughter    • Cancer Mother    • Heart disease Father        Review of Systems   Constitution: Negative for chills, fever and  malaise/fatigue.   Cardiovascular: Positive for dyspnea on exertion and leg swelling. Negative for chest pain, near-syncope, orthopnea, palpitations, paroxysmal nocturnal dyspnea and syncope.   Respiratory: Negative for cough and shortness of breath.    Musculoskeletal: Negative for joint pain, joint swelling and myalgias.   Gastrointestinal: Negative for abdominal pain, diarrhea, melena, nausea and vomiting.   Genitourinary: Negative for frequency and hematuria.   Neurological: Negative for light-headedness, numbness, paresthesias and seizures.   Allergic/Immunologic: Negative.    All other systems reviewed and are negative.      Allergies   Allergen Reactions   • Tizanidine Hallucinations         Current Outpatient Medications:   •  amLODIPine (NORVASC) 10 MG tablet, Take 1 tablet by mouth Daily for 180 days., Disp: 90 tablet, Rfl: 1  •  atorvastatin (LIPITOR) 20 MG tablet, Take 1 tablet by mouth Every Night for 180 days., Disp: 90 tablet, Rfl: 1  •  hydrochlorothiazide (HYDRODIURIL) 25 MG tablet, Take 1 tablet by mouth Daily for 180 days., Disp: 90 tablet, Rfl: 1  •  HYDROcodone-acetaminophen (NORCO) 5-325 MG per tablet, Take one po bid prn severe pain (Patient taking differently: Take 1 tablet by mouth Every 8 (Eight) Hours As Needed. Take one po bid prn severe pain), Disp: 60 tablet, Rfl: 0  •  insulin NPH (HUMULIN N) 100 UNIT/ML injection, 14 UNITS WITH BREAKFAST AND 10 UNITS AT DINNER SC (Patient taking differently: Inject 20 Units under the skin into the appropriate area as directed 2 (Two) Times a Day Before Meals. 14 UNITS WITH BREAKFAST AND 10 UNITS AT DINNER SC), Disp: 10 mL, Rfl: 5  •  insulin NPH (humuLIN N,novoLIN N) 100 UNIT/ML injection, Inject 100 Units under the skin into the appropriate area as directed 2 (Two) Times a Day. Before Breakfast and at Bedtime , Disp: , Rfl:   •  insulin regular (HUMULIN R) 100 UNIT/ML injection, INJECT 6 UNITS SUBCUTANEOUSLY DAILY BEFORE BREAKFAST AND BEFORE SUPPER  "(Patient taking differently: INJECT 14 UNITS SUBCUTANEOUSLY DAILY BEFORE BREAKFAST AND BEFORE SUPPER), Disp: 10 mL, Rfl: 2  •  Insulin Syringe 31G X 5/16\" 0.3 ML misc, 4 SC INJECTIONS OF INSULIN DAILY, Disp: 200 each, Rfl: 4  •  Insulin Syringe-Needle U-100 (TRUEPLUS INSULIN SYRINGE) 31G X 5/16\" 1 ML misc, USE FOUR TIMES A DAY AS DIRECTED, Disp: 100 each, Rfl: 3  •  levothyroxine (SYNTHROID, LEVOTHROID) 75 MCG tablet, Take 1 tablet by mouth Daily for 180 days., Disp: 90 tablet, Rfl: 1  •  losartan (COZAAR) 100 MG tablet, Take 1 tablet by mouth Daily., Disp: 30 tablet, Rfl: 1  •  ONE TOUCH ULTRA TEST test strip, USE TO TEST BLOOD SUGAR FOUR TIMES DAILY AS DIRECTED, Disp: 100 each, Rfl: 4  •  PARoxetine (PAXIL) 20 MG tablet, Take 1 tablet by mouth Every Night for 180 days., Disp: 90 tablet, Rfl: 1  •  rivaroxaban (XARELTO) 20 MG tablet, Take 1 tablet by mouth Daily for 90 days., Disp: 30 tablet, Rfl: 2  •  Foot Care Products (DIABETIC INSOLES) misc, 1 pair of diabetic shoe Dx E11.45, Disp: 1 each, Rfl: 0      Objective:     Vitals:    05/30/19 1336 05/30/19 1342   BP: 126/72 128/76   BP Location: Right arm Left arm   Pulse: 72    SpO2: 97%    Weight: 96.2 kg (212 lb)    Height: 154.9 cm (61\")      Body mass index is 40.06 kg/m².    PHYSICAL EXAM:    Physical Exam   Constitutional: She is oriented to person, place, and time. She appears well-developed and well-nourished. No distress.   HENT:   Head: Normocephalic and atraumatic.   Eyes: Pupils are equal, round, and reactive to light.   Neck: No JVD present. No thyromegaly present.   Cardiovascular: Normal rate, regular rhythm, normal heart sounds and intact distal pulses.   No murmur heard.  Pulmonary/Chest: Effort normal and breath sounds normal. No respiratory distress.   Abdominal: Soft. Bowel sounds are normal. She exhibits no distension. There is no splenomegaly or hepatomegaly. There is no tenderness.   Musculoskeletal: Normal range of motion. She exhibits no " edema.   Neurological: She is alert and oriented to person, place, and time.   Skin: Skin is warm and dry. She is not diaphoretic. No erythema.   Psychiatric: She has a normal mood and affect. Her behavior is normal. Judgment normal.         ECG 12 Lead  Date/Time: 5/30/2019 2:02 PM  Performed by: Marlyn Mcfarlane PA  Authorized by: Marlyn Mcfarlane PA   Comparison: compared with previous ECG from 3/24/2019  Similar to previous ECG  Rhythm: sinus rhythm  Ectopy: atrial premature contractions  BPM: 70    Clinical impression: abnormal EKG  Comments: Indication: Hypertension              Assessment:       Diagnosis Plan   1. Acute deep vein thrombosis (DVT) of proximal vein of right lower extremity (CMS/HCC)  ECG 12 Lead   2. Essential hypertension  ECG 12 Lead     Orders Placed This Encounter   Procedures   • ECG 12 Lead     This order was created via procedure documentation          Plan:       1.  DVT.  She is anticoagulated on Xarelto without difficulty.  Continue current plan.    2.  Hypertension.  Her blood pressure today is actually pretty stable at 126/72.    3.  Shortness of breath on exertion.  This is been going on for many years and has recently gotten a little bit worse.  She had a completely negative cardiac work-up back at the end of 2017 with a normal echocardiogram and normal nuclear stress test.  Her ECG today is unchanged.  She is having PACs but there is no evidence of infarct or ischemia.  She is morbidly obese and has a moderate sized hiatal hernia.  I think this is most likely the reason for her shortness of breath.  She states that exercise is difficult for her because of joint issues, and I have asked her to try water therapy and to really get busy about cutting the sugars and carbohydrates out of her diet.  She was slightly anemic when she was in the emergency room earlier this year, she follows closely with her primary care physician so I will defer management of this back to her PCP.  From  a cardiac standpoint I think she is stable and we can see her again as needed.    As always, it has been a pleasure to participate in your patient's care.      Sincerely,         Marlyn Mcfarlane PA-C

## 2019-06-04 ENCOUNTER — LAB (OUTPATIENT)
Dept: ENDOCRINOLOGY | Age: 74
End: 2019-06-04

## 2019-06-04 DIAGNOSIS — E03.9 ACQUIRED HYPOTHYROIDISM: ICD-10-CM

## 2019-06-04 DIAGNOSIS — IMO0002 UNCONTROLLED TYPE 2 DIABETES MELLITUS WITH DIABETIC NEUROPATHY, WITH LONG-TERM CURRENT USE OF INSULIN: ICD-10-CM

## 2019-06-04 DIAGNOSIS — IMO0002 UNCONTROLLED TYPE 2 DIABETES MELLITUS WITH DIABETIC NEUROPATHY, WITH LONG-TERM CURRENT USE OF INSULIN: Primary | ICD-10-CM

## 2019-06-05 LAB
ALBUMIN SERPL-MCNC: 3.9 G/DL (ref 3.5–5.2)
ALBUMIN/CREAT UR: 3.7 MG/G CREAT (ref 0–30)
ALBUMIN/GLOB SERPL: 1.4 G/DL
ALP SERPL-CCNC: 126 U/L (ref 39–117)
ALT SERPL-CCNC: 13 U/L (ref 1–33)
AST SERPL-CCNC: 14 U/L (ref 1–32)
BILIRUB SERPL-MCNC: 0.4 MG/DL (ref 0.2–1.2)
BUN SERPL-MCNC: 27 MG/DL (ref 8–23)
BUN/CREAT SERPL: 24.1 (ref 7–25)
CALCIUM SERPL-MCNC: 10.1 MG/DL (ref 8.6–10.5)
CHLORIDE SERPL-SCNC: 99 MMOL/L (ref 98–107)
CO2 SERPL-SCNC: 29.9 MMOL/L (ref 22–29)
CREAT SERPL-MCNC: 1.12 MG/DL (ref 0.57–1)
CREAT UR-MCNC: 123.2 MG/DL
GLOBULIN SER CALC-MCNC: 2.7 GM/DL
GLUCOSE SERPL-MCNC: 124 MG/DL (ref 65–99)
HBA1C MFR BLD: 7.4 % (ref 4.8–5.6)
MICROALBUMIN UR-MCNC: 4.5 UG/ML
POTASSIUM SERPL-SCNC: 4 MMOL/L (ref 3.5–5.2)
PROT SERPL-MCNC: 6.6 G/DL (ref 6–8.5)
SODIUM SERPL-SCNC: 141 MMOL/L (ref 136–145)
T4 FREE SERPL-MCNC: 1.39 NG/DL (ref 0.93–1.7)
TSH SERPL DL<=0.005 MIU/L-ACNC: 2 MIU/ML (ref 0.27–4.2)

## 2019-06-06 NOTE — PROGRESS NOTES
Subjective   Patient ID: Iraida Elizabeth is a 74 y.o. female is being seen for consultation today at the request of ART Miles for low back and bilateral leg pain.  Patient states that her pain started in 2018.  She went to PT once and was given HEP ( due to cost), however, the exercises have not helped.  She has not had back surgery.    She is a current patient at [facility] pain management and has had KALEN, MBB and SI joint injections that have not helped.  She is scheduled for fu 7.1.19.    Patient is currently having constant mild to moderate low back pain and bilateral lower extremity pain, made worse with prolonged walking.  She has bilateral leg weakness and bilateral foot N/T.    She is taking Norco 5/325 mg BID as prescribed by pain management.    She was  diagnosed with a right lower extremity DVT in March and is seeing Dr Kingston for that.  She is scheduled for venous mapping soon.  Patient states that she was taken off Xeralto a week ago and they have not placed on her any other blood thinners.      This very nice lady has had back pain and some leg pain for about a year. She used to be quite active, but about a year ago she woke up with pain in her back and initially pain down her right leg. The pain in the back has always been worse. She has recently been found to have some venous disease in her leg and is on Xarelto and has seen the vascular surgeons for that problem. She went through regular physical therapy which really did not help. She went to see the pain clinic and they did epidural blocks and medical branch blocks. None of these really helped all that much. She was not considered a candidate for an ablation. She had MRI in 08/2018, which I reviewed with her. She is here for my opinion about whether or not surgery has a role. Because of the preponderance of the back pain in the absence of true sciatica or significant spinal stenosis, I told her that surgery is not a good option at  all. Unfortunately, she is not a candidate for an ablation given her response to medial branch blocks, which would have been the next thing to think of. She has gained about 20 pounds since this all occurred. I told her that the best option she has is to focus on trying to get back into some kind of exercise and continued efforts at weight loss with her primary care physician. She has not tried aquatic therapy as long as she has had the back problem. Will give that a try and have her come back. Hopefully, over time, some of her pain can subside as she loses weight and gets more increase in her core strength.          Back Pain   The current episode started more than 1 year ago. The problem occurs constantly. The pain is present in the lumbar spine. The pain is at a severity of 8/10. The pain is moderate. The symptoms are aggravated by twisting and standing. Associated symptoms include leg pain, numbness, tingling and weakness.   Leg Pain    The pain is present in the right leg and left leg. The pain is at a severity of 8/10. Associated symptoms include numbness and tingling.   Extremity Weakness    The pain is present in the left lower leg, left upper leg, right upper leg and right lower leg. The pain is at a severity of 8/10. Associated symptoms include numbness and tingling.       The following portions of the patient's history were reviewed and updated as appropriate: allergies, current medications, past family history, past medical history, past social history, past surgical history and problem list.    Review of Systems   Musculoskeletal: Positive for arthralgias, back pain and extremity weakness. Negative for gait problem.   Neurological: Positive for tingling, weakness and numbness.   All other systems reviewed and are negative.      Objective   Physical Exam   Constitutional: She is oriented to person, place, and time. She appears well-developed and well-nourished.   HENT:   Head: Normocephalic and  atraumatic.   Eyes: Conjunctivae and EOM are normal. Pupils are equal, round, and reactive to light.   Fundoscopic exam:       The right eye shows no papilledema. The right eye shows venous pulsations.        The left eye shows no papilledema. The left eye shows venous pulsations.   Neck: Carotid bruit is not present.   Neurological: She is oriented to person, place, and time. She has a normal Finger-Nose-Finger Test and a normal Heel to Shin Test. Gait normal.   Reflex Scores:       Tricep reflexes are 2+ on the right side and 2+ on the left side.       Bicep reflexes are 2+ on the right side and 2+ on the left side.       Brachioradialis reflexes are 2+ on the right side and 2+ on the left side.       Patellar reflexes are 2+ on the right side and 2+ on the left side.       Achilles reflexes are 2+ on the right side and 2+ on the left side.  Psychiatric: Her speech is normal.     Neurologic Exam     Mental Status   Oriented to person, place, and time.   Registration of memory: Good recent and remote memory.   Attention: normal. Concentration: normal.   Speech: speech is normal   Level of consciousness: alert  Knowledge: consistent with education.     Cranial Nerves     CN II   Visual fields full to confrontation.   Visual acuity: normal    CN III, IV, VI   Pupils are equal, round, and reactive to light.  Extraocular motions are normal.     CN V   Facial sensation intact.   Right corneal reflex: normal  Left corneal reflex: normal    CN VII   Facial expression full, symmetric.   Right facial weakness: none  Left facial weakness: none    CN VIII   Hearing: intact    CN IX, X   Palate: symmetric    CN XI   Right sternocleidomastoid strength: normal  Left sternocleidomastoid strength: normal    CN XII   Tongue: not atrophic  Tongue deviation: none    Motor Exam   Muscle bulk: normal  Right arm tone: normal  Left arm tone: normal  Right leg tone: normal  Left leg tone: normal    Strength   Strength 5/5 except as noted.      Sensory Exam   Light touch normal.     Gait, Coordination, and Reflexes     Gait  Gait: normal    Coordination   Finger to nose coordination: normal  Heel to shin coordination: normal    Reflexes   Right brachioradialis: 2+  Left brachioradialis: 2+  Right biceps: 2+  Left biceps: 2+  Right triceps: 2+  Left triceps: 2+  Right patellar: 2+  Left patellar: 2+  Right achilles: 2+  Left achilles: 2+  Right : 2+  Left : 2+      Assessment/Plan   Independent Review of Radiographic Studies:    I reviewed the lumbar MRI done 8/9/2018 which shows multilevel degenerative disc disease from L1-S1.  There is some mild lateral recess stenosis at L4-L5 but nothing very dramatic.  There is a 1.5 cm synovial cyst arising from the left L5-S1 facet joint but does not impinge upon nerves.  Agree with the report.      Medical Decision Making:    Again, nothing from a surgical standpoint.  I can help get the exercise efforts started by referring her for aquatic therapy.  Perhaps later on she can progress back to [place] therapy.  I told her to continue working with her primary care physician on dietary issues and weight loss.  I will have her come back to see me in about 3 months.  Hopefully she can progress to some [place] therapy at that point.      Iraida was seen today for back pain, leg pain, extremity weakness and numbness.    Diagnoses and all orders for this visit:    DDD (degenerative disc disease), lumbar  -     Ambulatory Referral to Physical Therapy Evaluate and treat, Aquatics    Chronic midline low back pain with bilateral sciatica  -     Ambulatory Referral to Physical Therapy Evaluate and treat, Aquatics      Return in about 3 months (around 9/10/2019).

## 2019-06-10 ENCOUNTER — OFFICE VISIT (OUTPATIENT)
Dept: NEUROSURGERY | Facility: CLINIC | Age: 74
End: 2019-06-10

## 2019-06-10 VITALS
HEIGHT: 61 IN | WEIGHT: 212 LBS | DIASTOLIC BLOOD PRESSURE: 76 MMHG | HEART RATE: 74 BPM | SYSTOLIC BLOOD PRESSURE: 146 MMHG | BODY MASS INDEX: 40.02 KG/M2

## 2019-06-10 DIAGNOSIS — M54.42 CHRONIC MIDLINE LOW BACK PAIN WITH BILATERAL SCIATICA: ICD-10-CM

## 2019-06-10 DIAGNOSIS — M54.41 CHRONIC MIDLINE LOW BACK PAIN WITH BILATERAL SCIATICA: ICD-10-CM

## 2019-06-10 DIAGNOSIS — G89.29 CHRONIC MIDLINE LOW BACK PAIN WITH BILATERAL SCIATICA: ICD-10-CM

## 2019-06-10 DIAGNOSIS — M51.36 DDD (DEGENERATIVE DISC DISEASE), LUMBAR: Primary | ICD-10-CM

## 2019-06-10 PROCEDURE — 99214 OFFICE O/P EST MOD 30 MIN: CPT | Performed by: NEUROLOGICAL SURGERY

## 2019-06-18 ENCOUNTER — OFFICE VISIT (OUTPATIENT)
Dept: ENDOCRINOLOGY | Age: 74
End: 2019-06-18

## 2019-06-18 VITALS
DIASTOLIC BLOOD PRESSURE: 70 MMHG | HEIGHT: 61 IN | HEART RATE: 93 BPM | BODY MASS INDEX: 39.76 KG/M2 | SYSTOLIC BLOOD PRESSURE: 130 MMHG | WEIGHT: 210.6 LBS

## 2019-06-18 DIAGNOSIS — IMO0002 UNCONTROLLED TYPE 2 DIABETES MELLITUS WITH COMPLICATION, WITH LONG-TERM CURRENT USE OF INSULIN: Primary | ICD-10-CM

## 2019-06-18 DIAGNOSIS — E03.9 ACQUIRED HYPOTHYROIDISM: ICD-10-CM

## 2019-06-18 DIAGNOSIS — E11.69 HYPERLIPIDEMIA ASSOCIATED WITH TYPE 2 DIABETES MELLITUS (HCC): ICD-10-CM

## 2019-06-18 DIAGNOSIS — E16.1 HYPERINSULINISM: ICD-10-CM

## 2019-06-18 DIAGNOSIS — IMO0002 UNCONTROLLED TYPE 2 DIABETES MELLITUS WITH DIABETIC NEUROPATHY, WITH LONG-TERM CURRENT USE OF INSULIN: ICD-10-CM

## 2019-06-18 DIAGNOSIS — Z79.4 ENCOUNTER FOR LONG-TERM (CURRENT) USE OF INSULIN (HCC): ICD-10-CM

## 2019-06-18 DIAGNOSIS — E78.5 HYPERLIPIDEMIA ASSOCIATED WITH TYPE 2 DIABETES MELLITUS (HCC): ICD-10-CM

## 2019-06-18 PROCEDURE — 99214 OFFICE O/P EST MOD 30 MIN: CPT | Performed by: INTERNAL MEDICINE

## 2019-06-21 ENCOUNTER — TELEPHONE (OUTPATIENT)
Dept: ENDOCRINOLOGY | Age: 74
End: 2019-06-21

## 2019-06-21 NOTE — TELEPHONE ENCOUNTER
----- Message from Radha Dent sent at 6/18/2019  3:04 PM EDT -----  Contact: patient  Patient asked for call back ph. [phone] regards to the AVS she received today at check out does not match what Dr. Smith told her. She said she thought Dr. Smith told her to  take Humulin N, 20 units, 2xday morning and night and thought she is supposed to take Humulin R, 14 units at breakfast and 10 units at dinner.    She said the paperwork says Humulin N says 14 units breakfast, 10 units at dinner. She said the paperwork for Humulin R says 6 units at breakfast and 6 units at supper.    She said she has always taken insulin at bedtime and the paperwork does not tell her to take insulin at bedtime.  She said she  is confused regards to paperwork  instructions saying dinner and supper and needs to clarify.      SPOKE WITH PATIENT AND CLEARED UP HER CONFUSION AS TO WHAT SUPPER MEANS ON HER INSTRUCTIONS.

## 2019-07-01 ENCOUNTER — OFFICE VISIT (OUTPATIENT)
Dept: PAIN MEDICINE | Facility: CLINIC | Age: 74
End: 2019-07-01

## 2019-07-01 VITALS
TEMPERATURE: 98.3 F | OXYGEN SATURATION: 97 % | SYSTOLIC BLOOD PRESSURE: 149 MMHG | RESPIRATION RATE: 18 BRPM | WEIGHT: 211.6 LBS | BODY MASS INDEX: 39.95 KG/M2 | HEART RATE: 74 BPM | DIASTOLIC BLOOD PRESSURE: 71 MMHG | HEIGHT: 61 IN

## 2019-07-01 DIAGNOSIS — Z79.899 ENCOUNTER FOR LONG-TERM CURRENT USE OF HIGH RISK MEDICATION: ICD-10-CM

## 2019-07-01 DIAGNOSIS — M47.816 LUMBAR FACET ARTHROPATHY: ICD-10-CM

## 2019-07-01 DIAGNOSIS — G89.29 OTHER CHRONIC PAIN: Primary | ICD-10-CM

## 2019-07-01 DIAGNOSIS — M51.36 DDD (DEGENERATIVE DISC DISEASE), LUMBAR: ICD-10-CM

## 2019-07-01 PROCEDURE — 99214 OFFICE O/P EST MOD 30 MIN: CPT | Performed by: NURSE PRACTITIONER

## 2019-07-01 RX ORDER — LOSARTAN POTASSIUM 100 MG/1
TABLET ORAL
Qty: 30 TABLET | Refills: 0 | Status: SHIPPED | OUTPATIENT
Start: 2019-07-01 | End: 2019-08-08 | Stop reason: SDUPTHER

## 2019-07-01 RX ORDER — HYDROCODONE BITARTRATE AND ACETAMINOPHEN 5; 325 MG/1; MG/1
1 TABLET ORAL EVERY 8 HOURS PRN
Qty: 90 TABLET | Refills: 0 | Status: SHIPPED | OUTPATIENT
Start: 2019-07-01 | End: 2019-07-29 | Stop reason: SDUPTHER

## 2019-07-01 NOTE — PROGRESS NOTES
"CHIEF COMPLAINT  Follow-up for back pain. Ms. Elizabeth states that her back pain is unchanged.    Subjective   Iraida Elizabeth is a 74 y.o. female  who presents to the office for follow-up.She has a history of chronic back pain. Reports this is worse since last office visit.    Sees Dr Leon tomorrow. Is wanting injection in left ankle.  Is wearing BG monitor.    Complains of pain in her low back right worse than left. Today her pain is 7/10VAS. Describes the pain as continuous aching and throbbing with activity. Pain increases with walking, standing, bending/lifting/twisting, household chores(\"i can't hardly do my sweeping, change my sheets, etc\"); pain decreases with sitting, laying, rest and medication. Continues with Hydrocodone 5/325 1-2/day and Tylenol OTC. Denies any side effects from the regimen. The regimen helps decrease her pain by 50-65%. HOwever, does not last between doses.  ADL's by self. Denies any bowel or bladder changes.     RIGHT L2-5 Lumbar Medial Branch Blockade  on  9-19-18 performed by Dr. Casarez for management of back pain. Patient reports moderate relief from the procedure for 1-2 weeks     Back Pain   This is a chronic (chronic) problem. The current episode started more than 1 year ago. The problem occurs constantly. The problem has been gradually worsening (unchanged from last office visit) since onset. The pain is present in the lumbar spine and sacro-iliac. The pain radiates to the left foot, left thigh, left knee, right foot, right knee and right thigh. The pain is at a severity of 7/10. The symptoms are aggravated by bending, position, standing and twisting (laying flat). Pertinent negatives include no abdominal pain, chest pain, dysuria, fever, headaches, numbness or weakness. She has tried analgesics for the symptoms.      Procedure List:  ROLA 12/12/18 -- NO relief from the procedure.   Bilateral L3-5 Lumbar Medial Branch Blockade 11-21-18 -- minimal relief  RIGHT L2-5 Lumbar " "Medial Branch Blockade 9-19-18   Right SI joint injection 8/21/2018 -- NO relief from the procedure    Has previously failed physical therapy.    The following portions of the patient's history were reviewed and updated as appropriate: allergies, current medications, past family history, past medical history, past social history, past surgical history and problem list.    Review of Systems   Constitutional: Negative for fatigue and fever.   HENT: Negative for congestion.    Eyes: Negative for visual disturbance.   Respiratory: Positive for shortness of breath. Negative for cough and wheezing.    Cardiovascular: Positive for leg swelling (left leg). Negative for chest pain and palpitations.   Gastrointestinal: Negative for abdominal pain, constipation and diarrhea.   Genitourinary: Negative for difficulty urinating and dysuria.   Musculoskeletal: Positive for back pain.   Neurological: Negative for weakness, numbness and headaches.   Psychiatric/Behavioral: Positive for sleep disturbance. Negative for suicidal ideas. The patient is not nervous/anxious.        Vitals:    07/01/19 1245   BP: 149/71   Pulse: 74   Resp: 18   Temp: 98.3 °F (36.8 °C)   SpO2: 97%   Weight: 96 kg (211 lb 9.6 oz)   Height: 154.9 cm (61\")   PainSc:   7   PainLoc: Back     Objective   Physical Exam   Constitutional: She is oriented to person, place, and time. Vital signs are normal. She appears well-developed and well-nourished. She is cooperative.   HENT:   Head: Normocephalic and atraumatic.   Nose: Nose normal.   Eyes: Conjunctivae and lids are normal.   Neck: Trachea normal. Neck supple.   Cardiovascular: Normal rate.   Pulmonary/Chest: Effort normal.   Abdominal: Normal appearance.   Musculoskeletal:        Lumbar back: She exhibits decreased range of motion, tenderness and bony tenderness (right L2=L5 moderate tenderness-- + loading manuever).   Brace of left ankle   Neurological: She is alert and oriented to person, place, and time. Gait " abnormal.   Reflex Scores:       Patellar reflexes are 1+ on the right side and 1+ on the left side.  Skin: Skin is warm, dry and intact.   Psychiatric: She has a normal mood and affect. Her speech is normal and behavior is normal. Judgment and thought content normal. Cognition and memory are normal.   Nursing note and vitals reviewed.      Assessment/Plan   Iraida was seen today for back pain.    Diagnoses and all orders for this visit:    Other chronic pain    DDD (degenerative disc disease), lumbar    Encounter for long-term current use of high risk medication    Lumbar facet arthropathy  -     Case Request    Other orders  -     HYDROcodone-acetaminophen (NORCO) 5-325 MG per tablet; Take 1 tablet by mouth Every 8 (Eight) Hours As Needed for Severe Pain .      --- The urine drug screen confirmation from 8-14-18 has been reviewed and the result is APPROPRIATE based on patient history and DONNY report  --- Needs UDS at next office visit.  --- Right L2-L5 MBB. No blood thinners. Reviewed the procedure at length with the patient.  Included in the review was expectations, complications, risk and benefits.The procedure was described in detail and the risks, benefits and alternatives were discussed with the patient (including but not limited to: bleeding, infection, nerve damage, worsening of pain, inability to perform injection, paralysis, seizures, and death) who agreed to proceed.  Discussed the potential for sedation if warranted/wanted.  The procedure will plan to be performed at [facility] with fluoroscopic guidance(unless ultrasound is indicated). Questions were answered and in a way the patient could understand.  Patient verbalized understanding and wishes to proceed.  This intervention will be ordered.  Discussed with patient that all procedures are part of a multimodal plan of care and include either formal PT or a home exercise program.    --- Refill Hydrocodone but increase to q8hrs  PRN Patient appears stable with current regimen. No adverse effects. Regarding continuation of opioids, there is no evidence of aberrant behavior or any red flags.  The patient continues with appropriate response to opioid therapy. ADL's remain intact by self.   --- Follow-up 2 months or sooner if needed.     DONNY REPORT    As part of the patient's treatment plan, I am prescribing controlled substances. The patient has been made aware of appropriate use of such medications, including potential risk of somnolence, limited ability to drive and/or work safely, and the potential for dependence or overdose. It has also bee made clear that these medications are for use by this patient only, without concomitant use of alcohol or other substances unless prescribed.     Patient has completed prescribing agreement detailing terms of continued prescribing of controlled substances, including monitoring DONNY reports, urine drug screening, and pill counts if necessary. The patient is aware that inappropriate use will results in cessation of prescribing such medications.    DONNY report has been reviewed and scanned into the patient's chart.    As the clinician, I personally reviewed the DONNY from 6-28-19 while the patient was in the office today.    History and physical exam exhibit continued safe and appropriate use of controlled substances.      EMR Dragon/Transcription disclaimer:   Much of this encounter note is an electronic transcription/translation of spoken language to printed text. The electronic translation of spoken language may permit erroneous, or at times, nonsensical words or phrases to be inadvertently transcribed; Although I have reviewed the note for such errors, some may still exist.

## 2019-07-30 RX ORDER — HYDROCODONE BITARTRATE AND ACETAMINOPHEN 5; 325 MG/1; MG/1
1 TABLET ORAL EVERY 8 HOURS PRN
Qty: 90 TABLET | Refills: 0 | Status: SHIPPED | OUTPATIENT
Start: 2019-07-30 | End: 2019-08-23 | Stop reason: SDUPTHER

## 2019-08-05 ENCOUNTER — OFFICE VISIT (OUTPATIENT)
Dept: ENDOCRINOLOGY | Age: 74
End: 2019-08-05

## 2019-08-05 VITALS
SYSTOLIC BLOOD PRESSURE: 130 MMHG | BODY MASS INDEX: 39.73 KG/M2 | HEIGHT: 61 IN | WEIGHT: 210.4 LBS | DIASTOLIC BLOOD PRESSURE: 68 MMHG | HEART RATE: 97 BPM

## 2019-08-05 DIAGNOSIS — IMO0002 UNCONTROLLED TYPE 2 DIABETES MELLITUS WITH DIABETIC NEUROPATHY, WITH LONG-TERM CURRENT USE OF INSULIN: ICD-10-CM

## 2019-08-05 DIAGNOSIS — IMO0002 UNCONTROLLED TYPE 2 DIABETES MELLITUS WITH COMPLICATION, WITH LONG-TERM CURRENT USE OF INSULIN: Primary | ICD-10-CM

## 2019-08-05 DIAGNOSIS — Z79.4 ENCOUNTER FOR LONG-TERM (CURRENT) USE OF INSULIN (HCC): ICD-10-CM

## 2019-08-05 DIAGNOSIS — E11.69 HYPERLIPIDEMIA ASSOCIATED WITH TYPE 2 DIABETES MELLITUS (HCC): ICD-10-CM

## 2019-08-05 DIAGNOSIS — E78.5 HYPERLIPIDEMIA ASSOCIATED WITH TYPE 2 DIABETES MELLITUS (HCC): ICD-10-CM

## 2019-08-05 DIAGNOSIS — E16.1 HYPERINSULINISM: ICD-10-CM

## 2019-08-05 DIAGNOSIS — E03.9 ACQUIRED HYPOTHYROIDISM: ICD-10-CM

## 2019-08-05 PROCEDURE — 99214 OFFICE O/P EST MOD 30 MIN: CPT | Performed by: INTERNAL MEDICINE

## 2019-08-05 NOTE — PATIENT INSTRUCTIONS
NPH INSULIN 24 UNITS BREAKFAST AND SUPPER    REGULAR INSULIN  12 UNITS BREAKFAST  14 UNITS AT DINNER

## 2019-08-05 NOTE — PROGRESS NOTES
74 y.o.    Patient Care Team:  Claudio Anne MD as PCP - General  Claudio Anne MD as PCP - Family Medicine  Robert Garcia MD as Consulting Physician (Endocrinology)  Shawn Galvan IV, MD as Surgeon (Neurosurgery)    Chief Complaint:      F/U TYPE 2 DIABETES, UNCONTROLLED.  COMPLICATIONS WITH HIGH BLOOD SUGARS.    Subjective     HPI   Patient is a 74-year-old female with a past history of uncontrolled type 2 diabetes mellitus came for follow-up    Uncontrolled type 2 diabetes mellitus  Patient was calling the office and reporting that her blood sugars were in the 200-400 range within the past few weeks and is very concerned  So she was advised to come for an earlier appointment  Patient reported that she is currently taking Novolin NPH 20 units twice daily and Novolin regular insulin 10 units twice daily  She reports that most of her blood sugars are in the 150-300 range  Patient is concerned about ankle replacement surgery in October 2019    Hypoglycemia  Patient denies any recent hypoglycemia  Uncontrolled type 2 diabetes mellitus with neuropathy  Patient reports symptoms of tingling numbness and burning in both lower extremities  She tried Neurontin in the past without much benefit  Patient denies any knowledge of diabetic nephropathy or retinopathy  Hypothyroidism  Patient is currently taking levothyroxine 75 mcg daily.  Reports compliance with the medication and denies any side effects  Weight gain  Patient currently weighs 210 pounds with a BMI of 39.8      Interval History    SUMMARY  Patient is a known type II diabetic on oral hypoglycemics. Her blood sugars have been out of control for the past several months most of the sugars are greater than 200. She has been very hesitant to start insulin.  Patient started Levemir 7 units at bedtime along with NovoLog 3 times a day before each meal. She appears to have tolerated Levemir very well. she reported that when the blood sugar dropped to 87  she has a typical hypoglycemic reaction recently.   But she reports that the sugars are still very high. She feels very thirsty and fatigue along with the high numbers.she managed to increase the NovoLog by herself to 8-10 units before meal.patient reports that her insurance company switch the insulin to Humalog since January.  fasting blood sugars are still elevated in the 200 range  patient is currently taking Levemir 35 units at bedtime and NovoLog 14 units before each meal  Diabetic neuropathy  patient continues to complain of tingling and numbness in both lower extremities. She is currently on gabapentin  Fatigue  Especially worse in the blood sugars are elevated usually towards the end of the day.  Abnormal weight gain  Patient currently weighs 210 pounds   she feels very frustrated regarding the weight gain but is more concerned about her blood sugars at this time.  Patient reports that she has never tried victoza.  She reported no history of pancreatitis or thyroid cancer  patient also reported that due to her 's illness she has been taking him to different office visits and has not been able to focus on herself. She has not been checking her Accu-Cheks on a regular basis. She also reports swelling in the feet for the past few months. Her last hemoglobin A1c was 8.6%      Patient started victoza in the previous visit. She reports no side effects other than mild nausea initially. She reports she lost nearly 12 pounds in the blood sugars have improved significantly. She still currently taking NovoLog 7 units before each meal if the blood sugars are greater than 100 and Levemir 40 units at bedtime.  Her victoza dose is 1.2 mg daily at night      Patient reports that January 2015 and she had a left ankle fracture and has been through rehabilitation for nearly one month and still has pain on walking and weight-bearing.. During that time her blood sugars apparently were high but for the past 2-3 weeks they  have significantly improved  Patient had one episode of hypoglycemia 2 days ago when she overreacted to blood sugar of 285 and took nearly 19 units of NovoLog      Interval history  Patient reported that she is currently in the Medicare gap and her insulin and Victoza is costing almost $160 each and is not able to afford that.  She loves Victoza since it made her lose nearly 32 pounds and controls her appetite  She is back on taking Levemir 40 units at night and victoza 1.8 mg daily  Her blood sugars have significantly improved majority are less than 160  She also reported that she is not needing NovoLog at all on most of the days.  She recently received a cortisone injection in the knee in August 2015 and her blood sugars have been elevated for a few days after that.  Patient forgot to bring her glucometer today but most blood sugars are in the 115 range         The following portions of the patient's history were reviewed and updated as appropriate: allergies, current medications, past family history, past medical history, past social history, past surgical history and problem list.    Past Medical History:   Diagnosis Date   • Arthropathy of knee 05/22/2014    unspecified   • Arthropathy, multiple sites 04/12/2012    unspecified   • Bulging lumbar disc    • Cancer (CMS/HCC)     breast   • Chronic kidney disease, stage III (moderate) (CMS/HCC) 05/26/2011   • Controlled diabetes mellitus type II without complication (CMS/HCC)    • Deep vein blood clot of left lower extremity (CMS/HCC) 2019   • Depression 04/02/2001   • Dyspnea    • Essential hypertension    • Fall 2015    FRACTURED ANKLE   • History of complete eye exam 03/29/2012   • History of gynecological procedure 03/01/2010    special invesitation and examinations; gynecological examination; routine gynecological examination   • Hyperlipidemia     other and unspecified   • Hypothyroidism     unspecified   • Low back pain    • Peripheral neuropathy    •  Personal history of malignant neoplasm of breast 2001    R breast (negative nodes) [place] with radiation and Tamoxifen   • Thyroid disease    • Type II diabetes mellitus with neurological manifestations, uncontrolled (CMS/HCC) 01/03/2013     Family History   Problem Relation Age of Onset   • Hypertension Sister    • Thyroid disease Daughter    • Cancer Mother    • Heart disease Father      Social History     Socioeconomic History   • Marital status:      Spouse name: Not on file   • Number of children: 4   • Years of education: 12   • Highest education level: High school graduate   Occupational History   • Occupation: RETIRED   Social Needs   • Financial resource strain: Patient refused   • Food insecurity:     Worry: Patient refused     Inability: Patient refused   • Transportation needs:     Medical: Patient refused     Non-medical: Patient refused   Tobacco Use   • Smoking status: Never Smoker   • Smokeless tobacco: Never Used   Substance and Sexual Activity   • Alcohol use: No   • Drug use: No   • Sexual activity: Defer   Social History Narrative    LIVES ALONE     Allergies   Allergen Reactions   • Tizanidine Hallucinations       Current Outpatient Medications:   •  amLODIPine (NORVASC) 10 MG tablet, Take 1 tablet by mouth Daily for 180 days., Disp: 90 tablet, Rfl: 1  •  atorvastatin (LIPITOR) 20 MG tablet, Take 1 tablet by mouth Every Night for 180 days., Disp: 90 tablet, Rfl: 1  •  Foot Care Products (DIABETIC INSOLES) misc, 1 pair of diabetic shoe Dx E11.45, Disp: 1 each, Rfl: 0  •  hydrochlorothiazide (HYDRODIURIL) 25 MG tablet, Take 1 tablet by mouth Daily for 180 days., Disp: 90 tablet, Rfl: 1  •  HYDROcodone-acetaminophen (NORCO) 5-325 MG per tablet, Take 1 tablet by mouth Every 8 (Eight) Hours As Needed for Severe Pain ., Disp: 90 tablet, Rfl: 0  •  insulin NPH (HUMULIN N) 100 UNIT/ML injection, 14 UNITS WITH BREAKFAST AND 10 UNITS AT DINNER SC (Patient taking differently: Inject 20 Units under the  "skin into the appropriate area as directed 2 (Two) Times a Day Before Meals. 14 UNITS WITH BREAKFAST AND 10 UNITS AT DINNER SC), Disp: 10 mL, Rfl: 5  •  insulin NPH (humuLIN N,novoLIN N) 100 UNIT/ML injection, Inject 100 Units under the skin into the appropriate area as directed 2 (Two) Times a Day. Before Breakfast and at Bedtime , Disp: , Rfl:   •  insulin regular (HUMULIN R) 100 UNIT/ML injection, INJECT 6 UNITS SUBCUTANEOUSLY DAILY BEFORE BREAKFAST AND BEFORE SUPPER (Patient taking differently: INJECT 14 UNITS SUBCUTANEOUSLY DAILY BEFORE BREAKFAST AND BEFORE SUPPER), Disp: 10 mL, Rfl: 2  •  Insulin Syringe 31G X 5/16\" 0.3 ML misc, 4 SC INJECTIONS OF INSULIN DAILY, Disp: 200 each, Rfl: 4  •  Insulin Syringe-Needle U-100 (TRUEPLUS INSULIN SYRINGE) 31G X 5/16\" 1 ML misc, USE FOUR TIMES A DAY AS DIRECTED, Disp: 100 each, Rfl: 3  •  levothyroxine (SYNTHROID, LEVOTHROID) 75 MCG tablet, Take 1 tablet by mouth Daily for 180 days., Disp: 90 tablet, Rfl: 1  •  losartan (COZAAR) 100 MG tablet, TAKE ONE TABLET BY MOUTH DAILY, Disp: 30 tablet, Rfl: 0  •  ONE TOUCH ULTRA TEST test strip, USE TO TEST BLOOD SUGAR FOUR TIMES DAILY AS DIRECTED, Disp: 100 each, Rfl: 4  •  PARoxetine (PAXIL) 20 MG tablet, Take 1 tablet by mouth Every Night for 180 days., Disp: 90 tablet, Rfl: 1        Review of Systems   Constitutional: Negative for chills, fatigue and fever.   Cardiovascular: Negative for chest pain and palpitations.   Gastrointestinal: Negative for abdominal pain, constipation, diarrhea, nausea and vomiting.   Endocrine: Negative for cold intolerance and heat intolerance.   All other systems reviewed and are negative.      Objective       Vitals:    08/05/19 1021   BP: 130/68   Pulse: 97   Weight: 95.4 kg (210 lb 6.4 oz)   Height: 154.9 cm (61\")     Body mass index is 39.75 kg/m².      Physical Exam   Constitutional: She is oriented to person, place, and time. She appears well-developed and well-nourished.   obese   HENT:   Head: " Normocephalic and atraumatic.   Eyes: EOM are normal. Pupils are equal, round, and reactive to light.   Neck: Normal range of motion. Neck supple. No thyromegaly present.   Cardiovascular: Normal rate, regular rhythm, normal heart sounds and intact distal pulses.   Pulmonary/Chest: Effort normal and breath sounds normal.   Abdominal: Soft. Bowel sounds are normal. She exhibits distension. There is no tenderness.   Musculoskeletal: Normal range of motion. She exhibits no edema.   Neurological: She is alert and oriented to person, place, and time.   Skin: Skin is warm and dry.   Psychiatric: She has a normal mood and affect. Her behavior is normal.   Nursing note and vitals reviewed.    Results Review:     I reviewed the patient's new clinical results.    Medical records reviewed  Summary:      Lab on 06/04/2019   Component Date Value Ref Range Status   • Creatinine, Urine 06/04/2019 123.2  Not Estab. mg/dL Final   • Microalbumin, Urine 06/04/2019 4.5  Not Estab. ug/mL Final   • Microalbumin/Creatinine Ratio 06/04/2019 3.7  0.0 - 30.0 mg/g creat Final    Comment:                      Normal:                0.0 -  30.0                       Albuminuria:          31.0 - 300.0                       Clinical albuminuria:       >300.0     • TSH 06/04/2019 2.000  0.270 - 4.200 mIU/mL Final   • Free T4 06/04/2019 1.39  0.93 - 1.70 ng/dL Final   • Hemoglobin A1C 06/04/2019 7.40* 4.80 - 5.60 % Final    Comment: Hemoglobin A1C Ranges:  Increased Risk for Diabetes  5.7% to 6.4%  Diabetes                     >= 6.5%  Diabetic Goal                < 7.0%     • Glucose 06/04/2019 124* 65 - 99 mg/dL Final   • BUN 06/04/2019 27* 8 - 23 mg/dL Final   • Creatinine 06/04/2019 1.12* 0.57 - 1.00 mg/dL Final   • eGFR Non African Am 06/04/2019 47* >60 mL/min/1.73 Final    Comment: The MDRD GFR formula is only valid for adults with stable  renal function between ages 18 and 70.     • eGFR  Am 06/04/2019 57* >60 mL/min/1.73 Final   •  BUN/Creatinine Ratio 06/04/2019 24.1  7.0 - 25.0 Final   • Sodium 06/04/2019 141  136 - 145 mmol/L Final   • Potassium 06/04/2019 4.0  3.5 - 5.2 mmol/L Final   • Chloride 06/04/2019 99  98 - 107 mmol/L Final   • Total CO2 06/04/2019 29.9* 22.0 - 29.0 mmol/L Final   • Calcium 06/04/2019 10.1  8.6 - 10.5 mg/dL Final   • Total Protein 06/04/2019 6.6  6.0 - 8.5 g/dL Final   • Albumin 06/04/2019 3.90  3.50 - 5.20 g/dL Final   • Globulin 06/04/2019 2.7  gm/dL Final   • A/G Ratio 06/04/2019 1.4  g/dL Final   • Total Bilirubin 06/04/2019 0.4  0.2 - 1.2 mg/dL Final   • Alkaline Phosphatase 06/04/2019 126* 39 - 117 U/L Final   • AST (SGOT) 06/04/2019 14  1 - 32 U/L Final   • ALT (SGPT) 06/04/2019 13  1 - 33 U/L Final     Lab Results   Component Value Date    HGBA1C 7.40 (H) 06/04/2019    HGBA1C 7.16 (H) 03/11/2019    HGBA1C 7.00 (H) 12/13/2018     Lab Results   Component Value Date    MICROALBUR 4.5 06/04/2019    CREATININE 1.12 (H) 06/04/2019     Imaging Results (most recent)     None          Assessment and Plan:    Iraida was seen today for diabetes.    Diagnoses and all orders for this visit:    Uncontrolled type 2 diabetes mellitus with complication, with long-term current use of insulin (CMS/[facility])    Uncontrolled type 2 diabetes mellitus with diabetic neuropathy, with long-term current use of insulin (CMS/[facility])    Acquired hypothyroidism    Hyperinsulinism    Encounter for long-term (current) use of insulin (CMS/[facility])    Hyperlipidemia associated with type 2 diabetes mellitus (CMS/[facility])         #1 uncontrolled type 2 diabetes mellitus.  #2 uncontrolled diabetic neuropathy. Most likely related to uncontrolled hyperglycemia.   #3 fatigue  #4 abnormal weight gain:   #5 hypoglycemia: not many recently  #6 hypothyroidism on medication  #7 heart murmur ejection systolic    Patient reports that her blood sugars are still fluctuating wildly  For several weeks they were in the 300-400  Currently they are in the 100-200  "range  Patient is currently taking Novolin  NPH 20 units breakfast and supper  Novolin R 10 units with breakfast and dinner    I advised her to increase it to 24 units NPH twice daily  Increase regular insulin 12 units breakfast, 14 units at dinner    Freestyle CGM apparently had no data probably a faulty sensor  We will try to put another one today  Patient will come back for review in 3-4 weeks    The total time spent  was more than 25 min of which greater than 15 min of time (greater than 50% of the total time)  was spent face to face with the patient counseling and coordination of care on recommended evaluation and treatment options, instructions for management/treatment and /or follow up and importance of compliance with chosen management or treatment options      Robert Garcia MD. FACE    08/05/19      EMR Dragon / transcription disclaimer:     \"Dictated utilizing Dragon dictation\".         "

## 2019-08-08 ENCOUNTER — TELEPHONE (OUTPATIENT)
Dept: FAMILY MEDICINE CLINIC | Facility: CLINIC | Age: 74
End: 2019-08-08

## 2019-08-08 DIAGNOSIS — I10 ESSENTIAL HYPERTENSION: Primary | Chronic | ICD-10-CM

## 2019-08-08 RX ORDER — LOSARTAN POTASSIUM 100 MG/1
100 TABLET ORAL DAILY
Qty: 90 TABLET | Refills: 0 | Status: SHIPPED | OUTPATIENT
Start: 2019-08-08 | End: 2019-10-02

## 2019-08-19 ENCOUNTER — APPOINTMENT (OUTPATIENT)
Dept: PREADMISSION TESTING | Facility: HOSPITAL | Age: 74
End: 2019-08-19

## 2019-08-19 VITALS
WEIGHT: 214 LBS | RESPIRATION RATE: 18 BRPM | SYSTOLIC BLOOD PRESSURE: 153 MMHG | HEART RATE: 91 BPM | TEMPERATURE: 97.4 F | BODY MASS INDEX: 40.4 KG/M2 | HEIGHT: 61 IN | DIASTOLIC BLOOD PRESSURE: 60 MMHG | OXYGEN SATURATION: 93 %

## 2019-08-19 LAB
ANION GAP SERPL CALCULATED.3IONS-SCNC: 11.5 MMOL/L (ref 5–15)
BASOPHILS # BLD AUTO: 0.04 10*3/MM3 (ref 0–0.2)
BASOPHILS NFR BLD AUTO: 0.6 % (ref 0–1.5)
BUN BLD-MCNC: 24 MG/DL (ref 8–23)
BUN/CREAT SERPL: 21.2 (ref 7–25)
CALCIUM SPEC-SCNC: 8.8 MG/DL (ref 8.6–10.5)
CHLORIDE SERPL-SCNC: 98 MMOL/L (ref 98–107)
CO2 SERPL-SCNC: 27.5 MMOL/L (ref 22–29)
CREAT BLD-MCNC: 1.13 MG/DL (ref 0.57–1)
DEPRECATED RDW RBC AUTO: 56.2 FL (ref 37–54)
EOSINOPHIL # BLD AUTO: 0.27 10*3/MM3 (ref 0–0.4)
EOSINOPHIL NFR BLD AUTO: 4.4 % (ref 0.3–6.2)
ERYTHROCYTE [DISTWIDTH] IN BLOOD BY AUTOMATED COUNT: 18.9 % (ref 12.3–15.4)
GFR SERPL CREATININE-BSD FRML MDRD: 47 ML/MIN/1.73
GLUCOSE BLD-MCNC: 154 MG/DL (ref 65–99)
HCT VFR BLD AUTO: 36.1 % (ref 34–46.6)
HGB BLD-MCNC: 10.6 G/DL (ref 12–15.9)
IMM GRANULOCYTES # BLD AUTO: 0.02 10*3/MM3 (ref 0–0.05)
IMM GRANULOCYTES NFR BLD AUTO: 0.3 % (ref 0–0.5)
LYMPHOCYTES # BLD AUTO: 0.82 10*3/MM3 (ref 0.7–3.1)
LYMPHOCYTES NFR BLD AUTO: 13.3 % (ref 19.6–45.3)
MCH RBC QN AUTO: 23.8 PG (ref 26.6–33)
MCHC RBC AUTO-ENTMCNC: 29.4 G/DL (ref 31.5–35.7)
MCV RBC AUTO: 81.1 FL (ref 79–97)
MONOCYTES # BLD AUTO: 0.45 10*3/MM3 (ref 0.1–0.9)
MONOCYTES NFR BLD AUTO: 7.3 % (ref 5–12)
NEUTROPHILS # BLD AUTO: 4.58 10*3/MM3 (ref 1.7–7)
NEUTROPHILS NFR BLD AUTO: 74.1 % (ref 42.7–76)
NRBC BLD AUTO-RTO: 0 /100 WBC (ref 0–0.2)
PLATELET # BLD AUTO: 334 10*3/MM3 (ref 140–450)
PMV BLD AUTO: 10.8 FL (ref 6–12)
POTASSIUM BLD-SCNC: 3.8 MMOL/L (ref 3.5–5.2)
RBC # BLD AUTO: 4.45 10*6/MM3 (ref 3.77–5.28)
SODIUM BLD-SCNC: 137 MMOL/L (ref 136–145)
WBC NRBC COR # BLD: 6.18 10*3/MM3 (ref 3.4–10.8)

## 2019-08-19 PROCEDURE — 80048 BASIC METABOLIC PNL TOTAL CA: CPT | Performed by: SURGERY

## 2019-08-19 PROCEDURE — 36415 COLL VENOUS BLD VENIPUNCTURE: CPT

## 2019-08-19 PROCEDURE — 85025 COMPLETE CBC W/AUTO DIFF WBC: CPT | Performed by: SURGERY

## 2019-08-19 PROCEDURE — 93005 ELECTROCARDIOGRAM TRACING: CPT

## 2019-08-19 PROCEDURE — 93010 ELECTROCARDIOGRAM REPORT: CPT | Performed by: INTERNAL MEDICINE

## 2019-08-19 NOTE — DISCHARGE INSTRUCTIONS
Take the following medications the morning of surgery with a small sip of water: TAKE AMLODIPINE AND LOSARTAN AM OF SURGERY    PLEASE ARRIVE AT THE  HOSPITAL AT: LEFT MESSAGE WITH LUPE TO CONFIRM PT'S ARRIVAL TIME  PLEASE ARRIVE AT THE  HOSPITAL AT: CONFIRMED ARRIVAL TIME 0530AM  General Instructions:  • Do not eat solid food after midnight the night before surgery.  • You may drink clear liquids day of surgery but must stop at least one hour before your hospital arrival time.  • It is beneficial for you to have a clear drink that contains carbohydrates the day of surgery.  We suggest a 12 to 20 ounce bottle of Gatorade or Powerade for non-diabetic patients or a 12 to 20 ounce bottle of G2 or Powerade Zero for diabetic patients. (Pediatric patients, are not advised to drink a 12 to 20 ounce carbohydrate drink)    Clear liquids are liquids you can see through.  Nothing red in color.     Plain water                               Sports drinks  Sodas                                   Gelatin (Jell-O)  Fruit juices without pulp such as white grape juice and apple juice  Popsicles that contain no fruit or yogurt  Tea or coffee (no cream or milk added)  Gatorade / Powerade  G2 / Powerade Zero    • Infants may have breast milk up to four hours before surgery.  • Infants drinking formula may drink formula up to six hours before surgery.   • Patients who avoid smoking, chewing tobacco and alcohol for 4 weeks prior to surgery have a reduced risk of post-operative complications.  Quit smoking as many days before surgery as you can.  • Do not smoke, use chewing tobacco or drink alcohol the day of surgery.   • If applicable bring your C-PAP/ BI-PAP machine.  • Bring any papers given to you in the doctor’s office.  • Wear clean comfortable clothes and socks.  • Do not wear contact lenses, false eyelashes or make-up.  Bring a case for your glasses.   • Bring crutches or walker if applicable.  • Remove all piercings.  Leave  jewelry and any other valuables at home.  • Hair extensions with metal clips must be removed prior to surgery.  • The Pre-Admission Testing nurse will instruct you to bring medications if unable to obtain an accurate list in Pre-Admission Testing.        If you were given a blood bank ID arm band remember to bring it with you the day of surgery.    Preventing a Surgical Site Infection:  • For 2 to 3 days before surgery, avoid shaving with a razor because the razor can irritate skin and make it easier to develop an infection.    • Any areas of open skin can increase the risk of a post-operative wound infection by allowing bacteria to enter and travel throughout the body.  Notify your surgeon if you have any skin wounds / rashes even if it is not near the expected surgical site.  The area will need assessed to determine if surgery should be delayed until it is healed.  • The night prior to surgery sleep in a clean bed with clean clothing.  Do not allow pets to sleep with you.  • Shower on the morning of surgery using a fresh bar of anti-bacterial soap (such as Dial) and clean washcloth.  Dry with a clean towel and dress in clean clothing.  • Ask your surgeon if you will be receiving antibiotics prior to surgery.  • Make sure you, your family, and all healthcare providers clean their hands with soap and water or an alcohol based hand  before caring for you or your wound.    Day of surgery:  Upon arrival, a Pre-op nurse and Anesthesiologist will review your health history, obtain vital signs, and answer questions you may have.  The only belongings needed at this time will be a list of your home medications and if applicable your C-PAP/BI-PAP machine.  If you are staying overnight your family can leave the rest of your belongings in the car and bring them to your room later.  A Pre-op nurse will start an IV and you may receive medication in preparation for surgery, including something to help you relax.  Your  family will be able to see you in the Pre-op area.  While you are in surgery your family should notify the waiting room  if they leave the waiting room area and provide a contact phone number.    Please be aware that surgery does come with discomfort.  We want to make every effort to control your discomfort so please discuss any uncontrolled symptoms with your nurse.   Your doctor will most likely have prescribed pain medications.      If you are going home after surgery you will receive individualized written care instructions before being discharged.  A responsible adult must drive you to and from the hospital on the day of your surgery and stay with you for 24 hours.    If you are staying overnight following surgery, you will be transported to your hospital room following the recovery period.  [facility] has all private rooms.    You have received a list of surgical assistants for your reference.  If you have any questions please call Pre-Admission Testing at [phone].  Deductibles and co-payments are collected on the day of service. Please be prepared to pay the required co-pay, deductible or deposit on the day of service as defined by your plan.

## 2019-08-23 ENCOUNTER — OFFICE VISIT (OUTPATIENT)
Dept: PAIN MEDICINE | Facility: CLINIC | Age: 74
End: 2019-08-23

## 2019-08-23 ENCOUNTER — OFFICE VISIT (OUTPATIENT)
Dept: ENDOCRINOLOGY | Age: 74
End: 2019-08-23

## 2019-08-23 ENCOUNTER — RESULTS ENCOUNTER (OUTPATIENT)
Dept: PAIN MEDICINE | Facility: CLINIC | Age: 74
End: 2019-08-23

## 2019-08-23 VITALS
HEART RATE: 97 BPM | BODY MASS INDEX: 40.33 KG/M2 | DIASTOLIC BLOOD PRESSURE: 70 MMHG | SYSTOLIC BLOOD PRESSURE: 128 MMHG | HEIGHT: 61 IN | WEIGHT: 213.6 LBS

## 2019-08-23 VITALS
TEMPERATURE: 98 F | BODY MASS INDEX: 40.25 KG/M2 | OXYGEN SATURATION: 94 % | HEART RATE: 81 BPM | DIASTOLIC BLOOD PRESSURE: 71 MMHG | SYSTOLIC BLOOD PRESSURE: 150 MMHG | HEIGHT: 61 IN | WEIGHT: 213.2 LBS | RESPIRATION RATE: 16 BRPM

## 2019-08-23 DIAGNOSIS — G89.29 OTHER CHRONIC PAIN: ICD-10-CM

## 2019-08-23 DIAGNOSIS — Z79.899 ENCOUNTER FOR LONG-TERM CURRENT USE OF HIGH RISK MEDICATION: ICD-10-CM

## 2019-08-23 DIAGNOSIS — M54.42 CHRONIC MIDLINE LOW BACK PAIN WITH BILATERAL SCIATICA: ICD-10-CM

## 2019-08-23 DIAGNOSIS — M54.41 CHRONIC MIDLINE LOW BACK PAIN WITH BILATERAL SCIATICA: ICD-10-CM

## 2019-08-23 DIAGNOSIS — E78.5 HYPERLIPIDEMIA ASSOCIATED WITH TYPE 2 DIABETES MELLITUS (HCC): ICD-10-CM

## 2019-08-23 DIAGNOSIS — G89.29 OTHER CHRONIC PAIN: Primary | ICD-10-CM

## 2019-08-23 DIAGNOSIS — IMO0002 UNCONTROLLED TYPE 2 DIABETES MELLITUS WITH COMPLICATION, WITH LONG-TERM CURRENT USE OF INSULIN: Primary | ICD-10-CM

## 2019-08-23 DIAGNOSIS — E16.1 HYPERINSULINISM: ICD-10-CM

## 2019-08-23 DIAGNOSIS — IMO0002 UNCONTROLLED TYPE 2 DIABETES MELLITUS WITH DIABETIC NEUROPATHY, WITH LONG-TERM CURRENT USE OF INSULIN: ICD-10-CM

## 2019-08-23 DIAGNOSIS — Z79.4 ENCOUNTER FOR LONG-TERM (CURRENT) USE OF INSULIN (HCC): ICD-10-CM

## 2019-08-23 DIAGNOSIS — G89.29 CHRONIC MIDLINE LOW BACK PAIN WITH BILATERAL SCIATICA: ICD-10-CM

## 2019-08-23 DIAGNOSIS — E03.9 ACQUIRED HYPOTHYROIDISM: ICD-10-CM

## 2019-08-23 DIAGNOSIS — E11.69 HYPERLIPIDEMIA ASSOCIATED WITH TYPE 2 DIABETES MELLITUS (HCC): ICD-10-CM

## 2019-08-23 LAB
POC AMPHETAMINES: NEGATIVE
POC BARBITURATES: NEGATIVE
POC BENZODIAZEPHINES: NEGATIVE
POC COCAINE: NEGATIVE
POC METHADONE: NEGATIVE
POC METHAMPHETAMINE SCREEN URINE: NEGATIVE
POC OPIATES: POSITIVE
POC OXYCODONE: POSITIVE
POC PHENCYCLIDINE: NEGATIVE
POC PROPOXYPHENE: NEGATIVE
POC THC: NEGATIVE
POC TRICYCLIC ANTIDEPRESSANTS: NEGATIVE

## 2019-08-23 PROCEDURE — 80305 DRUG TEST PRSMV DIR OPT OBS: CPT | Performed by: NURSE PRACTITIONER

## 2019-08-23 PROCEDURE — 99213 OFFICE O/P EST LOW 20 MIN: CPT | Performed by: NURSE PRACTITIONER

## 2019-08-23 PROCEDURE — 99214 OFFICE O/P EST MOD 30 MIN: CPT | Performed by: INTERNAL MEDICINE

## 2019-08-23 PROCEDURE — 95251 CONT GLUC MNTR ANALYSIS I&R: CPT | Performed by: INTERNAL MEDICINE

## 2019-08-23 RX ORDER — HYDROCODONE BITARTRATE AND ACETAMINOPHEN 5; 325 MG/1; MG/1
1 TABLET ORAL EVERY 8 HOURS PRN
Qty: 90 TABLET | Refills: 0 | Status: SHIPPED | OUTPATIENT
Start: 2019-08-23 | End: 2019-10-01 | Stop reason: SDUPTHER

## 2019-08-23 NOTE — PROGRESS NOTES
CHIEF COMPLAINT  F/U back pain- patient states that her pain has remained the same. She is having some neck pain and states she feels like she has some nodules on her neck.     Subjective   Iraida Elizabeth is a 74 y.o. female  who presents to the office for follow-up.She has a history of back pain.    RIGHT L2-5 Lumbar Medial Branch Blockade  on  9-19-18 performed by Dr. Casarez for management of back pain. Patient reports moderate relief from the procedure for 1-2 weeks. Ordered repeat injection last visit but was denied by insurance.    Procedure List:  LESI 12/12/18 -- NO relief from the procedure.   Bilateral L3-5 Lumbar Medial Branch Blockade 11-21-18 -- minimal relief  RIGHT L2-5 Lumbar Medial Branch Blockade 9-19-18 --   Right SI joint injection 8/21/2018 -- NO relief from the procedure     C/o back pain, severe, worsening.  Has been taking Hydrocodone 5/325 1-2/day, has been supplementing with Tylenol, Meloxicam helping (started a few months ago).  No constipation. Provides moderate relief.  was too drowsy with Hydrocodone 10/325.       Has previously failed physical therapy.      Saw Dr. Hdez about 2 months ago.  Was not recommending surgery at that time.  Recommend physical therapy and weight loss.    Complete left ankle replacement scheduled 10/3/19 - Dr. Sheehan. Also having vein stripping next week with Dr. Young     Back Pain   This is a chronic (chronic) problem. The current episode started more than 1 year ago. The problem occurs constantly. The problem has been gradually worsening since onset. The pain is present in the lumbar spine and sacro-iliac. The pain radiates to the left foot, left thigh, left knee, right foot, right knee and right thigh. The pain is at a severity of 7/10. The pain is severe. The symptoms are aggravated by bending, position, standing and twisting (laying flat). Associated symptoms include numbness (bilateral toes) and weakness (bilateral legs). Pertinent negatives  "include no abdominal pain, chest pain, dysuria, fever or headaches. She has tried analgesics for the symptoms.      PEG Assessment   What number best describes your pain on average in the past week?8  What number best describes how, during the past week, pain has interfered with your enjoyment of life?9  What number best describes how, during the past week, pain has interfered with your general activity?  9    The following portions of the patient's history were reviewed and updated as appropriate: allergies, current medications, past family history, past medical history, past social history, past surgical history and problem list.    Review of Systems   Constitutional: Positive for activity change (decreased). Negative for chills, fatigue and fever.   HENT: Negative for congestion.    Eyes: Negative for visual disturbance.   Respiratory: Positive for shortness of breath. Negative for cough, chest tightness and wheezing.    Cardiovascular: Positive for leg swelling (left leg). Negative for chest pain and palpitations.   Gastrointestinal: Negative for abdominal pain, constipation and diarrhea.   Genitourinary: Negative for difficulty urinating and dysuria.   Musculoskeletal: Positive for back pain.   Neurological: Positive for dizziness (when she lays down), weakness (bilateral legs) and numbness (bilateral toes). Negative for light-headedness and headaches.   Psychiatric/Behavioral: Positive for sleep disturbance. Negative for agitation and suicidal ideas. The patient is not nervous/anxious.        Vitals:    08/23/19 1247   BP: 150/71   Pulse: 81   Resp: 16   Temp: 98 °F (36.7 °C)   SpO2: 94%   Weight: 96.7 kg (213 lb 3.2 oz)   Height: 154.9 cm (61\")   PainSc:   7   PainLoc: Back       Objective   Physical Exam   Constitutional: She is oriented to person, place, and time. She appears well-developed and well-nourished. No distress.   HENT:   Head: Normocephalic and atraumatic.   Eyes: Conjunctivae and EOM are " normal.   Neck: Neck supple.   Cardiovascular: Normal rate.   Pulmonary/Chest: Effort normal. No respiratory distress.   Musculoskeletal:        Lumbar back: She exhibits tenderness, bony tenderness and pain.   +lumbar facet tenderness/loading    Neurological: She is alert and oriented to person, place, and time. She has normal strength. No sensory deficit. Gait (ambulating with cane) abnormal.   Skin: Skin is warm and dry.   Psychiatric: She has a normal mood and affect. Her behavior is normal.   Nursing note and vitals reviewed.    Assessment/Plan   Iraida was seen today for back pain.    Diagnoses and all orders for this visit:    Other chronic pain    Chronic midline low back pain with bilateral sciatica    Encounter for long-term current use of high risk medication    Other orders  -     HYDROcodone-acetaminophen (NORCO) 5-325 MG per tablet; Take 1 tablet by mouth Every 8 (Eight) Hours As Needed for Severe Pain . dnf 8/30/19      --- Lumbar MBB once authorized by insurance  --- Refill Hydrocodone. Patient appears stable with current regimen. No adverse effects. Regarding continuation of opioids, there is no evidence of aberrant behavior or any red flags.  The patient continues with appropriate response to opioid therapy. ADL's remain intact by self.   --- Routine UDS in office today as part of monitoring requirements for controlled substances.  The specimen was viewed and the immunoassay result reviewed and is +OPI, OXY.  This specimen will be sent to [facility] for confirmation.     --- Ok for acute post op pain medication from surgeon   --- Follow-up 2 months          DONNY REPORT    As part of the patient's treatment plan, I am prescribing controlled substances. The patient has been made aware of appropriate use of such medications, including potential risk of somnolence, limited ability to drive and/or work safely, and the potential for dependence or overdose. It has also bee made clear that  these medications are for use by this patient only, without concomitant use of alcohol or other substances unless prescribed.     Patient has completed prescribing agreement detailing terms of continued prescribing of controlled substances, including monitoring DONNY reports, urine drug screening, and pill counts if necessary. The patient is aware that inappropriate use will results in cessation of prescribing such medications.    DONNY report has been reviewed and scanned into the patient's chart.    As the clinician, I personally reviewed the DONNY from 8/23/19 while the patient was in the office today.    History and physical exam exhibit continued safe and appropriate use of controlled substances.    EMR Dragon/Transcription disclaimer:   Much of this encounter note is an electronic transcription/translation of spoken language to printed text. The electronic translation of spoken language may permit erroneous, or at times, nonsensical words or phrases to be inadvertently transcribed; Although I have reviewed the note for such errors, some may still exist.

## 2019-08-26 ENCOUNTER — ANESTHESIA (OUTPATIENT)
Dept: PERIOP | Facility: HOSPITAL | Age: 74
End: 2019-08-26

## 2019-08-26 ENCOUNTER — ANESTHESIA EVENT (OUTPATIENT)
Dept: PERIOP | Facility: HOSPITAL | Age: 74
End: 2019-08-26

## 2019-08-26 ENCOUNTER — APPOINTMENT (OUTPATIENT)
Dept: GENERAL RADIOLOGY | Facility: HOSPITAL | Age: 74
End: 2019-08-26

## 2019-08-26 ENCOUNTER — HOSPITAL ENCOUNTER (OUTPATIENT)
Facility: HOSPITAL | Age: 74
Setting detail: HOSPITAL OUTPATIENT SURGERY
Discharge: HOME | End: 2019-08-26
Attending: SURGERY | Admitting: SURGERY

## 2019-08-26 VITALS
DIASTOLIC BLOOD PRESSURE: 78 MMHG | OXYGEN SATURATION: 95 % | HEART RATE: 72 BPM | HEIGHT: 61 IN | WEIGHT: 212.5 LBS | SYSTOLIC BLOOD PRESSURE: 135 MMHG | BODY MASS INDEX: 40.12 KG/M2 | RESPIRATION RATE: 20 BRPM | TEMPERATURE: 97.8 F

## 2019-08-26 LAB
GLUCOSE BLDC GLUCOMTR-MCNC: 109 MG/DL (ref 70–130)
GLUCOSE BLDC GLUCOMTR-MCNC: 82 MG/DL (ref 70–130)

## 2019-08-26 PROCEDURE — 71045 X-RAY EXAM CHEST 1 VIEW: CPT

## 2019-08-26 PROCEDURE — 25010000003 CEFAZOLIN IN DEXTROSE 2-4 GM/100ML-% SOLUTION: Performed by: SURGERY

## 2019-08-26 PROCEDURE — 25010000002 NEOSTIGMINE PER 0.5 MG: Performed by: NURSE ANESTHETIST, CERTIFIED REGISTERED

## 2019-08-26 PROCEDURE — 25010000002 PROPOFOL 10 MG/ML EMULSION: Performed by: NURSE ANESTHETIST, CERTIFIED REGISTERED

## 2019-08-26 PROCEDURE — 94640 AIRWAY INHALATION TREATMENT: CPT

## 2019-08-26 PROCEDURE — 25010000002 FENTANYL CITRATE (PF) 100 MCG/2ML SOLUTION: Performed by: ANESTHESIOLOGY

## 2019-08-26 PROCEDURE — 25010000002 FENTANYL CITRATE (PF) 100 MCG/2ML SOLUTION: Performed by: NURSE ANESTHETIST, CERTIFIED REGISTERED

## 2019-08-26 PROCEDURE — 25010000002 ONDANSETRON PER 1 MG: Performed by: NURSE ANESTHETIST, CERTIFIED REGISTERED

## 2019-08-26 PROCEDURE — 94799 UNLISTED PULMONARY SVC/PX: CPT

## 2019-08-26 PROCEDURE — 82962 GLUCOSE BLOOD TEST: CPT

## 2019-08-26 PROCEDURE — 25010000002 PROPOFOL 1000 MG/ML EMULSION: Performed by: NURSE ANESTHETIST, CERTIFIED REGISTERED

## 2019-08-26 RX ORDER — PROPOFOL 10 MG/ML
VIAL (ML) INTRAVENOUS AS NEEDED
Status: DISCONTINUED | OUTPATIENT
Start: 2019-08-26 | End: 2019-08-26 | Stop reason: SURG

## 2019-08-26 RX ORDER — LIDOCAINE HYDROCHLORIDE 10 MG/ML
0.5 INJECTION, SOLUTION EPIDURAL; INFILTRATION; INTRACAUDAL; PERINEURAL ONCE AS NEEDED
Status: DISCONTINUED | OUTPATIENT
Start: 2019-08-26 | End: 2019-08-26 | Stop reason: HOSPADM

## 2019-08-26 RX ORDER — FLUMAZENIL 0.1 MG/ML
0.2 INJECTION INTRAVENOUS AS NEEDED
Status: DISCONTINUED | OUTPATIENT
Start: 2019-08-26 | End: 2019-08-26 | Stop reason: HOSPADM

## 2019-08-26 RX ORDER — PROMETHAZINE HYDROCHLORIDE 25 MG/1
25 SUPPOSITORY RECTAL ONCE AS NEEDED
Status: DISCONTINUED | OUTPATIENT
Start: 2019-08-26 | End: 2019-08-26 | Stop reason: HOSPADM

## 2019-08-26 RX ORDER — SODIUM CHLORIDE 0.9 % (FLUSH) 0.9 %
1-10 SYRINGE (ML) INJECTION AS NEEDED
Status: DISCONTINUED | OUTPATIENT
Start: 2019-08-26 | End: 2019-08-26 | Stop reason: HOSPADM

## 2019-08-26 RX ORDER — EPHEDRINE SULFATE 50 MG/ML
5 INJECTION, SOLUTION INTRAVENOUS ONCE AS NEEDED
Status: DISCONTINUED | OUTPATIENT
Start: 2019-08-26 | End: 2019-08-26 | Stop reason: HOSPADM

## 2019-08-26 RX ORDER — FENTANYL CITRATE 50 UG/ML
50 INJECTION, SOLUTION INTRAMUSCULAR; INTRAVENOUS
Status: DISCONTINUED | OUTPATIENT
Start: 2019-08-26 | End: 2019-08-26 | Stop reason: HOSPADM

## 2019-08-26 RX ORDER — HYDROCODONE BITARTRATE AND ACETAMINOPHEN 5; 325 MG/1; MG/1
1 TABLET ORAL EVERY 6 HOURS PRN
Qty: 20 TABLET | Refills: 0 | Status: SHIPPED | OUTPATIENT
Start: 2019-08-26 | End: 2019-10-18

## 2019-08-26 RX ORDER — SODIUM CHLORIDE, SODIUM LACTATE, POTASSIUM CHLORIDE, CALCIUM CHLORIDE 600; 310; 30; 20 MG/100ML; MG/100ML; MG/100ML; MG/100ML
9 INJECTION, SOLUTION INTRAVENOUS CONTINUOUS
Status: DISCONTINUED | OUTPATIENT
Start: 2019-08-26 | End: 2019-08-26 | Stop reason: HOSPADM

## 2019-08-26 RX ORDER — OXYCODONE AND ACETAMINOPHEN 7.5; 325 MG/1; MG/1
1 TABLET ORAL ONCE AS NEEDED
Status: DISCONTINUED | OUTPATIENT
Start: 2019-08-26 | End: 2019-08-26 | Stop reason: HOSPADM

## 2019-08-26 RX ORDER — LIDOCAINE HYDROCHLORIDE 20 MG/ML
INJECTION, SOLUTION INFILTRATION; PERINEURAL AS NEEDED
Status: DISCONTINUED | OUTPATIENT
Start: 2019-08-26 | End: 2019-08-26 | Stop reason: SURG

## 2019-08-26 RX ORDER — PROMETHAZINE HYDROCHLORIDE 25 MG/ML
6.25 INJECTION, SOLUTION INTRAMUSCULAR; INTRAVENOUS
Status: DISCONTINUED | OUTPATIENT
Start: 2019-08-26 | End: 2019-08-26 | Stop reason: HOSPADM

## 2019-08-26 RX ORDER — HYDRALAZINE HYDROCHLORIDE 20 MG/ML
5 INJECTION INTRAMUSCULAR; INTRAVENOUS
Status: DISCONTINUED | OUTPATIENT
Start: 2019-08-26 | End: 2019-08-26 | Stop reason: HOSPADM

## 2019-08-26 RX ORDER — PROMETHAZINE HYDROCHLORIDE 25 MG/1
25 TABLET ORAL ONCE AS NEEDED
Status: DISCONTINUED | OUTPATIENT
Start: 2019-08-26 | End: 2019-08-26 | Stop reason: HOSPADM

## 2019-08-26 RX ORDER — ONDANSETRON 2 MG/ML
INJECTION INTRAMUSCULAR; INTRAVENOUS AS NEEDED
Status: DISCONTINUED | OUTPATIENT
Start: 2019-08-26 | End: 2019-08-26 | Stop reason: SURG

## 2019-08-26 RX ORDER — GLYCOPYRROLATE 0.2 MG/ML
INJECTION INTRAMUSCULAR; INTRAVENOUS AS NEEDED
Status: DISCONTINUED | OUTPATIENT
Start: 2019-08-26 | End: 2019-08-26 | Stop reason: SURG

## 2019-08-26 RX ORDER — LABETALOL HYDROCHLORIDE 5 MG/ML
5 INJECTION, SOLUTION INTRAVENOUS
Status: DISCONTINUED | OUTPATIENT
Start: 2019-08-26 | End: 2019-08-26 | Stop reason: HOSPADM

## 2019-08-26 RX ORDER — FAMOTIDINE 10 MG/ML
20 INJECTION, SOLUTION INTRAVENOUS ONCE
Status: COMPLETED | OUTPATIENT
Start: 2019-08-26 | End: 2019-08-26

## 2019-08-26 RX ORDER — IPRATROPIUM BROMIDE AND ALBUTEROL SULFATE 2.5; .5 MG/3ML; MG/3ML
3 SOLUTION RESPIRATORY (INHALATION)
Status: DISCONTINUED | OUTPATIENT
Start: 2019-08-26 | End: 2019-08-26 | Stop reason: HOSPADM

## 2019-08-26 RX ORDER — ONDANSETRON 2 MG/ML
4 INJECTION INTRAMUSCULAR; INTRAVENOUS ONCE AS NEEDED
Status: DISCONTINUED | OUTPATIENT
Start: 2019-08-26 | End: 2019-08-26 | Stop reason: HOSPADM

## 2019-08-26 RX ORDER — DIPHENHYDRAMINE HYDROCHLORIDE 50 MG/ML
12.5 INJECTION INTRAMUSCULAR; INTRAVENOUS
Status: DISCONTINUED | OUTPATIENT
Start: 2019-08-26 | End: 2019-08-26 | Stop reason: HOSPADM

## 2019-08-26 RX ORDER — CEFAZOLIN SODIUM 2 G/100ML
2 INJECTION, SOLUTION INTRAVENOUS ONCE
Status: COMPLETED | OUTPATIENT
Start: 2019-08-26 | End: 2019-08-26

## 2019-08-26 RX ORDER — ROCURONIUM BROMIDE 10 MG/ML
INJECTION, SOLUTION INTRAVENOUS AS NEEDED
Status: DISCONTINUED | OUTPATIENT
Start: 2019-08-26 | End: 2019-08-26 | Stop reason: SURG

## 2019-08-26 RX ORDER — DIPHENHYDRAMINE HCL 25 MG
25 CAPSULE ORAL
Status: DISCONTINUED | OUTPATIENT
Start: 2019-08-26 | End: 2019-08-26 | Stop reason: HOSPADM

## 2019-08-26 RX ORDER — HYDROCODONE BITARTRATE AND ACETAMINOPHEN 7.5; 325 MG/1; MG/1
1 TABLET ORAL ONCE AS NEEDED
Status: COMPLETED | OUTPATIENT
Start: 2019-08-26 | End: 2019-08-26

## 2019-08-26 RX ORDER — HYDROMORPHONE HYDROCHLORIDE 1 MG/ML
0.5 INJECTION, SOLUTION INTRAMUSCULAR; INTRAVENOUS; SUBCUTANEOUS
Status: DISCONTINUED | OUTPATIENT
Start: 2019-08-26 | End: 2019-08-26 | Stop reason: HOSPADM

## 2019-08-26 RX ORDER — PROMETHAZINE HYDROCHLORIDE 25 MG/ML
12.5 INJECTION, SOLUTION INTRAMUSCULAR; INTRAVENOUS ONCE AS NEEDED
Status: DISCONTINUED | OUTPATIENT
Start: 2019-08-26 | End: 2019-08-26 | Stop reason: HOSPADM

## 2019-08-26 RX ORDER — NALOXONE HCL 0.4 MG/ML
0.2 VIAL (ML) INJECTION AS NEEDED
Status: DISCONTINUED | OUTPATIENT
Start: 2019-08-26 | End: 2019-08-26 | Stop reason: HOSPADM

## 2019-08-26 RX ORDER — ACETAMINOPHEN 325 MG/1
650 TABLET ORAL ONCE AS NEEDED
Status: DISCONTINUED | OUTPATIENT
Start: 2019-08-26 | End: 2019-08-26 | Stop reason: HOSPADM

## 2019-08-26 RX ADMIN — FENTANYL CITRATE 50 MCG: 50 INJECTION, SOLUTION INTRAMUSCULAR; INTRAVENOUS at 10:28

## 2019-08-26 RX ADMIN — GLYCOPYRROLATE 0.4 MG: 0.2 INJECTION INTRAMUSCULAR; INTRAVENOUS at 09:41

## 2019-08-26 RX ADMIN — PROPOFOL 200 MG: 10 INJECTION, EMULSION INTRAVENOUS at 08:44

## 2019-08-26 RX ADMIN — ROCURONIUM BROMIDE 25 MG: 10 INJECTION INTRAVENOUS at 08:44

## 2019-08-26 RX ADMIN — HYDROCODONE BITARTRATE AND ACETAMINOPHEN 1 TABLET: 7.5; 325 TABLET ORAL at 10:45

## 2019-08-26 RX ADMIN — NEOSTIGMINE METHYLSULFATE 2.5 MG: 1 INJECTION INTRAMUSCULAR; INTRAVENOUS; SUBCUTANEOUS at 09:41

## 2019-08-26 RX ADMIN — FENTANYL CITRATE 50 MCG: 50 INJECTION, SOLUTION INTRAMUSCULAR; INTRAVENOUS at 10:37

## 2019-08-26 RX ADMIN — GLYCOPYRROLATE 0.2 MG: 0.2 INJECTION INTRAMUSCULAR; INTRAVENOUS at 09:00

## 2019-08-26 RX ADMIN — PROPOFOL 25 MCG/KG/MIN: 10 INJECTION, EMULSION INTRAVENOUS at 08:48

## 2019-08-26 RX ADMIN — FENTANYL CITRATE 50 MCG: 50 INJECTION INTRAMUSCULAR; INTRAVENOUS at 09:07

## 2019-08-26 RX ADMIN — LIDOCAINE HYDROCHLORIDE 40 MG: 20 INJECTION, SOLUTION INFILTRATION; PERINEURAL at 08:44

## 2019-08-26 RX ADMIN — CEFAZOLIN SODIUM 2 G: 2 INJECTION, SOLUTION INTRAVENOUS at 08:52

## 2019-08-26 RX ADMIN — PROPOFOL 50 MG: 10 INJECTION, EMULSION INTRAVENOUS at 09:42

## 2019-08-26 RX ADMIN — ONDANSETRON 4 MG: 2 INJECTION INTRAMUSCULAR; INTRAVENOUS at 08:41

## 2019-08-26 RX ADMIN — FENTANYL CITRATE 50 MCG: 50 INJECTION INTRAMUSCULAR; INTRAVENOUS at 08:58

## 2019-08-26 RX ADMIN — IPRATROPIUM BROMIDE AND ALBUTEROL SULFATE 3 ML: .5; 3 SOLUTION RESPIRATORY (INHALATION) at 11:53

## 2019-08-26 RX ADMIN — SODIUM CHLORIDE, POTASSIUM CHLORIDE, SODIUM LACTATE AND CALCIUM CHLORIDE 9 ML/HR: 600; 310; 30; 20 INJECTION, SOLUTION INTRAVENOUS at 07:12

## 2019-08-26 RX ADMIN — FAMOTIDINE 20 MG: 10 INJECTION INTRAVENOUS at 07:12

## 2019-08-26 NOTE — ANESTHESIA PROCEDURE NOTES
Airway  Urgency: elective    Airway not difficult    General Information and Staff    Patient location during procedure: OR  Anesthesiologist: Frank Dorado MD  CRNA: Mar Barba CRNA    Indications and Patient Condition  Indications for airway management: airway protection    Preoxygenated: yes  MILS maintained throughout  Mask difficulty assessment: 1 - vent by mask    Final Airway Details  Final airway type: endotracheal airway      Successful airway: ETT  Cuffed: yes   Successful intubation technique: direct laryngoscopy  Facilitating devices/methods: intubating stylet  Endotracheal tube insertion site: oral  Blade: [place]  Blade size: 2  ETT size (mm): 7.0  Cormack-Lehane Classification: grade I - full view of glottis  Placement verified by: chest auscultation and capnometry   Cuff volume (mL): 6  Measured from: teeth  ETT to teeth (cm): 19  Number of attempts at approach: 1

## 2019-08-26 NOTE — OP NOTE
[facility]  Iraida Elizabeth  1945     Brief Operative Note    08/26/19   Jimenez Young MD      Preoperative Diagnosis: Symptomatic right lower extremity varicose veins    Postoperative Diagnosis: same as above    Procedure Performed: Ligation of right saphenofemoral junction    Surgeon: Jimenez Young MD    Assistant: Chelsey Mccabe and they provided critical assistance during the case including suctioning, exposure, retraction, and reduction of blood loss.    Anesthesia: General Anesthesia via Laryngeal Airway    Estimated Blood Loss: minimal    Specimen: None    Complications: None    Dispo: awakened from anesthesia, extubated and taken to the recovery room in a stable condition, having suffered no apparent untoward event.    Indication for procedure: 74 y.o. female with symptomatic right lower extremity varicose veins status post remote ablation.  She is here for saphenofemoral junction ligation and possible phlebectomies.    Description of procedure: Patient was taken to the operating room and placed in the supine position.  General anesthesia was induced.  The right leg and groin were prepped and draped in usual sterile fashion.  A timeout was observed.  Preoperative antibiotics were given.    An oblique incision was made over the saphenofemoral junction and this was dissected out by following the large dilated saphenous vein.  The saphenous vein measured about a centimeter in diameter at this point.  I traced down to the saphenofemoral junction, clearly identified the walls of the femoral vein, crossclamped the saphenofemoral junction and divided between clamps.  The femoral vein side of this was ligated with 5-0 Prolene in a running fashion in 2 layers.  The other side was tied off with 2-0 silk.  The rest of the saphenous vein was then traced to the inferior portion of the wound as far as I could, clamps, divided, tied off with 2-0 silk.  The incision was cleaned and dried made  hemostatic.  A few hemoclips have been used to divide the branches to the saphenofemoral junction as it was divided out.    Once we had hemostasis, the wound was closed using 3-0 Vicryl in the subcutaneous layer and skin staples.    A few areas on the leg is been marked with the patient standing over the sites of probable varicose veins.  The patient's varicose veins were not especially palpable compared to normal.  I made a total of 5 phlebectomy incisions and found a single small varicose vein under all this.  These were all closed with Steri-Strips.  A sterile dressing was placed on the groin.  A bulky sterile padded compressive dressing was placed on the lower leg.  The patient tolerated the procedure well.  All counts were correct        Jimenez Young MD  08/26/19

## 2019-08-26 NOTE — H&P
Name: Iraida Elizabeth ADMIT: 2019   : 1945  PCP: Claudio Anne MD    MRN: [id number] LOS: 0 days   AGE/SEX: 74 y.o. female  ROOM: [facility]         CHIEF COMPLAINT: Symptomatic right lower extremity varicose veins  HPI: Iraida Elizabeth is a 74 y.o. female whose previous medical records I have reviewed and summarize below in addition to the findings from today's exam.  She had a venous procedure on her right leg more than 30 years ago, she is uncertain what this was.  She has had a recurrence of painful varicose veins bother her especially at night.  The location is the medial thigh near the knee.  She had vein mapping performed that showed no segment of saphenous vein which can be ablated and she is here for ligation of her saphenofemoral junction.    PAST MEDICAL HISTORY:   Past Medical History:   Diagnosis Date   • Arthropathy of knee 2014    unspecified   • Arthropathy, multiple sites 2012    unspecified   • Bulging lumbar disc    • Cancer (CMS/HCC)     breast   • Chronic kidney disease, stage III (moderate) (CMS/HCC) 2011   • Controlled diabetes mellitus type II without complication (CMS/HCC)    • Deep vein blood clot of right lower extremity (CMS/HCC) 2019   • Depression 2001   • Dyspnea    • Essential hypertension    • Fall 2015    FRACTURED ANKLE   • History of complete eye exam 2012   • History of gynecological procedure 2010    special invesitation and examinations; gynecological examination; routine gynecological examination   • Hyperlipidemia     other and unspecified   • Hypothyroidism     unspecified   • Low back pain    • Peripheral neuropathy    • Personal history of malignant neoplasm of breast 2001    R breast (negative nodes) TX with radiation and Tamoxifen   • Thyroid disease    • Type II diabetes mellitus with neurological manifestations, uncontrolled (CMS/HCC) 2013   • Wrist fracture, left       PAST SURGICAL  "HISTORY:   Past Surgical History:   Procedure Laterality Date   • BREAST LUMPECTOMY Right 2001   • OTHER SURGICAL HISTORY Left 01/2015    ankle fracture repair   • VARICOSE VEIN SURGERY        FAMILY HISTORY:   Family History   Problem Relation Age of Onset   • Hypertension Sister    • Thyroid disease Daughter    • Cancer Mother    • Heart disease Father    • Malig Hyperthermia Neg Hx       SOCIAL HISTORY:   Social History     Tobacco Use   • Smoking status: Never Smoker   • Smokeless tobacco: Never Used   Substance Use Topics   • Alcohol use: No   • Drug use: No      MEDICATIONS:   No current facility-administered medications on file prior to encounter.      Current Outpatient Medications on File Prior to Encounter   Medication Sig Dispense Refill   • amLODIPine (NORVASC) 10 MG tablet Take 1 tablet by mouth Daily for 180 days. 90 tablet 1   • atorvastatin (LIPITOR) 20 MG tablet Take 1 tablet by mouth Every Night for 180 days. 90 tablet 1   • Foot Care Products (DIABETIC INSOLES) misc 1 pair of diabetic shoe  Dx E11.45 1 each 0   • hydrochlorothiazide (HYDRODIURIL) 25 MG tablet Take 1 tablet by mouth Daily for 180 days. 90 tablet 1   • Insulin Syringe 31G X 5/16\" 0.3 ML misc 4 SC INJECTIONS OF INSULIN DAILY 200 each 4   • Insulin Syringe-Needle U-100 (TRUEPLUS INSULIN SYRINGE) 31G X 5/16\" 1 ML misc USE FOUR TIMES A DAY AS DIRECTED 100 each 3   • levothyroxine (SYNTHROID, LEVOTHROID) 75 MCG tablet Take 1 tablet by mouth Daily for 180 days. 90 tablet 1   • ONE TOUCH ULTRA TEST test strip USE TO TEST BLOOD SUGAR FOUR TIMES DAILY AS DIRECTED 100 each 4   • PARoxetine (PAXIL) 20 MG tablet Take 1 tablet by mouth Every Night for 180 days. 90 tablet 1             ALLERGIES: Tizanidine     COMPLETE REVIEW OF SYSTEMS:     Review of Systems   Constitutional: Positive for activity change and fatigue.   Respiratory: Negative for shortness of breath.    Cardiovascular: Negative for chest pain.   Gastrointestinal: Negative for " "abdominal pain.         PHYSICAL EXAM:   Patient Vitals for the past 24 hrs:   BP Temp Temp src Pulse Resp SpO2 Height Weight   08/26/19 0606 136/50 98.4 °F (36.9 °C) Oral 69 18 98 % 154.9 cm (61\") 96.4 kg (212 lb 8 oz)        Physical Exam   Constitutional: She appears well-developed and well-nourished. No distress.   HENT:   Head: Normocephalic and atraumatic.   Neck: Normal range of motion.   Cardiovascular: Normal rate and regular rhythm.   Pulmonary/Chest: Effort normal. No respiratory distress.   Abdominal: Soft. There is no tenderness.   Neurological: She is alert.   Skin: Skin is warm and dry.     Multiple spider veins, a few varicose veins on the right leg        LABS:      Recent Results (from the past 24 hour(s))   POC Glucose Once    Collection Time: 08/26/19  5:47 AM   Result Value Ref Range    Glucose 82 70 - 130 mg/dL   ]    The following radiologic or non-invasive studies including the images have been independently reviewed by me and my impressions are as follows: Vein mapping shows significant saphenofemoral junction reflux and multiple varicose veins.       ASSESSMENT/PLAN: 74 y.o. female with symptomatic nonaxial varicose veins.  She does not have many palpable varicose veins on physical exam but on ultrasound she has multiple large veins fed from incompetent saphenofemoral junction.  We will plan for saphenofemoral junction ligation.  I marked a few varicose veins with her in the standing position and will perform phlebectomy of this as well.    I have discussed with the patient the following five points:  1-  I have been asked to see Iraida Elizabeth for the treatment of the diagnosis of: Symptomatic venous reflux, right leg  2- The planned treatment of this diagnosis is: Saphenofemoral junction ligation with phlebectomies  3- The risks of a procedure include but are not limited to the following: bleeding, thrombosis, damage to adjacent anatomical structures including nerves or blood vessels, " infections, wound healing problems, the need for further procedures, whether planned or emergent. The benefits of a procedure include but are not limited to the following: Decrease in pain  4- Alternatives to the planned procedure include: Compression stockings indefinitely  5- The natural history of the diagnosis if no treatment is given is: Progression or lack of resolution of symptoms    Assessment/Plan       * No active hospital problems. *      Jimenez Young MD   08/26/19

## 2019-08-26 NOTE — ANESTHESIA PREPROCEDURE EVALUATION
Anesthesia Evaluation     NPO Solid Status: > 8 hours             Airway   Mallampati: II  Dental      Pulmonary - normal exam   (-) sleep apnea, not a smoker  Cardiovascular - normal exam    (+) hypertension, DVT, hyperlipidemia,       Neuro/Psych  (+) psychiatric history Depression,     GI/Hepatic/Renal/Endo    (+) morbid obesity,  diabetes mellitus using insulin, hypothyroidism,     Musculoskeletal     Abdominal    Substance History      OB/GYN          Other                      Anesthesia Plan    ASA 3     general     Anesthetic plan, all risks, benefits, and alternatives have been provided, discussed and informed consent has been obtained with: patient.

## 2019-08-26 NOTE — ANESTHESIA POSTPROCEDURE EVALUATION
"Patient: Iraida Elizabeth    Procedure Summary     Date:  08/26/19 Room / Location:  [facility] OR  / [facility] MAIN OR    Anesthesia Start:  0836 Anesthesia Stop:  1011    Procedure:  RIGHT LEG PHLEBECTOMY WITH SAPHENO FEMORAL JUNCTION LIGATION (Right ) Diagnosis:      Surgeon:  Jimenez Young MD Provider:  Frank Dorado MD    Anesthesia Type:  general ASA Status:  3          Anesthesia Type: general  Last vitals  BP   129/70 (08/26/19 1155)   Temp   36.6 °C (97.8 °F) (08/26/19 1150)   Pulse   83 (08/26/19 1155)   Resp   16 (08/26/19 1155)     SpO2   92 % (08/26/19 1155)     Post Anesthesia Care and Evaluation    Patient location during evaluation: bedside  Patient participation: complete - patient participated  Level of consciousness: awake  Pain management: adequate  Airway patency: patent  Anesthetic complications: No anesthetic complications    Cardiovascular status: acceptable  Respiratory status: acceptable  Hydration status: acceptable    Comments: */70   Pulse 83   Temp 36.6 °C (97.8 °F) (Oral)   Resp 16   Ht 154.9 cm (61\")   Wt 96.4 kg (212 lb 8 oz)   SpO2 92%   BMI 40.15 kg/m²         " Statement Selected

## 2019-08-26 NOTE — PERIOPERATIVE NURSING NOTE
DR SANCHEZ HERE INTERVIEWING PT, LET HIM KNOW PT'S BS IS 82 THIS AM. ALSO PT REPORTS SHE CHECKED HER BS THIS AM AT HOME IT WAS 78, STATES SHE TOOK A GLUCOSE TAB B/C SHE FELT SHAKY, THEN SHE STATES SHE RECHECK HER BS AND IT . HE ASKED PT HOW SHE FEELS NOW, SHE STATES SHE FEELS FINE, NO NEW ORDERS NOTED.

## 2019-08-27 ENCOUNTER — TRANSITIONAL CARE MANAGEMENT TELEPHONE ENCOUNTER (OUTPATIENT)
Dept: FAMILY MEDICINE CLINIC | Facility: CLINIC | Age: 74
End: 2019-08-27

## 2019-08-28 NOTE — PROGRESS NOTES
Dr srinivasan has appt on 9/4, 9/5 at 11:15 and some appt on 9/9 available if pt want one of them just let me know

## 2019-09-04 ENCOUNTER — OFFICE VISIT (OUTPATIENT)
Dept: FAMILY MEDICINE CLINIC | Facility: CLINIC | Age: 74
End: 2019-09-04

## 2019-09-04 VITALS
DIASTOLIC BLOOD PRESSURE: 71 MMHG | BODY MASS INDEX: 40.78 KG/M2 | OXYGEN SATURATION: 97 % | WEIGHT: 216 LBS | HEIGHT: 61 IN | RESPIRATION RATE: 18 BRPM | SYSTOLIC BLOOD PRESSURE: 149 MMHG | HEART RATE: 71 BPM

## 2019-09-04 DIAGNOSIS — Z09 HOSPITAL DISCHARGE FOLLOW-UP: Primary | ICD-10-CM

## 2019-09-04 DIAGNOSIS — I87.2 VENOUS INSUFFICIENCY OF BOTH LOWER EXTREMITIES: ICD-10-CM

## 2019-09-04 PROCEDURE — 99214 OFFICE O/P EST MOD 30 MIN: CPT | Performed by: FAMILY MEDICINE

## 2019-09-04 NOTE — PROGRESS NOTES
Transitional Care Follow Up Visit  Subjective     Iraida Elizabeth is a 74 y.o. female who presents for a transitional care management visit.    Within 48 business hours after discharge our office contacted her via telephone to coordinate her care and needs.      I reviewed and discussed the details of that call along with the discharge summary, hospital problems, inpatient lab results, inpatient diagnostic studies, and consultation reports with Iraida.     Current outpatient and discharge medications have been reconciled for the patient.  Reviewed by: Claudio Anne MD      Date of TCM Phone Call 8/27/2019   [facility]   Date of Admission 8/26/2019   Date of Discharge 8/26/2019   Discharge Disposition Home or Self Care     Risk for Readmission (LACE) Score: 8 (8/26/2019  6:00 AM)      Patient went in for vein surgery on 8/26/2019.  She went home same day.  No complications.  No medication changes.  She has her next follow-up with Dr. Laguna on September 9, 2019.  Staples are still in place in her right thigh area.  Overall she is doing well.  No excessive  discharge from the wound.  She denies fever sweats or chills.  Patient has follow-up with orthopedist in later October for left ankle surgical repair.       Course During Hospital Stay:  See above     The following portions of the patient's history were reviewed and updated as appropriate: allergies, current medications, past family history, past medical history, past social history, past surgical history and problem list.    Review of Systems   Constitutional: Negative for fever.   Skin:        See HPI       Objective   Physical Exam   Constitutional: She is cooperative. No distress.   Eyes: Conjunctivae and lids are normal.   Neck: Carotid bruit is not present. No tracheal deviation present.   Cardiovascular: Normal rate, regular rhythm and normal heart sounds.   No murmur heard.  Pulmonary/Chest: Effort normal and breath sounds normal.    Musculoskeletal: She exhibits edema (non pitting both legs).   Brace left ankle   Neurological: She is alert. She is not disoriented.   Skin: Skin is warm and dry.        Psychiatric: She has a normal mood and affect. Her speech is normal and behavior is normal.   Vitals reviewed.      Assessment/Plan   Diagnoses and all orders for this visit:    1. Hospital discharge follow-up (Primary)  Comments:  Patient doing well postoperatively.  Follow-ups as indicated.    2. Venous insufficiency of both lower extremities  Assessment & Plan:  Improvement with surgery.  Follow-up with vascular surgeon as ordered.      rtc in October. Repeat labs prior to visit

## 2019-09-24 LAB
ALBUMIN SERPL-MCNC: 3.9 G/DL (ref 3.5–5.2)
ALBUMIN/GLOB SERPL: 1.5 G/DL
ALP SERPL-CCNC: 116 U/L (ref 39–117)
ALT SERPL-CCNC: 11 U/L (ref 1–33)
AST SERPL-CCNC: 12 U/L (ref 1–32)
BASOPHILS # BLD AUTO: 0.05 10*3/MM3 (ref 0–0.2)
BASOPHILS NFR BLD AUTO: 0.7 % (ref 0–1.5)
BILIRUB SERPL-MCNC: 0.4 MG/DL (ref 0.2–1.2)
BUN SERPL-MCNC: 19 MG/DL (ref 8–23)
BUN/CREAT SERPL: 20 (ref 7–25)
CALCIUM SERPL-MCNC: 9.2 MG/DL (ref 8.6–10.5)
CHLORIDE SERPL-SCNC: 100 MMOL/L (ref 98–107)
CHOLEST SERPL-MCNC: 146 MG/DL (ref 100–199)
CO2 SERPL-SCNC: 28.8 MMOL/L (ref 22–29)
CREAT SERPL-MCNC: 0.95 MG/DL (ref 0.57–1)
EOSINOPHIL # BLD AUTO: 0.35 10*3/MM3 (ref 0–0.4)
EOSINOPHIL NFR BLD AUTO: 4.8 % (ref 0.3–6.2)
ERYTHROCYTE [DISTWIDTH] IN BLOOD BY AUTOMATED COUNT: 15.5 % (ref 12.3–15.4)
GLOBULIN SER CALC-MCNC: 2.6 GM/DL
GLUCOSE SERPL-MCNC: 146 MG/DL (ref 65–99)
HCT VFR BLD AUTO: 36.5 % (ref 34–46.6)
HDL SERPL-SCNC: 28.1 UMOL/L
HDLC SERPL-MCNC: 55 MG/DL
HGB BLD-MCNC: 10.6 G/DL (ref 12–15.9)
IMM GRANULOCYTES # BLD AUTO: 0.02 10*3/MM3 (ref 0–0.05)
IMM GRANULOCYTES NFR BLD AUTO: 0.3 % (ref 0–0.5)
LDL SERPL QN: 21.2 NM
LDL SERPL-SCNC: 768 NMOL/L
LDL SMALL SERPL-SCNC: 130 NMOL/L
LDLC SERPL CALC-MCNC: 72 MG/DL (ref 0–99)
LYMPHOCYTES # BLD AUTO: 1.02 10*3/MM3 (ref 0.7–3.1)
LYMPHOCYTES NFR BLD AUTO: 14.1 % (ref 19.6–45.3)
MCH RBC QN AUTO: 23.7 PG (ref 26.6–33)
MCHC RBC AUTO-ENTMCNC: 29 G/DL (ref 31.5–35.7)
MCV RBC AUTO: 81.5 FL (ref 79–97)
MONOCYTES # BLD AUTO: 0.62 10*3/MM3 (ref 0.1–0.9)
MONOCYTES NFR BLD AUTO: 8.6 % (ref 5–12)
NEUTROPHILS # BLD AUTO: 5.17 10*3/MM3 (ref 1.7–7)
NEUTROPHILS NFR BLD AUTO: 71.5 % (ref 42.7–76)
NRBC BLD AUTO-RTO: 0 /100 WBC (ref 0–0.2)
PLATELET # BLD AUTO: 377 10*3/MM3 (ref 140–450)
POTASSIUM SERPL-SCNC: 4.5 MMOL/L (ref 3.5–5.2)
PROT SERPL-MCNC: 6.5 G/DL (ref 6–8.5)
RBC # BLD AUTO: 4.48 10*6/MM3 (ref 3.77–5.28)
SODIUM SERPL-SCNC: 142 MMOL/L (ref 136–145)
TRIGL SERPL-MCNC: 93 MG/DL (ref 0–149)
WBC # BLD AUTO: 7.23 10*3/MM3 (ref 3.4–10.8)

## 2019-10-01 RX ORDER — HYDROCODONE BITARTRATE AND ACETAMINOPHEN 5; 325 MG/1; MG/1
1 TABLET ORAL EVERY 8 HOURS PRN
Qty: 90 TABLET | Refills: 0 | Status: SHIPPED | OUTPATIENT
Start: 2019-10-01 | End: 2019-10-29 | Stop reason: SDUPTHER

## 2019-10-01 NOTE — TELEPHONE ENCOUNTER
Medication Refill Request    Date of phone call: 10/1/19    Medication being requested: norco 5/325mg si tab po q 8 hours prn   Qty: 90    Date of last visit: 19    Date of last refill: 19    DONNY up to date?: yes    Next Follow up?: 10/18/19    Any new pertinent information? (i.e, new medication allergies, new use of medications, change in patient's health or condition, non-compliance or inconsistency with prescribing agreement?):

## 2019-10-02 ENCOUNTER — OFFICE VISIT (OUTPATIENT)
Dept: FAMILY MEDICINE CLINIC | Facility: CLINIC | Age: 74
End: 2019-10-02

## 2019-10-02 VITALS
OXYGEN SATURATION: 98 % | DIASTOLIC BLOOD PRESSURE: 71 MMHG | RESPIRATION RATE: 16 BRPM | HEART RATE: 67 BPM | SYSTOLIC BLOOD PRESSURE: 153 MMHG

## 2019-10-02 DIAGNOSIS — I10 ESSENTIAL HYPERTENSION: Chronic | ICD-10-CM

## 2019-10-02 DIAGNOSIS — F32.1 MODERATE SINGLE CURRENT EPISODE OF MAJOR DEPRESSIVE DISORDER (HCC): ICD-10-CM

## 2019-10-02 DIAGNOSIS — F32.5 MAJOR DEPRESSIVE DISORDER WITH SINGLE EPISODE, IN FULL REMISSION (HCC): ICD-10-CM

## 2019-10-02 DIAGNOSIS — K44.9 HIATAL HERNIA: ICD-10-CM

## 2019-10-02 DIAGNOSIS — E03.9 ACQUIRED HYPOTHYROIDISM: ICD-10-CM

## 2019-10-02 DIAGNOSIS — D50.9 MICROCYTIC ANEMIA: ICD-10-CM

## 2019-10-02 DIAGNOSIS — Z23 NEED FOR IMMUNIZATION AGAINST INFLUENZA: ICD-10-CM

## 2019-10-02 DIAGNOSIS — E78.49 OTHER HYPERLIPIDEMIA: ICD-10-CM

## 2019-10-02 DIAGNOSIS — Z01.818 PREOPERATIVE CLEARANCE: Primary | ICD-10-CM

## 2019-10-02 PROBLEM — Z86.718 HISTORY OF DVT (DEEP VEIN THROMBOSIS): Status: ACTIVE | Noted: 2019-03-24

## 2019-10-02 PROCEDURE — G0008 ADMIN INFLUENZA VIRUS VAC: HCPCS | Performed by: FAMILY MEDICINE

## 2019-10-02 PROCEDURE — 90653 IIV ADJUVANT VACCINE IM: CPT | Performed by: FAMILY MEDICINE

## 2019-10-02 PROCEDURE — 99214 OFFICE O/P EST MOD 30 MIN: CPT | Performed by: FAMILY MEDICINE

## 2019-10-02 RX ORDER — PAROXETINE HYDROCHLORIDE 20 MG/1
20 TABLET, FILM COATED ORAL NIGHTLY
Qty: 90 TABLET | Refills: 1 | Status: SHIPPED | OUTPATIENT
Start: 2019-10-02 | End: 2020-01-02 | Stop reason: SDUPTHER

## 2019-10-02 RX ORDER — LEVOTHYROXINE SODIUM 0.07 MG/1
75 TABLET ORAL DAILY
Qty: 90 TABLET | Refills: 1 | Status: SHIPPED | OUTPATIENT
Start: 2019-10-02 | End: 2020-01-02 | Stop reason: SDUPTHER

## 2019-10-02 RX ORDER — ATORVASTATIN CALCIUM 20 MG/1
20 TABLET, FILM COATED ORAL NIGHTLY
Qty: 90 TABLET | Refills: 1 | Status: SHIPPED | OUTPATIENT
Start: 2019-10-02 | End: 2020-01-02 | Stop reason: SDUPTHER

## 2019-10-02 RX ORDER — AMLODIPINE, VALSARTAN AND HYDROCHLOROTHIAZIDE 10; 320; 25 MG/1; MG/1; MG/1
1 TABLET ORAL DAILY
Qty: 90 TABLET | Refills: 1 | Status: SHIPPED | OUTPATIENT
Start: 2019-10-02 | End: 2019-10-18

## 2019-10-02 NOTE — ASSESSMENT & PLAN NOTE
Hypertension is unchanged.  Medication changes per orders.  Changed to Exforge HCT daily  Blood pressure will be reassessed in 3 months.

## 2019-10-02 NOTE — ASSESSMENT & PLAN NOTE
Psychological condition is unchanged.  Continue current treatment regimen.  Psychological condition  will be reassessed in 3 months.

## 2019-10-02 NOTE — PROGRESS NOTES
Chief Complaint:   Subjective    Rooming Tab(CC,VS,Pt Hx,Fall Screen)   Chief Complaint   Patient presents with   • Hyperlipidemia     lab work    • Hypertension   • Med Management         History of Present Illness   Iraida Elizabeth is a 74 y.o. female who presents to the office today to follow-up on recent labs.  She remains anemic.  The anemic is microcytic.  Unfortunately no iron levels or B12 or folate levels were obtained.  Patient's type and cross was a positive.  She has upcoming surgery on left ankle in the near future.  She does need clearance.  She is certainly cleared for surgery from my standpoint but will need clearance also from endocrinology.  The hospital would like a copy of recent EKG done in August.  We have results but not the actual tracing.  Copy was obtained and will be sent along with the clearance for surgery.  Blood pressure is above goal.  We will switch to Exforge HCT.  Depression stable.  Lipids controlled.  Thyroid controlled.  Overall she feels well except for left ankle issue.    I have reviewed and updated her medications, medical history and problem list during today's office visit.     Problem List Tab  Medications Tab  Synopsis Tab  Chart Review Tab  Care Everywhere Tab  Immunizations Tab  Patient History Tab  Social History     Tobacco Use   • Smoking status: Never Smoker   • Smokeless tobacco: Never Used   Substance Use Topics   • Alcohol use: No       Review of Systems   Constitutional: Negative for fatigue.   Cardiovascular: Positive for leg swelling. Negative for chest pain.   Musculoskeletal:        See HPI         Physical Examination:   Objective   Rooming Tab(CC,VS,Pt Hx,Fall Screen)  /71   Pulse 67   Resp 16   SpO2 98%     There is no height or weight on file to calculate BMI.    Physical Exam   Constitutional: She is oriented to person, place, and time. She appears well-developed. No distress.   Eyes: Conjunctivae and lids are normal.   Neck: Carotid bruit is  not present.   Cardiovascular: Normal rate, regular rhythm and normal heart sounds.   Pulmonary/Chest: Effort normal and breath sounds normal.   Neurological: She is alert and oriented to person, place, and time.   Skin: Skin is warm and dry.   Psychiatric: She has a normal mood and affect. Her behavior is normal.   Vitals reviewed.       Data Reviewed:              Lab Results   Component Value Date     (H) 09/23/2019    BUN 19 09/23/2019    BUN 24 (H) 08/19/2019    CREATININE 0.95 09/23/2019    CREATININE 1.13 (H) 08/19/2019    EGFRIFNONA 57 (L) 09/23/2019    EGFRIFNONA 47 (L) 08/19/2019    EGFRIFAFRI 69 09/23/2019     09/23/2019     08/19/2019    K 4.5 09/23/2019    K 3.8 08/19/2019     09/23/2019    CL 98 08/19/2019    CALCIUM 9.2 09/23/2019    CALCIUM 8.8 08/19/2019    ALBUMIN 3.90 09/23/2019    BILITOT 0.4 09/23/2019    ALKPHOS 116 09/23/2019    AST 12 09/23/2019    ALT 11 09/23/2019    CHLPL 146 09/23/2019    TRIG 93 09/23/2019    WBC 7.23 09/23/2019    RBC 4.48 09/23/2019    HCT 36.5 09/23/2019    HCT 36.1 08/19/2019    MCV 81.5 09/23/2019    MCV 81.1 08/19/2019    MCH 23.7 (L) 09/23/2019    MCH 23.8 (L) 08/19/2019          Assessment/Plan:   Assessment/Plan   Order Review Tab  Health Maintenance Tab  Patient Plan/Order Tab  Diagnoses and all orders for this visit:    1. Preoperative clearance (Primary)  Comments:  cleared if ok with endocrine    2. Need for immunization against influenza  -     Fluad Tri 65yr+    3. Microcytic anemia  -     Ambulatory Referral to Gastroenterology  -     Iron Profile  -     Vitamin B12 & Folate    4. Hiatal hernia  -     Ambulatory Referral to Gastroenterology    5. Major depressive disorder with single episode, in full remission (CMS/HCC)  -     PARoxetine (PAXIL) 20 MG tablet; Take 1 tablet by mouth Every Night for 180 days.  Dispense: 90 tablet; Refill: 1    6. Acquired hypothyroidism  Assessment & Plan:  The current medical regimen is effective;   continue present plan and medications.      Orders:  -     levothyroxine (SYNTHROID, LEVOTHROID) 75 MCG tablet; Take 1 tablet by mouth Daily for 180 days.  Dispense: 90 tablet; Refill: 1    7. Other hyperlipidemia  Assessment & Plan:  Lipid abnormalities are improving with treatment.  Pharmacotherapy as ordered.  Lipids will be reassessed in 3 months.    Orders:  -     atorvastatin (LIPITOR) 20 MG tablet; Take 1 tablet by mouth Every Night for 180 days.  Dispense: 90 tablet; Refill: 1    8. Essential hypertension  Assessment & Plan:  Hypertension is unchanged.  Medication changes per orders.  Changed to Exforge HCT daily  Blood pressure will be reassessed in 3 months.    Orders:  -     amLODIPine-Valsartan-HCTZ -25 MG tablet; Take 1 tablet by mouth Daily for 180 days.  Dispense: 90 tablet; Refill: 1    9. Moderate single current episode of major depressive disorder (CMS/HCC)  Assessment & Plan:  Psychological condition is unchanged.  Continue current treatment regimen.  Psychological condition  will be reassessed in 3 months.       Follow up:   Wrapup Tab  Return in about 3 months (around 1/2/2020) for Recheck.

## 2019-10-04 ENCOUNTER — TELEPHONE (OUTPATIENT)
Dept: FAMILY MEDICINE CLINIC | Facility: CLINIC | Age: 74
End: 2019-10-04

## 2019-10-04 DIAGNOSIS — I10 ESSENTIAL HYPERTENSION: Primary | Chronic | ICD-10-CM

## 2019-10-04 RX ORDER — VALSARTAN 320 MG/1
320 TABLET ORAL DAILY
Qty: 90 TABLET | Refills: 1 | Status: SHIPPED | OUTPATIENT
Start: 2019-10-04 | End: 2020-01-02 | Stop reason: SDUPTHER

## 2019-10-04 RX ORDER — AMLODIPINE BESYLATE 10 MG/1
10 TABLET ORAL DAILY
Qty: 90 TABLET | Refills: 1 | Status: SHIPPED | OUTPATIENT
Start: 2019-10-04 | End: 2020-01-02 | Stop reason: SDUPTHER

## 2019-10-04 RX ORDER — HYDROCHLOROTHIAZIDE 25 MG/1
25 TABLET ORAL DAILY
Qty: 90 TABLET | Refills: 1 | Status: SHIPPED | OUTPATIENT
Start: 2019-10-04 | End: 2020-01-02 | Stop reason: SDUPTHER

## 2019-10-04 NOTE — TELEPHONE ENCOUNTER
Pt can afford the combination with the amlodipine , valsartan , - HCTZ   Will resend RX to pharm separately in order to get covered by her insursance

## 2019-10-17 ENCOUNTER — DOCUMENTATION (OUTPATIENT)
Dept: ENDOCRINOLOGY | Age: 74
End: 2019-10-17

## 2019-10-17 NOTE — PROGRESS NOTES
To whom it may concern    Iraida Elizabeth is a74 yr old  type II diabetic currently under my care.    Her last hemoglobin A1c was 7.3% in June 2019 and the creatinine was 0.9 in September 2019  Considering her age of 74 I believe her hemoglobin A1c is at the optimal control  Patient also has history of hypertension and hyperlipidemia  Patient reports that she is undergoing ankle surgery and I think from a diabetes standpoint she is at an average or below average risk

## 2019-10-18 ENCOUNTER — OFFICE VISIT (OUTPATIENT)
Dept: PAIN MEDICINE | Facility: CLINIC | Age: 74
End: 2019-10-18

## 2019-10-18 VITALS
DIASTOLIC BLOOD PRESSURE: 72 MMHG | RESPIRATION RATE: 18 BRPM | WEIGHT: 215.8 LBS | HEART RATE: 71 BPM | BODY MASS INDEX: 40.75 KG/M2 | HEIGHT: 61 IN | TEMPERATURE: 98.1 F | SYSTOLIC BLOOD PRESSURE: 113 MMHG | OXYGEN SATURATION: 96 %

## 2019-10-18 DIAGNOSIS — G89.29 OTHER CHRONIC PAIN: Primary | ICD-10-CM

## 2019-10-18 DIAGNOSIS — M54.42 CHRONIC MIDLINE LOW BACK PAIN WITH BILATERAL SCIATICA: ICD-10-CM

## 2019-10-18 DIAGNOSIS — Z79.899 ENCOUNTER FOR LONG-TERM CURRENT USE OF HIGH RISK MEDICATION: ICD-10-CM

## 2019-10-18 DIAGNOSIS — G89.29 CHRONIC MIDLINE LOW BACK PAIN WITH BILATERAL SCIATICA: ICD-10-CM

## 2019-10-18 DIAGNOSIS — M54.41 CHRONIC MIDLINE LOW BACK PAIN WITH BILATERAL SCIATICA: ICD-10-CM

## 2019-10-18 PROCEDURE — 99213 OFFICE O/P EST LOW 20 MIN: CPT | Performed by: NURSE PRACTITIONER

## 2019-10-18 NOTE — PROGRESS NOTES
CHIEF COMPLAINT  Follow-up for back pain. Ms. Elizabeth states that her back pain has gotten worse since her last appt.    Subjective   Iraida Elizabeth is a 74 y.o. female  who presents to the office for follow-up.She has a history of back pain.    RIGHT L2-5 Lumbar Medial Branch Blockade  on  9-19-18 performed by Dr. Casarez for management of back pain. Patient reports moderate relief from the procedure for 1-2 weeks. Ordered repeat injection last visit but was denied by insurance, appeal was denied as well, still in some sort of review process.       Procedure List:  LESI 12/12/18 -- NO relief from the procedure.   Bilateral L3-5 Lumbar Medial Branch Blockade 11-21-18 -- minimal relief  RIGHT L2-5 Lumbar Medial Branch Blockade 9-19-18 -- 80% relief, 2 weeks   Right SI joint injection 8/21/2018 -- NO relief from the procedure     C/o back pain, severe, worsening.  pain today 6/10 in severity. Has been taking Hydrocodone 5/325 1-2/day, has been supplementing with Tylenol, Meloxicam helping (started a few months ago).  No constipation. Provides moderate relief.  was too drowsy with Hydrocodone 10/325.       Has previously failed physical therapy.       Saw Dr. Hdez about 2 months ago.  Was not recommending surgery at that time.  Recommend physical therapy and weight loss.     Complete left ankle replacement scheduled     Back Pain   This is a chronic (chronic) problem. The current episode started more than 1 year ago. The problem occurs constantly. The problem has been gradually worsening since onset. The pain is present in the lumbar spine and sacro-iliac. The pain radiates to the left foot, left thigh, left knee, right foot, right knee and right thigh. The pain is at a severity of 6/10. The pain is severe. The symptoms are aggravated by bending, position, standing and twisting (laying flat). Associated symptoms include weakness (bilateral legs). Pertinent negatives include no abdominal pain, chest pain, dysuria,  "fever, headaches or numbness. She has tried analgesics for the symptoms.      PEG Assessment   What number best describes your pain on average in the past week?7  What number best describes how, during the past week, pain has interfered with your enjoyment of life?8  What number best describes how, during the past week, pain has interfered with your general activity?  9    The following portions of the patient's history were reviewed and updated as appropriate: allergies, current medications, past family history, past medical history, past social history, past surgical history and problem list.    Review of Systems   Constitutional: Negative for fatigue and fever.   HENT: Negative for congestion.    Eyes: Negative for visual disturbance.   Respiratory: Positive for shortness of breath. Negative for cough and wheezing.    Cardiovascular: Positive for leg swelling (left leg). Negative for chest pain and palpitations.   Gastrointestinal: Negative for abdominal pain, constipation and diarrhea.   Genitourinary: Negative for difficulty urinating and dysuria.   Musculoskeletal: Positive for arthralgias (left ankle) and back pain.   Neurological: Positive for weakness (bilateral legs). Negative for numbness and headaches.   Psychiatric/Behavioral: Positive for sleep disturbance. Negative for suicidal ideas. The patient is not nervous/anxious.        Vitals:    10/18/19 1318   BP: 113/72   Pulse: 71   Resp: 18   Temp: 98.1 °F (36.7 °C)   SpO2: 96%   Weight: 97.9 kg (215 lb 12.8 oz)   Height: 154.9 cm (60.98\")   PainSc:   6   PainLoc: Back     Objective   Physical Exam   Constitutional: She is oriented to person, place, and time. She appears well-developed and well-nourished. No distress.   HENT:   Head: Normocephalic and atraumatic.   Eyes: Conjunctivae and EOM are normal.   Neck: Neck supple.   Cardiovascular: Normal rate.   Pulmonary/Chest: Effort normal. No respiratory distress.   Musculoskeletal:        Lumbar back: She " exhibits tenderness, bony tenderness and pain.   +lumbar facet tenderness/loading    Neurological: She is alert and oriented to person, place, and time. She has normal strength. No sensory deficit. Gait (ambulating with cane) abnormal.   Skin: Skin is warm and dry.   Psychiatric: She has a normal mood and affect. Her behavior is normal.   Nursing note and vitals reviewed.      Assessment/Plan   Iraida was seen today for back pain.    Diagnoses and all orders for this visit:    Other chronic pain    Chronic midline low back pain with bilateral sciatica    Encounter for long-term current use of high risk medication      --- Continue Hydrocodone. Patient appears stable with current regimen. No adverse effects. Regarding continuation of opioids, there is no evidence of aberrant behavior or any red flags.  The patient continues with appropriate response to opioid therapy. ADL's remain intact by self.   --- The urine drug screen confirmation from 8/23/19 has been reviewed and the result is appropriate based on patient history and DONNY report  --- Ok for acute post op medication management per surgeon/rehab physician   --- Follow-up 2 months          DONNY REPORT    As part of the patient's treatment plan, I am prescribing controlled substances. The patient has been made aware of appropriate use of such medications, including potential risk of somnolence, limited ability to drive and/or work safely, and the potential for dependence or overdose. It has also bee made clear that these medications are for use by this patient only, without concomitant use of alcohol or other substances unless prescribed.     Patient has completed prescribing agreement detailing terms of continued prescribing of controlled substances, including monitoring DONNY reports, urine drug screening, and pill counts if necessary. The patient is aware that inappropriate use will results in cessation of prescribing such medications.    DONNY report  has been reviewed and scanned into the patient's chart.    As the clinician, I personally reviewed the DONNY from 10/18/19 while the patient was in the office today.    History and physical exam exhibit continued safe and appropriate use of controlled substances.    EMR Dragon/Transcription disclaimer:   Much of this encounter note is an electronic transcription/translation of spoken language to printed text. The electronic translation of spoken language may permit erroneous, or at times, nonsensical words or phrases to be inadvertently transcribed; Although I have reviewed the note for such errors, some may still exist.

## 2019-10-21 RX ORDER — HYDROCODONE BITARTRATE AND ACETAMINOPHEN 5; 325 MG/1; MG/1
1 TABLET ORAL EVERY 8 HOURS PRN
Qty: 90 TABLET | Refills: 0 | Status: CANCELLED | OUTPATIENT
Start: 2019-10-21

## 2019-10-21 NOTE — TELEPHONE ENCOUNTER
Medication Refill Request    Date of phone call: 10/21/19    Medication being requested: norco 5/325mg si tab po q 8 hours prn   Qty: 90    Date of last visit: 19    Date of last refill: 10/2/19    DONNY up to date?: yes    Next Follow up?: 19    Any new pertinent information? (i.e, new medication allergies, new use of medications, change in patient's health or condition, non-compliance or inconsistency with prescribing agreement?): pt called in today explaining her surgeon had to cancel her upcoming surgery on 10/23/19 d/t his mom in hospice so surgery is postponed to 19 so she will be in need of refill before next ov. Please advise.

## 2019-10-22 ENCOUNTER — HOSPITAL ENCOUNTER (EMERGENCY)
Facility: HOSPITAL | Age: 74
Discharge: HOME | End: 2019-10-23
Attending: EMERGENCY MEDICINE | Admitting: EMERGENCY MEDICINE

## 2019-10-22 VITALS
WEIGHT: 219 LBS | DIASTOLIC BLOOD PRESSURE: 66 MMHG | SYSTOLIC BLOOD PRESSURE: 162 MMHG | HEIGHT: 61 IN | HEART RATE: 76 BPM | OXYGEN SATURATION: 98 % | TEMPERATURE: 98.2 F | BODY MASS INDEX: 41.35 KG/M2 | RESPIRATION RATE: 16 BRPM

## 2019-10-22 DIAGNOSIS — T78.1XXA ALLERGIC REACTION TO TREE NUT: Primary | ICD-10-CM

## 2019-10-22 LAB — GLUCOSE BLDC GLUCOMTR-MCNC: 208 MG/DL (ref 70–130)

## 2019-10-22 PROCEDURE — 99283 EMERGENCY DEPT VISIT LOW MDM: CPT

## 2019-10-22 PROCEDURE — 63710000001 DIPHENHYDRAMINE PER 50 MG: Performed by: EMERGENCY MEDICINE

## 2019-10-22 PROCEDURE — 82962 GLUCOSE BLOOD TEST: CPT

## 2019-10-22 RX ORDER — DIPHENHYDRAMINE HYDROCHLORIDE 50 MG/ML
25 INJECTION INTRAMUSCULAR; INTRAVENOUS ONCE
Status: DISCONTINUED | OUTPATIENT
Start: 2019-10-22 | End: 2019-10-22

## 2019-10-22 RX ORDER — DIPHENHYDRAMINE HCL 25 MG
25 CAPSULE ORAL ONCE
Status: COMPLETED | OUTPATIENT
Start: 2019-10-22 | End: 2019-10-22

## 2019-10-22 RX ORDER — FAMOTIDINE 20 MG/1
20 TABLET, FILM COATED ORAL ONCE
Status: COMPLETED | OUTPATIENT
Start: 2019-10-22 | End: 2019-10-22

## 2019-10-22 RX ADMIN — FAMOTIDINE 20 MG: 20 TABLET, FILM COATED ORAL at 21:23

## 2019-10-22 RX ADMIN — DIPHENHYDRAMINE HYDROCHLORIDE 25 MG: 25 CAPSULE ORAL at 21:22

## 2019-10-23 NOTE — ED TRIAGE NOTES
Pt was sent over by urgent care for allergic reaction. Pt ate almonds around 1900 and approx 30-40mins later reported hives, soa, n/v. Pt was given epi at urgent care with some relief. No stridor. No distress at this time

## 2019-10-23 NOTE — ED PROVIDER NOTES
EMERGENCY DEPARTMENT ENCOUNTER    CHIEF COMPLAINT  Chief Complaint: Allergic reaction  History given by: Patient  History limited by: None  Room Number: 31/31  PMD: Claudio Anne MD      HPI:  Pt is a 74 y.o. female who presents with a reported allergic reaction after eating almonds at 1830 today. Pt reports initially having epigastric abd pain and itching to her hands, but then she became diaphoretic, nauseous, had vomiting, and developed a generalized rash. She denies any SOA or wheezing, as well as any previous allergies to tree nuts. She was seen at urgent care PTA and given EPI that alleviated her symptoms and improved her rash. Pt reports she feels that she is back to normal.    Duration: Began at 1830 today  Onset: Gradual  Timing: Constant  Intensity/Severity: Moderate  Progression: Improved  Associated Symptoms: Initially pt had epigastric abd pain, itching to her hands, diaphoresis, nausea, vomiting, and a generalized rash  Previous Episodes: Pt was seen at urgent care PTA.  Treatment before arrival: Pt had EPI at urgent care that alleviated her symptoms and improved her rash/    PAST MEDICAL HISTORY  Active Ambulatory Problems     Diagnosis Date Noted   • Moderate single current episode of major depressive disorder (CMS/[facility]) 04/02/2001   • Essential hypertension    • Other hyperlipidemia    • Personal history of malignant neoplasm of breast 01/01/2001   • Acquired hypothyroidism    • Hypoglycemia due to endogenous hyperinsulinemia 12/10/2015   • Systolic murmur 12/10/2015   • Grief reaction 06/21/2016   • Menopause 06/21/2016   • Osteopenia 07/17/2016   • Encounter for long-term (current) use of insulin (CMS/[facility]) 08/29/2016   • Hyperinsulinism 08/29/2016   • Exertional shortness of breath 11/13/2017   • Uncontrolled type 2 diabetes mellitus with diabetic neuropathy, with long-term current use of insulin (CMS/[facility]) 01/16/2018   • Uncontrolled type 2 diabetes mellitus with complication, with long-term  current use of insulin (CMS/[facility]) 01/16/2018   • Arthralgia of multiple sites 05/14/2018   • DDD (degenerative disc disease), lumbar 08/07/2018   • Right leg weakness 08/07/2018   • Other chronic pain 08/14/2018   • Localized edema 08/16/2018   • Chronic midline low back pain with bilateral sciatica 09/21/2018   • Insomnia due to medical condition 09/24/2018   • Right hip pain 10/17/2018   • Hyperlipidemia associated with type 2 diabetes mellitus (CMS/[facility]) 12/19/2018   • History of DVT (deep vein thrombosis) 03/24/2019   • Venous insufficiency of both lower extremities 05/02/2019     Resolved Ambulatory Problems     Diagnosis Date Noted   • Arthropathy of knee 05/22/2014   • Arthropathy, multiple sites 04/12/2012   • Chronic kidney disease, stage III (moderate) (CMS/[facility]) 05/26/2011   • Controlled type 2 diabetes mellitus with diabetic neuropathy, with long-term current use of insulin (CMS/[facility])    • Uncontrolled type 2 diabetes mellitus (CMS/[facility]) 01/03/2013   • History of gynecological procedure 03/01/2010   • Abnormal weight gain 12/10/2015   • Diabetes (CMS/[facility]) 12/10/2015   • Fatigue 12/10/2015   • Type II diabetes mellitus with neurological manifestations, uncontrolled (CMS/[facility]) 08/29/2016   • Uncontrolled type 2 diabetes mellitus with complication, with long-term current use of insulin (CMS/[facility]) 02/14/2017   • Infectious colitis 08/07/2018     Past Medical History:   Diagnosis Date   • Arthropathy of knee 05/22/2014   • Arthropathy, multiple sites 04/12/2012   • Bulging lumbar disc    • Cancer (CMS/[facility])    • Chronic kidney disease, stage III (moderate) (CMS/HCC) 05/26/2011   • Controlled diabetes mellitus type II without complication (CMS/[facility])    • Deep vein blood clot of right lower extremity (CMS/[facility]) 03/2019   • Depression 04/02/2001   • Dyspnea    • Essential hypertension    • Fall 2015   • History of complete eye exam 03/29/2012   • History of gynecological procedure 03/01/2010   • Hyperlipidemia    •  Hypothyroidism    • Low back pain    • Peripheral neuropathy    • Personal history of malignant neoplasm of breast 2001   • Thyroid disease    • Type II diabetes mellitus with neurological manifestations, uncontrolled (CMS/HCC) 01/03/2013   • Wrist fracture, left 2008       PAST SURGICAL HISTORY  Past Surgical History:   Procedure Laterality Date   • BREAST LUMPECTOMY Right 2001   • OTHER SURGICAL HISTORY Left 01/2015    ankle fracture repair   • VARICOSE VEIN SURGERY     • VEIN LIGATION AND STRIPPING Right 8/26/2019    Procedure: RIGHT LEG PHLEBECTOMY WITH SAPHENO FEMORAL JUNCTION LIGATION;  Surgeon: Jimenez Young MD;  Location: [facility];  Service: Vascular       FAMILY HISTORY  Family History   Problem Relation Age of Onset   • Hypertension Sister    • Thyroid disease Daughter    • Cancer Mother    • Heart disease Father    • Malig Hyperthermia Neg Hx        SOCIAL HISTORY  Social History     Socioeconomic History   • Marital status:      Spouse name: Not on file   • Number of children: 4   • Years of education: 12   • Highest education level: High school graduate   Occupational History   • Occupation: RETIRED   Social Needs   • Financial resource strain: Patient refused   • Food insecurity:     Worry: Patient refused     Inability: Patient refused   • Transportation needs:     Medical: Patient refused     Non-medical: Patient refused   Tobacco Use   • Smoking status: Never Smoker   • Smokeless tobacco: Never Used   Substance and Sexual Activity   • Alcohol use: No   • Drug use: No   • Sexual activity: Defer   Social History Narrative    LIVES ALONE       ALLERGIES  Tizanidine and Tree nut    REVIEW OF SYSTEMS  Review of Systems   Constitutional: Positive for diaphoresis. Negative for fever.   HENT: Negative for sore throat.    Eyes: Negative.    Respiratory: Negative for cough, shortness of breath and wheezing.    Cardiovascular: Negative for chest pain.   Gastrointestinal: Positive for  abdominal pain (epigastric), nausea and vomiting. Negative for diarrhea.   Genitourinary: Negative for dysuria.   Musculoskeletal: Negative for neck pain.   Skin: Positive for rash (generalized with itchy hands).   Allergic/Immunologic: Negative.    Neurological: Negative for weakness, numbness and headaches.   Hematological: Negative.    Psychiatric/Behavioral: Negative.    All other systems reviewed and are negative.      PHYSICAL EXAM  ED Triage Vitals [10/22/19 2041]   Temp Heart Rate Resp BP SpO2   98.2 °F (36.8 °C) 76 16 162/66 98 %      Temp src Heart Rate Source Patient Position BP Location FiO2 (%)   -- Monitor -- -- --         Physical Exam   Constitutional: She is oriented to person, place, and time. No distress.   HENT:   Head: Normocephalic and atraumatic.   Mouth/Throat: Oropharynx is clear and moist.   Eyes: EOM are normal. Pupils are equal, round, and reactive to light.   Neck: Normal range of motion. Neck supple.   Cardiovascular: Normal rate, regular rhythm and normal heart sounds.   No murmur heard.  Pulmonary/Chest: Effort normal and breath sounds normal. No respiratory distress.   CTAB   Abdominal: Soft. There is no tenderness. There is no rebound and no guarding.   Musculoskeletal: Normal range of motion. She exhibits no edema.   Lymphadenopathy:     She has no cervical adenopathy.   Neurological: She is alert and oriented to person, place, and time. She has normal sensation and normal strength.   Skin: Skin is warm and dry. No rash noted.   Psychiatric: Mood and affect normal.   Nursing note and vitals reviewed.      LAB RESULTS  Lab Results (last 24 hours)     Procedure Component Value Units Date/Time    POC Glucose Once [[id number]]  (Abnormal) Collected:  10/22/19 2317    Specimen:  Blood Updated:  10/22/19 2319     Glucose 208 mg/dL           I ordered the above labs and reviewed the results    PROGRESS AND CONSULTS     2109 Ordered pepcid and benadryl.  We discussed the option of giving  patient prednisone.  Her daughter states that patient's blood sugar will go up to 400-500 if we give her steroids.  Since patient is feeling at baseline at this time, we will withhold the steroids.    2317 Ordered POC glucose for further evaluation.    2322 Rechecked with pt, who has not had any recurrent symptoms or return of her rash. Plan to discharge. She should f/u with an allergist. Pt understands and agrees with the plan, all questions answered.    MEDICAL DECISION MAKING  Results were reviewed/discussed with the patient and they were also made aware of online access. Pt also made aware that some labs, such as cultures, will not be resulted during ER visit and follow up with PMD is necessary.     MDM  Number of Diagnoses or Management Options     Amount and/or Complexity of Data Reviewed  Clinical lab tests: reviewed and ordered  Decide to obtain previous medical records or to obtain history from someone other than the patient: yes    Patient Progress  Patient progress: stable         DIAGNOSIS  Final diagnoses:   Allergic reaction to tree nut       DISPOSITION  DISCHARGE    Patient discharged in stable condition.    Reviewed implications of results, diagnosis, meds, responsibility to follow up, warning signs and symptoms of possible worsening, potential complications and reasons to return to ER, including new or worsening sxs.    Patient/Family voiced understanding of above instructions.    Discussed plan for discharge, as there is no emergent indication for admission. Patient referred to primary care provider for BP management due to today's BP. Pt/family is agreeable and understands need for follow up and repeat testing.  Pt is aware that discharge does not mean that nothing is wrong but it indicates no emergency is present that requires admission and they must continue care with follow-up as given below or physician of their choice.     FOLLOW-UP  Claudio Anne MD  [address]ARR[address]CHA[address]  [place]  [address] [place] [id number]  [phone]    Schedule an appointment as soon as possible for a visit       Latest Documented Vital Signs:  As of 11:31 PM  BP- 162/66 HR- 76 Temp- 98.2 °F (36.8 °C) O2 sat- 98%    --  Documentation assistance provided by emily Lagos for Dr. Muñoz.  Information recorded by the scribe was done at my direction and has been verified and validated by me.     Vy Lagos  10/22/19 8970       Drew Muñoz MD  10/23/19 1492

## 2019-10-23 NOTE — DISCHARGE INSTRUCTIONS
Use over-the-counter Pepcid or Tagamet twice a day for the next 5 days.  Use over-the-counter Benadryl every 6-8 hours as needed for itching.  Avoid all nuts until you see an allergist.  These return to the emergency department if you develop worsening hives with shortness of breath or dizziness.

## 2019-10-29 RX ORDER — HYDROCODONE BITARTRATE AND ACETAMINOPHEN 5; 325 MG/1; MG/1
1 TABLET ORAL EVERY 8 HOURS PRN
Qty: 90 TABLET | Refills: 0 | Status: SHIPPED | OUTPATIENT
Start: 2019-10-29 | End: 2020-01-03 | Stop reason: SDUPTHER

## 2019-10-29 NOTE — TELEPHONE ENCOUNTER
Medication Refill Request    Date of phone call: 10/29/19    Medication being requested: norco 5/325mg si tab po q 8 hours prn   Qty: 90    Date of last visit: 10/18/19    Date of last refill: 10/2/19    DONNY up to date?: yes    Next Follow up?: 19    Any new pertinent information? (i.e, new medication allergies, new use of medications, change in patient's health or condition, non-compliance or inconsistency with prescribing agreement?):

## 2019-12-02 ENCOUNTER — RESULTS ENCOUNTER (OUTPATIENT)
Dept: ENDOCRINOLOGY | Age: 74
End: 2019-12-02

## 2019-12-02 DIAGNOSIS — IMO0002 UNCONTROLLED TYPE 2 DIABETES MELLITUS WITH COMPLICATION, WITH LONG-TERM CURRENT USE OF INSULIN: ICD-10-CM

## 2019-12-02 DIAGNOSIS — E03.9 ACQUIRED HYPOTHYROIDISM: ICD-10-CM

## 2019-12-02 DIAGNOSIS — E03.9 ACQUIRED HYPOTHYROIDISM: Primary | ICD-10-CM

## 2020-01-02 ENCOUNTER — OFFICE VISIT (OUTPATIENT)
Dept: FAMILY MEDICINE CLINIC | Facility: CLINIC | Age: 75
End: 2020-01-02

## 2020-01-02 VITALS
OXYGEN SATURATION: 98 % | HEART RATE: 72 BPM | RESPIRATION RATE: 16 BRPM | DIASTOLIC BLOOD PRESSURE: 67 MMHG | SYSTOLIC BLOOD PRESSURE: 123 MMHG

## 2020-01-02 DIAGNOSIS — E78.49 OTHER HYPERLIPIDEMIA: ICD-10-CM

## 2020-01-02 DIAGNOSIS — E03.9 ACQUIRED HYPOTHYROIDISM: ICD-10-CM

## 2020-01-02 DIAGNOSIS — I10 ESSENTIAL HYPERTENSION: Primary | Chronic | ICD-10-CM

## 2020-01-02 DIAGNOSIS — F32.5 MAJOR DEPRESSIVE DISORDER WITH SINGLE EPISODE, IN FULL REMISSION (HCC): ICD-10-CM

## 2020-01-02 DIAGNOSIS — D64.9 ANEMIA OF UNKNOWN ETIOLOGY: ICD-10-CM

## 2020-01-02 PROBLEM — M19.072 ARTHRITIS OF LEFT ANKLE: Status: ACTIVE | Noted: 2019-11-20

## 2020-01-02 PROCEDURE — 99214 OFFICE O/P EST MOD 30 MIN: CPT | Performed by: FAMILY MEDICINE

## 2020-01-02 RX ORDER — IRON ASPGLY,PS/C/B12/FA/CA/SUC 150-25-1
1 CAPSULE ORAL
COMMUNITY
End: 2020-01-02 | Stop reason: SDUPTHER

## 2020-01-02 RX ORDER — LEVOTHYROXINE SODIUM 0.07 MG/1
75 TABLET ORAL DAILY
Qty: 90 TABLET | Refills: 1 | Status: SHIPPED | OUTPATIENT
Start: 2020-01-02 | End: 2020-06-03 | Stop reason: SDUPTHER

## 2020-01-02 RX ORDER — HYDROCHLOROTHIAZIDE 25 MG/1
25 TABLET ORAL DAILY
Qty: 90 TABLET | Refills: 1 | Status: SHIPPED | OUTPATIENT
Start: 2020-01-02 | End: 2020-06-03 | Stop reason: SDUPTHER

## 2020-01-02 RX ORDER — EPINEPHRINE 0.3 MG/.3ML
INJECTION SUBCUTANEOUS
COMMUNITY
Start: 2019-11-05

## 2020-01-02 RX ORDER — IRON ASPGLY,PS/C/B12/FA/CA/SUC 150-25-1
1 CAPSULE ORAL
Qty: 90 CAPSULE | Refills: 1 | Status: SHIPPED | OUTPATIENT
Start: 2020-01-02 | End: 2020-06-03

## 2020-01-02 RX ORDER — VALSARTAN 320 MG/1
320 TABLET ORAL DAILY
Qty: 90 TABLET | Refills: 1 | Status: SHIPPED | OUTPATIENT
Start: 2020-01-02 | End: 2020-06-03 | Stop reason: SDUPTHER

## 2020-01-02 RX ORDER — PAROXETINE HYDROCHLORIDE 20 MG/1
20 TABLET, FILM COATED ORAL NIGHTLY
Qty: 90 TABLET | Refills: 1 | Status: SHIPPED | OUTPATIENT
Start: 2020-01-02 | End: 2020-06-03 | Stop reason: SDUPTHER

## 2020-01-02 RX ORDER — AMLODIPINE BESYLATE 10 MG/1
10 TABLET ORAL DAILY
Qty: 90 TABLET | Refills: 1 | Status: SHIPPED | OUTPATIENT
Start: 2020-01-02 | End: 2020-06-03 | Stop reason: SDUPTHER

## 2020-01-02 RX ORDER — ATORVASTATIN CALCIUM 20 MG/1
20 TABLET, FILM COATED ORAL NIGHTLY
Qty: 90 TABLET | Refills: 1 | Status: SHIPPED | OUTPATIENT
Start: 2020-01-02 | End: 2020-06-03 | Stop reason: SDUPTHER

## 2020-01-02 NOTE — PROGRESS NOTES
Subjective       Chief Complaint   Patient presents with   • Hypertension     3 mo follow up         History of Present Illness   Iraida Elizabeth is a 74 y.o. female who presents to the office today to follow-up from recent stay at rehab center.  She is recovering from left ankle surgery.  She has no further follow-up with orthopedics.  She may start walking starting tomorrow.  Blood pressure is controlled.  Depression is stable on current therapy.  Lipids are controlled.  Thyroid is controlled.  She had worsening of her anemia while in rehab and was given a supplement to help.  That condition needs reevaluation.  Overall she feels better.  I have reviewed and updated her medications, medical history and problem list during today's office visit.     Social History     Tobacco Use   • Smoking status: Never Smoker   • Smokeless tobacco: Never Used   Substance Use Topics   • Alcohol use: No       Review of Systems   Constitutional: Positive for fatigue ( Proving).   Musculoskeletal:        Patient is left leg is improving.  Starting with ambulation tomorrow.         Physical Examination:   Objective   /67   Pulse 72   Resp 16   SpO2 98%     There is no height or weight on file to calculate BMI.    Physical Exam   Constitutional: She is oriented to person, place, and time. She appears well-developed. No distress.   Eyes: Conjunctivae and lids are normal.   Neck: Carotid bruit is not present.   Cardiovascular: Normal rate, regular rhythm and normal heart sounds.   Pulmonary/Chest: Effort normal and breath sounds normal.   Musculoskeletal:   Using roller walker for ambulation.  Patient still in postop boot left foot.   Neurological: She is alert and oriented to person, place, and time.   Skin: Skin is warm and dry.   Psychiatric: She has a normal mood and affect. Her behavior is normal.   Vitals reviewed.       Data Reviewed:              Lab Results   Component Value Date    GLU 48 (L) 11/21/2019    BUN 24 (H)  11/21/2019    CREATININE 1.1 11/21/2019     (L) 11/21/2019    K 4.2 11/21/2019     11/21/2019    CALCIUM 7.9 (L) 11/21/2019    CHLPL 89 11/21/2019    TRIG 64 11/21/2019    HDL 42 (L) 11/21/2019    VLDL 13 11/21/2019    LDL 34 11/21/2019          Assessment/Plan:   Assessment/Plan   Diagnoses and all orders for this visit:    1. Essential hypertension (Primary)  Assessment & Plan:  Hypertension is unchanged.  Continue current treatment regimen.  Blood pressure will be reassessed at the next regular appointment.    Orders:  -     amLODIPine (NORVASC) 10 MG tablet; Take 1 tablet by mouth Daily.  Dispense: 90 tablet; Refill: 1  -     hydroCHLOROthiazide (HYDRODIURIL) 25 MG tablet; Take 1 tablet by mouth Daily.  Dispense: 90 tablet; Refill: 1  -     valsartan (DIOVAN) 320 MG tablet; Take 1 tablet by mouth Daily.  Dispense: 90 tablet; Refill: 1  -     Comprehensive Metabolic Panel; Future  -     CBC & Differential; Future    2. Other hyperlipidemia  Assessment & Plan:  Lipid abnormalities are unchanged.  Pharmacotherapy as ordered.  Lipids will be reassessed in 6 months.    Orders:  -     atorvastatin (LIPITOR) 20 MG tablet; Take 1 tablet by mouth Every Night for 180 days.  Dispense: 90 tablet; Refill: 1  -     Lipid Panel With / Chol / HDL Ratio; Future  -     CK; Future    3. Acquired hypothyroidism  Assessment & Plan:  The current medical regimen is effective;  continue present plan and medications.      Orders:  -     levothyroxine (SYNTHROID, LEVOTHROID) 75 MCG tablet; Take 1 tablet by mouth Daily for 180 days.  Dispense: 90 tablet; Refill: 1  -     TSH; Future    4. Major depressive disorder with single episode, in full remission (CMS/HCC)  Assessment & Plan:  Psychological condition is unchanged.  Continue current treatment regimen.  Psychological condition  will be reassessed at the next regular appointment.    Orders:  -     PARoxetine (PAXIL) 20 MG tablet; Take 1 tablet by mouth Every Night for 180  days.  Dispense: 90 tablet; Refill: 1    5. Anemia of unknown etiology  Assessment & Plan:  Due for repeat labs.  Continue supplement as ordered.    Orders:  -     CBC & Differential; Future  -     Vitamin B12 & Folate; Future  -     Iron Profile; Future  -     iron polysacch complex-B12-VitC-FA-Succ (Ferrex 150 Forte Plus)  MG capsule capsule; Take 1 capsule by mouth Daily With Breakfast for 180 days.  Dispense: 90 capsule; Refill: 1  -     CBC & Differential; Future    Patient Instructions         Follow up:   Return in about 6 months (around 7/2/2020) for Medicare Wellness and regular visit, 30 minutes.

## 2020-01-03 ENCOUNTER — OFFICE VISIT (OUTPATIENT)
Dept: PAIN MEDICINE | Facility: CLINIC | Age: 75
End: 2020-01-03

## 2020-01-03 ENCOUNTER — TELEPHONE (OUTPATIENT)
Dept: FAMILY MEDICINE CLINIC | Facility: CLINIC | Age: 75
End: 2020-01-03

## 2020-01-03 VITALS
HEIGHT: 61 IN | SYSTOLIC BLOOD PRESSURE: 144 MMHG | RESPIRATION RATE: 20 BRPM | OXYGEN SATURATION: 93 % | DIASTOLIC BLOOD PRESSURE: 67 MMHG | TEMPERATURE: 99.1 F | HEART RATE: 75 BPM | BODY MASS INDEX: 41.42 KG/M2 | WEIGHT: 219.4 LBS

## 2020-01-03 DIAGNOSIS — M47.816 LUMBAR FACET ARTHROPATHY: ICD-10-CM

## 2020-01-03 DIAGNOSIS — Z79.899 ENCOUNTER FOR LONG-TERM (CURRENT) USE OF HIGH-RISK MEDICATION: ICD-10-CM

## 2020-01-03 DIAGNOSIS — M51.36 DDD (DEGENERATIVE DISC DISEASE), LUMBAR: ICD-10-CM

## 2020-01-03 DIAGNOSIS — M54.42 CHRONIC MIDLINE LOW BACK PAIN WITH BILATERAL SCIATICA: Primary | ICD-10-CM

## 2020-01-03 DIAGNOSIS — G89.29 CHRONIC MIDLINE LOW BACK PAIN WITH BILATERAL SCIATICA: Primary | ICD-10-CM

## 2020-01-03 DIAGNOSIS — G89.29 OTHER CHRONIC PAIN: ICD-10-CM

## 2020-01-03 DIAGNOSIS — M54.41 CHRONIC MIDLINE LOW BACK PAIN WITH BILATERAL SCIATICA: Primary | ICD-10-CM

## 2020-01-03 PROCEDURE — 99214 OFFICE O/P EST MOD 30 MIN: CPT | Performed by: NURSE PRACTITIONER

## 2020-01-03 RX ORDER — HYDROCODONE BITARTRATE AND ACETAMINOPHEN 5; 325 MG/1; MG/1
1 TABLET ORAL EVERY 8 HOURS PRN
Qty: 90 TABLET | Refills: 0 | Status: SHIPPED | OUTPATIENT
Start: 2020-01-03 | End: 2020-02-06 | Stop reason: SDUPTHER

## 2020-01-03 NOTE — PROGRESS NOTES
"CHIEF COMPLAINT  F/u back pain. Pt sts pain has worsened since last ov. Pt would like to discuss getting a lumbar injection, last inj was approved however pt had left ankle replacement surgery in 11/2019 then rehab x a month, now is walking with a left foot boot, and is interested in an injection.     This is initial evaluation by [facility]   Iraida Elizabeth is a 74 y.o. female  who presents to the office for follow-up.She has a history of back pain.  Her pain is 7/10VAS in severity.  She describes her pain as intermittent burning and aching.  It is worse with prolonged standing or walking, It is improved with medication, resting/sitting, and alternating heat/ice. Also complains of neck pain today.  States \"I think I have arthritis\".  Denies any radicular symptoms at this time. Her pain in her neck is 6/10VAS.  This is continuous, burning and aching.  Her neck pain is worse with ROM, and it is improved with heat and medication.  Has been taking Hydrocodone 5/325 3/day. Her medication brings her pain down about 40-50%.  Denies any side effects from current regimen including somnolence and constipation.  ADLs by self.      S/p total ankle arthroplasty on 11/20/19 with Dr. Olsen.  Patient was in inpatient rehab for 20 days and has completed her PT/rehab for this surgery.  Remains in left boot for 2 more weeks and then can progress to wearing a shoe as tolerated.     RIGHT L2-5 Lumbar Medial Branch Blockade  on  9-19-18 performed by Dr. Casarez for management of back pain. Patient reports moderate relief from the procedure for 1-2 weeks. Ordered repeat injection last visit but was denied by insurance, appeal was denied as well, this procedure was approved immediately prior to her ankle replacement surgery so she has been unable to complete it.         Procedure List:  LESI 12/12/18 -- NO relief from the procedure.   Bilateral L3-5 Lumbar Medial Branch Blockade 11-21-18 -- minimal relief  RIGHT L2-5 " Lumbar Medial Branch Blockade 9-19-18 -- 80% relief, 2 weeks   Right SI joint injection 8/21/2018 -- NO relief from the procedure    Back Pain   This is a chronic (chronic) problem. The current episode started more than 1 year ago. The problem occurs constantly. The problem has been gradually worsening since onset. The pain is present in the lumbar spine and sacro-iliac. The pain radiates to the left foot, left thigh, left knee, right foot, right knee and right thigh. The pain is at a severity of 7/10. The pain is severe. The symptoms are aggravated by bending, position, standing and twisting (laying flat). Associated symptoms include numbness (bilat feet) and weakness (left leg). Pertinent negatives include no abdominal pain, chest pain, dysuria, fever or headaches. She has tried analgesics for the symptoms.   Neck Pain    This is a chronic problem. The current episode started more than 1 year ago. The problem occurs constantly. The problem has been unchanged. The pain is present in the midline. The quality of the pain is described as aching and burning. The pain is at a severity of 6/10. The symptoms are aggravated by position (ROM). Associated symptoms include numbness (bilat feet) and weakness (left leg). Pertinent negatives include no chest pain, fever or headaches. She has tried heat and oral narcotics for the symptoms. The treatment provided moderate relief.      PEG Assessment   What number best describes your pain on average in the past week?7  What number best describes how, during the past week, pain has interfered with your enjoyment of life?7  What number best describes how, during the past week, pain has interfered with your general activity?  7    The following portions of the patient's history were reviewed and updated as appropriate: allergies, current medications, past family history, past medical history, past social history, past surgical history and problem list.    Review of Systems  "  Constitutional: Positive for activity change (improved). Negative for fatigue and fever.   HENT: Negative for congestion.    Eyes: Negative for visual disturbance.   Respiratory: Negative for cough, shortness of breath and wheezing.    Cardiovascular: Negative for chest pain, palpitations and leg swelling.   Gastrointestinal: Negative for abdominal pain, constipation and diarrhea.   Endocrine: Negative for polyuria.   Genitourinary: Negative for difficulty urinating and dysuria.   Musculoskeletal: Positive for arthralgias (left ankle), back pain, gait problem (walker) and neck pain.   Allergic/Immunologic: Negative for immunocompromised state.   Neurological: Positive for weakness (left leg) and numbness (bilat feet). Negative for dizziness and headaches.   Psychiatric/Behavioral: Negative for agitation, sleep disturbance and suicidal ideas. The patient is not nervous/anxious.      Vitals:    01/03/20 1018   BP: 144/67   Pulse: 75   Resp: 20   Temp: 99.1 °F (37.3 °C)   SpO2: 93%   Weight: 99.5 kg (219 lb 6.4 oz)  Comment: pt left foot currently in a boot   Height: 154.9 cm (61\")   PainSc:   7   PainLoc: Back     Objective   Physical Exam   Constitutional: She is oriented to person, place, and time. She appears well-developed and well-nourished.   HENT:   Head: Normocephalic and atraumatic.   Eyes: Pupils are equal, round, and reactive to light. EOM are normal.   Neck: Spinous process tenderness present. Decreased range of motion present.   POS Cervical Loading Manuever   Cardiovascular: Normal rate.   Pulmonary/Chest: Effort normal.   Musculoskeletal:        Left ankle: She exhibits decreased range of motion (Walking Boot).        Cervical back: She exhibits tenderness.        Lumbar back: She exhibits tenderness and pain.   POS Lumbar Loading Maneuver  NEG Ranulfo SLR     Neurological: She is alert and oriented to person, place, and time. Gait (Ambulates with walker) abnormal.   Skin: Skin is warm and dry. " "  Psychiatric: She has a normal mood and affect. Her behavior is normal. Judgment and thought content normal.   Nursing note and vitals reviewed.    Assessment/Plan   Iraida was seen today for back pain.    Diagnoses and all orders for this visit:    Chronic midline low back pain with bilateral sciatica    Other chronic pain    DDD (degenerative disc disease), lumbar    Encounter for long-term (current) use of high-risk medication    ---  Bilateral L2-L5 MBB to be performed once she is outside of her 90 day post-op window from total ankle arthroplasty.  -------  Education about Medial Branch Blockade and RF Therapy:    This medial branch blockade (MBB) suggested is intended for diagnostic purposes, with the intent of offering the patient Radiofrequency thermal rhizotomy (RF) if the MBB is diagnostically effective.  The diagnostic blockade is necessary to determine the likelihood that RF therapy could be efficacious in providing long term relief to the patient.    Medial branches are sensory nerve branches that connect to a facet joint and transmit sensations & pain signals from that joint.  Facet is a term for the type of joints found in the spine.  Medial branches are the nerves that go to a facet, and therefore are also sometimes called \"facet joint nerves\" (FJNs).      In a medial branch blockade procedure, xray fluoroscopy is used to verify the locations of the outside of the joint lines which are being targeted.  Under xray guidance, needles are placed to these areas.  Contrast dye is injected to confirm proper placement, with dye flowing over the joint area, and to ensure that the dye does not flow into unintended areas such as a vein.  When this is confirmed, local anesthetic is injected to block the medial branch at that joint level.      If MBBs are diagnostically successful in blocking pain, then the patient is most likely a great candidate for Radiofrequency of those facet joint nerves.  In the RF " procedure, needles are placed to the joint lines in the same fashion, and after testing, the needle tips are heated to thermally treat the nerves, blocking the nerves by in essence damaging the nerves with the heat treatment.       Medically, a successful RF procedure should provide a patient with 50% pain relief or more for at least 6 months.  Clinical experience suggests that successful patients receive relief more in the range of 12 months on average.  We also discussed that a fortunate minority of patients receive therapeutic success from the MBB, and may not require RF ablation.  If a patient receives more than 8 weeks of relief from MBB, then occasional repeat MBB for therapeutic purposes is a very reasonable alternative therapy.  This course of therapy is consistent with our LCDs according to our CMS  in the area, and therefore other insurance providers should follow accordingly.  We will monitor our patients to screen for these therapeutic responders and will offer RF therapy only when necessary.      We discussed that MBB & RF are not without risks.  Guidelines regarding anticoagulant use & neuraxial procedures will be respected.  Patients that are ill or otherwise may be at risk for sepsis will not have their spines accessed by neuraxial injections of any type.  This patient will not be offered these therapies if there is an increased risk.   We discussed that there is a risk of postprocedural pain and also a risk of worsening of clinical picture with these procedures as with any neuraxial procedure.    -------  --- The urine drug screen confirmation from 8/23/19 has been reviewed and the result is appropriate based on patient history and DONNY report  --- Refill hydrocodone. Patient appears stable with current regimen. No adverse effects. Regarding continuation of opioids, there is no evidence of aberrant behavior or any red flags.  The patient continues with appropriate response to opioid  therapy. ADL's remain intact by self.   --- Follow-up 2 months or sooner if needed     DONNY REPORT    As part of the patient's treatment plan, I am prescribing controlled substances. The patient has been made aware of appropriate use of such medications, including potential risk of somnolence, limited ability to drive and/or work safely, and the potential for dependence or overdose. It has also bee made clear that these medications are for use by this patient only, without concomitant use of alcohol or other substances unless prescribed.     Patient has completed prescribing agreement detailing terms of continued prescribing of controlled substances, including monitoring DONNY reports, urine drug screening, and pill counts if necessary. The patient is aware that inappropriate use will results in cessation of prescribing such medications.    DONNY report has been reviewed and scanned into the patient's chart.    As the clinician, I personally reviewed the DONNY from 1/1/2020 while the patient was in the office today.    History and physical exam exhibit continued safe and appropriate use of controlled substances.    EMR Dragon/Transcription disclaimer:   Much of this encounter note is an electronic transcription/translation of spoken language to printed text. The electronic translation of spoken language may permit erroneous, or at times, nonsensical words or phrases to be inadvertently transcribed; Although I have reviewed the note for such errors, some may still exist.

## 2020-01-03 NOTE — TELEPHONE ENCOUNTER
Pt states that Ferrex cost 150.00 she states that she is not able to afford that. She is wanting something cheeper

## 2020-01-16 ENCOUNTER — RESULTS ENCOUNTER (OUTPATIENT)
Dept: FAMILY MEDICINE CLINIC | Facility: CLINIC | Age: 75
End: 2020-01-16

## 2020-01-16 DIAGNOSIS — D64.9 ANEMIA OF UNKNOWN ETIOLOGY: ICD-10-CM

## 2020-01-28 ENCOUNTER — DOCUMENTATION (OUTPATIENT)
Dept: PAIN MEDICINE | Facility: CLINIC | Age: 75
End: 2020-01-28

## 2020-01-28 ENCOUNTER — OUTSIDE FACILITY SERVICE (OUTPATIENT)
Dept: PAIN MEDICINE | Facility: CLINIC | Age: 75
End: 2020-01-28

## 2020-01-28 PROCEDURE — 64493 INJ PARAVERT F JNT L/S 1 LEV: CPT | Performed by: ANESTHESIOLOGY

## 2020-01-28 PROCEDURE — 64494 INJ PARAVERT F JNT L/S 2 LEV: CPT | Performed by: ANESTHESIOLOGY

## 2020-01-29 NOTE — PROGRESS NOTES
Bilateral L3-5 Lumbar Medial Branch Blockade  [facility]      PREOPERATIVE DIAGNOSIS:  Lumbar spondylosis without myelopathy    POSTOPERATIVE DIAGNOSIS:  Lumbar spondylosis without myelopathy    PROCEDURE:   Diagnostic Bilateral Lumbar Medial Branch Nerve Blockades, with fluoroscopy:  L3, L4, and L5 nerves (at the L4 & L5 transverse processes and the sacral alar groove) to block facet joints L4-5, and L5-S1  1. 69618-59 -- Bilateral Lumbar Facet blocks, 1st Level  2. 22829-92 -- Bilateral Lumbar Facet blocks, 2nd  Level    PRE-PROCEDURE DISCUSSION WITH PATIENT:    Risks and complications were discussed with the patient prior to starting the procedure and informed consent was obtained.      SURGEON:  Kaiden Miranda MD    REASON FOR PROCEDURE:    The patient complains of pain that seems to have a significant axial component and Tenderness of the affected facet joints on exam under fluoroscopy    SEDATION:  Versed 6mg IV  ANESTHETIC:  Marcaine 0.5%  STEROID:  NONE  TOTAL VOLUME OF SOLUTION:  6ml    DESCRIPTON OF PROCEDURE:  After obtaining informed consent, IV access was obtained in the preoperative area.   The patient was taken to the operating room.  The patient was placed in the prone position with a pillow under the abdomen. All pressure points were well padded.  EKG, blood pressure, and pulse oximeter were monitored.  The patient was monitored and sedated by the RN under my direction. The lumbosacral area was prepped with Chloraprep and draped in a sterile fashion. Under fluoroscopic guidance the transverse processes of the L4 and L5 vertebrae at the junctions of the superior articular processes were identified on the right. Also identified was the groove between the ala and the superior articular process of the sacrum on the ipsilateral side.  Skin and subcutaneous tissue were anesthetized with 1% lidocaine above each of these points. A 22-gauge spinal needle was introduced under  fluoroscopic guidance at the above junctions. Aspiration was negative for blood and CSF.  After confirming the position of the needle with fluoroscope in all views, 0.25 mL of Omnipaque was injected, and after seeing the proper spread a total of 1 mL of the anesthetic solution noted above was injected at each of these points.  Needles were removed intact from each of the areas.  A similar procedure was repeated to block the L3, L4, and L5 nerves on the contralateral side.   Onset of analgesia was noted.  Vital signs remained stable throughout.      ESTIMATED BLOOD LOSS:  <5 mL  SPECIMENS:  none    COMPLICATIONS:   No complications were noted., There was no indication of vascular uptake on live injection of contrast dye. and The patient did not have any signs of postprocedure numbness nor weakness.    TOLERANCE & DISCHARGE CONDITION:    The patient tolerated the procedure well.  The patient was transported to the recovery area without difficulties.  The patient was discharged to home under the care of family in stable and satisfactory condition.    PLAN OF CARE:  1. The patient was given our standard instruction sheet.  2. We discussed that Lumbar Medial Branch Blockade is a diagnostic procedure in consideration for radiofrequency ablation if two diagnostic procedures prove to be positive for significant benefit.  If sustained relief of 6 to eight weeks is obtained, then an alternative plan could be therapeutic lumbar branch blockades.  3. The patient is asked to keep a pain log each hour for 8 hours after the procedure today.  4. The patient will  Return to clinic 1 wk.  5. The patient will resume all medications as per the medication reconciliation sheet.

## 2020-02-06 NOTE — TELEPHONE ENCOUNTER
Medication Refill Request    Date of phone call: 20    Medication being requested: norco 5/325mg si tab po q 8 hours prn   Qty: 90    Date of last visit: 1/3/20    Date of last refill: 20    DONNY up to date?: yes    Next Follow up?: 3/3/20    Any new pertinent information? (i.e, new medication allergies, new use of medications, change in patient's health or condition, non-compliance or inconsistency with prescribing agreement?):

## 2020-02-07 RX ORDER — HYDROCODONE BITARTRATE AND ACETAMINOPHEN 5; 325 MG/1; MG/1
1 TABLET ORAL EVERY 8 HOURS PRN
Qty: 90 TABLET | Refills: 0 | Status: SHIPPED | OUTPATIENT
Start: 2020-02-07 | End: 2020-03-09 | Stop reason: SDUPTHER

## 2020-02-14 ENCOUNTER — RESULTS ENCOUNTER (OUTPATIENT)
Dept: PAIN MEDICINE | Facility: CLINIC | Age: 75
End: 2020-02-14

## 2020-02-14 ENCOUNTER — OFFICE VISIT (OUTPATIENT)
Dept: PAIN MEDICINE | Facility: CLINIC | Age: 75
End: 2020-02-14

## 2020-02-14 VITALS
OXYGEN SATURATION: 95 % | RESPIRATION RATE: 16 BRPM | TEMPERATURE: 98.3 F | BODY MASS INDEX: 41.42 KG/M2 | WEIGHT: 219.4 LBS | HEART RATE: 95 BPM | HEIGHT: 61 IN | DIASTOLIC BLOOD PRESSURE: 67 MMHG | SYSTOLIC BLOOD PRESSURE: 149 MMHG

## 2020-02-14 DIAGNOSIS — Z79.899 ENCOUNTER FOR LONG-TERM (CURRENT) USE OF HIGH-RISK MEDICATION: Primary | ICD-10-CM

## 2020-02-14 DIAGNOSIS — M54.42 CHRONIC MIDLINE LOW BACK PAIN WITH BILATERAL SCIATICA: ICD-10-CM

## 2020-02-14 DIAGNOSIS — M54.41 CHRONIC MIDLINE LOW BACK PAIN WITH BILATERAL SCIATICA: ICD-10-CM

## 2020-02-14 DIAGNOSIS — Z79.899 ENCOUNTER FOR LONG-TERM (CURRENT) USE OF HIGH-RISK MEDICATION: ICD-10-CM

## 2020-02-14 DIAGNOSIS — M47.816 LUMBAR FACET ARTHROPATHY: ICD-10-CM

## 2020-02-14 DIAGNOSIS — G89.29 CHRONIC MIDLINE LOW BACK PAIN WITH BILATERAL SCIATICA: ICD-10-CM

## 2020-02-14 LAB
POC AMPHETAMINES: NEGATIVE
POC BARBITURATES: NEGATIVE
POC BENZODIAZEPHINES: NEGATIVE
POC COCAINE: NEGATIVE
POC METHADONE: NEGATIVE
POC METHAMPHETAMINE SCREEN URINE: NEGATIVE
POC OPIATES: POSITIVE
POC OXYCODONE: NEGATIVE
POC PHENCYCLIDINE: NEGATIVE
POC PROPOXYPHENE: NEGATIVE
POC THC: NEGATIVE
POC TRICYCLIC ANTIDEPRESSANTS: NEGATIVE

## 2020-02-14 PROCEDURE — 99214 OFFICE O/P EST MOD 30 MIN: CPT | Performed by: NURSE PRACTITIONER

## 2020-02-14 PROCEDURE — 80305 DRUG TEST PRSMV DIR OPT OBS: CPT | Performed by: NURSE PRACTITIONER

## 2020-02-14 NOTE — PROGRESS NOTES
"CHIEF COMPLAINT  F/U back pain-Bilateral L3-5 Lumbar Medial Branch Blockade- patient states her pain was improved 80% for 1 week.       Subjective   Iraida Elizabeth is a 74 y.o. female  who presents to the office for follow-up of procedure.  She completed a Bilateral L3-L5 lumbar medial branch blockade   on  1/28/2020 performed by Dr. Miranda for management of low back pain. Patient reports 80% relief from the procedure for 1 week before her pain returned to baseline.  Today her pain is 7/10VAS in severity.  She describes her pain as intermittent burning pain.  Her pain is worsened with prolonged standing, prolonged sitting, prolonged walking; it is improved with rest, MBB, heat, and medication.  Continues with Hydrocodone 5/325 3/day. Her medication decreases her pain by 30-40%. \"It helps enough for me to get through the day\".  She denies any side effects from her medication including somnolence or constipation.  ADLs by self.     Back Pain   This is a chronic (chronic) problem. The current episode started more than 1 year ago. The problem occurs constantly. The problem has been gradually worsening since onset. The pain is present in the lumbar spine and sacro-iliac. The pain radiates to the left foot, left thigh, left knee, right foot, right knee and right thigh. The pain is at a severity of 7/10. The pain is severe. The symptoms are aggravated by bending, position, standing and twisting (laying flat). Associated symptoms include numbness (bilat feet) and weakness (left leg). Pertinent negatives include no abdominal pain, chest pain, dysuria, fever or headaches. She has tried analgesics for the symptoms.   Neck Pain    This is a chronic problem. The current episode started more than 1 year ago. The problem occurs constantly. The problem has been unchanged. The pain is present in the midline. The quality of the pain is described as aching and burning. The pain is at a severity of 6/10. The symptoms are aggravated by " position (ROM). Associated symptoms include numbness (bilat feet) and weakness (left leg). Pertinent negatives include no chest pain, fever or headaches. She has tried heat and oral narcotics for the symptoms. The treatment provided moderate relief.      Procedure List:  LESI 12/12/18 -- NO relief from the procedure.   Bilateral L3-5 Lumbar Medial Branch Blockade 11-21-18 -- minimal relief  RIGHT L2-5 Lumbar Medial Branch Blockade 9-19-18 -- 80% relief, 2 weeks   Right SI joint injection 8/21/2018 -- NO relief from the procedure    PEG Assessment   What number best describes your pain on average in the past week?6  What number best describes how, during the past week, pain has interfered with your enjoyment of life?7  What number best describes how, during the past week, pain has interfered with your general activity?  7    The following portions of the patient's history were reviewed and updated as appropriate: allergies, current medications, past family history, past medical history, past social history, past surgical history and problem list.    Review of Systems   Constitutional: Positive for activity change (improved). Negative for fatigue and fever.   HENT: Negative for congestion.    Eyes: Negative for visual disturbance.   Respiratory: Negative for cough, shortness of breath and wheezing.    Cardiovascular: Negative for chest pain, palpitations and leg swelling.   Gastrointestinal: Negative for abdominal pain, constipation and diarrhea.   Endocrine: Negative for polyuria.   Genitourinary: Negative for difficulty urinating and dysuria.   Musculoskeletal: Positive for arthralgias (left ankle), back pain, gait problem (walker) and neck pain.   Allergic/Immunologic: Negative for immunocompromised state.   Neurological: Positive for weakness (left leg) and numbness (bilat feet). Negative for dizziness, light-headedness and headaches.   Psychiatric/Behavioral: Negative for agitation, sleep disturbance and suicidal  "ideas. The patient is not nervous/anxious.      Vitals:    02/14/20 1358   BP: 149/67   Pulse: 95   Resp: 16   Temp: 98.3 °F (36.8 °C)   SpO2: 95%   Weight: 99.5 kg (219 lb 6.4 oz)   Height: 154.9 cm (61\")   PainSc:   7   PainLoc: Back     Objective   Physical Exam   Constitutional: She is oriented to person, place, and time. She appears well-developed and well-nourished.   HENT:   Head: Normocephalic and atraumatic.   Eyes: Pupils are equal, round, and reactive to light. Conjunctivae and EOM are normal.   Neck: Spinous process tenderness present. Decreased range of motion present.   POS Cervical Loading Manuever   Cardiovascular: Normal rate.   Pulmonary/Chest: Effort normal.   Musculoskeletal:        Left ankle: She exhibits decreased range of motion (Walking Boot).        Cervical back: She exhibits tenderness.        Lumbar back: She exhibits tenderness and pain.   POS Lumbar Loading Maneuver  NEG Ranulfo SLR     Neurological: She is alert and oriented to person, place, and time. Gait (Ambulates with walker) abnormal.   Skin: Skin is warm and dry.   Psychiatric: She has a normal mood and affect. Her behavior is normal. Judgment and thought content normal.   Nursing note and vitals reviewed.    Assessment/Plan   Iraida was seen today for back pain.    Diagnoses and all orders for this visit:    Encounter for long-term (current) use of high-risk medication    Chronic midline low back pain with bilateral sciatica    Lumbar facet arthropathy      --- Repeat Bilateral L2-L5 MBB x 1 with goal of performing RFL.   -------  Education about Medial Branch Blockade and RF Therapy:    This medial branch blockade (MBB) suggested is intended for diagnostic purposes, with the intent of offering the patient Radiofrequency thermal rhizotomy (RF) if the MBB is diagnostically effective.  The diagnostic blockade is necessary to determine the likelihood that RF therapy could be efficacious in providing long term relief to the " "patient.    Medial branches are sensory nerve branches that connect to a facet joint and transmit sensations & pain signals from that joint.  Facet is a term for the type of joints found in the spine.  Medial branches are the nerves that go to a facet, and therefore are also sometimes called \"facet joint nerves\" (FJNs).      In a medial branch blockade procedure, xray fluoroscopy is used to verify the locations of the outside of the joint lines which are being targeted.  Under xray guidance, needles are placed to these areas.  Contrast dye is injected to confirm proper placement, with dye flowing over the joint area, and to ensure that the dye does not flow into unintended areas such as a vein.  When this is confirmed, local anesthetic is injected to block the medial branch at that joint level.      If MBBs are diagnostically successful in blocking pain, then the patient is most likely a great candidate for Radiofrequency of those facet joint nerves.  In the RF procedure, needles are placed to the joint lines in the same fashion, and after testing, the needle tips are heated to thermally treat the nerves, blocking the nerves by in essence damaging the nerves with the heat treatment.       Medically, a successful RF procedure should provide a patient with 50% pain relief or more for at least 6 months.  Clinical experience suggests that successful patients receive relief more in the range of 12 months on average.  We also discussed that a fortunate minority of patients receive therapeutic success from the MBB, and may not require RF ablation.  If a patient receives more than 8 weeks of relief from MBB, then occasional repeat MBB for therapeutic purposes is a very reasonable alternative therapy.  This course of therapy is consistent with our LCDs according to our CMS  in the area, and therefore other insurance providers should follow accordingly.  We will monitor our patients to screen for these " therapeutic responders and will offer RF therapy only when necessary.        We discussed that MBB & RF are not without risks.  Guidelines regarding anticoagulant use & neuraxial procedures will be respected.  Patients that are ill or otherwise may be at risk for sepsis will not have their spines accessed by neuraxial injections of any type.  This patient will not be offered these therapies if there is an increased risk.   We discussed that there is a risk of postprocedural pain and also a risk of worsening of clinical picture with these procedures as with any neuraxial procedure.    -------    --- Continue Hydrocodone 5/325 TID PRN moderate to severe pain. No refill needed today. Patient appears stable with current regimen. No adverse effects. Regarding continuation of opioids, there is no evidence of aberrant behavior or any red flags.  The patient continues with appropriate response to opioid therapy. ADL's remain intact by self.   --- The urine drug screen confirmation from 8/23/2019 has been reviewed and the result is appropriate based on patient history and DONNY report  --- Follow-up after procedure     DONNY REPORT    As part of the patient's treatment plan, I am prescribing controlled substances. The patient has been made aware of appropriate use of such medications, including potential risk of somnolence, limited ability to drive and/or work safely, and the potential for dependence or overdose. It has also bee made clear that these medications are for use by this patient only, without concomitant use of alcohol or other substances unless prescribed.     Patient has completed prescribing agreement detailing terms of continued prescribing of controlled substances, including monitoring DONNY reports, urine drug screening, and pill counts if necessary. The patient is aware that inappropriate use will results in cessation of prescribing such medications.    DONNY report has been reviewed and scanned into  the patient's chart.    As the clinician, I personally reviewed the DONNY from 2/13/2020 while the patient was in the office today.    History and physical exam exhibit continued safe and appropriate use of controlled substances.     EMR Dragon/Transcription disclaimer:   Much of this encounter note is an electronic transcription/translation of spoken language to printed text. The electronic translation of spoken language may permit erroneous, or at times, nonsensical words or phrases to be inadvertently transcribed; Although I have reviewed the note for such errors, some may still exist.

## 2020-02-17 ENCOUNTER — OFFICE VISIT (OUTPATIENT)
Dept: ENDOCRINOLOGY | Age: 75
End: 2020-02-17

## 2020-02-17 VITALS
OXYGEN SATURATION: 95 % | BODY MASS INDEX: 40.12 KG/M2 | DIASTOLIC BLOOD PRESSURE: 62 MMHG | HEIGHT: 62 IN | WEIGHT: 218 LBS | HEART RATE: 89 BPM | SYSTOLIC BLOOD PRESSURE: 122 MMHG

## 2020-02-17 DIAGNOSIS — E16.1 HYPERINSULINISM: ICD-10-CM

## 2020-02-17 DIAGNOSIS — IMO0002 UNCONTROLLED TYPE 2 DIABETES MELLITUS WITH DIABETIC NEUROPATHY, WITH LONG-TERM CURRENT USE OF INSULIN: ICD-10-CM

## 2020-02-17 DIAGNOSIS — Z79.4 ENCOUNTER FOR LONG-TERM (CURRENT) USE OF INSULIN (HCC): ICD-10-CM

## 2020-02-17 DIAGNOSIS — E78.5 HYPERLIPIDEMIA ASSOCIATED WITH TYPE 2 DIABETES MELLITUS (HCC): ICD-10-CM

## 2020-02-17 DIAGNOSIS — IMO0002 UNCONTROLLED TYPE 2 DIABETES MELLITUS WITH COMPLICATION, WITH LONG-TERM CURRENT USE OF INSULIN: Primary | ICD-10-CM

## 2020-02-17 DIAGNOSIS — E11.69 HYPERLIPIDEMIA ASSOCIATED WITH TYPE 2 DIABETES MELLITUS (HCC): ICD-10-CM

## 2020-02-17 DIAGNOSIS — E03.9 ACQUIRED HYPOTHYROIDISM: ICD-10-CM

## 2020-02-17 PROCEDURE — 99214 OFFICE O/P EST MOD 30 MIN: CPT | Performed by: INTERNAL MEDICINE

## 2020-02-17 NOTE — PATIENT INSTRUCTIONS
Insulin instructions    NPH insulin 20 units before breakfast and 20 units before supper    Regular insulin 10 units before breakfast and 12 units before supper

## 2020-02-17 NOTE — PROGRESS NOTES
74 y.o.    Patient Care Team:  Claudio Anne MD as PCP - General  Claudio Anen MD as PCP - Family Medicine  Robert Garcia MD as Consulting Physician (Endocrinology)  Shawn Galvan IV, MD as Surgeon (Neurosurgery)  Nile Laguna MD as Surgeon (Vascular Surgery)  Chuck Olsen MD as Consulting Physician (Orthopedic Surgery)    Chief Complaint:      F/U TYPE 2 DIABETES, UNCONTROLLED.  NO RECENT LABS.  Subjective     HPI   Patient is a 74-year-old white female with a history of uncontrolled type 2 diabetes mellitus with complications came for follow-up    Uncontrolled type 2 diabetes mellitus  Patient reports that her blood sugars have been in the 150-250 range for the past few months  She reported that she got multiple steroid injections in the spine and knee and ankle over the past few months  Patient is currently taking NPH 24 units twice daily before breakfast and supper and regular insulin 14 units before breakfast and 16 before supper  Hypoglycemia  Patient reports hypoglycemia usually in the middle of the day or middle of the night  Patient is status post ankle replacement surgery in November 2019  She is currently using a cane for walk  Uncontrolled type 2 diabetes mellitus and neuropathy  Patient reports symptoms of tingling numbness burning in both lower extremities  Patient denies any knowledge of diabetic nephropathy retinopathy  Hypothyroidism  Currently taking levothyroxine 75 mcg daily.  Reports compliance with the medication and denies any side effects  Abnormal weight gain  Patient currently weighs 218 pounds  She gained nearly 5 pounds in the past 4 months    Interval History      The following portions of the patient's history were reviewed and updated as appropriate: allergies, current medications, past family history, past medical history, past social history, past surgical history and problem list.    Past Medical History:   Diagnosis Date   • Arthropathy of knee  05/22/2014    unspecified   • Arthropathy, multiple sites 04/12/2012    unspecified   • Bulging lumbar disc    • Cancer (CMS/[facility])     breast   • Chronic kidney disease, stage III (moderate) (CMS/[facility]) 05/26/2011   • Controlled diabetes mellitus type II without complication (CMS/[facility])    • Deep vein blood clot of right lower extremity (CMS/[facility]) 03/2019   • Depression 04/02/2001   • Dyspnea    • Essential hypertension    • Fall 2015    FRACTURED ANKLE   • History of complete eye exam 03/29/2012   • History of gynecological procedure 03/01/2010    special invesitation and examinations; gynecological examination; routine gynecological examination   • Hyperlipidemia     other and unspecified   • Hypothyroidism     unspecified   • Low back pain    • Moderate single current episode of major depressive disorder (CMS/[facility]) 4/2/2001   • Peripheral neuropathy    • Personal history of malignant neoplasm of breast 2001    R breast (negative nodes) [place] with radiation and Tamoxifen   • Thyroid disease    • Type II diabetes mellitus with neurological manifestations, uncontrolled (CMS/[facility]) 01/03/2013   • Wrist fracture, left 2008     Family History   Problem Relation Age of Onset   • Hypertension Sister    • Thyroid disease Daughter    • Cancer Mother    • Heart disease Father    • Malig Hyperthermia Neg Hx      Social History     Socioeconomic History   • Marital status:      Spouse name: Not on file   • Number of children: 4   • Years of education: 12   • Highest education level: High school graduate   Occupational History   • Occupation: RETIRED   Social Needs   • Financial resource strain: Patient refused   • Food insecurity:     Worry: Patient refused     Inability: Patient refused   • Transportation needs:     Medical: Patient refused     Non-medical: Patient refused   Tobacco Use   • Smoking status: Never Smoker   • Smokeless tobacco: Never Used   Substance and Sexual Activity   • Alcohol use: No   • Drug use: No   •  "Sexual activity: Defer   Social History Narrative    LIVES ALONE     Allergies   Allergen Reactions   • Tizanidine Hallucinations   • Tree Nut Anaphylaxis     almonds   • Vancomycin Itching     Pt c/o itiching and redness in arm after starting vancomycin ivpb       Current Outpatient Medications:   •  amLODIPine (NORVASC) 10 MG tablet, Take 1 tablet by mouth Daily., Disp: 90 tablet, Rfl: 1  •  atorvastatin (LIPITOR) 20 MG tablet, Take 1 tablet by mouth Every Night for 180 days., Disp: 90 tablet, Rfl: 1  •  EPINEPHrine (EPIPEN) 0.3 MG/0.3ML solution auto-injector injection, , Disp: , Rfl:   •  Foot Care Products (DIABETIC INSOLES) misc, 1 pair of diabetic shoe Dx E11.45, Disp: 1 each, Rfl: 0  •  hydroCHLOROthiazide (HYDRODIURIL) 25 MG tablet, Take 1 tablet by mouth Daily., Disp: 90 tablet, Rfl: 1  •  HYDROcodone-acetaminophen (NORCO) 5-325 MG per tablet, Take 1 tablet by mouth Every 8 (Eight) Hours As Needed for Severe Pain ., Disp: 90 tablet, Rfl: 0  •  insulin NPH (HUMULIN N) 100 UNIT/ML injection, Inject 24 Units under the skin into the appropriate area as directed 2 (Two) Times a Day Before Meals., Disp: 20 mL, Rfl: 5  •  insulin regular (NOVOLIN R) 100 UNIT/ML injection, 14 UNITS AT BREAKFAST, 16 UNITS AT DINNER, Disp: 10 mL, Rfl: 5  •  Insulin Syringe 31G X 5/16\" 0.3 ML misc, 4 SC INJECTIONS OF INSULIN DAILY, Disp: 200 each, Rfl: 4  •  Insulin Syringe-Needle U-100 (TRUEPLUS INSULIN SYRINGE) 31G X 5/16\" 1 ML misc, USE FOUR TIMES A DAY AS DIRECTED, Disp: 100 each, Rfl: 3  •  iron polysacch complex-B12-VitC-FA-Succ (Ferrex 150 Forte Plus)  MG capsule capsule, Take 1 capsule by mouth Daily With Breakfast for 180 days., Disp: 90 capsule, Rfl: 1  •  levothyroxine (SYNTHROID, LEVOTHROID) 75 MCG tablet, Take 1 tablet by mouth Daily for 180 days., Disp: 90 tablet, Rfl: 1  •  ONE TOUCH ULTRA TEST test strip, USE TO TEST BLOOD SUGAR FOUR TIMES DAILY AS DIRECTED, Disp: 100 each, Rfl: 4  •  PARoxetine (PAXIL) 20 MG " "tablet, Take 1 tablet by mouth Every Night for 180 days., Disp: 90 tablet, Rfl: 1  •  valsartan (DIOVAN) 320 MG tablet, Take 1 tablet by mouth Daily., Disp: 90 tablet, Rfl: 1        Review of Systems   Constitutional: Negative for chills, fatigue and fever.   Cardiovascular: Negative for chest pain and palpitations.   Gastrointestinal: Negative for abdominal pain, constipation, diarrhea, nausea and vomiting.   Endocrine: Negative for cold intolerance and heat intolerance.   All other systems reviewed and are negative.      Objective       Vitals:    02/17/20 1200   BP: 122/62   Pulse: 89   SpO2: 95%   Weight: 98.9 kg (218 lb)   Height: 156.2 cm (61.5\")     Body mass index is 40.52 kg/m².      Physical Exam   Constitutional: She is oriented to person, place, and time. She appears well-developed and well-nourished.   obese   HENT:   Head: Normocephalic and atraumatic.   Eyes: Pupils are equal, round, and reactive to light. EOM are normal.   Neck: Normal range of motion. Neck supple. No thyromegaly present.   Cardiovascular: Normal rate, regular rhythm, normal heart sounds and intact distal pulses.   Pulmonary/Chest: Effort normal and breath sounds normal.   Abdominal: Soft. Bowel sounds are normal. She exhibits distension. There is no tenderness.   Musculoskeletal: Normal range of motion. She exhibits no edema.   Neurological: She is alert and oriented to person, place, and time.   Skin: Skin is warm and dry.   Psychiatric: She has a normal mood and affect. Her behavior is normal.   Nursing note and vitals reviewed.    Results Review:     I reviewed the patient's new clinical results.    Medical records reviewed  Summary:      Office Visit on 02/14/2020   Component Date Value Ref Range Status   • Methamphetaine Screen, Urine 02/14/2020 Negative  Negative Final   • POC Amphetamines 02/14/2020 Negative  Negative Final   • Barbiturates Screen 02/14/2020 Negative  Negative Final   • Benzodiazepine Screen 02/14/2020 " Negative  Negative Final   • Cocaine Screen 02/14/2020 Negative  Negative Final   • Methadone Screen 02/14/2020 Negative  Negative Final   • Opiate Screen 02/14/2020 Positive* Negative Final   • Oxycodone, Screen 02/14/2020 Negative  Negative Final   • Phencyclidine (PCP) Screen 02/14/2020 Negative  Negative Final   • Propoxyphene Screen 02/14/2020 Negative  Negative Final   • THC, Screen 02/14/2020 Negative  Negative Final   • Tricyclic Antidepressants Screen 02/14/2020 Negative  Negative Final     Lab Results   Component Value Date    HGBA1C 6.7 (H) 11/21/2019    HGBA1C 7.40 (H) 06/04/2019    HGBA1C 7.16 (H) 03/11/2019     Lab Results   Component Value Date    MICROALBUR 4.5 06/04/2019    CREATININE 1.1 11/21/2019     Imaging Results (Most Recent)     None                Assessment and Plan:    Iraida was seen today for diabetes and hypothyroidism.    Diagnoses and all orders for this visit:    Uncontrolled type 2 diabetes mellitus with complication, with long-term current use of insulin (CMS/[facility])  -     Comprehensive Metabolic Panel  -     Hemoglobin A1c  -     Microalbumin / Creatinine Urine Ratio - Urine, Clean Catch    Uncontrolled type 2 diabetes mellitus with diabetic neuropathy, with long-term current use of insulin (CMS/[facility])    Hyperinsulinism    Acquired hypothyroidism    Encounter for long-term (current) use of insulin (CMS/[facility])    Hyperlipidemia associated with type 2 diabetes mellitus (CMS/[facility])    Patient did not bring her glucometer today  She reports that her blood sugars have been elevated over the past few months  She has not been checking frequently  She also received several intra-articular steroid injections in the ankle and the knee in the spine over the past few months  Patient is status post ankle surgery November 20, 2019 and prior to that her A1c was 6.7%    Patient believes he will be much higher today  She will get lab testing done today  Patient also reported hypoglycemia in the middle  "of the day and middle of the night    I advised insulin as follows  NPH insulin 20 units before breakfast and supper  Regular insulin 10 units before breakfast and 12 units before dinner    Patient return to follow-up in 3 months.    Robert Garcia MD. FACE    02/17/20      EMR Dragon / transcription disclaimer:     \"Dictated utilizing Dragon dictation\".         "

## 2020-02-18 LAB
ALBUMIN SERPL-MCNC: 4.1 G/DL (ref 3.7–4.7)
ALBUMIN/CREAT UR: 4 MG/G CREAT (ref 0–29)
ALBUMIN/GLOB SERPL: 1.8 {RATIO} (ref 1.2–2.2)
ALP SERPL-CCNC: 147 IU/L (ref 39–117)
ALT SERPL-CCNC: 14 IU/L (ref 0–32)
AST SERPL-CCNC: 15 IU/L (ref 0–40)
BILIRUB SERPL-MCNC: 0.3 MG/DL (ref 0–1.2)
BUN SERPL-MCNC: 27 MG/DL (ref 8–27)
BUN/CREAT SERPL: 24 (ref 12–28)
CALCIUM SERPL-MCNC: 9.2 MG/DL (ref 8.7–10.3)
CHLORIDE SERPL-SCNC: 102 MMOL/L (ref 96–106)
CO2 SERPL-SCNC: 24 MMOL/L (ref 20–29)
CREAT SERPL-MCNC: 1.14 MG/DL (ref 0.57–1)
CREAT UR-MCNC: 331.2 MG/DL
GLOBULIN SER CALC-MCNC: 2.3 G/DL (ref 1.5–4.5)
GLUCOSE SERPL-MCNC: 126 MG/DL (ref 65–99)
HBA1C MFR BLD: 6.4 % (ref 4.8–5.6)
INTERPRETATION: NORMAL
Lab: NORMAL
MICROALBUMIN UR-MCNC: 12.8 UG/ML
POTASSIUM SERPL-SCNC: 4.4 MMOL/L (ref 3.5–5.2)
PROT SERPL-MCNC: 6.4 G/DL (ref 6–8.5)
SODIUM SERPL-SCNC: 142 MMOL/L (ref 134–144)

## 2020-02-24 LAB
BASOPHILS # BLD AUTO: 0.04 10*3/MM3 (ref 0–0.2)
BASOPHILS NFR BLD AUTO: 0.7 % (ref 0–1.5)
EOSINOPHIL # BLD AUTO: 0.42 10*3/MM3 (ref 0–0.4)
EOSINOPHIL NFR BLD AUTO: 7.5 % (ref 0.3–6.2)
ERYTHROCYTE [DISTWIDTH] IN BLOOD BY AUTOMATED COUNT: 16.5 % (ref 12.3–15.4)
FOLATE SERPL-MCNC: 11 NG/ML (ref 4.78–24.2)
HCT VFR BLD AUTO: 46.7 % (ref 34–46.6)
HGB BLD-MCNC: 14.7 G/DL (ref 12–15.9)
IMM GRANULOCYTES # BLD AUTO: 0.01 10*3/MM3 (ref 0–0.05)
IMM GRANULOCYTES NFR BLD AUTO: 0.2 % (ref 0–0.5)
IRON SATN MFR SERPL: 13 % (ref 20–50)
IRON SERPL-MCNC: 63 MCG/DL (ref 37–145)
LYMPHOCYTES # BLD AUTO: 1.54 10*3/MM3 (ref 0.7–3.1)
LYMPHOCYTES NFR BLD AUTO: 27.6 % (ref 19.6–45.3)
MCH RBC QN AUTO: 29 PG (ref 26.6–33)
MCHC RBC AUTO-ENTMCNC: 31.5 G/DL (ref 31.5–35.7)
MCV RBC AUTO: 92.1 FL (ref 79–97)
MONOCYTES # BLD AUTO: 0.45 10*3/MM3 (ref 0.1–0.9)
MONOCYTES NFR BLD AUTO: 8.1 % (ref 5–12)
NEUTROPHILS # BLD AUTO: 3.11 10*3/MM3 (ref 1.7–7)
NEUTROPHILS NFR BLD AUTO: 55.9 % (ref 42.7–76)
NRBC BLD AUTO-RTO: 0 /100 WBC (ref 0–0.2)
PLATELET # BLD AUTO: 260 10*3/MM3 (ref 140–450)
RBC # BLD AUTO: 5.07 10*6/MM3 (ref 3.77–5.28)
TIBC SERPL-MCNC: 475 MCG/DL
UIBC SERPL-MCNC: 412 MCG/DL (ref 112–346)
VIT B12 SERPL-MCNC: 510 PG/ML (ref 211–946)
WBC # BLD AUTO: 5.57 10*3/MM3 (ref 3.4–10.8)

## 2020-03-05 ENCOUNTER — OUTSIDE FACILITY SERVICE (OUTPATIENT)
Dept: PAIN MEDICINE | Facility: CLINIC | Age: 75
End: 2020-03-05

## 2020-03-05 ENCOUNTER — DOCUMENTATION (OUTPATIENT)
Dept: PAIN MEDICINE | Facility: CLINIC | Age: 75
End: 2020-03-05

## 2020-03-05 PROCEDURE — 64493 INJ PARAVERT F JNT L/S 1 LEV: CPT | Performed by: ANESTHESIOLOGY

## 2020-03-05 PROCEDURE — 64494 INJ PARAVERT F JNT L/S 2 LEV: CPT | Performed by: ANESTHESIOLOGY

## 2020-03-09 RX ORDER — HYDROCODONE BITARTRATE AND ACETAMINOPHEN 5; 325 MG/1; MG/1
1 TABLET ORAL EVERY 8 HOURS PRN
Qty: 90 TABLET | Refills: 0 | Status: SHIPPED | OUTPATIENT
Start: 2020-03-09 | End: 2020-04-07 | Stop reason: SDUPTHER

## 2020-03-09 NOTE — PROGRESS NOTES
Lumbar Medial Branch Blockade  [facility]      PREOPERATIVE DIAGNOSIS:  Lumbar spondylosis without myelopathy    POSTOPERATIVE DIAGNOSIS:  Lumbar spondylosis without myelopathy    PROCEDURE:   Diagnostic bilateral  Lumbar Medial Branch Nerve Blockades, with fluoroscopy:   L3, L4 and L5 nerve levels    PRE-PROCEDURE DISCUSSION WITH PATIENT:    Risks and complications were discussed with the patient prior to starting the procedure and informed consent was obtained.      SURGEON:  Kaiden Miranda MD    REASON FOR PROCEDURE:   The patient complains of pain that seems to have a significant axial component, Previous diagnostic positivity of a Lumbar Medial Branch Blockade at the same levels and The patient admits to 80% or more pain relief diagnostically from medial branch blockades.    SEDATION:  Versed 6mg IV      ANESTHETIC:  Marcaine 0.5%  STEROID:  NONE  TOTAL VOLUME OF SOLUTION:  6 mL    DESCRIPTON OF PROCEDURE:  After obtaining informed consent, IV access was obtained in the preoperative area.   The patient was taken to the operating room.  The patient was placed in the prone position with a pillow under the abdomen. All pressure points were well padded.  EKG, blood pressure, and pulse oximeter were monitored.  The patient was monitored and sedated by the RN under my direction. The lumbosacral area was prepped with Chloraprep and draped in a sterile fashion.     Under fluoroscopic guidance the vertebral  transverse processes areas at the appropriate vertebral levels were located, and points were identified at the junctions of the superior articular processes with the superior articular processes.     Skin and subcutaneous tissue were anesthetized with 1% lidocaine above each of these points. A 22-gauge spinal needle was introduced under fluoroscopic guidance at the above junctions. Aspiration was negative for blood and CSF.  After confirming the position of the needle with fluoroscope in all  views, 0.25 mL of Omnipaque was injected, and after seeing the proper spread a total of 1 mL of the anesthetic solution noted above was injected at each of these points.  Needles were removed intact from each of the areas.  Onset of analgesia was noted.  Vital signs remained stable throughout.      ESTIMATED BLOOD LOSS:  <5 mL  SPECIMENS:  none    COMPLICATIONS:   No complications were noted., There was no indication of vascular uptake on live injection of contrast dye. and The patient did not have any signs of postprocedure numbness nor weakness.    TOLERANCE & DISCHARGE CONDITION:    The patient tolerated the procedure well.  The patient was transported to the recovery area without difficulties.  The patient was discharged to home under the care of family in stable and satisfactory condition.    PLAN OF CARE:  1. The patient was given our standard instruction sheet.  2. We discussed that Lumbar Medial Branch Blockade is a diagnostic procedure in consideration for radiofrequency ablation if two diagnostic procedures prove to be positive for significant benefit.  If sustained relief of 6 to eight weeks is obtained, then an alternative plan could be therapeutic lumbar branch blockades.  3. The patient is asked to keep a pain log each hour for 8 hours after the procedure today.  4. The patient will  Return to clinic 1-2 wks.  5. The patient will resume all medications as per the medication reconciliation sheet.

## 2020-03-09 NOTE — TELEPHONE ENCOUNTER
Medication Refill Request    Date of phone call: 3/9/2020    Medication being requested: hydro/apap 5-325 mg si tab q 8 hrs  Qty: 90    Date of last visit: 2020    Date of last refill: 2020    DONNY up to date?: yes    Next Follow up?: n/a    Any new pertinent information? (i.e, new medication allergies, new use of medications, change in patient's health or condition, non-compliance or inconsistency with prescribing agreement?):

## 2020-03-16 ENCOUNTER — OFFICE VISIT (OUTPATIENT)
Dept: PAIN MEDICINE | Facility: CLINIC | Age: 75
End: 2020-03-16

## 2020-03-16 VITALS
SYSTOLIC BLOOD PRESSURE: 138 MMHG | RESPIRATION RATE: 16 BRPM | WEIGHT: 217.6 LBS | OXYGEN SATURATION: 95 % | BODY MASS INDEX: 41.08 KG/M2 | HEIGHT: 61 IN | DIASTOLIC BLOOD PRESSURE: 75 MMHG | TEMPERATURE: 98 F | HEART RATE: 87 BPM

## 2020-03-16 DIAGNOSIS — M47.816 LUMBAR FACET ARTHROPATHY: Primary | ICD-10-CM

## 2020-03-16 DIAGNOSIS — M54.42 CHRONIC MIDLINE LOW BACK PAIN WITH BILATERAL SCIATICA: ICD-10-CM

## 2020-03-16 DIAGNOSIS — G89.29 OTHER CHRONIC PAIN: ICD-10-CM

## 2020-03-16 DIAGNOSIS — M51.36 DDD (DEGENERATIVE DISC DISEASE), LUMBAR: ICD-10-CM

## 2020-03-16 DIAGNOSIS — G89.29 CHRONIC MIDLINE LOW BACK PAIN WITH BILATERAL SCIATICA: ICD-10-CM

## 2020-03-16 DIAGNOSIS — Z79.899 ENCOUNTER FOR LONG-TERM (CURRENT) USE OF HIGH-RISK MEDICATION: ICD-10-CM

## 2020-03-16 DIAGNOSIS — M54.41 CHRONIC MIDLINE LOW BACK PAIN WITH BILATERAL SCIATICA: ICD-10-CM

## 2020-03-16 PROCEDURE — 99214 OFFICE O/P EST MOD 30 MIN: CPT | Performed by: NURSE PRACTITIONER

## 2020-03-16 NOTE — PROGRESS NOTES
CHIEF COMPLAINT  F/U back pain-Lumbar Medial Branch Blockade- patient states that her pain was improved 90% for 1 week.      Subjective   Iraida Elizabeth is a 75 y.o. female  who presents to the office for follow-up of procedure.  She completed a Bilateral L3-L5 MBB   on  3/5/2020 performed by Dr. Miranda for management of low back pain. Patient reports 90% relief from the procedure for 1 week, her pain has now returned to baseline.. Today her pain is 6/10VAS in severity. Continues with Hydrocodone 5/325 3/day.  She states this medication decreases her pain by 30-50%.  She denies any side effects from her medication regimen including somnolence or constipation.  ADLs by self. She denies any bowel or bladder changes since last office visit.      Previous Procedures:  1/28/2020 Bilateral L3-L5 MBB - 80% relief x 1 week    Back Pain   This is a chronic (chronic) problem. The current episode started more than 1 year ago. The problem occurs constantly. The problem has been gradually worsening since onset. The pain is present in the lumbar spine and sacro-iliac. The pain radiates to the left foot, left thigh, left knee, right foot, right knee and right thigh. The pain is at a severity of 6/10. The pain is severe. The symptoms are aggravated by bending, position, standing and twisting (laying flat). Pertinent negatives include no abdominal pain or dysuria. She has tried analgesics for the symptoms. The treatment provided significant (diagnostic relief with MBB) relief.      PEG Assessment   What number best describes your pain on average in the past week?7  What number best describes how, during the past week, pain has interfered with your enjoyment of life?7  What number best describes how, during the past week, pain has interfered with your general activity?  7    The following portions of the patient's history were reviewed and updated as appropriate: allergies, current medications, past family history, past medical  "history, past social history, past surgical history and problem list.    Review of Systems   Constitutional: Positive for activity change (improved) and fatigue.   HENT: Negative for congestion.    Eyes: Negative for visual disturbance.   Respiratory: Positive for chest tightness and shortness of breath. Negative for cough and wheezing.    Cardiovascular: Negative for palpitations and leg swelling.   Gastrointestinal: Negative for abdominal pain, constipation and diarrhea.   Endocrine: Negative for polyuria.   Genitourinary: Negative for difficulty urinating and dysuria.   Musculoskeletal: Positive for arthralgias (left ankle), back pain and gait problem (walker).   Allergic/Immunologic: Negative for immunocompromised state.   Neurological: Negative for dizziness and light-headedness.   Psychiatric/Behavioral: Positive for sleep disturbance. Negative for agitation and suicidal ideas. The patient is not nervous/anxious.        Vitals:    03/16/20 1344   BP: 138/75   Pulse: 87   Resp: 16   Temp: 98 °F (36.7 °C)   SpO2: 95%   Weight: 98.7 kg (217 lb 9.6 oz)   Height: 154.9 cm (61\")   PainSc:   6   PainLoc: Back     Objective   Physical Exam   Constitutional: She is oriented to person, place, and time. She appears well-developed and well-nourished.   HENT:   Head: Normocephalic and atraumatic.   Eyes: Pupils are equal, round, and reactive to light. Conjunctivae and EOM are normal.   Neck: Spinous process tenderness present. Decreased range of motion present.   POS Cervical Loading Manuever   Cardiovascular: Normal rate.   Pulmonary/Chest: Effort normal.   Musculoskeletal:        Left ankle: She exhibits decreased range of motion (Walking Boot).        Lumbar back: She exhibits tenderness and pain.   POS Lumbar Loading Maneuver  NEG Ranulfo SLR     Neurological: She is alert and oriented to person, place, and time. Abnormal gait: Cane.   Skin: Skin is warm, dry and intact.   Psychiatric: She has a normal mood and affect. Her " speech is normal and behavior is normal. Judgment and thought content normal.   Nursing note and vitals reviewed.    Assessment/Plan   Iraida was seen today for back pain.    Diagnoses and all orders for this visit:    Lumbar facet arthropathy  -     Case Request    DDD (degenerative disc disease), lumbar      --- Bilateral L3-L5 Radiofrequency ablation  --------  Education about Radiofrequency Lesioning of Medial Branches:    The medial branch blockade was intended for diagnostic purposes, with the intent of offering the patient Radiofrequency thermal rhizotomy if the MBB is diagnostically effective.  The diagnostic blockade is necessary to determine the likelihood that RF therapy could be efficacious in providing long term relief to the patient.  As indicated above, diagnostic efficacy was established.      In the RF procedure, needles are placed to the joint lines in the same fashion, and after testing, the needle tips are heated to thermally treat the nerves, blocking the nerves by in essence damaging the nerves with the heat treatment.      Medically, a successful RF procedure should provide a patient with 50% pain relief or more for at least 6 months.  We estimate a likelihood of about an 80% chance that medical success will be realized.  We discussed & educated once again that not all patients have a medically successful result, and the patient voices understanding.  However, our clinical experience suggests that successful patients receive relief more in the range of 12 months on average.  (We also discussed that a fortunate minority of patients receive therapeutic success from the MBB, and may not require RF ablation.  If a patient receives more than 8 weeks of relief from MBB, then occasional repeat MBB for therapeutic purposes is a very reasonable alternative therapy.  This course of therapy is consistent with our LCDs according to our CMS  in the area, and therefore other insurance providers  should follow accordingly.  We will monitor our patients to screen for these therapeutic responders and will offer RF therapy only when necessary.  However, in this clinical scenario, this therapeutic result was not realized, and therefore Radiofrequency Lesioning is medically necessary.)      We discussed that MBB & RF are not without risks.  Guidelines regarding anticoagulant use & neuraxial procedures will be respected.  Patients that are ill or otherwise may be at risk for sepsis will not have their spines accessed by neuraxial injections of any type.  This patient will not be offered these therapies if there is an increased risk.   We discussed that there is a risk of postprocedural pain and also a risk of worsening of clinical picture with these procedures as with any neuraxial procedure.  All invasive procedures have risks including but not limited to bleeding, infection, injury, nerve injury, paralysis, coma, death, lack of pain relief, and worsening of clinical picture.      In this education session, all of these topics were covered and the patient voiced understanding.    ---------  --- The urine drug screen confirmation from 2/14/2020 has been reviewed and the result is appropriate based on patient history and DONNY report  --- Continue norco 5/325. No refill needed at this time. Patient appears stable with current regimen. No adverse effects. Regarding continuation of opioids, there is no evidence of aberrant behavior or any red flags.  The patient continues with appropriate response to opioid therapy. ADL's remain intact by self.   --- Follow-up 2 months or sooner if needed     DONNY REPORT    As part of the patient's treatment plan, I am prescribing controlled substances. The patient has been made aware of appropriate use of such medications, including potential risk of somnolence, limited ability to drive and/or work safely, and the potential for dependence or overdose. It has also bee made clear  that these medications are for use by this patient only, without concomitant use of alcohol or other substances unless prescribed.     Patient has completed prescribing agreement detailing terms of continued prescribing of controlled substances, including monitoring DONNY reports, urine drug screening, and pill counts if necessary. The patient is aware that inappropriate use will results in cessation of prescribing such medications.    DONNY report has been reviewed and scanned into the patient's chart.    As the clinician, I personally reviewed the DONNY from 3/13/2020 while the patient was in the office today.    History and physical exam exhibit continued safe and appropriate use of controlled substances.       EMR Dragon/Transcription disclaimer:   Much of this encounter note is an electronic transcription/translation of spoken language to printed text. The electronic translation of spoken language may permit erroneous, or at times, nonsensical words or phrases to be inadvertently transcribed; Although I have reviewed the note for such errors, some may still exist.

## 2020-03-19 ENCOUNTER — TELEPHONE (OUTPATIENT)
Dept: FAMILY MEDICINE CLINIC | Facility: CLINIC | Age: 75
End: 2020-03-19

## 2020-03-19 NOTE — TELEPHONE ENCOUNTER
Lab results are satisfactory.  No longer anemic.  Her iron level B12 and folate levels are all satisfactory.  Certainly okay to send her those results if she has not already gotten them through her my chart account.

## 2020-04-07 NOTE — TELEPHONE ENCOUNTER
Medication Refill Request    Date of phone call: 20    Medication being requested: norco 5/325mg si tab po q 8 hours prn   Qty: 90    Date of last visit: 3/16/20    Date of last refill: 3/11/20    DONNY up to date?: yes    Next Follow up?: 20    Any new pertinent information? (i.e, new medication allergies, new use of medications, change in patient's health or condition, non-compliance or inconsistency with prescribing agreement?): pt called asking about refill, I told her you would send it soon, pt also requesting ketamine compound cream? Pt sts she used the cream in the past and it helped. Would you prescribe? Please advise.  Thank you.

## 2020-04-09 ENCOUNTER — TELEPHONE (OUTPATIENT)
Dept: FAMILY MEDICINE CLINIC | Facility: CLINIC | Age: 75
End: 2020-04-09

## 2020-04-09 RX ORDER — HYDROCODONE BITARTRATE AND ACETAMINOPHEN 5; 325 MG/1; MG/1
1 TABLET ORAL EVERY 8 HOURS PRN
Qty: 90 TABLET | Refills: 0 | Status: SHIPPED | OUTPATIENT
Start: 2020-04-09 | End: 2020-05-11 | Stop reason: SDUPTHER

## 2020-04-09 NOTE — TELEPHONE ENCOUNTER
The compound cream isn't electronically prescribed.  Please start a form and I will sign and and then we can fax it

## 2020-04-30 ENCOUNTER — RESULTS ENCOUNTER (OUTPATIENT)
Dept: ENDOCRINOLOGY | Age: 75
End: 2020-04-30

## 2020-04-30 ENCOUNTER — OFFICE VISIT (OUTPATIENT)
Dept: ENDOCRINOLOGY | Age: 75
End: 2020-04-30

## 2020-04-30 DIAGNOSIS — IMO0002 UNCONTROLLED TYPE 2 DIABETES MELLITUS WITH DIABETIC NEUROPATHY, WITH LONG-TERM CURRENT USE OF INSULIN: ICD-10-CM

## 2020-04-30 DIAGNOSIS — Z79.4 ENCOUNTER FOR LONG-TERM (CURRENT) USE OF INSULIN (HCC): ICD-10-CM

## 2020-04-30 DIAGNOSIS — E16.1 HYPERINSULINISM: ICD-10-CM

## 2020-04-30 DIAGNOSIS — IMO0002 UNCONTROLLED TYPE II DIABETES MELLITUS WITH NEPHROPATHY: ICD-10-CM

## 2020-04-30 DIAGNOSIS — IMO0002 UNCONTROLLED TYPE 2 DIABETES MELLITUS WITH COMPLICATION, WITH LONG-TERM CURRENT USE OF INSULIN: Primary | ICD-10-CM

## 2020-04-30 DIAGNOSIS — E11.69 HYPERLIPIDEMIA ASSOCIATED WITH TYPE 2 DIABETES MELLITUS (HCC): ICD-10-CM

## 2020-04-30 DIAGNOSIS — IMO0002 UNCONTROLLED TYPE 2 DIABETES MELLITUS WITH COMPLICATION, WITH LONG-TERM CURRENT USE OF INSULIN: ICD-10-CM

## 2020-04-30 DIAGNOSIS — E03.9 ACQUIRED HYPOTHYROIDISM: ICD-10-CM

## 2020-04-30 DIAGNOSIS — E78.5 HYPERLIPIDEMIA ASSOCIATED WITH TYPE 2 DIABETES MELLITUS (HCC): ICD-10-CM

## 2020-04-30 PROCEDURE — 99214 OFFICE O/P EST MOD 30 MIN: CPT | Performed by: INTERNAL MEDICINE

## 2020-04-30 NOTE — PROGRESS NOTES
75 y.o.    Patient Care Team:  Claudio Anne MD as PCP - General  Claudio Anne MD as PCP - Family Medicine  Robert Garcia MD as Consulting Physician (Endocrinology)  Shawn Galvan IV, MD as Surgeon (Neurosurgery)  Nile Laguna MD as Surgeon (Vascular Surgery)  Chuck Olsen MD as Consulting Physician (Orthopedic Surgery)    Chief Complaint:    Uncontrolled type 2 diabetes mellitus with complications and hypothyroidism  Subjective   Visit type: Video visit Doximity  Total time spent:  Consent:  You have chosen to receive care through a telehealth visit.  Do you consent to use a video/audio connection for your medical care today? Yes  HPI   Patient is a 75-year-old white female with history of uncontrolled type 2 diabetes mellitus with complications came for follow-up    Uncontrolled type 2 diabetes mellitus  Patient is currently taking NPH insulin twice daily and regular insulin twice daily  She reports her blood sugars are mostly in the 1  range  Hypoglycemia  Reports occasional hypoglycemia  Uncontrolled type 2 diabetes mellitus with neuropathy  Patient has symptoms of tingling numbness burning in both lower extremities  Patient denied any knowledge of diabetic retinopathy  Uncontrolled type 2 diabetes mellitus with nephropathy  Patient has elevated creatinine with microalbuminuria  Hypothyroidism  Currently taking levothyroxine 75 mcg daily.  Reports compliance and denies side effects        Interval History      The following portions of the patient's history were reviewed and updated as appropriate: allergies, current medications, past family history, past medical history, past social history, past surgical history and problem list.    Past Medical History:   Diagnosis Date   • Arthropathy of knee 05/22/2014    unspecified   • Arthropathy, multiple sites 04/12/2012    unspecified   • Bulging lumbar disc    • Cancer (CMS/HCC)     breast   • Chronic kidney disease, stage III  (moderate) (CMS/[facility]) 05/26/2011   • Controlled diabetes mellitus type II without complication (CMS/[facility])    • Deep vein blood clot of right lower extremity (CMS/[facility]) 03/2019   • Depression 04/02/2001   • Dyspnea    • Essential hypertension    • Fall 2015    FRACTURED ANKLE   • History of complete eye exam 03/29/2012   • History of gynecological procedure 03/01/2010    special invesitation and examinations; gynecological examination; routine gynecological examination   • Hyperlipidemia     other and unspecified   • Hypothyroidism     unspecified   • Low back pain    • Moderate single current episode of major depressive disorder (CMS/[facility]) 4/2/2001   • Peripheral neuropathy    • Personal history of malignant neoplasm of breast 2001    R breast (negative nodes) TX with radiation and Tamoxifen   • Thyroid disease    • Type II diabetes mellitus with neurological manifestations, uncontrolled (CMS/[facility]) 01/03/2013   • Wrist fracture, left 2008     Family History   Problem Relation Age of Onset   • Hypertension Sister    • Thyroid disease Daughter    • Cancer Mother    • Heart disease Father    • Malig Hyperthermia Neg Hx      Social History     Socioeconomic History   • Marital status:      Spouse name: Not on file   • Number of children: 4   • Years of education: 12   • Highest education level: High school graduate   Occupational History   • Occupation: RETIRED   Social Needs   • Financial resource strain: Patient refused   • Food insecurity:     Worry: Patient refused     Inability: Patient refused   • Transportation needs:     Medical: Patient refused     Non-medical: Patient refused   Tobacco Use   • Smoking status: Never Smoker   • Smokeless tobacco: Never Used   Substance and Sexual Activity   • Alcohol use: No   • Drug use: No   • Sexual activity: Defer   Social History Narrative    LIVES ALONE     Allergies   Allergen Reactions   • Tizanidine Hallucinations   • Tree Nut Anaphylaxis     almonds   • Vancomycin  "Itching     Pt c/o itiching and redness in arm after starting vancomycin ivpb       Current Outpatient Medications:   •  amLODIPine (NORVASC) 10 MG tablet, Take 1 tablet by mouth Daily., Disp: 90 tablet, Rfl: 1  •  atorvastatin (LIPITOR) 20 MG tablet, Take 1 tablet by mouth Every Night for 180 days., Disp: 90 tablet, Rfl: 1  •  EPINEPHrine (EPIPEN) 0.3 MG/0.3ML solution auto-injector injection, , Disp: , Rfl:   •  Foot Care Products (DIABETIC INSOLES) misc, 1 pair of diabetic shoe Dx E11.45, Disp: 1 each, Rfl: 0  •  hydroCHLOROthiazide (HYDRODIURIL) 25 MG tablet, Take 1 tablet by mouth Daily., Disp: 90 tablet, Rfl: 1  •  HYDROcodone-acetaminophen (NORCO) 5-325 MG per tablet, Take 1 tablet by mouth Every 8 (Eight) Hours As Needed for Severe Pain ., Disp: 90 tablet, Rfl: 0  •  insulin NPH (HUMULIN N) 100 UNIT/ML injection, Inject 24 Units under the skin into the appropriate area as directed 2 (Two) Times a Day Before Meals., Disp: 20 mL, Rfl: 5  •  insulin regular (NOVOLIN R) 100 UNIT/ML injection, 14 UNITS AT BREAKFAST, 16 UNITS AT DINNER, Disp: 10 mL, Rfl: 5  •  Insulin Syringe 31G X 5/16\" 0.3 ML misc, 4 SC INJECTIONS OF INSULIN DAILY, Disp: 200 each, Rfl: 4  •  Insulin Syringe-Needle U-100 (TRUEPLUS INSULIN SYRINGE) 31G X 5/16\" 1 ML misc, USE FOUR TIMES A DAY AS DIRECTED, Disp: 100 each, Rfl: 3  •  iron polysacch complex-B12-VitC-FA-Succ (Ferrex 150 Forte Plus)  MG capsule capsule, Take 1 capsule by mouth Daily With Breakfast for 180 days., Disp: 90 capsule, Rfl: 1  •  levothyroxine (SYNTHROID, LEVOTHROID) 75 MCG tablet, Take 1 tablet by mouth Daily for 180 days., Disp: 90 tablet, Rfl: 1  •  ONE TOUCH ULTRA TEST test strip, USE TO TEST BLOOD SUGAR FOUR TIMES DAILY AS DIRECTED, Disp: 100 each, Rfl: 4  •  PARoxetine (PAXIL) 20 MG tablet, Take 1 tablet by mouth Every Night for 180 days., Disp: 90 tablet, Rfl: 1  •  valsartan (DIOVAN) 320 MG tablet, Take 1 tablet by mouth Daily., Disp: 90 tablet, Rfl: " 1        Review of Systems   Constitutional: Positive for fatigue.   Respiratory: Negative.    Cardiovascular: Negative.    Endocrine: Positive for polydipsia.   Neurological: Positive for numbness.   All other systems reviewed and are negative.      Objective       There were no vitals filed for this visit.  There is no height or weight on file to calculate BMI.    Vital signs and physical examination not documented due to video visit  Physical Exam  Results Review:     I reviewed the patient's new clinical results.    Medical records reviewed  Summary:      Office Visit on 02/17/2020   Component Date Value Ref Range Status   • Glucose 02/17/2020 126* 65 - 99 mg/dL Final   • BUN 02/17/2020 27  8 - 27 mg/dL Final   • Creatinine 02/17/2020 1.14* 0.57 - 1.00 mg/dL Final   • eGFR Non African Am 02/17/2020 47* >59 mL/min/1.73 Final   • eGFR African Am 02/17/2020 54* >59 mL/min/1.73 Final   • BUN/Creatinine Ratio 02/17/2020 24  12 - 28 Final   • Sodium 02/17/2020 142  134 - 144 mmol/L Final   • Potassium 02/17/2020 4.4  3.5 - 5.2 mmol/L Final   • Chloride 02/17/2020 102  96 - 106 mmol/L Final   • Total CO2 02/17/2020 24  20 - 29 mmol/L Final   • Calcium 02/17/2020 9.2  8.7 - 10.3 mg/dL Final   • Total Protein 02/17/2020 6.4  6.0 - 8.5 g/dL Final   • Albumin 02/17/2020 4.1  3.7 - 4.7 g/dL Final                  **Please note reference interval change**   • Globulin 02/17/2020 2.3  1.5 - 4.5 g/dL Final   • A/G Ratio 02/17/2020 1.8  1.2 - 2.2 Final   • Total Bilirubin 02/17/2020 0.3  0.0 - 1.2 mg/dL Final   • Alkaline Phosphatase 02/17/2020 147* 39 - 117 IU/L Final   • AST (SGOT) 02/17/2020 15  0 - 40 IU/L Final   • ALT (SGPT) 02/17/2020 14  0 - 32 IU/L Final   • Hemoglobin A1C 02/17/2020 6.4* 4.8 - 5.6 % Final    Comment:          Prediabetes: 5.7 - 6.4           Diabetes: >6.4           Glycemic control for adults with diabetes: <7.0     • Creatinine, Urine 02/17/2020 331.2  Not Estab. mg/dL Final   • Microalbumin, Urine  02/17/2020 12.8  Not Estab. ug/mL Final   • Microalbumin/Creatinine Ratio 02/17/2020 4  0 - 29 mg/g creat Final    Comment:                        Normal:                0 -  29                         Moderately increased: 30 - 300                         Severely increased:       >300                **Please note reference interval change**     • Interpretation 02/17/2020 Note   Final    Comment: -------------------------------  CHRONIC KIDNEY DISEASE:  EGFR, BLOOD PRESSURE, AND PROTEINURIA ASSESSMENT  The overall regression of eGFR with time is not  statistically significant. Current eGFR is 47 mL/min/1.73mE2  corresponding to CKD stage 3a. Multiply eGFR by 1.159 if  patient is . Potassium is within goal and  has risen, was 4.0 and now is 4.4 mmol/L. Albumin:Creatinine  ratio is not elevated and has not changed significantly, was  3.7 and now is 3.9 mg/g creat. If no other markers of kidney  damage are present consider measurement of cystatin C to  confirm diagnosis of CKD. Glycemic control (HB A1c: 6.4 %)  is within goal.  EGFR, BLOOD PRESSURE, AND PROTEINURIA TREATMENT SUGGESTIONS  -  Based on current eGFR and absence of significant  proteinuria, patient is at moderately increased risk for  adverse outcomes such as CKD progression, CVD, and  mortality. Guidelines recommend a target blood pressure of  140/90 mmHg or less in CKD patients to reduce cardiovascula                           r  risk and CKD progression. A higher blood pressure target may  be appropriate in older individuals to avoid symptomatic  hypotension.  EGFR, BLOOD PRESSURE, AND PROTEINURIA FOLLOW-UP  -  Spot Urine Panel (Albumin preferred) and fasting Renal Panel  within 12 months; Hemoglobin A1C within 6 months;  -  BONE and MINERAL ASSESSMENT  Calcium is within goal and has decreased, was 10.1 and now  is 9.2 mg/dL. Carbon Dioxide is within goal and has  decreased, was 30 and now is 24 mmol/L. Guidelines recommend  the  measurement of 25-hydroxy vitamin D in patients with  CKD.  BONE and MINERAL TREATMENT SUGGESTIONS  -  Interpretations require simultaneous measurements of serum  calcium and phosphorus.  BONE and MINERAL FOLLOW-UP  -  fasting PTH with Renal Panel and 25-Hydroxy Vitamin D are  recommended by KDOQI guidelines, at least yearly;  -  LIPIDS ASSESSMENT  Most recent order does not include a fasting Lipid Panel.  LIPIDS FOLLOW-UP  -  fasting Lipid Panel is recommended by KDOQI guidelines,                            at  least yearly;  -  ANEMIA ASSESSMENT  Most recent order does not include a CBC Panel or iron  studies.  ANEMIA FOLLOW-UP  -  CBC is recommended by KDOQI guidelines, at least yearly;  -------------------------------  DISCLAIMER  These assessments and treatment suggestions are provided as  a convenience in support of the physician-patient  relationship and are not intended to replace the physician's  clinical judgment. They are derived from national guidelines  in addition to other evidence and expert opinion. The  clinician should consider this information within the  context of clinical opinion and the individual patient.  SEE GUIDANCE FOR CHRONIC KIDNEY DISEASE PROGRAM: Kidney  Disease Improving Global Outcomes (KDIGO) clinical practice  guidelines are at http://kdigo.org/home/guidelines/.  National Kidney Foundation Kidney Disease Outcomes Quality  Initiative (KDOQI (TM)), with its limitations and  disclaimers, are at www.kidney.org/professionals/KDOQI. This  program is intended f                           or patients who have been diagnosed  with stages 3, 4, or pre-dialysis 5 CKD. It is not intended  for children, pregnant patients, or transplant patients.     • PDF Image 02/17/2020 Not applicable   Final     Lab Results   Component Value Date    HGBA1C 6.4 (H) 02/17/2020    HGBA1C 6.7 (H) 11/21/2019    HGBA1C 7.40 (H) 06/04/2019     Lab Results   Component Value Date    MICROALBUR 12.8 02/17/2020     "CREATININE 1.14 (H) 02/17/2020     Imaging Results (Most Recent)     None                 Assessment and Plan:    Diagnoses and all orders for this visit:    Uncontrolled type 2 diabetes mellitus with complication, with long-term current use of insulin (CMS/[facility])  -     Comprehensive Metabolic Panel; Future  -     Hemoglobin A1c; Future  -     Microalbumin / Creatinine Urine Ratio - Urine, Clean Catch; Future  -     TSH; Future  -     T4, Free; Future    Uncontrolled type 2 diabetes mellitus with diabetic neuropathy, with long-term current use of insulin (CMS/[facility])  -     Comprehensive Metabolic Panel; Future  -     Hemoglobin A1c; Future  -     Microalbumin / Creatinine Urine Ratio - Urine, Clean Catch; Future  -     TSH; Future  -     T4, Free; Future    Hyperinsulinism    Encounter for long-term (current) use of insulin (CMS/[facility])    Acquired hypothyroidism  -     Comprehensive Metabolic Panel; Future  -     Hemoglobin A1c; Future  -     Microalbumin / Creatinine Urine Ratio - Urine, Clean Catch; Future  -     TSH; Future  -     T4, Free; Future    Hyperlipidemia associated with type 2 diabetes mellitus (CMS/[facility])    Uncontrolled type II diabetes mellitus with nephropathy (CMS/[facility])    Patient reported that her fasting blood sugars are also in the 100-150 range but the evening blood sugars are going into 200-280 range and she is very concerned  She is currently taking NPH 20 units twice daily and regular insulin 10 units before breakfast and 12 before dinner    I advised her to go back to the previous schedule of NPH insulin 24 units twice daily and regular insulin 14 before breakfast and 16 before dinner  Patient return to follow-up in 6 weeks      Robert Garcia MD. FACE    04/30/20      EMR Dragon / transcription disclaimer:     \"Dictated utilizing Dragon dictation\".         "

## 2020-05-04 RX ORDER — SYRINGE AND NEEDLE,INSULIN,1ML 31GX15/64"
SYRINGE, EMPTY DISPOSABLE MISCELLANEOUS
Qty: 100 EACH | Refills: 0 | Status: SHIPPED | OUTPATIENT
Start: 2020-05-04 | End: 2020-06-22

## 2020-05-05 ENCOUNTER — RESULTS ENCOUNTER (OUTPATIENT)
Dept: ENDOCRINOLOGY | Age: 75
End: 2020-05-05

## 2020-05-05 DIAGNOSIS — IMO0002 UNCONTROLLED TYPE 2 DIABETES MELLITUS WITH DIABETIC NEUROPATHY, WITH LONG-TERM CURRENT USE OF INSULIN: ICD-10-CM

## 2020-05-05 DIAGNOSIS — IMO0002 UNCONTROLLED TYPE 2 DIABETES MELLITUS WITH COMPLICATION, WITH LONG-TERM CURRENT USE OF INSULIN: ICD-10-CM

## 2020-05-05 DIAGNOSIS — E03.9 ACQUIRED HYPOTHYROIDISM: ICD-10-CM

## 2020-05-05 DIAGNOSIS — E16.1 HYPERINSULINISM: Primary | ICD-10-CM

## 2020-05-11 ENCOUNTER — OFFICE VISIT (OUTPATIENT)
Dept: PAIN MEDICINE | Facility: CLINIC | Age: 75
End: 2020-05-11

## 2020-05-11 DIAGNOSIS — G89.29 OTHER CHRONIC PAIN: ICD-10-CM

## 2020-05-11 DIAGNOSIS — M25.50 ARTHRALGIA OF MULTIPLE SITES: ICD-10-CM

## 2020-05-11 DIAGNOSIS — M47.816 LUMBAR FACET ARTHROPATHY: ICD-10-CM

## 2020-05-11 DIAGNOSIS — G89.29 CHRONIC MIDLINE LOW BACK PAIN WITH BILATERAL SCIATICA: ICD-10-CM

## 2020-05-11 DIAGNOSIS — M54.41 CHRONIC MIDLINE LOW BACK PAIN WITH BILATERAL SCIATICA: ICD-10-CM

## 2020-05-11 DIAGNOSIS — M25.551 RIGHT HIP PAIN: ICD-10-CM

## 2020-05-11 DIAGNOSIS — M54.42 CHRONIC MIDLINE LOW BACK PAIN WITH BILATERAL SCIATICA: ICD-10-CM

## 2020-05-11 DIAGNOSIS — Z79.899 ENCOUNTER FOR LONG-TERM (CURRENT) USE OF HIGH-RISK MEDICATION: Primary | ICD-10-CM

## 2020-05-11 DIAGNOSIS — M51.36 DDD (DEGENERATIVE DISC DISEASE), LUMBAR: ICD-10-CM

## 2020-05-11 PROCEDURE — 99442 PR PHYS/QHP TELEPHONE EVALUATION 11-20 MIN: CPT | Performed by: NURSE PRACTITIONER

## 2020-05-11 RX ORDER — HYDROCODONE BITARTRATE AND ACETAMINOPHEN 5; 325 MG/1; MG/1
1 TABLET ORAL EVERY 8 HOURS PRN
Qty: 21 TABLET | Refills: 0 | Status: SHIPPED | OUTPATIENT
Start: 2020-05-11 | End: 2020-06-03 | Stop reason: ALTCHOICE

## 2020-05-11 NOTE — PROGRESS NOTES
You have chosen to receive care through a telephone visit. Do you consent to use a telephone visit for your medical care today? Yes    TELEPHONE VISIT    CHIEF COMPLAINT: F/u back pain    Subjective   Iraida Elizabeth is a 75 y.o. female  who presents for a telephonic follow-up.She has a history of back pain.  Today her pain is 9/10VAS in severity. She continues with Norco 5/325 3/day. This medication regimen decreases her pain by a mild amount. The patient states that when she takes her pain medication she typically receives about 45 mins of decreased pain before her pain begins to return to baseline.  She denies any side effects from her medication, ADLs by self, but she states she is having difficulty with these due to increasing back pain.  At her last office visit Bilateral L3-L5 RFL was ordered, this procedure was cancelled due to the COVID19 pandemic.  As we are now beginning to resume procedures we will try to get her scheduled for her RFL.  We discussed the new requirements of COVID19 testing prior to her procedure as well as quarantine prior to her procedure and she states she is willing to comply with this.     We discussed with the very short term relief she is receiving from the norco that it is time we trial a long-acting analgesic.  We will switch to belbuca 75mcg BID, she will discontinue norco use once she begins taking the belbuca.  She states understanding.     Not a good candidate for muscle relaxers--side effects with Tizanidine (hallucinations) in the past  Not a good candidiate for NSAIDs--elevated creatinine (2/17/2020-1.14)    Back Pain   This is a chronic (chronic) problem. The current episode started more than 1 year ago. The problem occurs constantly. The problem has been gradually worsening since onset. The pain is present in the lumbar spine and sacro-iliac. The pain radiates to the left foot, left thigh, left knee, right foot, right knee and right thigh. The pain is at a severity of  9/10. The pain is severe. The symptoms are aggravated by bending, position, standing and twisting (laying flat). Pertinent negatives include no abdominal pain or dysuria. She has tried analgesics for the symptoms. The treatment provided significant (diagnostic relief with MBB) relief.        The following portions of the patient's history were reviewed and updated as appropriate: allergies, current medications, past family history, past medical history, past social history, past surgical history and problem list.    Review of Systems   Constitutional: Positive for activity change (requiring walker more frequently) and fatigue.   HENT: Negative for congestion.    Eyes: Negative for visual disturbance.   Respiratory: Positive for chest tightness and shortness of breath. Negative for cough and wheezing.    Cardiovascular: Negative for palpitations and leg swelling.   Gastrointestinal: Negative for abdominal pain, constipation and diarrhea.   Endocrine: Negative for polyuria.   Genitourinary: Negative for difficulty urinating and dysuria.   Musculoskeletal: Positive for arthralgias (left ankle), back pain and gait problem (walker).   Allergic/Immunologic: Negative for immunocompromised state.   Neurological: Negative for dizziness and light-headedness.   Psychiatric/Behavioral: Positive for sleep disturbance. Negative for agitation and suicidal ideas. The patient is not nervous/anxious.      There were no vitals filed for this visit.    Objective   Physical Exam  As this is a telephone check-in, the ability to perform a routine physical exam is extremely limited. The patient seems alert and is oriented appropriately.   On this phone call there is not any evidence of respiratory distress.   The patient seems of normal mood.   The remainder of a routine physical exam is deferred.    Assessment/Plan   Diagnoses and all orders for this visit:    Encounter for long-term (current) use of high-risk medication    Lumbar facet  arthropathy    DDD (degenerative disc disease), lumbar    Right hip pain    Other chronic pain    Chronic midline low back pain with bilateral sciatica    Arthralgia of multiple sites      ----------------    Our practice is offering alternative &/or electronic methods to continue to follow our patients while at the same time further the efforts toward social distancing, in accordance with our organizational policies, professional societies' guidance, and [facility] mandates.  I support the Healthy at Home campaign and in this visit I have counseled the patient on our needs to limit in-person office visits and physical encounters with medical facilities whenever possible.  I have also educated the patient on the medical necessities of maintaining social distancing while we continue to function during this crisis period.      ----------------    --- This visit has been rescheduled as a phone visit to comply with patient safety concerns in accordance with CDC recommendations. Total time of discussion was 18 minutes.  --- The urine drug screen confirmation from 2/14/2020 has been reviewed and the result is appropriate based on patient history and DONNY report  --- We will trial Belbuca 75 mcg film BID.  A 1 week supply of Norco 5/325 will be sent to the pharmacy to give our office time to get the Belbuca approved through her insurance.  Patient appears stable with current regimen. No adverse effects. Regarding continuation of opioids, there is no evidence of aberrant behavior or any red flags.  The patient continues with appropriate response to opioid therapy. ADL's remain intact by self.   --- Schedule previously ordered L3-L5 RFL  --------  Education about Radiofrequency Lesioning of Medial Branches:    The medial branch blockade was intended for diagnostic purposes, with the intent of offering the patient Radiofrequency thermal rhizotomy if the MBB is diagnostically effective.  The diagnostic blockade is  necessary to determine the likelihood that RF therapy could be efficacious in providing long term relief to the patient.  As indicated above, diagnostic efficacy was established.      In the RF procedure, needles are placed to the joint lines in the same fashion, and after testing, the needle tips are heated to thermally treat the nerves, blocking the nerves by in essence damaging the nerves with the heat treatment.      Medically, a successful RF procedure should provide a patient with 50% pain relief or more for at least 6 months.  We estimate a likelihood of about an 80% chance that medical success will be realized.  We discussed & educated once again that not all patients have a medically successful result, and the patient voices understanding.  However, our clinical experience suggests that successful patients receive relief more in the range of 12 months on average.  (We also discussed that a fortunate minority of patients receive therapeutic success from the MBB, and may not require RF ablation.  If a patient receives more than 8 weeks of relief from MBB, then occasional repeat MBB for therapeutic purposes is a very reasonable alternative therapy.  This course of therapy is consistent with our LCDs according to our CMS  in the area, and therefore other insurance providers should follow accordingly.  We will monitor our patients to screen for these therapeutic responders and will offer RF therapy only when necessary.  However, in this clinical scenario, this therapeutic result was not realized, and therefore Radiofrequency Lesioning is medically necessary.)      We discussed that MBB & RF are not without risks.  Guidelines regarding anticoagulant use & neuraxial procedures will be respected.  Patients that are ill or otherwise may be at risk for sepsis will not have their spines accessed by neuraxial injections of any type.  This patient will not be offered these therapies if there is an increased  risk.   We discussed that there is a risk of postprocedural pain and also a risk of worsening of clinical picture with these procedures as with any neuraxial procedure.  All invasive procedures have risks including but not limited to bleeding, infection, injury, nerve injury, paralysis, coma, death, lack of pain relief, and worsening of clinical picture.      In this education session, all of these topics were covered and the patient voiced understanding.    ---------    --- Follow-up 1 month or sooner if needed     DONNY REPORT    As part of the patient's treatment plan, I am prescribing controlled substances. The patient has been made aware of appropriate use of such medications, including potential risk of somnolence, limited ability to drive and/or work safely, and the potential for dependence or overdose. It has also been made clear that these medications are for use by this patient only, without concomitant use of alcohol or other substances unless prescribed.     Patient has completed prescribing agreement detailing terms of continued prescribing of controlled substances, including monitoring DONNY reports, urine drug screening, and pill counts if necessary. The patient is aware that inappropriate use will results in cessation of prescribing such medications.    DONNY report has been reviewed and scanned into the patient's chart.    As the clinician, I personally reviewed the DONNY from 5/8/2020 while the patient was on the telephonic visit today.    History and physical exam exhibit continued safe and appropriate use of controlled substances.    -------    EMR Dragon/Transcription disclaimer:   Much of this encounter note is an electronic transcription/translation of spoken language to printed text. The electronic translation of spoken language may permit erroneous, or at times, nonsensical words or phrases to be inadvertently transcribed; Although I have reviewed the note for such errors, some may still  exist.

## 2020-05-11 NOTE — TELEPHONE ENCOUNTER
Starting on Belbuca, 1 week of norco sent in while getting PA for [facility].     Thanks,   TT

## 2020-05-12 ENCOUNTER — PRIOR AUTHORIZATION (OUTPATIENT)
Dept: PAIN MEDICINE | Facility: CLINIC | Age: 75
End: 2020-05-12

## 2020-05-12 NOTE — TELEPHONE ENCOUNTER
Iraida Elizabeth ([place]: [place]) - [id number]    [facility] [address] films    Status: PA Request    Created: May 11th, 2020 [phone]    Sent: May 12th, 2020

## 2020-05-27 ENCOUNTER — TELEPHONE (OUTPATIENT)
Dept: PAIN MEDICINE | Facility: CLINIC | Age: 75
End: 2020-05-27

## 2020-05-27 NOTE — TELEPHONE ENCOUNTER
Blood in the stool is not a side effect of belbuca.  When it comes to the belbuca we can discuss adjusting this dose at a follow up appointment.  Changing her belbuca dosage, however, is not the priority as she is reporting signs of GI bleeding.  Please inform her that she needs to be evaluated by her PCP or she needs to present to the emergency department for evaluation.

## 2020-05-27 NOTE — TELEPHONE ENCOUNTER
I informed patient. She stated that the stool is bright red. I advised her to call her PCP ASAP or go to the ER. She stated that she was going to give her PCP a call ASAP.

## 2020-05-27 NOTE — TELEPHONE ENCOUNTER
Patient called and stated that since she has been changed to belbuca she is still having back pain. She states that the belbuca is not working. She states that she has also noticed that her abdomen is bloated and she is having bloody stools. She stated that she has had bloody stool in the past and has assumed it was hemorrhoids but it is more frequent now; at least one time a day. She wants to know could this be because of the medication and why is she in so much pain. Please advise.

## 2020-05-31 ENCOUNTER — RESULTS ENCOUNTER (OUTPATIENT)
Dept: FAMILY MEDICINE CLINIC | Facility: CLINIC | Age: 75
End: 2020-05-31

## 2020-05-31 DIAGNOSIS — I10 ESSENTIAL HYPERTENSION: Chronic | ICD-10-CM

## 2020-05-31 DIAGNOSIS — E78.49 OTHER HYPERLIPIDEMIA: ICD-10-CM

## 2020-05-31 DIAGNOSIS — D64.9 ANEMIA OF UNKNOWN ETIOLOGY: ICD-10-CM

## 2020-05-31 DIAGNOSIS — E03.9 ACQUIRED HYPOTHYROIDISM: ICD-10-CM

## 2020-06-02 ENCOUNTER — TELEPHONE (OUTPATIENT)
Dept: PAIN MEDICINE | Facility: CLINIC | Age: 75
End: 2020-06-02

## 2020-06-02 NOTE — TELEPHONE ENCOUNTER
Ask her to trial doubling this medication to 2 films twice a day for 2 days and let us know how she tolerates this. Thanks, TT

## 2020-06-02 NOTE — TELEPHONE ENCOUNTER
Pt called in today c/o severe back pain, can't walk short distances or bend over to pick anything up off the floor. Currently on belbuca 75mcg but sts it is not helping her pain. Pt wants to know is there anything you recommend? Please advise. Thank you.

## 2020-06-03 ENCOUNTER — OFFICE VISIT (OUTPATIENT)
Dept: FAMILY MEDICINE CLINIC | Facility: CLINIC | Age: 75
End: 2020-06-03

## 2020-06-03 VITALS
RESPIRATION RATE: 18 BRPM | HEART RATE: 80 BPM | SYSTOLIC BLOOD PRESSURE: 152 MMHG | WEIGHT: 228 LBS | BODY MASS INDEX: 43.05 KG/M2 | OXYGEN SATURATION: 96 % | TEMPERATURE: 97.4 F | HEIGHT: 61 IN | DIASTOLIC BLOOD PRESSURE: 81 MMHG

## 2020-06-03 DIAGNOSIS — F32.5 MAJOR DEPRESSIVE DISORDER WITH SINGLE EPISODE, IN FULL REMISSION (HCC): ICD-10-CM

## 2020-06-03 DIAGNOSIS — D64.9 ANEMIA OF UNKNOWN ETIOLOGY: ICD-10-CM

## 2020-06-03 DIAGNOSIS — E78.49 OTHER HYPERLIPIDEMIA: ICD-10-CM

## 2020-06-03 DIAGNOSIS — IMO0002 UNCONTROLLED TYPE II DIABETES MELLITUS WITH NEPHROPATHY: ICD-10-CM

## 2020-06-03 DIAGNOSIS — R06.02 EXERTIONAL SHORTNESS OF BREATH: Primary | ICD-10-CM

## 2020-06-03 DIAGNOSIS — I10 ESSENTIAL HYPERTENSION: Chronic | ICD-10-CM

## 2020-06-03 DIAGNOSIS — E03.9 ACQUIRED HYPOTHYROIDISM: ICD-10-CM

## 2020-06-03 PROCEDURE — 99214 OFFICE O/P EST MOD 30 MIN: CPT | Performed by: FAMILY MEDICINE

## 2020-06-03 RX ORDER — PAROXETINE HYDROCHLORIDE 20 MG/1
20 TABLET, FILM COATED ORAL NIGHTLY
Qty: 90 TABLET | Refills: 1 | Status: SHIPPED | OUTPATIENT
Start: 2020-06-03 | End: 2020-11-25 | Stop reason: SDUPTHER

## 2020-06-03 RX ORDER — ATORVASTATIN CALCIUM 20 MG/1
20 TABLET, FILM COATED ORAL NIGHTLY
Qty: 90 TABLET | Refills: 1 | Status: SHIPPED | OUTPATIENT
Start: 2020-06-03 | End: 2020-11-25 | Stop reason: SDUPTHER

## 2020-06-03 RX ORDER — VALSARTAN 320 MG/1
320 TABLET ORAL DAILY
Qty: 90 TABLET | Refills: 1 | Status: SHIPPED | OUTPATIENT
Start: 2020-06-03 | End: 2020-11-25 | Stop reason: SDUPTHER

## 2020-06-03 RX ORDER — AMLODIPINE BESYLATE 10 MG/1
10 TABLET ORAL DAILY
Qty: 90 TABLET | Refills: 1 | Status: SHIPPED | OUTPATIENT
Start: 2020-06-03 | End: 2020-11-25 | Stop reason: SDUPTHER

## 2020-06-03 RX ORDER — HYDROCHLOROTHIAZIDE 25 MG/1
25 TABLET ORAL DAILY
Qty: 90 TABLET | Refills: 1 | Status: SHIPPED | OUTPATIENT
Start: 2020-06-03 | End: 2020-11-25 | Stop reason: SDUPTHER

## 2020-06-03 RX ORDER — LEVOTHYROXINE SODIUM 0.07 MG/1
75 TABLET ORAL DAILY
Qty: 90 TABLET | Refills: 1 | Status: SHIPPED | OUTPATIENT
Start: 2020-06-03 | End: 2020-11-25 | Stop reason: SDUPTHER

## 2020-06-03 NOTE — ASSESSMENT & PLAN NOTE
Lipid abnormalities are unchanged.  Pharmacotherapy as ordered.  Lipids will be reassessed in 1 month.

## 2020-06-03 NOTE — PROGRESS NOTES
"   Subjective       Chief Complaint   Patient presents with   • Anemia     questions          HPI:       Iraida Elizabeth is a 75 y.o. female who presents to the office today patient has been having increasing exertional dyspnea and get short of breath going from the office to the car.  She has not had chest pain.  She denies fever sweats chills.  She denies cough.  At one time she had some anemia and this will be rechecked with current labs.  She is due for lab draw and will get those tomorrow to rule out other issues.  Labs from February have been reviewed.  Blood pressure is elevated.  She has had some interval weight gain over the past 2 weeks.  She states it is about 10 pounds.  Recent changes in pain medicine are suspected.    I have reviewed and updated her medications, medical history and problem list during today's office visit.   This patient's ACP documentation is up to date, and there's nothing further left to document.   Social History     Tobacco Use   • Smoking status: Never Smoker   • Smokeless tobacco: Never Used   Substance Use Topics   • Alcohol use: No       Review of Systems   Constitutional: Positive for fatigue and unexpected weight gain. Negative for fever.   Respiratory: Positive for shortness of breath. Negative for cough.    Musculoskeletal: Positive for back pain.         PE:   Objective   /81   Pulse 80   Temp 97.4 °F (36.3 °C) (Temporal)   Resp 18   Ht 154.9 cm (61\")   Wt 103 kg (228 lb)   SpO2 96%   BMI 43.08 kg/m²     Body mass index is 43.08 kg/m².    Physical Exam   Constitutional: She is oriented to person, place, and time. She appears well-developed. No distress.   Eyes: Conjunctivae and lids are normal.   Neck: Carotid bruit is not present.   Cardiovascular: Normal rate, regular rhythm and normal heart sounds.   Pulmonary/Chest: Effort normal and breath sounds normal.   Neurological: She is alert and oriented to person, place, and time.   Skin: Skin is warm and dry. "   Psychiatric: She has a normal mood and affect. Her behavior is normal.   Vitals reviewed.       Data Reviewed:                   A/P:     Assessment/Plan   Diagnoses and all orders for this visit:    1. Exertional shortness of breath (Primary)  Assessment & Plan:  Worsening with weight gain. Referral to cardiology    Orders:  -     Ambulatory Referral to Cardiology    2. Essential hypertension  Assessment & Plan:  Hypertension is worsening.  Continue current treatment regimen.  Blood pressure will be reassessed at the next regular appointment.    Orders:  -     amLODIPine (NORVASC) 10 MG tablet; Take 1 tablet by mouth Daily.  Dispense: 90 tablet; Refill: 1  -     hydroCHLOROthiazide (HYDRODIURIL) 25 MG tablet; Take 1 tablet by mouth Daily.  Dispense: 90 tablet; Refill: 1  -     valsartan (DIOVAN) 320 MG tablet; Take 1 tablet by mouth Daily.  Dispense: 90 tablet; Refill: 1    3. Other hyperlipidemia  Assessment & Plan:  Lipid abnormalities are unchanged.  Pharmacotherapy as ordered.  Lipids will be reassessed in 1 month.    Orders:  -     atorvastatin (LIPITOR) 20 MG tablet; Take 1 tablet by mouth Every Night for 180 days.  Dispense: 90 tablet; Refill: 1    4. Acquired hypothyroidism  Assessment & Plan:  The current medical regimen is effective;  continue present plan and medications.  Labs due    Orders:  -     TSH+Free T4; Future  -     TSH+Free T4  -     levothyroxine (SYNTHROID, LEVOTHROID) 75 MCG tablet; Take 1 tablet by mouth Daily for 180 days.  Dispense: 90 tablet; Refill: 1    5. Anemia of unknown etiology  Assessment & Plan:  Labs due.    Orders:  -     Iron Profile; Future  -     Iron Profile    6. Uncontrolled type II diabetes mellitus with nephropathy (CMS/[facility])  -     Hemoglobin A1c; Future  -     Hemoglobin A1c    7. Major depressive disorder with single episode, in full remission (CMS/[facility])  Assessment & Plan:  Psychological condition is unchanged.  Continue current treatment regimen.  Psychological  condition  will be reassessed at the next regular appointment.    Orders:  -     PARoxetine (PAXIL) 20 MG tablet; Take 1 tablet by mouth Every Night for 180 days.  Dispense: 90 tablet; Refill: 1        Follow up:    Return in about 1 month (around 7/3/2020) for recheck.

## 2020-06-03 NOTE — ASSESSMENT & PLAN NOTE
Hypertension is worsening.  Continue current treatment regimen.  Blood pressure will be reassessed at the next regular appointment.

## 2020-06-04 NOTE — TELEPHONE ENCOUNTER
Pt called back today after trying increase of belbuca 75mcg 2 films bid x 2 days per [place]. Pt sts the increase didn't improve pain while walking or standing, however pt sts it did help improve her pain while doing housework such as loading the  and vacuuming. Please advise. Thank  You.

## 2020-06-04 NOTE — TELEPHONE ENCOUNTER
Does she have enough supply to continue with the 2 films twice a day until we see her again on 6/16?

## 2020-06-04 NOTE — TELEPHONE ENCOUNTER
Pt sts the increase in belbuca has improved her pain at night, she sts no longer waking up in the middle of the night in pain anymore. Pt sts she only has 4 days left of belbuca if she continues to use 2 films bid. I will ask Dr. Miranda to send temporary supply to pharmacy to last her until appt.

## 2020-06-04 NOTE — TELEPHONE ENCOUNTER
Medication Refill Request    Date of phone call: 6/4/20    Medication being requested: belbuca 75mcg sig: Apply 2 films to cheek 2 (Two) Times a Day  Qty: 28    Date of last visit: 5/11/20    Date of last refill: 5/15/20    DONNY up to date?: yes    Next Follow up?: 6/16/20    Any new pertinent information? (i.e, new medication allergies, new use of medications, change in patient's health or condition, non-compliance or inconsistency with prescribing agreement?): please advise pt needs 7 day supply to last her until 6/16/20 appt. Thank you.

## 2020-06-05 ENCOUNTER — LAB REQUISITION (OUTPATIENT)
Dept: LAB | Facility: HOSPITAL | Age: 75
End: 2020-06-05

## 2020-06-05 DIAGNOSIS — Z00.00 ENCOUNTER FOR GENERAL ADULT MEDICAL EXAMINATION WITHOUT ABNORMAL FINDINGS: ICD-10-CM

## 2020-06-05 LAB
ALBUMIN SERPL-MCNC: 4.1 G/DL (ref 3.5–5.2)
ALBUMIN/GLOB SERPL: 2.3 G/DL
ALP SERPL-CCNC: 115 U/L (ref 39–117)
ALT SERPL-CCNC: 11 U/L (ref 1–33)
AST SERPL-CCNC: 16 U/L (ref 1–32)
BASOPHILS # BLD AUTO: 0.06 10*3/MM3 (ref 0–0.2)
BASOPHILS NFR BLD AUTO: 1 % (ref 0–1.5)
BILIRUB SERPL-MCNC: 0.5 MG/DL (ref 0.2–1.2)
BUN SERPL-MCNC: 19 MG/DL (ref 8–23)
BUN/CREAT SERPL: 17.3 (ref 7–25)
CALCIUM SERPL-MCNC: 9.1 MG/DL (ref 8.6–10.5)
CHLORIDE SERPL-SCNC: 102 MMOL/L (ref 98–107)
CHOLEST SERPL-MCNC: 126 MG/DL (ref 0–200)
CHOLEST/HDLC SERPL: 2.14 {RATIO}
CK SERPL-CCNC: 98 U/L (ref 20–180)
CO2 SERPL-SCNC: 28.7 MMOL/L (ref 22–29)
CREAT SERPL-MCNC: 1.1 MG/DL (ref 0.57–1)
EOSINOPHIL # BLD AUTO: 0.66 10*3/MM3 (ref 0–0.4)
EOSINOPHIL NFR BLD AUTO: 10.9 % (ref 0.3–6.2)
ERYTHROCYTE [DISTWIDTH] IN BLOOD BY AUTOMATED COUNT: 12.9 % (ref 12.3–15.4)
GLOBULIN SER CALC-MCNC: 1.8 GM/DL
GLUCOSE SERPL-MCNC: 130 MG/DL (ref 65–99)
HCT VFR BLD AUTO: 39.6 % (ref 34–46.6)
HDLC SERPL-MCNC: 59 MG/DL (ref 40–60)
HGB BLD-MCNC: 13 G/DL (ref 12–15.9)
IMM GRANULOCYTES # BLD AUTO: 0.01 10*3/MM3 (ref 0–0.05)
IMM GRANULOCYTES NFR BLD AUTO: 0.2 % (ref 0–0.5)
LDLC SERPL CALC-MCNC: 44 MG/DL (ref 0–100)
LYMPHOCYTES # BLD AUTO: 1.29 10*3/MM3 (ref 0.7–3.1)
LYMPHOCYTES NFR BLD AUTO: 21.4 % (ref 19.6–45.3)
MCH RBC QN AUTO: 31.8 PG (ref 26.6–33)
MCHC RBC AUTO-ENTMCNC: 32.8 G/DL (ref 31.5–35.7)
MCV RBC AUTO: 96.8 FL (ref 79–97)
MONOCYTES # BLD AUTO: 0.7 10*3/MM3 (ref 0.1–0.9)
MONOCYTES NFR BLD AUTO: 11.6 % (ref 5–12)
NEUTROPHILS # BLD AUTO: 3.32 10*3/MM3 (ref 1.7–7)
NEUTROPHILS NFR BLD AUTO: 54.9 % (ref 42.7–76)
NRBC BLD AUTO-RTO: 0 /100 WBC (ref 0–0.2)
PLATELET # BLD AUTO: 263 10*3/MM3 (ref 140–450)
POTASSIUM SERPL-SCNC: 4.6 MMOL/L (ref 3.5–5.2)
PROT SERPL-MCNC: 5.9 G/DL (ref 6–8.5)
RBC # BLD AUTO: 4.09 10*6/MM3 (ref 3.77–5.28)
SODIUM SERPL-SCNC: 141 MMOL/L (ref 136–145)
TRIGL SERPL-MCNC: 115 MG/DL (ref 0–150)
TSH SERPL DL<=0.005 MIU/L-ACNC: 2.22 UIU/ML (ref 0.27–4.2)
VLDLC SERPL CALC-MCNC: 23 MG/DL (ref 5–40)
WBC # BLD AUTO: 6.04 10*3/MM3 (ref 3.4–10.8)

## 2020-06-05 PROCEDURE — U0004 COV-19 TEST NON-CDC HGH THRU: HCPCS | Performed by: ANESTHESIOLOGY

## 2020-06-06 LAB
REF LAB TEST METHOD: NORMAL
SARS-COV-2 RNA RESP QL NAA+PROBE: NOT DETECTED

## 2020-06-08 ENCOUNTER — DOCUMENTATION (OUTPATIENT)
Dept: PAIN MEDICINE | Facility: CLINIC | Age: 75
End: 2020-06-08

## 2020-06-08 ENCOUNTER — OUTSIDE FACILITY SERVICE (OUTPATIENT)
Dept: PAIN MEDICINE | Facility: CLINIC | Age: 75
End: 2020-06-08

## 2020-06-08 PROCEDURE — 99152 MOD SED SAME PHYS/QHP 5/>YRS: CPT | Performed by: ANESTHESIOLOGY

## 2020-06-08 PROCEDURE — 64636 DESTROY L/S FACET JNT ADDL: CPT | Performed by: ANESTHESIOLOGY

## 2020-06-08 PROCEDURE — 64635 DESTROY LUMB/SAC FACET JNT: CPT | Performed by: ANESTHESIOLOGY

## 2020-06-08 NOTE — PROGRESS NOTES
Bilateral L3-5 Lumbar Medial Branch RADIOFREQUENCY  [facility]      PREOPERATIVE DIAGNOSIS:  Lumbar spondylosis without myelopathy    POSTOPERATIVE DIAGNOSIS:  Lumbar spondylosis without myelopathy    PROCEDURE:   Diagnostic Bilateral Lumbar Medial Branch Nerve thermal radiofrequency lesioning, with fluoroscopy:  L3, L4, and L5 nerves (at the L4 and L5 transverse processes and the sacral alar groove) to thermally treat the innervation to facet joints L4-5 and L5-S1  1. 26866-93 -- Bilateral L/S facet neuro destr., 1st Level  2. 45624-75 -- Bilateral L/S facet neuro destr., 2nd  Level    PRE-PROCEDURE DISCUSSION WITH PATIENT:    Risks and complications were discussed with the patient prior to starting the procedure and informed consent was obtained.      SURGEON:  Kaiden Miranda MD    REASON FOR PROCEDURE:    The patient complains of pain that seems to have a significant axial component and Previous diagnostic positivity of two Lumbar Medial Branch Blockades at the same levels    SEDATION:  Fentanyl 50 mcg IV  TIME OF PROCEDURE:   The intraoperative procedure time after administration of the sedative was 20 minutes.     ANESTHETIC:  Lidocaine 2%  STEROID:  NONE      DESCRIPTON OF PROCEDURE:  After obtaining informed consent, IV access  was obtained in the preoperative area.   The patient was taken to the operating room.  The patient was placed in the prone position with a pillow under the abdomen. All pressure points were well padded.  EKG, blood pressure, and pulse oximeter were monitored.  The patient was monitored and sedated by the RN under my direction. The lumbosacral area was prepped with Chloraprep and draped in a sterile fashion.     Under fluoroscopic guidance the transverse processes of the L4 and L5 vertebrae at the junctions of the superior articular processes were identified on the right.  Also identified was the groove between the ala and the superior articular process of the  sacrum on the ipsilateral side.  Skin and subcutaneous tissue were anesthetized with 1ml of 1% lidocaine above each of these points. Then, radiofrequency probe needles were advanced in this fluoro view to the above junctions.  Aspiration was negative for blood and CSF.  After confirming the position of the needle with fluoroscope in all views, testing was initiated.  First, sensory testing was started on each needle a 1V and 50Hz and slowly decreased until painful pressure stimulation diminished at 0.5V.  Next, motor testing was confirmed to be negative at 3V and 2Hz for any radicular stimulation.  Then 1mL of the local anesthetic was instilled in each needle.  Two minutes elapsed, and during this time a lateral fluoroscopic view was confirmed again to ensure the needles had not advanced nor retracted.  Then, Radiofrequency Lesioning was initiated for 2.5 minutes at 85 degrees Celsius.  Needles were removed intact from each of the areas.     A similar procedure was repeated to address the L3, L4, and L5 nerves on the contralateral side.   Onset of analgesia was noted.  Vital signs remained stable throughout.      ESTIMATED BLOOD LOSS:  <5 mL  SPECIMENS:  none    COMPLICATIONS:   No complications were noted. and The patient did not have any signs of postprocedure numbness nor weakness.    TOLERANCE & DISCHARGE CONDITION:    The patient tolerated the procedure well.  The patient was transported to the recovery area without difficulties.  The patient was discharged to home under the care of family in stable and satisfactory condition.    PLAN OF CARE:  1. The patient was given our standard instruction sheet.  2. The patient will  Return to clinic 1 wk.  3. The patient will resume all medications as per the medication reconciliation sheet.

## 2020-06-16 ENCOUNTER — OFFICE VISIT (OUTPATIENT)
Dept: PAIN MEDICINE | Facility: CLINIC | Age: 75
End: 2020-06-16

## 2020-06-16 VITALS
HEIGHT: 61 IN | HEART RATE: 79 BPM | DIASTOLIC BLOOD PRESSURE: 72 MMHG | BODY MASS INDEX: 42.9 KG/M2 | TEMPERATURE: 98.5 F | OXYGEN SATURATION: 97 % | RESPIRATION RATE: 20 BRPM | WEIGHT: 227.2 LBS | SYSTOLIC BLOOD PRESSURE: 142 MMHG

## 2020-06-16 DIAGNOSIS — M47.816 LUMBAR FACET ARTHROPATHY: ICD-10-CM

## 2020-06-16 DIAGNOSIS — M54.41 CHRONIC MIDLINE LOW BACK PAIN WITH BILATERAL SCIATICA: ICD-10-CM

## 2020-06-16 DIAGNOSIS — Z79.899 ENCOUNTER FOR LONG-TERM (CURRENT) USE OF HIGH-RISK MEDICATION: Primary | ICD-10-CM

## 2020-06-16 DIAGNOSIS — G89.29 OTHER CHRONIC PAIN: ICD-10-CM

## 2020-06-16 DIAGNOSIS — M54.42 CHRONIC MIDLINE LOW BACK PAIN WITH BILATERAL SCIATICA: ICD-10-CM

## 2020-06-16 DIAGNOSIS — M51.36 DDD (DEGENERATIVE DISC DISEASE), LUMBAR: ICD-10-CM

## 2020-06-16 DIAGNOSIS — G89.29 CHRONIC MIDLINE LOW BACK PAIN WITH BILATERAL SCIATICA: ICD-10-CM

## 2020-06-16 PROCEDURE — 99213 OFFICE O/P EST LOW 20 MIN: CPT | Performed by: NURSE PRACTITIONER

## 2020-06-16 NOTE — PROGRESS NOTES
"CHIEF COMPLAINT  F/u back pain. Pt had Bilateral L3-5 Lumbar Medial Branch RADIOFREQUENCY on 6/8/20. Pt sts receiving 0% relief from procedure. Pt sts she feels pressure, burning and severe pain in her right lower and mid lumbar. Pt sts she is unable to do any household chores, difficulty walking and standing.     Subjective   Iraida Elizabeth is a 75 y.o. female  who presents for follow-up.She has a history of back pain.  Today her pain is 7/10VAS in severity.  She reports worsening pain since her procedure.  She completed a Bilateral L3-L5 Lumbar MBB on 6/8/2020 performed by Dr. Miranda. Patient reassured that it can take anywhere from 2-6 weeks to see results from radiofrequency ablation. She complains of weakness in her bilateral legs and states \"it feels like my legs can't hold me up\". She also reports some intermittent shooting pain into her bilateral legs which she feels is worsening. She denies any bowel or bladder incontinence.     She was transitioned to Belbuca 75 mcg film BID, this was not efficacious and we increased her dose to 150 mcg film BID.  She states this medication is more helpful for her pain then the hydrocodone, but it still only helps her pain when at rest.  She reports some drowsiness at times, she states \"it's not like I can't stay awake or anything like that\".  She denies any other side effects including constipation. ADLs by self.     Back Pain   This is a chronic (chronic) problem. The current episode started more than 1 year ago. The problem occurs constantly. The problem has been gradually worsening since onset. The pain is present in the lumbar spine and sacro-iliac. The pain radiates to the left foot, left thigh, left knee, right foot, right knee and right thigh. The pain is at a severity of 7/10. The pain is severe. The symptoms are aggravated by bending, position, standing and twisting (laying flat). Associated symptoms include weakness (gen). Pertinent negatives include no " abdominal pain, chest pain, dysuria, fever, headaches or numbness. She has tried analgesics for the symptoms. The treatment provided significant (diagnostic relief with MBB) relief.      PEG Assessment   What number best describes your pain on average in the past week?7  What number best describes how, during the past week, pain has interfered with your enjoyment of life?7  What number best describes how, during the past week, pain has interfered with your general activity?  7    The following portions of the patient's history were reviewed and updated as appropriate: allergies, current medications, past family history, past medical history, past social history, past surgical history and problem list.    Review of Systems   Constitutional: Positive for activity change (dec) and fatigue (freq). Negative for fever.   HENT: Negative for congestion and voice change.    Eyes: Negative for visual disturbance.   Respiratory: Positive for shortness of breath (freq will see cardiology next week). Negative for cough, chest tightness and wheezing.    Cardiovascular: Positive for leg swelling (bilat will see cardiologist on 6/22/20). Negative for chest pain and palpitations.   Gastrointestinal: Negative for abdominal pain, constipation, diarrhea and vomiting.   Endocrine: Negative for polyuria.   Genitourinary: Negative for difficulty urinating and dysuria.   Musculoskeletal: Positive for arthralgias (left ankle), back pain, gait problem (cane) and joint swelling (bilat feet). Negative for neck pain.   Allergic/Immunologic: Negative for immunocompromised state.   Neurological: Positive for weakness (gen) and light-headedness (occ). Negative for dizziness, tremors, numbness and headaches.   Psychiatric/Behavioral: Negative for agitation, sleep disturbance and suicidal ideas. The patient is not nervous/anxious.      Vitals:    06/16/20 1401   BP: 142/72   Pulse: 79   Resp: 20   Temp: 98.5 °F (36.9 °C)   SpO2: 97%   Weight: 103 kg  "(227 lb 3.2 oz)   Height: 154.9 cm (61\")   PainSc:   7   PainLoc: Back       Objective   Physical Exam   Constitutional: She is oriented to person, place, and time. She appears well-developed and well-nourished.   HENT:   Head: Normocephalic and atraumatic.   Eyes: Pupils are equal, round, and reactive to light. Conjunctivae and EOM are normal.   Neck: Normal range of motion.   Cardiovascular: Normal rate.   Pulmonary/Chest: Effort normal.   Musculoskeletal:        Lumbar back: She exhibits decreased range of motion, bony tenderness and pain.   Neurological: She is alert and oriented to person, place, and time. She has normal strength. Gait (Cane) abnormal.   Reflex Scores:       Patellar reflexes are 1+ on the right side and 1+ on the left side.       Achilles reflexes are 1+ on the right side and 1+ on the left side.  Skin: Skin is warm, dry and intact.   Psychiatric: She has a normal mood and affect. Her speech is normal and behavior is normal. Judgment and thought content normal.   Nursing note and vitals reviewed.    Assessment/Plan   Iraida was seen today for back pain.    Diagnoses and all orders for this visit:    Encounter for long-term (current) use of high-risk medication    Lumbar facet arthropathy    DDD (degenerative disc disease), lumbar    Other chronic pain    Chronic midline low back pain with bilateral sciatica  -     MRI Lumbar Spine With Contrast; Future      --- Reviewed RED FLAG SYMPTOMS, including but not limited to-- fever, chills, stiff neck, headache, flu-like symptoms, increased tenderness and redness. Also reviewed loss of bowel and bladder control and saddle anesthesia. If patient notices this, she is to call office immediately. Patient verbalized understanding.  --- Increase Belbuca to 300 mcg film BID. No adverse effects. Regarding continuation of opioids, there is no evidence of aberrant behavior or any red flags.  The patient continues with appropriate response to opioid therapy. " ADL's remain intact by self. Consider PGT test in future pending response to this dosage.   --- Follow-up 1 month or sooner if needed for medication management.      DONNY REPORT    As part of the patient's treatment plan, I am prescribing controlled substances. The patient has been made aware of appropriate use of such medications, including potential risk of somnolence, limited ability to drive and/or work safely, and the potential for dependence or overdose. It has also bee made clear that these medications are for use by this patient only, without concomitant use of alcohol or other substances unless prescribed.     Patient has completed prescribing agreement detailing terms of continued prescribing of controlled substances, including monitoring DONNY reports, urine drug screening, and pill counts if necessary. The patient is aware that inappropriate use will results in cessation of prescribing such medications.    DONNY report has been reviewed and scanned into the patient's chart.    As the clinician, I personally reviewed the DONNY from 6/15/2020.    History and physical exam exhibit continued safe and appropriate use of controlled substances.    EMR Dragon/Transcription disclaimer:   Much of this encounter note is an electronic transcription/translation of spoken language to printed text. The electronic translation of spoken language may permit erroneous, or at times, nonsensical words or phrases to be inadvertently transcribed; Although I have reviewed the note for such errors, some may still exist.

## 2020-06-18 ENCOUNTER — RESULTS ENCOUNTER (OUTPATIENT)
Dept: ENDOCRINOLOGY | Age: 75
End: 2020-06-18

## 2020-06-18 DIAGNOSIS — IMO0002 UNCONTROLLED TYPE 2 DIABETES MELLITUS WITH COMPLICATION, WITH LONG-TERM CURRENT USE OF INSULIN: ICD-10-CM

## 2020-06-18 DIAGNOSIS — E03.9 ACQUIRED HYPOTHYROIDISM: ICD-10-CM

## 2020-06-18 DIAGNOSIS — IMO0002 UNCONTROLLED TYPE 2 DIABETES MELLITUS WITH DIABETIC NEUROPATHY, WITH LONG-TERM CURRENT USE OF INSULIN: ICD-10-CM

## 2020-06-22 ENCOUNTER — OFFICE VISIT (OUTPATIENT)
Dept: CARDIOLOGY | Facility: CLINIC | Age: 75
End: 2020-06-22

## 2020-06-22 VITALS
HEIGHT: 62 IN | HEART RATE: 80 BPM | DIASTOLIC BLOOD PRESSURE: 58 MMHG | BODY MASS INDEX: 41.66 KG/M2 | SYSTOLIC BLOOD PRESSURE: 138 MMHG | WEIGHT: 226.4 LBS | OXYGEN SATURATION: 93 %

## 2020-06-22 DIAGNOSIS — R06.09 DOE (DYSPNEA ON EXERTION): ICD-10-CM

## 2020-06-22 DIAGNOSIS — I10 ESSENTIAL HYPERTENSION: Primary | Chronic | ICD-10-CM

## 2020-06-22 PROCEDURE — 93000 ELECTROCARDIOGRAM COMPLETE: CPT | Performed by: INTERNAL MEDICINE

## 2020-06-22 PROCEDURE — 99214 OFFICE O/P EST MOD 30 MIN: CPT | Performed by: INTERNAL MEDICINE

## 2020-06-22 RX ORDER — SYRINGE AND NEEDLE,INSULIN,1ML 31GX15/64"
SYRINGE, EMPTY DISPOSABLE MISCELLANEOUS
Qty: 100 EACH | Refills: 0 | Status: SHIPPED | OUTPATIENT
Start: 2020-06-22 | End: 2020-08-11

## 2020-06-22 NOTE — PROGRESS NOTES
Date of Office Visit: 20  Encounter Provider: Alex Snell MD  Place of Service: [facility] CARDIOLOGY  Patient Name: Iraida Elizabeth  :1945    Chief Complaint   Patient presents with   • Shortness of Breath     per PCP    :     HPI: Iraida Elizabeth is a 75 y.o. female, new to me, who presents today for follow-up.  Old records have been obtained and reviewed by me.  She is a patient who was first evaluated me in  2017.  This was for dyspnea on exertion.  She has hypertension, diabetes, and hyperlipidemia.  As part of her work-up she had an echocardiogram and a nuclear stress test, both of these were normal.  She has not been seen in our office since.  She did present to the emergency room in March of this year with posterior knee pain and ruled in for a DVT.  She had a CT angiogram of the chest which showed no evidence of pulmonary emboli.  She did have a moderate sized hiatal hernia.  She was also anemic which was new.  She was started on Xarelto.  She is here today for yearly follow-up.    She was sent back secondary to worsening dyspnea on exertion.  She denies any chest pain, but states that her dyspnea is severe and she can only walk to the bathroom.  She has not seen a pulmonologist.  She has no orthopnea or PND.  No symptoms consistent with angina.        Past Medical History:   Diagnosis Date   • Arthropathy of knee 2014    unspecified   • Arthropathy, multiple sites 2012    unspecified   • Bulging lumbar disc    • Cancer (CMS/HCC)     breast   • Chronic kidney disease, stage III (moderate) (CMS/HCC) 2011   • Controlled diabetes mellitus type II without complication (CMS/HCC)    • Deep vein blood clot of right lower extremity (CMS/HCC) 2019   • Depression 2001   • Dyspnea    • Essential hypertension    • Fall 2015    FRACTURED ANKLE   • History of complete eye exam 2012   • History of gynecological procedure  03/01/2010    special invesitation and examinations; gynecological examination; routine gynecological examination   • Hyperlipidemia     other and unspecified   • Hypothyroidism     unspecified   • Low back pain    • Moderate single current episode of major depressive disorder (CMS/[facility]) 4/2/2001   • Peripheral neuropathy    • Personal history of malignant neoplasm of breast 2001    R breast (negative nodes) TX with radiation and Tamoxifen   • Thyroid disease    • Type II diabetes mellitus with neurological manifestations, uncontrolled (CMS/HCC) 01/03/2013   • Wrist fracture, left 2008       Past Surgical History:   Procedure Laterality Date   • ANKLE ARTHROPLASTY Left 11/20/2019   • BREAST LUMPECTOMY Right 2001   • OTHER SURGICAL HISTORY Left 01/2015    ankle fracture repair   • VARICOSE VEIN SURGERY     • VEIN LIGATION AND STRIPPING Right 8/26/2019    Procedure: RIGHT LEG PHLEBECTOMY WITH SAPHENO FEMORAL JUNCTION LIGATION;  Surgeon: Jimenez Young MD;  Location: [facility];  Service: Vascular       Social History     Socioeconomic History   • Marital status:      Spouse name: Not on file   • Number of children: 4   • Years of education: 12   • Highest education level: High school graduate   Occupational History   • Occupation: RETIRED   Social Needs   • Financial resource strain: Patient refused   • Food insecurity:     Worry: Patient refused     Inability: Patient refused   • Transportation needs:     Medical: Patient refused     Non-medical: Patient refused   Tobacco Use   • Smoking status: Never Smoker   • Smokeless tobacco: Never Used   Substance and Sexual Activity   • Alcohol use: No   • Drug use: No   • Sexual activity: Defer   Social History Narrative    LIVES ALONE       Family History   Problem Relation Age of Onset   • Hypertension Sister    • Cancer Sister    • Thyroid disease Daughter    • Cancer Mother    • Heart disease Father    • Bleeding Disorder Sister    • Malig Hyperthermia  Neg Hx        Review of Systems   Constitution: Negative for chills, fever and malaise/fatigue.   HENT: Negative for nosebleeds and sore throat.    Eyes: Negative for blurred vision and double vision.   Cardiovascular: Positive for dyspnea on exertion and leg swelling. Negative for chest pain, claudication, near-syncope, orthopnea, palpitations, paroxysmal nocturnal dyspnea and syncope.   Respiratory: Negative for cough, shortness of breath and snoring.    Endocrine: Negative for cold intolerance, heat intolerance and polydipsia.   Skin: Negative for itching, poor wound healing and rash.   Musculoskeletal: Negative for joint pain, joint swelling, muscle weakness and myalgias.   Gastrointestinal: Negative for abdominal pain, diarrhea, melena, nausea and vomiting.   Genitourinary: Negative for frequency and hematuria.   Neurological: Negative for light-headedness, loss of balance, numbness, paresthesias, seizures, vertigo and weakness.   Psychiatric/Behavioral: Negative for altered mental status and depression.   Allergic/Immunologic: Negative.    All other systems reviewed and are negative.      Allergies   Allergen Reactions   • Tizanidine Hallucinations   • Tree Nut Anaphylaxis     almonds   • Vancomycin Itching     Pt c/o itiching and redness in arm after starting vancomycin ivpb         Current Outpatient Medications:   •  amLODIPine (NORVASC) 10 MG tablet, Take 1 tablet by mouth Daily., Disp: 90 tablet, Rfl: 1  •  atorvastatin (LIPITOR) 20 MG tablet, Take 1 tablet by mouth Every Night for 180 days., Disp: 90 tablet, Rfl: 1  •  Buprenorphine HCl (BELBUCA) film, Apply 1 film to cheek 2 (two) times a day., Disp: 60 each, Rfl: 0  •  EPINEPHrine (EPIPEN) 0.3 MG/0.3ML solution auto-injector injection, , Disp: , Rfl:   •  Foot Care Products (DIABETIC INSOLES) misc, 1 pair of diabetic shoe Dx E11.45, Disp: 1 each, Rfl: 0  •  glucose blood (ONE TOUCH ULTRA TEST) test strip, PT TO TESTS BS QID, Disp: 100 each, Rfl: 3  •   "hydroCHLOROthiazide (HYDRODIURIL) 25 MG tablet, Take 1 tablet by mouth Daily., Disp: 90 tablet, Rfl: 1  •  insulin NPH (HUMULIN N) 100 UNIT/ML injection, Inject 24 Units under the skin into the appropriate area as directed 2 (Two) Times a Day Before Meals., Disp: 20 mL, Rfl: 5  •  insulin regular (NOVOLIN R) 100 UNIT/ML injection, 14 UNITS AT BREAKFAST, 16 UNITS AT DINNER, Disp: 10 mL, Rfl: 5  •  Insulin Syringe 31G X 5/16\" 0.3 ML misc, 4 SC INJECTIONS OF INSULIN DAILY, Disp: 200 each, Rfl: 4  •  Insulin Syringe-Needle U-100 (TRUEPLUS INSULIN SYRINGE) 31G X 5/16\" 1 ML misc, USE FOUR TIMES A DAY AS DIRECTED, Disp: 100 each, Rfl: 3  •  levothyroxine (SYNTHROID, LEVOTHROID) 75 MCG tablet, Take 1 tablet by mouth Daily for 180 days., Disp: 90 tablet, Rfl: 1  •  PARoxetine (PAXIL) 20 MG tablet, Take 1 tablet by mouth Every Night for 180 days., Disp: 90 tablet, Rfl: 1  •  RELION INSULIN SYRINGE 31G X 15/64\" 0.3 ML misc, USE SYRINGES TO INJECT INSULIN SUBCUTANEOUSLY FOUR TIMES DAILY, Disp: 100 each, Rfl: 0  •  valsartan (DIOVAN) 320 MG tablet, Take 1 tablet by mouth Daily., Disp: 90 tablet, Rfl: 1      Objective:     Vitals:    06/22/20 1115   BP: 138/58   BP Location: Left arm   Patient Position: Sitting   Cuff Size: Large Adult   Pulse: 80   SpO2: 93%   Weight: 103 kg (226 lb 6.4 oz)   Height: 157.5 cm (62\")     Body mass index is 41.41 kg/m².    PHYSICAL EXAM:  Physical Exam   Constitutional: She is oriented to person, place, and time. She appears well-developed and well-nourished. No distress.   HENT:   Head: Normocephalic and atraumatic.   Eyes: Pupils are equal, round, and reactive to light.   Neck: No JVD present. No thyromegaly present.   Cardiovascular: Normal rate, regular rhythm, normal heart sounds and intact distal pulses.   No murmur heard.  Pulmonary/Chest: Effort normal and breath sounds normal. No respiratory distress.   Abdominal: Soft. Bowel sounds are normal. She exhibits no distension. There is no " splenomegaly or hepatomegaly. There is no tenderness.   Musculoskeletal: Normal range of motion. She exhibits no edema.   Neurological: She is alert and oriented to person, place, and time.   Skin: Skin is warm and dry. She is not diaphoretic. No erythema.   Psychiatric: She has a normal mood and affect. Her behavior is normal. Judgment normal.         ECG 12 Lead  Date/Time: 6/22/2020 12:45 PM  Performed by: Alex Snell MD  Authorized by: Alex Snell MD   Comparison: compared with previous ECG from 5/30/2019  Similar to previous ECG  Rhythm: sinus rhythm  Rate: normal  QRS axis: normal    Clinical impression: normal ECG        Very pleasant 75-year-old female with a medical history of obesity and dyspnea who presents back to me for her dyspnea on exertion.  She previously underwent a cardiac workup about a year and a half ago.  She had a normal stress test at that time and had no evidence of significant evidence abnormalities on her echocardiogram.      I think it is very unlikely that this is cardiac in etiology; however, I have discussed with her the only thing we could do at this point in time is a right and left heart catheterization.      I have recommended that she actually have a formal pulmonary evaluation, and rule out a pulmonary etiology before we put her through the risk of a heart catheterization.  If there is no clear etiology from a pulmonary standpoint, then I think we will get her set up for the catheterization.           Assessment:      No diagnosis found.  No orders of the defined types were placed in this encounter.    Plan:

## 2020-06-24 ENCOUNTER — OFFICE VISIT (OUTPATIENT)
Dept: FAMILY MEDICINE CLINIC | Facility: CLINIC | Age: 75
End: 2020-06-24

## 2020-06-24 VITALS
RESPIRATION RATE: 20 BRPM | DIASTOLIC BLOOD PRESSURE: 70 MMHG | WEIGHT: 228 LBS | OXYGEN SATURATION: 96 % | HEART RATE: 79 BPM | HEIGHT: 62 IN | SYSTOLIC BLOOD PRESSURE: 137 MMHG | TEMPERATURE: 98 F | BODY MASS INDEX: 41.96 KG/M2

## 2020-06-24 DIAGNOSIS — E78.49 OTHER HYPERLIPIDEMIA: ICD-10-CM

## 2020-06-24 DIAGNOSIS — I10 ESSENTIAL HYPERTENSION: Primary | Chronic | ICD-10-CM

## 2020-06-24 DIAGNOSIS — N18.30 STAGE 3 CHRONIC KIDNEY DISEASE (HCC): ICD-10-CM

## 2020-06-24 DIAGNOSIS — E03.9 ACQUIRED HYPOTHYROIDISM: ICD-10-CM

## 2020-06-24 DIAGNOSIS — F32.5 MAJOR DEPRESSIVE DISORDER WITH SINGLE EPISODE, IN FULL REMISSION (HCC): ICD-10-CM

## 2020-06-24 PROBLEM — D64.9 ANEMIA OF UNKNOWN ETIOLOGY: Status: RESOLVED | Noted: 2020-01-02 | Resolved: 2020-06-24

## 2020-06-24 PROCEDURE — 99214 OFFICE O/P EST MOD 30 MIN: CPT | Performed by: FAMILY MEDICINE

## 2020-06-24 NOTE — PROGRESS NOTES
"   Subjective       Chief Complaint   Patient presents with   • Labs Only         HPI:       HPI     Iraida Elizabeth is a 75 y.o. female who presents to the office today to follow-up on recent labs.  She is in the middle of a work-up for ongoing fatigue and shortness of breath.  She had negative COVID-19 testing and this test result has been reviewed today.  She did see a cardiologist and preliminary testing was negative.  She has an appointment in the works to see a pulmonologist to look at her shortness of breath.  Only medicine changes include increased pain medicine.  She has been on her antidepressant for many years and that has never given her side effects.  She has had some interval weight gain recently.        Review of Systems   Constitutional: Positive for fatigue.   Respiratory: Positive for shortness of breath. Negative for cough.    Cardiovascular: Negative for chest pain.        I have reviewed and confirmed the accuracy of the ROS as documented by myself or MA/LPN/RN Claudio Anne MD   This patient's ACP documentation is up to date, and there's nothing further left to document.      PE:   Objective   /70   Pulse 79   Temp 98 °F (36.7 °C) (Tympanic)   Resp 20   Ht 157.5 cm (62\")   Wt 103 kg (228 lb)   SpO2 96%   BMI 41.70 kg/m²     Body mass index is 41.7 kg/m².    Physical Exam   Constitutional: She is oriented to person, place, and time. She appears well-developed. No distress.   Eyes: Conjunctivae and lids are normal.   Neck: Carotid bruit is not present.   Cardiovascular: Normal rate, regular rhythm and normal heart sounds.   Pulmonary/Chest: Effort normal and breath sounds normal.   Neurological: She is alert and oriented to person, place, and time.   Skin: Skin is warm and dry.   Psychiatric: She has a normal mood and affect. Her behavior is normal.   Vitals reviewed.         Data Reviewed:                  A/P:     Assessment/Plan   Diagnoses and all orders for this visit:    1. " Essential hypertension (Primary)  Assessment & Plan:  Hypertension is unchanged.  Continue current treatment regimen.  Blood pressure will be reassessed at the next regular appointment.      2. Other hyperlipidemia  Assessment & Plan:  Lipid abnormalities are unchanged.  Pharmacotherapy as ordered.  Lipids will be reassessed in 6 months.      3. Acquired hypothyroidism  Assessment & Plan:  The current medical regimen is effective;  continue present plan and medications.        4. Major depressive disorder with single episode, in full remission (CMS/[facility])  Assessment & Plan:  Psychological condition is unchanged.  Continue current treatment regimen.  Psychological condition  will be reassessed at the next regular appointment.      5. Stage 3 chronic kidney disease (CMS/[facility])  Assessment & Plan:  Renal condition is unchanged.  Continue current treatment regimen.  Renal condition will be reassessed in 6 months.          Follow up:    Return in about 20 weeks (around 11/11/2020) for Medicare Wellness and regular visit, 30 minutes.

## 2020-06-30 ENCOUNTER — HOSPITAL ENCOUNTER (OUTPATIENT)
Dept: MRI IMAGING | Facility: HOSPITAL | Age: 75
Discharge: HOME | End: 2020-06-30
Admitting: NURSE PRACTITIONER

## 2020-06-30 DIAGNOSIS — M54.42 CHRONIC MIDLINE LOW BACK PAIN WITH BILATERAL SCIATICA: Primary | ICD-10-CM

## 2020-06-30 DIAGNOSIS — M54.41 CHRONIC MIDLINE LOW BACK PAIN WITH BILATERAL SCIATICA: ICD-10-CM

## 2020-06-30 DIAGNOSIS — G89.29 CHRONIC MIDLINE LOW BACK PAIN WITH BILATERAL SCIATICA: Primary | ICD-10-CM

## 2020-06-30 DIAGNOSIS — M54.42 CHRONIC MIDLINE LOW BACK PAIN WITH BILATERAL SCIATICA: ICD-10-CM

## 2020-06-30 DIAGNOSIS — M54.41 CHRONIC MIDLINE LOW BACK PAIN WITH BILATERAL SCIATICA: Primary | ICD-10-CM

## 2020-06-30 DIAGNOSIS — G89.29 CHRONIC MIDLINE LOW BACK PAIN WITH BILATERAL SCIATICA: ICD-10-CM

## 2020-06-30 PROCEDURE — A9577 INJ MULTIHANCE: HCPCS | Performed by: NURSE PRACTITIONER

## 2020-06-30 PROCEDURE — 72158 MRI LUMBAR SPINE W/O & W/DYE: CPT

## 2020-06-30 PROCEDURE — 82565 ASSAY OF CREATININE: CPT

## 2020-06-30 PROCEDURE — 0 GADOBENATE DIMEGLUMINE 529 MG/ML SOLUTION: Performed by: NURSE PRACTITIONER

## 2020-06-30 RX ADMIN — GADOBENATE DIMEGLUMINE 20 ML: 529 INJECTION, SOLUTION INTRAVENOUS at 19:49

## 2020-07-01 LAB — CREAT BLDA-MCNC: 1.4 MG/DL (ref 0.6–1.3)

## 2020-07-02 NOTE — PROGRESS NOTES
Please notify of MRI lumbar results which show no evidence of metastatic disease, compression fracture, or disc herniation.  She has multilevel degenerative disease which is similar in appearance to her prior MRI.  She also has mild multilevel narrowing in her spinal canal.

## 2020-07-14 ENCOUNTER — OFFICE VISIT (OUTPATIENT)
Dept: PAIN MEDICINE | Facility: CLINIC | Age: 75
End: 2020-07-14

## 2020-07-14 DIAGNOSIS — M47.816 LUMBAR FACET ARTHROPATHY: ICD-10-CM

## 2020-07-14 DIAGNOSIS — M51.36 DDD (DEGENERATIVE DISC DISEASE), LUMBAR: ICD-10-CM

## 2020-07-14 DIAGNOSIS — G89.29 OTHER CHRONIC PAIN: ICD-10-CM

## 2020-07-14 DIAGNOSIS — M54.42 CHRONIC MIDLINE LOW BACK PAIN WITH BILATERAL SCIATICA: ICD-10-CM

## 2020-07-14 DIAGNOSIS — M54.41 CHRONIC MIDLINE LOW BACK PAIN WITH BILATERAL SCIATICA: ICD-10-CM

## 2020-07-14 DIAGNOSIS — Z79.899 ENCOUNTER FOR LONG-TERM (CURRENT) USE OF HIGH-RISK MEDICATION: Primary | ICD-10-CM

## 2020-07-14 DIAGNOSIS — G89.29 CHRONIC MIDLINE LOW BACK PAIN WITH BILATERAL SCIATICA: ICD-10-CM

## 2020-07-14 PROCEDURE — 99443 PR PHYS/QHP TELEPHONE EVALUATION 21-30 MIN: CPT | Performed by: NURSE PRACTITIONER

## 2020-07-14 NOTE — PROGRESS NOTES
TELEPHONE VISIT    You have chosen to receive care through a telephone visit. Do you consent to use a telephone visit for your medical care today? Yes      CHIEF COMPLAINT: F/u Back pain    Subjective   Iraida Elizabeth is a 75 y.o. female  who presents for a telephonic follow-up.She has a history of back pain.  Today her pain is 4/10VAS in severity. She continues with Belbuca 300 mcg film BID.  She states this medication helps and provides better pain control then the hydrocodone she was taking, but she still has pain.  We discussed that we will not be able to fully eliminate her chronic pain.  We discussed that the CDC states that in chronic pain with medication management alone all that can be expected is a 30% reduction in pain.  Patient states understanding.  She states the Belbuca does allow her to rest better and she does not awaken in severe pain as she has in the past. She denies any side effects including somnolence or constipation. ADLs by self.      She states she continues to have low back pain that is more prominent on the right but that radiates into her bilateral legs.  She describes her pain as constant burning pain  She reports subjective weakness in her bilateral legs and states this has not worsened since her last office visit.     Back Pain   This is a chronic (chronic) problem. The current episode started more than 1 year ago. The problem occurs constantly. The problem has been gradually worsening since onset. The pain is present in the lumbar spine and sacro-iliac. The quality of the pain is described as burning. The pain radiates to the left foot, left thigh, left knee, right foot, right knee and right thigh. The pain is at a severity of 4/10. The pain is moderate. The symptoms are aggravated by bending, position, standing and twisting (laying flat). Associated symptoms include weakness (gen). Pertinent negatives include no abdominal pain, chest pain, dysuria, fever, headaches or numbness. She  has tried analgesics for the symptoms. The treatment provided moderate relief.      The following portions of the patient's history were reviewed and updated as appropriate: allergies, current medications, past family history, past medical history, past social history, past surgical history and problem list.    Review of Systems   Constitutional: Negative for fever.   Cardiovascular: Negative for chest pain.   Gastrointestinal: Negative for abdominal pain.   Genitourinary: Negative for dysuria.   Musculoskeletal: Positive for back pain.   Neurological: Positive for weakness (gen). Negative for numbness and headaches.     Vitals:    07/14/20 1405   PainSc:   4       Objective   Physical Exam  As this is a telephone check-in, the ability to perform a routine physical exam is extremely limited. The patient seems alert and is oriented appropriately.   On this phone call there is not any evidence of respiratory distress.   The patient seems of normal mood.   The remainder of a routine physical exam is deferred.    MRI LUMBAR SPINE WITH AND WITHOUT CONTRAST     HISTORY: Back pain, bilateral radiculopathy, breast cancer.     COMPARISON: MRI lumbar spine 08/09/2018.      FINDINGS: There is a grade 1 anterolisthesis of L4 upon L5 and grade 1  anterolisthesis of L5 upon S1, unchanged. The conus is at L1.     T12-L1: There is a mild central disc osteophyte complex with no evidence  of herniation.     L1-L2: There is no evidence of disc bulge or herniation.     L2-L3: There is a mild broad-based disc osteophyte complex with no  evidence of herniation.     L3-L4: Mild-to-moderate facet degenerative disease is present  bilaterally. There is a mild broad-based disc osteophyte complex with no  evidence of herniation. A small hemangioma involving the L4 vertebral  body laterally to the right is appreciated. There is mild neural  foraminal compromise on the right secondary to extension of disc  material into the neural foramen,  unchanged. This approaches but does  not definitively involve the right L3 nerve as it exits the neural  foramen.     L4-L5: Mild-to-moderate facet degenerative disease is present  bilaterally. This is more prominent on the left, unchanged. A mild  broad-based disc osteophyte complex is present with no evidence of  herniation. There is mild neural foraminal compromise on the left  secondary to facet degenerative disease and extension of a small disc  osteophyte complex into the neural foramen, unchanged. Fluid signal is  appreciated involving the facet joints bilaterally consistent with  degenerative disease, unchanged. A small posterior directed synovial  cyst is present on the left, present previously and unchanged. Again,  this projects posteriorly and does not approach or encroach upon the  spinal canal.     L5-S1: Moderate facet degenerative disease is present bilaterally. There  is a mild central disc bulge resulting in mild flattening of ventral  surface of the thecal sac, slightly more prominent to the left. There is  mild lateral recess narrowing bilaterally. Mild neural foraminal  compromise is present bilaterally, more prominent on the right secondary  to anterolisthesis of L5 upon S1, loss of disc height and extension of a  small disc osteophyte complex into the neural foramen.     After contrast administration, there was no evidence of abnormal  enhancement to suggest metastatic disease.     IMPRESSION:  1. No evidence of metastatic disease, compression fracture or disc  herniation.   2. Multilevel degenerative disease involving the lumbar spine is noted  as described in detail above, similar in appearance compared to prior  examination and including a grade 1 anterolisthesis of L4 upon L5 and L5  upon S1, multilevel facet degenerative disease (most prominent at L4-L5)  and mild multilevel neural foraminal compromise. See above..     This report was finalized on 7/1/2020 5:17 PM by Dr. Parish Sarabia,  M.D.    Assessment/Plan   Diagnoses and all orders for this visit:    Encounter for long-term (current) use of high-risk medication    Lumbar facet arthropathy    DDD (degenerative disc disease), lumbar    Chronic midline low back pain with bilateral sciatica  -     Case Request    Other chronic pain      ----------------    Our practice is offering alternative &/or electronic methods to continue to follow our patients while at the same time further the efforts toward social distancing, in accordance with our organizational policies, professional societies' guidance, and gubernatorial mandates.  I support the Healthy at Home campaign and in this visit I have counseled the patient on our needs to limit in-person office visits and physical encounters with medical facilities whenever possible.  I have also educated the patient on the medical necessities of maintaining social distancing while we continue to function during this crisis period.      ----------------    --- This visit has been rescheduled as a phone visit to comply with patient safety concerns in accordance with CDC recommendations. Total time of discussion was 21 minutes.  --- Bilateral S1 TFESI  Reviewed the procedure at length with the patient.  Included in the review was expectations, complications, risk and benefits.The procedure was described in detail and the risks, benefits and alternatives were discussed with the patient (including but not limited to: bleeding, infection, nerve damage, worsening of pain, inability to perform injection, paralysis, seizures, and death) who agreed to proceed.  Discussed the potential for sedation if warranted/wanted.  The procedure will plan to be performed at [facility] with fluoroscopic guidance(unless ultrasound is indicated) and could potentially have steroids and contrast dye used. Questions were answered and in a way the patient could understand.  Patient verbalized understanding and  wishes to proceed.  This intervention will be ordered.  Discussed with patient that all procedures are part of a multimodal plan of care and include either formal PT or a home exercise program.  Patient has no evidence of coagulopathy or current infection.  --- Consider EMG of lower extremities if no improvement with TFESI  --- Refill Belbuca 300 mcg film BID. DNF 7/22/2020 applied. Patient appears stable with current regimen. No adverse effects. Regarding continuation of opioids, there is no evidence of aberrant behavior or any red flags.  The patient continues with appropriate response to opioid therapy. ADL's remain intact by self.   --- The urine drug screen confirmation from 2/14/2020 has been reviewed and the result is applied based on patient history and DONNY report  --- Follow-up 1 month or sooner if needed     DONNY REPORT    As part of the patient's treatment plan, I am prescribing controlled substances. The patient has been made aware of appropriate use of such medications, including potential risk of somnolence, limited ability to drive and/or work safely, and the potential for dependence or overdose. It has also been made clear that these medications are for use by this patient only, without concomitant use of alcohol or other substances unless prescribed.     Patient has completed prescribing agreement detailing terms of continued prescribing of controlled substances, including monitoring DONNY reports, urine drug screening, and pill counts if necessary. The patient is aware that inappropriate use will results in cessation of prescribing such medications.    DONNY report has been reviewed and scanned into the patient's chart.    As the clinician, I personally reviewed the DONNY from 7/13/2020 while the patient was on the telephonic visit today.    History and physical exam exhibit continued safe and appropriate use of controlled substances.    -------    EMR Dragon/Transcription disclaimer:    Much of this encounter note is an electronic transcription/translation of spoken language to printed text. The electronic translation of spoken language may permit erroneous, or at times, nonsensical words or phrases to be inadvertently transcribed; Although I have reviewed the note for such errors, some may still exist.

## 2020-07-28 ENCOUNTER — LAB REQUISITION (OUTPATIENT)
Dept: LAB | Facility: HOSPITAL | Age: 75
End: 2020-07-28

## 2020-07-28 DIAGNOSIS — Z00.00 ENCOUNTER FOR GENERAL ADULT MEDICAL EXAMINATION WITHOUT ABNORMAL FINDINGS: ICD-10-CM

## 2020-07-28 PROCEDURE — U0004 COV-19 TEST NON-CDC HGH THRU: HCPCS | Performed by: ANESTHESIOLOGY

## 2020-07-29 LAB
REF LAB TEST METHOD: NORMAL
SARS-COV-2 RNA RESP QL NAA+PROBE: NOT DETECTED

## 2020-07-30 ENCOUNTER — DOCUMENTATION (OUTPATIENT)
Dept: PAIN MEDICINE | Facility: CLINIC | Age: 75
End: 2020-07-30

## 2020-07-30 ENCOUNTER — OUTSIDE FACILITY SERVICE (OUTPATIENT)
Dept: PAIN MEDICINE | Facility: CLINIC | Age: 75
End: 2020-07-30

## 2020-07-30 PROCEDURE — 64483 NJX AA&/STRD TFRM EPI L/S 1: CPT | Performed by: ANESTHESIOLOGY

## 2020-07-31 NOTE — PROGRESS NOTES
Bilateral S1 Lumbar Transforaminal Epidural Steroid Injection  [facility]    PREOPERATIVE DIAGNOSIS:  Chronic low back pain, Lumbar Degenerative Disc Disease and bilateral sciatica    POSTOPERATIVE DIAGNOSIS:  Same as preop diagnosis    PROCEDURE:  CPT 76145(-50) --  Diagnostic Transforaminal Epidural Steroid Injection at the S1 level, bilaterally    PRE-PROCEDURE DISCUSSION WITH PATIENT:    Risks and complications were discussed with the patient prior to starting the procedure and informed consent was obtained.  We discussed various topics including but not limited to bleeding, infection, injury, nerve injury, paralysis, coma, death, postprocedural painful flare-up, postprocedural site soreness, and a lack of pain relief.  We discussed the diagnostic aspect of transforaminal epidural / selective nerve root blockade.    SURGEON:  Kaiden Miranda MD    REASON FOR PROCEDURE:    Degenerative changes are noted in the area., Radiating pattern of pain is likely consistent with degenerative changes in the area. and of note, no relief after RF so dx LSNB is reasonable    SEDATION:  Versed 4mg & Fentanyl 50 mcg IV  ANESTHETIC:  Marcaine 0.25%  STEROID:  Methylprednisolone (DEPO MEDROL) 80mg/ml    DESCRIPTON OF PROCEDURE:  After obtaining informed consent, an I.V. was started in the preoperative area. The patient taken to the operating room and was placed in the prone position with a pillow under the abdomen.  All pressure points were well padded.  EKG, blood pressure, and pulse oximeter were monitored.  The lumbosacral area was prepped with Chloraprep and draped in a sterile fashion. Under fluoroscopic guidance the upper vertebral-equivalent level of the sacrum was identified, and in a slightly oblique view to one side, the S1 posterior foramen was identified, and then a point just below the foramen at 6 o'clock position was identified. Skin and subcutaneous tissue was anesthetized with 1% lidocaine. A  22-gauge spinal needle was introduced under fluoroscopic guidance at the above junction and then redirected slightly cephalad and into the foramen without parasthesias and into the epidural space. After confirming the position of the needle with PA, lateral, and oblique fluoroscopic views, aspiration was checked and was clear of blood or CSF.  Next, 1 mL of Omnipaque was injected. After seeing adequate spread on the corresponding nerve root, a total volume 2mL of injectate containing 0.5ml of the above mentioned local anesthetic, 1 ml saline,  and half of the above mentioned corticosteroid was injected into the epidural space.    The needle was removed intact.      Next, the same vertebral level and corresponding foramen on the contralateral side was visualized with fluoroscopy in like manner, and a similar approach was used to place the spinal needle in that foramen.  After confirming the position of the needle with PA, lateral, and oblique fluoroscopic views, aspiration was checked and was clear of blood or CSF.  Next, 1 mL of Omnipaque was injected. After seeing adequate spread on the corresponding nerve root, a total volume 2mL of injectate containing 0.5ml of the above mentioned local anesthetic, 1 ml saline, and half of the above mentioned corticosteroid was injected into the epidural space. The needle was removed intact.  Vital signs were stable throughout.      ESTIMATED BLOOD LOSS:  <5 mL  SPECIMENS:  none    COMPLICATIONS:   No complications were noted., There was no indication of vascular uptake on live injection of contrast dye., There was no indication of intrathecal uptake on live injection of contrast dye., There was not any evidence of dural puncture.   and The patient did not have any signs of postprocedure numbness nor weakness.    TOLERANCE & DISCHARGE CONDITION:    The patient tolerated the procedure well.  The patient was transported to the recovery area without difficulties.  The patient was  discharged to home under the care of family in stable and satisfactory condition.    PLAN OF CARE:  1. The patient was given our standard instruction sheet.  2. The patient will Return to clinic 2 wks.  3. The patient will resume all medications as per the medication reconciliation sheet.

## 2020-08-11 RX ORDER — SYRINGE AND NEEDLE,INSULIN,1ML 31GX15/64"
SYRINGE, EMPTY DISPOSABLE MISCELLANEOUS
Qty: 100 EACH | Refills: 0 | Status: SHIPPED | OUTPATIENT
Start: 2020-08-11

## 2020-08-14 ENCOUNTER — OFFICE VISIT (OUTPATIENT)
Dept: PAIN MEDICINE | Facility: CLINIC | Age: 75
End: 2020-08-14

## 2020-08-14 DIAGNOSIS — G89.29 OTHER CHRONIC PAIN: ICD-10-CM

## 2020-08-14 DIAGNOSIS — Z79.899 ENCOUNTER FOR LONG-TERM (CURRENT) USE OF HIGH-RISK MEDICATION: Primary | ICD-10-CM

## 2020-08-14 DIAGNOSIS — M54.41 CHRONIC MIDLINE LOW BACK PAIN WITH BILATERAL SCIATICA: ICD-10-CM

## 2020-08-14 DIAGNOSIS — M51.36 DDD (DEGENERATIVE DISC DISEASE), LUMBAR: ICD-10-CM

## 2020-08-14 DIAGNOSIS — G89.29 CHRONIC MIDLINE LOW BACK PAIN WITH BILATERAL SCIATICA: ICD-10-CM

## 2020-08-14 DIAGNOSIS — M54.42 CHRONIC MIDLINE LOW BACK PAIN WITH BILATERAL SCIATICA: ICD-10-CM

## 2020-08-14 PROCEDURE — 99441 PR PHYS/QHP TELEPHONE EVALUATION 5-10 MIN: CPT | Performed by: NURSE PRACTITIONER

## 2020-08-14 NOTE — PROGRESS NOTES
TELEPHONE VISIT    You have chosen to receive care through a telephone visit. Do you consent to use a telephone visit for your medical care today? Yes    CHIEF COMPLAINT: Back pain    Subjective   Iraida Elizabeth is a 75 y.o. female  who presents for a telephonic follow-up.She has a history of back pain.  She completed a Bilateral S1 TFESI on 7/30/2020 performed by Dr. Miranda for the management of low back pain with bilateral radicular lower extremity pain.  She reports 85% ONGOING pain relief from this procedure.     Today her pain is 3/10VAS in severity.  She continues with Belbuca 300 mcg film BID.  She states this medication regimen decreases her pain a moderate amount.  She denies any side effects including somnolence or constipation. ADLs by self.        Back Pain   This is a chronic (chronic) problem. The current episode started more than 1 year ago. The problem occurs constantly. The problem has been gradually worsening since onset. The pain is present in the lumbar spine and sacro-iliac. The quality of the pain is described as burning. The pain radiates to the left foot, left thigh, left knee, right foot, right knee and right thigh. The pain is at a severity of 3/10. The pain is moderate. The symptoms are aggravated by bending, position, standing and twisting (laying flat). Associated symptoms include weakness (gen). Pertinent negatives include no abdominal pain, chest pain, dysuria, fever, headaches or numbness. She has tried analgesics (Ranulfo S1 TFESI ) for the symptoms. The treatment provided moderate relief.      The following portions of the patient's history were reviewed and updated as appropriate: allergies, current medications, past family history, past medical history, past social history, past surgical history and problem list.      Review of Systems   Constitutional: Negative for fever.   Cardiovascular: Negative for chest pain.   Gastrointestinal: Negative for abdominal pain.   Genitourinary:  Negative for dysuria.   Musculoskeletal: Positive for back pain.   Neurological: Positive for weakness (gen). Negative for numbness and headaches.     Vitals:    08/14/20 1335   PainSc:   3     Objective   Physical Exam  As this is a telephone check-in, the ability to perform a routine physical exam is extremely limited. The patient seems alert and is oriented appropriately.   On this phone call there is not any evidence of respiratory distress.   The patient seems of normal mood.   The remainder of a routine physical exam is deferred.      Assessment/Plan   Diagnoses and all orders for this visit:    Encounter for long-term (current) use of high-risk medication    DDD (degenerative disc disease), lumbar    Chronic midline low back pain with bilateral sciatica    Other chronic pain    Other orders  -     Buprenorphine HCl (BELBUCA) film; Apply 1 film to cheek 2 (two) times a day. DNF 8/23/2020      ----------------    Our practice is offering alternative &/or electronic methods to continue to follow our patients while at the same time further the efforts toward social distancing, in accordance with our organizational policies, professional societies' guidance, and [facility] mandates.  I support the Healthy at Home campaign and in this visit I have counseled the patient on our needs to limit in-person office visits and physical encounters with medical facilities whenever possible.  I have also educated the patient on the medical necessities of maintaining social distancing while we continue to function during this crisis period.      ----------------    --- This visit has been rescheduled as a phone visit to comply with patient safety concerns in accordance with CDC recommendations. Total time of discussion was 7 minutes.  --- The urine drug screen confirmation from 2/14/2020 has been reviewed and the result is appropriate based on patient history and DONNY report  --- Refill Belbuca 300 mcg film BID. DNF  8/23/2020 applied. Patient appears stable with current regimen. No adverse effects. Regarding continuation of opioids, there is no evidence of aberrant behavior or any red flags.  The patient continues with appropriate response to opioid therapy. ADL's remain intact by self.   --- Follow-up 1 month or sooner if needed       DONNY REPORT  As part of the patient's treatment plan, I am prescribing controlled substances. The patient has been made aware of appropriate use of such medications, including potential risk of somnolence, limited ability to drive and/or work safely, and the potential for dependence or overdose. It has also bee made clear that these medications are for use by this patient only, without concomitant use of alcohol or other substances unless prescribed.     Patient has completed prescribing agreement detailing terms of continued prescribing of controlled substances, including monitoring DONNY reports, urine drug screening, and pill counts if necessary. The patient is aware that inappropriate use will results in cessation of prescribing such medications.    DONNY report has been reviewed and scanned into the patient's chart.    As the clinician, I personally reviewed the DONNY from 8/13/2020 while the patient was in the office today.    History and physical exam exhibit continued safe and appropriate use of controlled substances.    -------    EMR Dragon/Transcription disclaimer:   Much of this encounter note is an electronic transcription/translation of spoken language to printed text. The electronic translation of spoken language may permit erroneous, or at times, nonsensical words or phrases to be inadvertently transcribed; Although I have reviewed the note for such errors, some may still exist.

## 2020-08-17 ENCOUNTER — TRANSCRIBE ORDERS (OUTPATIENT)
Dept: ADMINISTRATIVE | Facility: HOSPITAL | Age: 75
End: 2020-08-17

## 2020-08-17 DIAGNOSIS — Z86.718 PERSONAL HISTORY OF VENOUS THROMBOSIS AND EMBOLISM: Primary | ICD-10-CM

## 2020-08-21 ENCOUNTER — HOSPITAL ENCOUNTER (OUTPATIENT)
Dept: CT IMAGING | Facility: HOSPITAL | Age: 75
Discharge: HOME | End: 2020-08-21
Admitting: INTERNAL MEDICINE

## 2020-08-21 DIAGNOSIS — Z86.718 PERSONAL HISTORY OF VENOUS THROMBOSIS AND EMBOLISM: ICD-10-CM

## 2020-08-21 LAB — CREAT BLDA-MCNC: 1.1 MG/DL (ref 0.6–1.3)

## 2020-08-21 PROCEDURE — 71275 CT ANGIOGRAPHY CHEST: CPT

## 2020-08-21 PROCEDURE — 82565 ASSAY OF CREATININE: CPT

## 2020-08-21 PROCEDURE — 0 IOPAMIDOL PER 1 ML: Performed by: INTERNAL MEDICINE

## 2020-08-21 RX ADMIN — IOPAMIDOL 95 ML: 755 INJECTION, SOLUTION INTRAVENOUS at 10:41

## 2020-09-03 ENCOUNTER — TELEPHONE (OUTPATIENT)
Dept: CARDIOLOGY | Facility: CLINIC | Age: 75
End: 2020-09-03

## 2020-09-03 ENCOUNTER — OFFICE VISIT (OUTPATIENT)
Dept: ENDOCRINOLOGY | Age: 75
End: 2020-09-03

## 2020-09-03 VITALS
SYSTOLIC BLOOD PRESSURE: 130 MMHG | DIASTOLIC BLOOD PRESSURE: 70 MMHG | OXYGEN SATURATION: 95 % | HEIGHT: 61 IN | BODY MASS INDEX: 42.56 KG/M2 | WEIGHT: 225.4 LBS | HEART RATE: 75 BPM

## 2020-09-03 DIAGNOSIS — E03.9 ACQUIRED HYPOTHYROIDISM: ICD-10-CM

## 2020-09-03 DIAGNOSIS — R06.09 DOE (DYSPNEA ON EXERTION): Primary | ICD-10-CM

## 2020-09-03 DIAGNOSIS — I20.0 UNSTABLE ANGINA (HCC): ICD-10-CM

## 2020-09-03 DIAGNOSIS — E11.69 HYPERLIPIDEMIA ASSOCIATED WITH TYPE 2 DIABETES MELLITUS (HCC): ICD-10-CM

## 2020-09-03 DIAGNOSIS — IMO0002 UNCONTROLLED TYPE 2 DIABETES MELLITUS WITH COMPLICATION, WITH LONG-TERM CURRENT USE OF INSULIN: Primary | ICD-10-CM

## 2020-09-03 DIAGNOSIS — E78.5 HYPERLIPIDEMIA ASSOCIATED WITH TYPE 2 DIABETES MELLITUS (HCC): ICD-10-CM

## 2020-09-03 PROCEDURE — 99214 OFFICE O/P EST MOD 30 MIN: CPT | Performed by: INTERNAL MEDICINE

## 2020-09-03 NOTE — TELEPHONE ENCOUNTER
Called patient and told her that you would be setting up a left and right heart cath for her   That someone will call her to schedule this.

## 2020-09-03 NOTE — PROGRESS NOTES
75 y.o.    Patient Care Team:  Claudio Anne MD as PCP - General  Claudio Anne MD as PCP - Family Medicine  Robert Garcia MD as Consulting Physician (Endocrinology)  Shawn Galvan IV, MD as Surgeon (Neurosurgery)  Nile Laguna MD as Surgeon (Vascular Surgery)  Chuck Olsen MD as Consulting Physician (Orthopedic Surgery)    Chief Complaint:    FOLLOW UP/ TYPE 2 DIABETES MELLITUS / HYPOTHYROIDISM FORMER YAA PATIENT  Subjective     HPI   75-year-old white female is here for the evaluation of type 2 diabetes mellitus.  Patient used to see Dr. Garcia in the past.    Type 2 dm - Diagnosed for a long time.   Today in clinic pt reports being on NPH 24 units twice daily, Regular insulin 14 units with BF and 16 units with dinner.   FBG - 180 - 200  Pre meals - 180, recently had a epidural injection and that resulted in her BG go up.   Checks BG - 4 - 5 times a day  Sensor - want to have dexcom  Dm retinopathy - none,Last eye exam - march 20th  Dm nephropathy - CKD stage 3  Dm neuropathy - yes,Dm neuropathy meds - no meds  CAD -no  CVA -no  Episodes of hypoglycemia - none  Pt is physically active. weight has been stable.   Pt tries to follow DM diet for most part.   On Ace inb.    Hypothyroidism - on levothyroxine 75 mcg oral daily.     HLP   On lipitor 20 mg po daily.      Reviewed primary care physician's/consulting physician documentation and lab results :     Interval History      The following portions of the patient's history were reviewed and updated as appropriate: allergies, current medications, past family history, past medical history, past social history, past surgical history and problem list.    Past Medical History:   Diagnosis Date   • Anemia of unknown etiology 1/2/2020   • Arthropathy of knee 05/22/2014    unspecified   • Arthropathy, multiple sites 04/12/2012    unspecified   • Bulging lumbar disc    • Cancer (CMS/HCC)     breast   • Chronic kidney disease,  stage III (moderate) (CMS/[facility]) 05/26/2011   • Controlled diabetes mellitus type II without complication (CMS/[facility])    • Deep vein blood clot of right lower extremity (CMS/[facility]) 03/2019   • Depression 04/02/2001   • Dyspnea    • Essential hypertension    • Fall 2015    FRACTURED ANKLE   • History of complete eye exam 03/29/2012   • History of gynecological procedure 03/01/2010    special invesitation and examinations; gynecological examination; routine gynecological examination   • Hyperlipidemia     other and unspecified   • Hypothyroidism     unspecified   • Low back pain    • Moderate single current episode of major depressive disorder (CMS/[facility]) 4/2/2001   • Peripheral neuropathy    • Personal history of malignant neoplasm of breast 2001    R breast (negative nodes) TX with radiation and Tamoxifen   • Thyroid disease    • Type II diabetes mellitus with neurological manifestations, uncontrolled (CMS/[facility]) 01/03/2013   • Wrist fracture, left 2008     Family History   Problem Relation Age of Onset   • Hypertension Sister    • Cancer Sister    • Thyroid disease Daughter    • Cancer Mother    • Heart disease Father    • Bleeding Disorder Sister    • Malig Hyperthermia Neg Hx      Social History     Socioeconomic History   • Marital status:      Spouse name: Not on file   • Number of children: 4   • Years of education: 12   • Highest education level: High school graduate   Occupational History   • Occupation: RETIRED   Social Needs   • Financial resource strain: Patient refused   • Food insecurity:     Worry: Patient refused     Inability: Patient refused   • Transportation needs:     Medical: Patient refused     Non-medical: Patient refused   Tobacco Use   • Smoking status: Never Smoker   • Smokeless tobacco: Never Used   Substance and Sexual Activity   • Alcohol use: No   • Drug use: No   • Sexual activity: Defer   Social History Narrative    LIVES ALONE     Allergies   Allergen Reactions   • Tizanidine  "Hallucinations   • Tree Nut Anaphylaxis     almonds   • Vancomycin Itching     Pt c/o itiching and redness in arm after starting vancomycin ivpb       Current Outpatient Medications:   •  amLODIPine (NORVASC) 10 MG tablet, Take 1 tablet by mouth Daily., Disp: 90 tablet, Rfl: 1  •  atorvastatin (LIPITOR) 20 MG tablet, Take 1 tablet by mouth Every Night for 180 days., Disp: 90 tablet, Rfl: 1  •  Buprenorphine HCl (BELBUCA) film, Apply 1 film to cheek 2 (two) times a day. DNF 8/23/2020, Disp: 60 each, Rfl: 0  •  EPINEPHrine (EPIPEN) 0.3 MG/0.3ML solution auto-injector injection, , Disp: , Rfl:   •  Foot Care Products (DIABETIC INSOLES) misc, 1 pair of diabetic shoe Dx E11.45, Disp: 1 each, Rfl: 0  •  glucose blood (ONE TOUCH ULTRA TEST) test strip, PT TO TESTS BS QID, Disp: 100 each, Rfl: 3  •  hydroCHLOROthiazide (HYDRODIURIL) 25 MG tablet, Take 1 tablet by mouth Daily., Disp: 90 tablet, Rfl: 1  •  insulin NPH (HUMULIN N) 100 UNIT/ML injection, Inject 24 Units under the skin into the appropriate area as directed 2 (Two) Times a Day Before Meals., Disp: 20 mL, Rfl: 5  •  insulin regular (NOVOLIN R) 100 UNIT/ML injection, 14 UNITS AT BREAKFAST, 16 UNITS AT DINNER, Disp: 10 mL, Rfl: 5  •  Insulin Syringe 31G X 5/16\" 0.3 ML misc, 4 SC INJECTIONS OF INSULIN DAILY, Disp: 200 each, Rfl: 4  •  Insulin Syringe-Needle U-100 (TRUEPLUS INSULIN SYRINGE) 31G X 5/16\" 1 ML misc, USE FOUR TIMES A DAY AS DIRECTED, Disp: 100 each, Rfl: 3  •  levothyroxine (SYNTHROID, LEVOTHROID) 75 MCG tablet, Take 1 tablet by mouth Daily for 180 days., Disp: 90 tablet, Rfl: 1  •  PARoxetine (PAXIL) 20 MG tablet, Take 1 tablet by mouth Every Night for 180 days., Disp: 90 tablet, Rfl: 1  •  RELION INSULIN SYRINGE 31G X 15/64\" 0.3 ML misc, USE 1 SYRINGE EACH TO INJECT INSULIN SUBCUTANEOUSLY 4 TIMES DAILY, Disp: 100 each, Rfl: 0  •  valsartan (DIOVAN) 320 MG tablet, Take 1 tablet by mouth Daily., Disp: 90 tablet, Rfl: 1        Review of Systems " "  Constitutional: Positive for fatigue. Negative for appetite change and fever.   Eyes: Negative for visual disturbance.   Respiratory: Positive for shortness of breath.    Cardiovascular: Positive for leg swelling. Negative for palpitations.   Gastrointestinal: Negative for abdominal pain and vomiting.   Endocrine: Positive for polydipsia. Negative for polyuria.   Musculoskeletal: Negative for joint swelling and neck pain.   Skin: Negative for rash.   Neurological: Positive for numbness. Negative for weakness.   Psychiatric/Behavioral: Negative for behavioral problems.     I have reviewed the ROS as documented by the MA; Ronald Lambert MD.      Objective       Vitals:    09/03/20 1223   BP: 130/70   Pulse: 75   SpO2: 95%   Weight: 102 kg (225 lb 6.4 oz)   Height: 154.9 cm (61\")     Body mass index is 42.59 kg/m².      Physical Exam   Constitutional: She is oriented to person, place, and time. She appears well-nourished.   Obese     HENT:   Head: Normocephalic and atraumatic.   Wide neck   Eyes: Conjunctivae and EOM are normal. No scleral icterus.   Neck: Normal range of motion. Neck supple. No thyromegaly present.   Acanthosis nigricans   Cardiovascular: Normal rate and normal heart sounds.   Pulmonary/Chest: Effort normal and breath sounds normal. No stridor. She has no wheezes.   Abdominal: Soft. Bowel sounds are normal. She exhibits no distension. There is no tenderness.   Central obesity   Musculoskeletal: She exhibits no edema or tenderness.   Neurological: She is alert and oriented to person, place, and time.   Skin: Skin is warm and dry. She is not diaphoretic.   Psychiatric: She has a normal mood and affect.   Vitals reviewed.      Results Review:     I reviewed the patient's new clinical results and mentioned them above in HPI and in plan as well.    Medical records reviewed  Summary:Done      Hospital Outpatient Visit on 08/21/2020   Component Date Value Ref Range Status   • Creatinine 08/21/2020 1.10  " "0.60 - 1.30 mg/dL Final    Serial Number: [id number]:  [id number]     Lab Results   Component Value Date    HGBA1C 6.4 (H) 02/17/2020    HGBA1C 6.7 (H) 11/21/2019    HGBA1C 7.40 (H) 06/04/2019     Lab Results   Component Value Date    MICROALBUR 12.8 02/17/2020    CREATININE 1.10 08/21/2020     Imaging Results (Most Recent)     None                Assessment and Plan:    Iraida was seen today for diabetes.    Diagnoses and all orders for this visit:    Uncontrolled type 2 diabetes mellitus with complication, with long-term current use of insulin (CMS/[facility])  -     Hemoglobin A1c  -     Hemoglobin A1c; Future  -     Creatinine, Serum; Future  -     eGFR-Glomerular Filtration; Future  -     Lipid Panel; Future  -     TSH; Future  -     T4, Free; Future    Acquired hypothyroidism  -     Hemoglobin A1c  -     Hemoglobin A1c; Future  -     Creatinine, Serum; Future  -     eGFR-Glomerular Filtration; Future  -     Lipid Panel; Future  -     TSH; Future  -     T4, Free; Future    Hyperlipidemia associated with type 2 diabetes mellitus (CMS/[facility])  -     Hemoglobin A1c  -     Hemoglobin A1c; Future  -     Creatinine, Serum; Future  -     eGFR-Glomerular Filtration; Future  -     Lipid Panel; Future  -     TSH; Future  -     T4, Free; Future      Type 2 diabetes mellitus-uncontrolled  Continue the current insulin regimen  Check A1c and make further adjustments  Would consider Dexcom for the patient to monitor the blood glucose trends.  We will contact the agent.    Hypothyroidism  Check thyroid panel    Hyperlipidemia  Check lipid panel.    All these above problems are new for me    Reviewed Lab results with the patient.       Ronald Lambert MD  09/03/20    EMR Dragon / transcription disclaimer:     \"Dictated utilizing Dragon dictation\".      "

## 2020-09-03 NOTE — TELEPHONE ENCOUNTER
Patient calling stating she seen the pulmonary doctor and the pulmonary doctor has cleared her from pulmonary standpoint.  She states she has trouble breathing, states its really bad.  Wants to know if you want to see her back or what is the next step      [phone]

## 2020-09-04 LAB — HBA1C MFR BLD: 6.8 % (ref 4.8–5.6)

## 2020-09-10 ENCOUNTER — TELEPHONE (OUTPATIENT)
Dept: CARDIOLOGY | Facility: CLINIC | Age: 75
End: 2020-09-10

## 2020-09-10 NOTE — TELEPHONE ENCOUNTER
Dr Channing Lopez for the R+L heart cath went peer to peer for no recent testing.  Can you please call [facility] at [phone], opt 2, 1, 1, 2.    Member ID#: [id number]    This case is set to close on 9/18.      Thank you  Viv FERRO

## 2020-09-14 ENCOUNTER — OFFICE VISIT (OUTPATIENT)
Dept: PAIN MEDICINE | Facility: CLINIC | Age: 75
End: 2020-09-14

## 2020-09-14 ENCOUNTER — HOSPITAL ENCOUNTER (EMERGENCY)
Facility: HOSPITAL | Age: 75
Discharge: HOME | End: 2020-09-14
Attending: EMERGENCY MEDICINE | Admitting: EMERGENCY MEDICINE

## 2020-09-14 ENCOUNTER — APPOINTMENT (OUTPATIENT)
Dept: CARDIOLOGY | Facility: HOSPITAL | Age: 75
End: 2020-09-14

## 2020-09-14 ENCOUNTER — RESULTS ENCOUNTER (OUTPATIENT)
Dept: PAIN MEDICINE | Facility: CLINIC | Age: 75
End: 2020-09-14

## 2020-09-14 VITALS
SYSTOLIC BLOOD PRESSURE: 158 MMHG | HEART RATE: 85 BPM | TEMPERATURE: 98.2 F | DIASTOLIC BLOOD PRESSURE: 68 MMHG | OXYGEN SATURATION: 99 % | RESPIRATION RATE: 17 BRPM

## 2020-09-14 VITALS
SYSTOLIC BLOOD PRESSURE: 150 MMHG | HEART RATE: 79 BPM | BODY MASS INDEX: 42.56 KG/M2 | OXYGEN SATURATION: 97 % | HEIGHT: 61 IN | TEMPERATURE: 97.9 F | RESPIRATION RATE: 16 BRPM | DIASTOLIC BLOOD PRESSURE: 75 MMHG | WEIGHT: 225.4 LBS

## 2020-09-14 DIAGNOSIS — M54.41 CHRONIC MIDLINE LOW BACK PAIN WITH BILATERAL SCIATICA: ICD-10-CM

## 2020-09-14 DIAGNOSIS — G89.29 OTHER CHRONIC PAIN: ICD-10-CM

## 2020-09-14 DIAGNOSIS — M79.661 BILATERAL CALF PAIN: ICD-10-CM

## 2020-09-14 DIAGNOSIS — M51.36 DDD (DEGENERATIVE DISC DISEASE), LUMBAR: ICD-10-CM

## 2020-09-14 DIAGNOSIS — Z79.899 ENCOUNTER FOR LONG-TERM (CURRENT) USE OF HIGH-RISK MEDICATION: ICD-10-CM

## 2020-09-14 DIAGNOSIS — Z86.718 HISTORY OF DVT (DEEP VEIN THROMBOSIS): ICD-10-CM

## 2020-09-14 DIAGNOSIS — R60.0 LOWER EXTREMITY EDEMA: Primary | ICD-10-CM

## 2020-09-14 DIAGNOSIS — Z79.899 ENCOUNTER FOR LONG-TERM (CURRENT) USE OF HIGH-RISK MEDICATION: Primary | ICD-10-CM

## 2020-09-14 DIAGNOSIS — M79.662 BILATERAL CALF PAIN: ICD-10-CM

## 2020-09-14 DIAGNOSIS — G89.29 CHRONIC MIDLINE LOW BACK PAIN WITH BILATERAL SCIATICA: ICD-10-CM

## 2020-09-14 DIAGNOSIS — M54.42 CHRONIC MIDLINE LOW BACK PAIN WITH BILATERAL SCIATICA: ICD-10-CM

## 2020-09-14 LAB
BH CV LOWER VASCULAR LEFT COMMON FEMORAL AUGMENT: NORMAL
BH CV LOWER VASCULAR LEFT COMMON FEMORAL COMPETENT: NORMAL
BH CV LOWER VASCULAR LEFT COMMON FEMORAL COMPRESS: NORMAL
BH CV LOWER VASCULAR LEFT COMMON FEMORAL PHASIC: NORMAL
BH CV LOWER VASCULAR LEFT COMMON FEMORAL SPONT: NORMAL
BH CV LOWER VASCULAR LEFT DISTAL FEMORAL COMPRESS: NORMAL
BH CV LOWER VASCULAR LEFT GASTRONEMIUS COMPRESS: NORMAL
BH CV LOWER VASCULAR LEFT GREATER SAPH AK COMPRESS: NORMAL
BH CV LOWER VASCULAR LEFT GREATER SAPH BK COMPRESS: NORMAL
BH CV LOWER VASCULAR LEFT LESSER SAPH COMPRESS: NORMAL
BH CV LOWER VASCULAR LEFT MID FEMORAL AUGMENT: NORMAL
BH CV LOWER VASCULAR LEFT MID FEMORAL COMPETENT: NORMAL
BH CV LOWER VASCULAR LEFT MID FEMORAL COMPRESS: NORMAL
BH CV LOWER VASCULAR LEFT MID FEMORAL PHASIC: NORMAL
BH CV LOWER VASCULAR LEFT MID FEMORAL SPONT: NORMAL
BH CV LOWER VASCULAR LEFT PERONEAL COMPRESS: NORMAL
BH CV LOWER VASCULAR LEFT POPLITEAL AUGMENT: NORMAL
BH CV LOWER VASCULAR LEFT POPLITEAL COMPETENT: NORMAL
BH CV LOWER VASCULAR LEFT POPLITEAL COMPRESS: NORMAL
BH CV LOWER VASCULAR LEFT POPLITEAL PHASIC: NORMAL
BH CV LOWER VASCULAR LEFT POPLITEAL SPONT: NORMAL
BH CV LOWER VASCULAR LEFT POSTERIOR TIBIAL COMPRESS: NORMAL
BH CV LOWER VASCULAR LEFT PROFUNDA FEMORAL COMPRESS: NORMAL
BH CV LOWER VASCULAR LEFT PROXIMAL FEMORAL COMPRESS: NORMAL
BH CV LOWER VASCULAR LEFT SAPHENOFEMORAL JUNCTION COMPRESS: NORMAL
BH CV LOWER VASCULAR RIGHT COMMON FEMORAL AUGMENT: NORMAL
BH CV LOWER VASCULAR RIGHT COMMON FEMORAL COMPETENT: NORMAL
BH CV LOWER VASCULAR RIGHT COMMON FEMORAL COMPRESS: NORMAL
BH CV LOWER VASCULAR RIGHT COMMON FEMORAL PHASIC: NORMAL
BH CV LOWER VASCULAR RIGHT COMMON FEMORAL SPONT: NORMAL
BUPRENORPHINE SERPL-MCNC: POSITIVE NG/ML
POC AMPHETAMINES: NEGATIVE
POC BARBITURATES: NEGATIVE
POC BENZODIAZEPHINES: NEGATIVE
POC COCAINE: NEGATIVE
POC METHADONE: NEGATIVE
POC METHAMPHETAMINE SCREEN URINE: NEGATIVE
POC OPIATES: NEGATIVE
POC OXYCODONE: NEGATIVE
POC PHENCYCLIDINE: NEGATIVE
POC PROPOXYPHENE: NEGATIVE
POC THC: NEGATIVE
POC TRICYCLIC ANTIDEPRESSANTS: NEGATIVE

## 2020-09-14 PROCEDURE — 93971 EXTREMITY STUDY: CPT

## 2020-09-14 PROCEDURE — 99214 OFFICE O/P EST MOD 30 MIN: CPT | Performed by: NURSE PRACTITIONER

## 2020-09-14 PROCEDURE — 80305 DRUG TEST PRSMV DIR OPT OBS: CPT | Performed by: NURSE PRACTITIONER

## 2020-09-14 PROCEDURE — 99282 EMERGENCY DEPT VISIT SF MDM: CPT

## 2020-09-14 NOTE — ED PROVIDER NOTES
EMERGENCY DEPARTMENT ENCOUNTER    Room Number:  24/24  Date of encounter:  9/14/2020  PCP: Claudio Anne MD  Historian: Patient      HPI:  Chief Complaint: Left lower leg swelling and pain  A complete HPI/ROS/PMH/PSH/SH/FH are unobtainable due to: None    Context: Iraida Elizabeth is a 75 y.o. female who presents to the ED via private vehicle after being referred from the pain management clinic with concerns for possible DVT in the left lower leg.  Patient has had 2 weeks of intermittent swelling and pain in her left lower leg, history of DVT in the right leg.  Not currently on any anticoagulation, denies any recent travel or recent surgeries or recent injuries.  Denies any redness or fevers.  Pain is on the anterior lateral aspect of the proximal calf.  Denies any chest pain or shortness of breath.      MEDICAL RECORD REVIEW    Office visit reviewed from earlier today with ART Blanca with Pain Medicine, reviewed the note which suggested ED follow-up for possible left lower extremity DVT.  Patient sees pain management for chronic lower back pain.    PAST MEDICAL HISTORY  Active Ambulatory Problems     Diagnosis Date Noted   • Stage 3 chronic kidney disease (CMS/[facility]) 05/26/2011   • Uncontrolled type II diabetes mellitus with nephropathy (CMS/[facility]) 01/03/2013   • Essential hypertension    • Other hyperlipidemia    • Personal history of malignant neoplasm of breast 01/01/2001   • Acquired hypothyroidism    • Hypoglycemia due to endogenous hyperinsulinemia 12/10/2015   • Systolic murmur 12/10/2015   • Grief reaction 06/21/2016   • Menopause 06/21/2016   • Osteopenia 07/17/2016   • Encounter for long-term (current) use of insulin (CMS/[facility]) 08/29/2016   • Hyperinsulinism 08/29/2016   • Exertional shortness of breath 11/13/2017   • Uncontrolled type 2 diabetes mellitus with diabetic neuropathy, with long-term current use of insulin (CMS/[facility]) 01/16/2018   • Uncontrolled type 2 diabetes mellitus with complication, with  long-term current use of insulin (CMS/[facility]) 01/16/2018   • Arthralgia of multiple sites 05/14/2018   • DDD (degenerative disc disease), lumbar 08/07/2018   • Right leg weakness 08/07/2018   • Other chronic pain 08/14/2018   • Localized edema 08/16/2018   • Chronic midline low back pain with bilateral sciatica 09/21/2018   • Insomnia due to medical condition 09/24/2018   • Right hip pain 10/17/2018   • Hyperlipidemia associated with type 2 diabetes mellitus (CMS/[facility]) 12/19/2018   • History of DVT (deep vein thrombosis) 03/24/2019   • Venous insufficiency of both lower extremities 05/02/2019   • Arthritis of left ankle 11/20/2019   • Major depressive disorder with single episode, in full remission (CMS/[facility]) 01/02/2020   • Encounter for long-term (current) use of high-risk medication 01/03/2020   • Lumbar facet arthropathy 02/14/2020     Resolved Ambulatory Problems     Diagnosis Date Noted   • Arthropathy of knee 05/22/2014   • Arthropathy, multiple sites 04/12/2012   • Moderate single current episode of major depressive disorder (CMS/[facility]) 04/02/2001   • Controlled type 2 diabetes mellitus with diabetic neuropathy, with long-term current use of insulin (CMS/[facility])    • History of gynecological procedure 03/01/2010   • Abnormal weight gain 12/10/2015   • Diabetes (CMS/[facility]) 12/10/2015   • Fatigue 12/10/2015   • Type II diabetes mellitus with neurological manifestations, uncontrolled (CMS/[facility]) 08/29/2016   • Uncontrolled type 2 diabetes mellitus with complication, with long-term current use of insulin (CMS/[facility]) 02/14/2017   • Infectious colitis 08/07/2018   • Anemia of unknown etiology 01/02/2020     Past Medical History:   Diagnosis Date   • Bulging lumbar disc    • Cancer (CMS/[facility])    • Chronic kidney disease, stage III (moderate) (CMS/[facility]) 05/26/2011   • Controlled diabetes mellitus type II without complication (CMS/[facility])    • Deep vein blood clot of right lower extremity (CMS/[facility]) 03/2019   • Depression 04/02/2001   •  Dyspnea    • Fall 2015   • History of complete eye exam 03/29/2012   • Hyperlipidemia    • Hypothyroidism    • Low back pain    • Peripheral neuropathy    • Thyroid disease    • Wrist fracture, left 2008         PAST SURGICAL HISTORY  Past Surgical History:   Procedure Laterality Date   • ANKLE ARTHROPLASTY Left 11/20/2019   • BREAST LUMPECTOMY Right 2001   • OTHER SURGICAL HISTORY Left 01/2015    ankle fracture repair   • VARICOSE VEIN SURGERY     • VEIN LIGATION AND STRIPPING Right 8/26/2019    Procedure: RIGHT LEG PHLEBECTOMY WITH SAPHENO FEMORAL JUNCTION LIGATION;  Surgeon: Jimenez Young MD;  Location: [facility] OR;  Service: Vascular         FAMILY HISTORY  Family History   Problem Relation Age of Onset   • Hypertension Sister    • Cancer Sister    • Thyroid disease Daughter    • Cancer Mother    • Heart disease Father    • Bleeding Disorder Sister    • Malig Hyperthermia Neg Hx          SOCIAL HISTORY  Social History     Socioeconomic History   • Marital status:      Spouse name: Not on file   • Number of children: 4   • Years of education: 12   • Highest education level: High school graduate   Occupational History   • Occupation: RETIRED   Social Needs   • Financial resource strain: Patient refused   • Food insecurity     Worry: Patient refused     Inability: Patient refused   • Transportation needs     Medical: Patient refused     Non-medical: Patient refused   Tobacco Use   • Smoking status: Never Smoker   • Smokeless tobacco: Never Used   Substance and Sexual Activity   • Alcohol use: No   • Drug use: No   • Sexual activity: Defer   Social History Narrative    LIVES ALONE         ALLERGIES  Tizanidine, Tree nut, and Vancomycin        REVIEW OF SYSTEMS  Review of Systems     All systems reviewed and negative except for those discussed in HPI.       PHYSICAL EXAM    I have reviewed the triage vital signs and nursing notes.    ED Triage Vitals [09/14/20 1505]   Temp Heart Rate Resp BP SpO2    98.2 °F (36.8 °C) 90 16 167/70 98 %      Temp src Heart Rate Source Patient Position BP Location FiO2 (%)   Tympanic Monitor -- -- --       Physical Exam  General: Awake, alert, no acute distress  HEENT: EOMI  Pulm: Symmetric chest rise, nonlabored breathing  CV: Regular rate and rhythm, intact distal pulses, nonpitting edema bilateral lower extremities but left greater than right in the ankle and distal lower leg region.  GI: Non-distended  MSK: No deformity, mild tenderness to the left calf greater than the right calf without erythema or abnormal heat.  Anterior lower left leg surgical scar  Skin: Warm, dry  Neuro: Alert and oriented x 3, moving all extremities, no focal deficits  Psych: Calm, cooperative    Vital signs and nursing notes reviewed.       Surgical mask, protective eye goggles, and gloves used during this encounter. Patient in surgical mask.      LAB RESULTS  Recent Results (from the past 24 hour(s))   POC Urine Drug Screen, Triage    Collection Time: 09/14/20  3:31 PM    Specimen: Urine   Result Value Ref Range    Methamphetaine Screen, Urine Negative Negative    POC Amphetamines Negative Negative    Barbiturates Screen Negative Negative    Benzodiazepine Screen Negative Negative    Cocaine Screen Negative Negative    Methadone Screen Negative Negative    Opiate Screen Negative Negative    Oxycodone, Screen Negative Negative    Phencyclidine (PCP) Screen Negative Negative    Propoxyphene Screen Negative Negative    THC, Screen Negative Negative    Tricyclic Antidepressants Screen Negative Negative    Buprenorphine, Screen, Urine Positive (A) Negative   Duplex Venous Lower Extremity - Left CAR    Collection Time: 09/14/20  5:45 PM   Result Value Ref Range    Right Common Femoral Spont Y     Right Common Femoral Phasic Y     Right Common Femoral Augment Y     Right Common Femoral Competent Y     Right Common Femoral Compress C     Left Common Femoral Spont Y     Left Common Femoral Phasic Y     Left  Common Femoral Augment Y     Left Common Femoral Competent Y     Left Common Femoral Compress C     Left Saphenofemoral Junction Compress C     Left Profunda Femoral Compress C     Left Proximal Femoral Compress C     Left Mid Femoral Spont Y     Left Mid Femoral Phasic Y     Left Mid Femoral Augment Y     Left Mid Femoral Competent Y     Left Mid Femoral Compress C     Left Distal Femoral Compress C     Left Popliteal Spont Y     Left Popliteal Phasic Y     Left Popliteal Augment Y     Left Popliteal Competent Y     Left Popliteal Compress C     Left Posterior Tibial Compress C     Left Peroneal Compress C     Left GastronemiusSoleal Compress C     Left Greater Saph AK Compress C     Left Greater Saph BK Compress C     Left Lesser Saph Compress C        Ordered the above labs and independently reviewed the results.        RADIOLOGY  No Radiology Exams Resulted Within Past 24 Hours        PROCEDURES    Procedures      MEDICATIONS GIVEN IN ER    Medications - No data to display      PROGRESS, DATA ANALYSIS, CONSULTS, AND MEDICAL DECISION MAKING    All labs have been independently reviewed by me.  All radiology studies have been reviewed by me and discussed with radiologist dictating the report.   EKG's independently viewed and interpreted by me.  Discussion below represents my analysis of pertinent findings related to patient's condition, differential diagnosis, treatment plan and final disposition.    Ultrasound obtained to evaluate for possible DVT, no evidence of cellulitis on exam.    ED Course as of Sep 14 1807   Mon Sep 14, 2020   1753 Vascular  called, negative for DVT in the left lower extremity    [DC]   1757 At this point, with a negative DVT ultrasound, and no evidence of infection on exam, I do not have any acute further emergent concerns.  Plan for PCP follow-up and ED return for worsening symptoms as needed.  Continue current medications.    [DC]      ED Course User Index  [DC] Fawad Reed,  MD       AS OF 18:07 EDT VITALS:    BP - 167/70  HR - 90  TEMP - 98.2 °F (36.8 °C) (Tympanic)  02 SATS - 98%        DIAGNOSIS  Final diagnoses:   Lower extremity edema         DISPOSITION  DISCHARGE    Patient discharged in stable condition.    Reviewed implications of results, diagnosis, meds, responsibility to follow up, warning signs and symptoms of possible worsening, potential complications and reasons to return to ER.    Patient/Family voiced understanding of above instructions.    Discussed plan for discharge, as there is no emergent indication for admission. Patient referred to primary care provider for BP management due to today's BP. Pt/family is agreeable and understands need for follow up and repeat testing.  Pt is aware that discharge does not mean that nothing is wrong but it indicates no emergency is present that requires admission and they must continue care with follow-up as given below or physician of their choice.     FOLLOW-UP  [facility] Emergency Department  [address] [facility] [address] [phone]    As needed, If symptoms worsen    Claudio Anne MD  [address] [address]  [phone]    Schedule an appointment as soon as possible for a visit   As needed         Medication List      Changed    Buprenorphine HCl film  Commonly known as: BELBUCA  Apply 1 film to cheek 2 (two) times a day. DNF 9/23/2020  What changed: additional instructions           Where to Get Your Medications      These medications were sent to [address] [address] [facility] - [phone]  - [phone]   [address] [address] [id number]    Phone: [phone]   · Buprenorphine HCl film                    Fawad Reed MD  09/14/20 8880

## 2020-09-14 NOTE — PROGRESS NOTES
"CHIEF COMPLAINT  F/u BACK PAIN- PATIENT STATES THAT HER PAIN HAS WORSENED SINCE HER LAST VISIT.     Subjective   Iraida Elizabeth is a 75 y.o. female  who presents for follow-up.  She has a history of back pain.  Today her pain is 8/10VAS in severity. Patient states that her pain has worsened.  She states that her pain in her bilateral legs is increasing.  She continues with Belbuca 300 mg PO BID. She states \"I can't imagine what my pain would be like without it\".  She has been utilizing both heating pad and ice. She was evaluated by PT, but patient states she can not afford the cost per session ($50).     Bilateral S1 TFESI on 7/30/2020 performed by Dr. Miranda. Previously reported 85% relief x 2 weeks and then her pain returned to baseline.  Patient complains of worsening shortness of breath when she has been evaluated by pulmonology for, and is now pending a right and left heart cath with cardiology.  We discussed that it would not be recommended to proceed with any further procedures until she has completed her work-up with cardiology.  We also discussed that with her increasing shortness of breath it would not be recommended to increase her Belbuca for her pain at this time as this does have a side effect of respiratory depression.  Patient states understanding.    Patient complains of left calf pain today with is new to me.  She has bilateral lower extremity edema L>R. Patient also reports intermittent left calf redness and warmth which occurs towards the end of the day. Patient also reports intermittent Right calf pain as well as a history of right lower extremity DVT. Patient advised to go to the hospital for evaluation of possible DVT.     Back Pain  This is a chronic (chronic) problem. The current episode started more than 1 year ago. The problem occurs constantly. The problem has been gradually worsening since onset. The pain is present in the lumbar spine and sacro-iliac. The quality of the pain is " described as burning. The pain radiates to the left foot, left thigh, left knee, right foot, right knee and right thigh. The pain is at a severity of 8/10. The pain is moderate. The symptoms are aggravated by bending, position, standing and twisting (laying flat). Associated symptoms include numbness and weakness. Pertinent negatives include no abdominal pain, chest pain, dysuria, fever or headaches. She has tried analgesics (Ranulfo S1 TFESI ) for the symptoms. The treatment provided moderate relief.      PEG Assessment   What number best describes your pain on average in the past week?7  What number best describes how, during the past week, pain has interfered with your enjoyment of life?7  What number best describes how, during the past week, pain has interfered with your general activity?  8    The following portions of the patient's history were reviewed and updated as appropriate: allergies, current medications, past family history, past medical history, past social history, past surgical history and problem list.    Review of Systems   Constitutional: Positive for activity change (DECREASED) and fatigue. Negative for chills and fever.   HENT: Negative for congestion.    Eyes: Negative for visual disturbance.   Respiratory: Positive for shortness of breath. Negative for chest tightness.    Cardiovascular: Negative for chest pain.   Gastrointestinal: Positive for blood in stool. Negative for abdominal pain, constipation and diarrhea.   Genitourinary: Negative for difficulty urinating, dyspareunia and dysuria.   Musculoskeletal: Positive for back pain.   Neurological: Positive for dizziness, weakness, light-headedness and numbness. Negative for headaches.   Psychiatric/Behavioral: Positive for sleep disturbance. Negative for agitation. The patient is not nervous/anxious.      --  The aforementioned information the Chief Complaint section and above subjective data including any HPI data, and also the Review of  "Systems data, has been personally reviewed and affirmed.  --    Vitals:    20 1323   BP: 150/75   Pulse: 79   Resp: 16   Temp: 97.9 °F (36.6 °C)   SpO2: 97%   Weight: 102 kg (225 lb 6.4 oz)   Height: 154.9 cm (61\")   PainSc:   8   PainLoc: Back     Objective   Physical Exam  Vitals signs and nursing note reviewed.   Constitutional:       Appearance: Normal appearance. She is well-developed.   HENT:      Head: Normocephalic and atraumatic.   Eyes:      General: Lids are normal.      Conjunctiva/sclera: Conjunctivae normal.   Neck:      Musculoskeletal: Normal range of motion and neck supple.      Trachea: Trachea normal.   Cardiovascular:      Rate and Rhythm: Normal rate.   Pulmonary:      Effort: Pulmonary effort is normal.   Musculoskeletal:      Lumbar back: She exhibits decreased range of motion and tenderness.      Right lower le+ Edema present.      Left lower leg: She exhibits tenderness. 3+ Edema present.   Skin:     General: Skin is warm and dry.   Neurological:      Mental Status: She is alert and oriented to person, place, and time.   Psychiatric:         Speech: Speech normal.         Behavior: Behavior normal.         Judgment: Judgment normal.       Assessment/Plan   Iraida was seen today for back pain.    Diagnoses and all orders for this visit:    Encounter for long-term (current) use of high-risk medication    DDD (degenerative disc disease), lumbar    Chronic midline low back pain with bilateral sciatica    Other chronic pain    Bilateral calf pain    History of DVT (deep vein thrombosis)      --- Patient advised to go to the ED for evaluation of possible left lower extremity DVT.  --- Routine UDS in office today as part of monitoring requirements for controlled substances.  The specimen was viewed and the immunoassay result reviewed and is +BUP.  This specimen will be sent to [facility] laboratory for confirmation.     --- Refill Belbuca 300 mcg film BID. DNF 2020 applied. Patient " appears stable with current regimen. No adverse effects. Regarding continuation of opioids, there is no evidence of aberrant behavior or any red flags.  The patient continues with appropriate response to opioid therapy. ADL's remain intact by self.   --- Follow-up 1 month or sooner if needed     DONNY REPORT  As part of the patient's treatment plan, I am prescribing controlled substances. The patient has been made aware of appropriate use of such medications, including potential risk of somnolence, limited ability to drive and/or work safely, and the potential for dependence or overdose. It has also bee made clear that these medications are for use by this patient only, without concomitant use of alcohol or other substances unless prescribed.     Patient has completed prescribing agreement detailing terms of continued prescribing of controlled substances, including monitoring DONNY reports, urine drug screening, and pill counts if necessary. The patient is aware that inappropriate use will results in cessation of prescribing such medications.    DONNY report has been reviewed and scanned into the patient's chart.    As the clinician, I personally reviewed the DONNY from 9/11/2020 while the patient was in the office today.    History and physical exam exhibit continued safe and appropriate use of controlled substances.    EMR Dragon/Transcription disclaimer:   Much of this encounter note is an electronic transcription/translation of spoken language to printed text. The electronic translation of spoken language may permit erroneous, or at times, nonsensical words or phrases to be inadvertently transcribed; Although I have reviewed the note for such errors, some may still exist.

## 2020-09-14 NOTE — DISCHARGE INSTRUCTIONS
Continue current medications, elevate legs when at rest, compression stockings when able, PCP follow-up for recheck as needed, ED return for worsening symptoms as needed.

## 2020-09-14 NOTE — ED NOTES
Patient was placed in face mask in first look. Patient was wearing facemask when I entered the room and throughout our encounter. I wore full protective equipment throughout this patient encounter including a face mask, and gloves. Hand hygiene was performed before donning protective equipment and after removal when leaving the room.       Miguel Paniagua RN  09/14/20 0457

## 2020-09-14 NOTE — ED TRIAGE NOTES
Pt was at pain clinic c/o left lower leg pain and swelling.  Sent here for doppler    Patient was placed in face mask during first look triage.  Patient was wearing a face mask throughout encounter.  I wore personal protective equipment throughout the encounter.  Hand hygiene was performed before and after patient encounter.

## 2020-09-14 NOTE — PROGRESS NOTES
9/14/20 preliminary results are negative for acute deep vein thrombosis within the left lower extremity. Results called to Dr. Reed.

## 2020-09-17 NOTE — TELEPHONE ENCOUNTER
Emelia or Angela    Would one of you be so kind to please do this peer to peer?  It is set to close tomorrow.    Thank you  Viv FERRO

## 2020-09-18 ENCOUNTER — TRANSCRIBE ORDERS (OUTPATIENT)
Dept: CARDIOLOGY | Facility: CLINIC | Age: 75
End: 2020-09-18

## 2020-09-18 DIAGNOSIS — Z01.810 PRE-OPERATIVE CARDIOVASCULAR EXAMINATION: Primary | ICD-10-CM

## 2020-09-18 DIAGNOSIS — Z13.6 SCREENING FOR ISCHEMIC HEART DISEASE: ICD-10-CM

## 2020-09-18 PROBLEM — I20.0 UNSTABLE ANGINA (HCC): Status: ACTIVE | Noted: 2020-09-18

## 2020-09-18 PROBLEM — R06.09 DOE (DYSPNEA ON EXERTION): Status: ACTIVE | Noted: 2020-09-18

## 2020-09-21 ENCOUNTER — PATIENT OUTREACH (OUTPATIENT)
Dept: CASE MANAGEMENT | Facility: OTHER | Age: 75
End: 2020-09-21

## 2020-09-21 NOTE — OUTREACH NOTE
Patient Outreach Note    Spoke with patient briefly regarding her health and wellness. Introduced self, explained ACM RN role and provided contact information. Reviewed patient's follow up after the ED visit for leg swelling. Patient is scheduled for a cardiac cath on 9/29/20 for further evaluation. Patient declines further ACM outreach and was appreciative of the call.     Brenda Lubin RN  Ambulatory     9/21/2020, 16:41 EDT

## 2020-09-22 ENCOUNTER — TELEPHONE (OUTPATIENT)
Dept: ENDOCRINOLOGY | Age: 75
End: 2020-09-22

## 2020-09-22 ENCOUNTER — TRANSCRIBE ORDERS (OUTPATIENT)
Dept: SLEEP MEDICINE | Facility: HOSPITAL | Age: 75
End: 2020-09-22

## 2020-09-22 DIAGNOSIS — Z01.818 OTHER SPECIFIED PRE-OPERATIVE EXAMINATION: Primary | ICD-10-CM

## 2020-09-22 NOTE — TELEPHONE ENCOUNTER
LOOKING FOR THE STATUS FOR THE MONITOR FOR MONITORING HER B/S    SHE HAS HEARD NOTHING WITHIN LAST TWO WEEKS    PLEASE CALL PATIENT [phone]

## 2020-09-25 ENCOUNTER — LAB (OUTPATIENT)
Dept: LAB | Facility: HOSPITAL | Age: 75
End: 2020-09-25

## 2020-09-25 DIAGNOSIS — Z01.810 PRE-OPERATIVE CARDIOVASCULAR EXAMINATION: ICD-10-CM

## 2020-09-25 DIAGNOSIS — Z13.6 SCREENING FOR ISCHEMIC HEART DISEASE: ICD-10-CM

## 2020-09-25 LAB
ANION GAP SERPL CALCULATED.3IONS-SCNC: 8.8 MMOL/L (ref 5–15)
BASOPHILS # BLD AUTO: 0.05 10*3/MM3 (ref 0–0.2)
BASOPHILS NFR BLD AUTO: 0.6 % (ref 0–1.5)
BUN SERPL-MCNC: 26 MG/DL (ref 8–23)
BUN/CREAT SERPL: 23.6 (ref 7–25)
CALCIUM SPEC-SCNC: 9.2 MG/DL (ref 8.6–10.5)
CHLORIDE SERPL-SCNC: 101 MMOL/L (ref 98–107)
CO2 SERPL-SCNC: 29.2 MMOL/L (ref 22–29)
CREAT SERPL-MCNC: 1.1 MG/DL (ref 0.57–1)
DEPRECATED RDW RBC AUTO: 64.3 FL (ref 37–54)
EOSINOPHIL # BLD AUTO: 0.87 10*3/MM3 (ref 0–0.4)
EOSINOPHIL NFR BLD AUTO: 11.2 % (ref 0.3–6.2)
ERYTHROCYTE [DISTWIDTH] IN BLOOD BY AUTOMATED COUNT: 17.8 % (ref 12.3–15.4)
GFR SERPL CREATININE-BSD FRML MDRD: 48 ML/MIN/1.73
GLUCOSE SERPL-MCNC: 188 MG/DL (ref 65–99)
HCT VFR BLD AUTO: 43.2 % (ref 34–46.6)
HGB BLD-MCNC: 13.4 G/DL (ref 12–15.9)
IMM GRANULOCYTES # BLD AUTO: 0.02 10*3/MM3 (ref 0–0.05)
IMM GRANULOCYTES NFR BLD AUTO: 0.3 % (ref 0–0.5)
LYMPHOCYTES # BLD AUTO: 0.8 10*3/MM3 (ref 0.7–3.1)
LYMPHOCYTES NFR BLD AUTO: 10.3 % (ref 19.6–45.3)
MCH RBC QN AUTO: 30.2 PG (ref 26.6–33)
MCHC RBC AUTO-ENTMCNC: 31 G/DL (ref 31.5–35.7)
MCV RBC AUTO: 97.3 FL (ref 79–97)
MONOCYTES # BLD AUTO: 0.6 10*3/MM3 (ref 0.1–0.9)
MONOCYTES NFR BLD AUTO: 7.8 % (ref 5–12)
NEUTROPHILS NFR BLD AUTO: 5.4 10*3/MM3 (ref 1.7–7)
NEUTROPHILS NFR BLD AUTO: 69.8 % (ref 42.7–76)
NRBC BLD AUTO-RTO: 0 /100 WBC (ref 0–0.2)
PLATELET # BLD AUTO: 239 10*3/MM3 (ref 140–450)
PMV BLD AUTO: 10.7 FL (ref 6–12)
POTASSIUM SERPL-SCNC: 5 MMOL/L (ref 3.5–5.2)
RBC # BLD AUTO: 4.44 10*6/MM3 (ref 3.77–5.28)
SODIUM SERPL-SCNC: 139 MMOL/L (ref 136–145)
WBC # BLD AUTO: 7.74 10*3/MM3 (ref 3.4–10.8)

## 2020-09-25 PROCEDURE — 80048 BASIC METABOLIC PNL TOTAL CA: CPT

## 2020-09-25 PROCEDURE — 36415 COLL VENOUS BLD VENIPUNCTURE: CPT

## 2020-09-25 PROCEDURE — 85025 COMPLETE CBC W/AUTO DIFF WBC: CPT

## 2020-09-26 ENCOUNTER — LAB (OUTPATIENT)
Dept: LAB | Facility: HOSPITAL | Age: 75
End: 2020-09-26

## 2020-09-26 DIAGNOSIS — Z01.818 OTHER SPECIFIED PRE-OPERATIVE EXAMINATION: ICD-10-CM

## 2020-09-26 PROCEDURE — U0004 COV-19 TEST NON-CDC HGH THRU: HCPCS

## 2020-09-26 PROCEDURE — C9803 HOPD COVID-19 SPEC COLLECT: HCPCS

## 2020-09-28 LAB — SARS-COV-2 RNA RESP QL NAA+PROBE: NOT DETECTED

## 2020-09-29 ENCOUNTER — HOSPITAL ENCOUNTER (OUTPATIENT)
Facility: HOSPITAL | Age: 75
Setting detail: HOSPITAL OUTPATIENT SURGERY
Discharge: HOME | End: 2020-09-29
Attending: INTERNAL MEDICINE | Admitting: INTERNAL MEDICINE

## 2020-09-29 VITALS
SYSTOLIC BLOOD PRESSURE: 130 MMHG | OXYGEN SATURATION: 92 % | TEMPERATURE: 99.5 F | BODY MASS INDEX: 40.91 KG/M2 | HEART RATE: 66 BPM | DIASTOLIC BLOOD PRESSURE: 61 MMHG | HEIGHT: 62 IN | WEIGHT: 222.3 LBS | RESPIRATION RATE: 16 BRPM

## 2020-09-29 DIAGNOSIS — I20.0 UNSTABLE ANGINA (HCC): ICD-10-CM

## 2020-09-29 DIAGNOSIS — R06.09 DOE (DYSPNEA ON EXERTION): ICD-10-CM

## 2020-09-29 LAB
GLUCOSE BLDC GLUCOMTR-MCNC: 93 MG/DL (ref 70–130)
GLUCOSE BLDC GLUCOMTR-MCNC: 93 MG/DL (ref 70–130)
HCT VFR BLDA CALC: 40 % (ref 38–51)
HGB BLDA-MCNC: 13.6 G/DL (ref 12–17)
SAO2 % BLDA: 79 % (ref 95–98)

## 2020-09-29 PROCEDURE — S0260 H&P FOR SURGERY: HCPCS | Performed by: INTERNAL MEDICINE

## 2020-09-29 PROCEDURE — 93571 IV DOP VEL&/PRESS C FLO 1ST: CPT | Performed by: INTERNAL MEDICINE

## 2020-09-29 PROCEDURE — 85014 HEMATOCRIT: CPT

## 2020-09-29 PROCEDURE — 0 IOPAMIDOL PER 1 ML: Performed by: INTERNAL MEDICINE

## 2020-09-29 PROCEDURE — 85018 HEMOGLOBIN: CPT

## 2020-09-29 PROCEDURE — 25010000002 FENTANYL CITRATE (PF) 100 MCG/2ML SOLUTION: Performed by: INTERNAL MEDICINE

## 2020-09-29 PROCEDURE — 25010000002 MIDAZOLAM PER 1 MG: Performed by: INTERNAL MEDICINE

## 2020-09-29 PROCEDURE — 93460 R&L HRT ART/VENTRICLE ANGIO: CPT | Performed by: INTERNAL MEDICINE

## 2020-09-29 PROCEDURE — C1769 GUIDE WIRE: HCPCS | Performed by: INTERNAL MEDICINE

## 2020-09-29 PROCEDURE — 25010000002 HEPARIN (PORCINE) PER 1000 UNITS: Performed by: INTERNAL MEDICINE

## 2020-09-29 PROCEDURE — C1894 INTRO/SHEATH, NON-LASER: HCPCS | Performed by: INTERNAL MEDICINE

## 2020-09-29 PROCEDURE — 82962 GLUCOSE BLOOD TEST: CPT

## 2020-09-29 PROCEDURE — C1887 CATHETER, GUIDING: HCPCS | Performed by: INTERNAL MEDICINE

## 2020-09-29 RX ORDER — SODIUM CHLORIDE 9 MG/ML
100 INJECTION, SOLUTION INTRAVENOUS CONTINUOUS
Status: DISCONTINUED | OUTPATIENT
Start: 2020-09-29 | End: 2020-09-29 | Stop reason: HOSPADM

## 2020-09-29 RX ORDER — MIDAZOLAM HYDROCHLORIDE 1 MG/ML
INJECTION INTRAMUSCULAR; INTRAVENOUS AS NEEDED
Status: DISCONTINUED | OUTPATIENT
Start: 2020-09-29 | End: 2020-09-29 | Stop reason: HOSPADM

## 2020-09-29 RX ORDER — LIDOCAINE HYDROCHLORIDE 10 MG/ML
0.1 INJECTION, SOLUTION EPIDURAL; INFILTRATION; INTRACAUDAL; PERINEURAL ONCE AS NEEDED
Status: DISCONTINUED | OUTPATIENT
Start: 2020-09-29 | End: 2020-09-29 | Stop reason: HOSPADM

## 2020-09-29 RX ORDER — ONDANSETRON 4 MG/1
4 TABLET, FILM COATED ORAL EVERY 6 HOURS PRN
Status: DISCONTINUED | OUTPATIENT
Start: 2020-09-29 | End: 2020-09-29 | Stop reason: HOSPADM

## 2020-09-29 RX ORDER — ACETAMINOPHEN 325 MG/1
650 TABLET ORAL EVERY 4 HOURS PRN
Status: DISCONTINUED | OUTPATIENT
Start: 2020-09-29 | End: 2020-09-29 | Stop reason: HOSPADM

## 2020-09-29 RX ORDER — SODIUM CHLORIDE 0.9 % (FLUSH) 0.9 %
10 SYRINGE (ML) INJECTION AS NEEDED
Status: DISCONTINUED | OUTPATIENT
Start: 2020-09-29 | End: 2020-09-29 | Stop reason: HOSPADM

## 2020-09-29 RX ORDER — HYDROCODONE BITARTRATE AND ACETAMINOPHEN 5; 325 MG/1; MG/1
1 TABLET ORAL EVERY 4 HOURS PRN
Status: DISCONTINUED | OUTPATIENT
Start: 2020-09-29 | End: 2020-09-29 | Stop reason: HOSPADM

## 2020-09-29 RX ORDER — SODIUM CHLORIDE 0.9 % (FLUSH) 0.9 %
3 SYRINGE (ML) INJECTION EVERY 12 HOURS SCHEDULED
Status: DISCONTINUED | OUTPATIENT
Start: 2020-09-29 | End: 2020-09-29 | Stop reason: HOSPADM

## 2020-09-29 RX ORDER — LIDOCAINE HYDROCHLORIDE 20 MG/ML
INJECTION, SOLUTION INFILTRATION; PERINEURAL AS NEEDED
Status: DISCONTINUED | OUTPATIENT
Start: 2020-09-29 | End: 2020-09-29 | Stop reason: HOSPADM

## 2020-09-29 RX ORDER — FENTANYL CITRATE 50 UG/ML
INJECTION, SOLUTION INTRAMUSCULAR; INTRAVENOUS AS NEEDED
Status: DISCONTINUED | OUTPATIENT
Start: 2020-09-29 | End: 2020-09-29 | Stop reason: HOSPADM

## 2020-09-29 RX ORDER — SODIUM CHLORIDE 9 MG/ML
75 INJECTION, SOLUTION INTRAVENOUS CONTINUOUS
Status: DISCONTINUED | OUTPATIENT
Start: 2020-09-29 | End: 2020-09-29 | Stop reason: HOSPADM

## 2020-09-29 RX ORDER — ONDANSETRON 2 MG/ML
4 INJECTION INTRAMUSCULAR; INTRAVENOUS EVERY 6 HOURS PRN
Status: DISCONTINUED | OUTPATIENT
Start: 2020-09-29 | End: 2020-09-29 | Stop reason: HOSPADM

## 2020-09-29 RX ADMIN — SODIUM CHLORIDE 75 ML/HR: 9 INJECTION, SOLUTION INTRAVENOUS at 09:11

## 2020-10-04 LAB
HCT VFR BLDA CALC: 39 % (ref 38–51)
HCT VFR BLDA CALC: 40 % (ref 38–51)
HGB BLDA-MCNC: 13.3 G/DL (ref 12–17)
HGB BLDA-MCNC: 13.6 G/DL (ref 12–17)
SAO2 % BLDA: 70 % (ref 95–98)
SAO2 % BLDA: 94 % (ref 95–98)

## 2020-10-08 RX ORDER — CALCIUM CARB/VITAMIN D3/VIT K1 500-100-40
TABLET,CHEWABLE ORAL
Qty: 200 EACH | Refills: 4 | Status: SHIPPED | OUTPATIENT
Start: 2020-10-08

## 2020-10-14 ENCOUNTER — OFFICE VISIT (OUTPATIENT)
Dept: PAIN MEDICINE | Facility: CLINIC | Age: 75
End: 2020-10-14

## 2020-10-14 VITALS
TEMPERATURE: 97.1 F | OXYGEN SATURATION: 97 % | DIASTOLIC BLOOD PRESSURE: 65 MMHG | SYSTOLIC BLOOD PRESSURE: 136 MMHG | RESPIRATION RATE: 20 BRPM | HEIGHT: 62 IN | BODY MASS INDEX: 41.59 KG/M2 | HEART RATE: 68 BPM | WEIGHT: 226 LBS

## 2020-10-14 DIAGNOSIS — M54.41 CHRONIC MIDLINE LOW BACK PAIN WITH BILATERAL SCIATICA: ICD-10-CM

## 2020-10-14 DIAGNOSIS — M25.562 CHRONIC PAIN OF LEFT KNEE: ICD-10-CM

## 2020-10-14 DIAGNOSIS — G89.29 CHRONIC MIDLINE LOW BACK PAIN WITH BILATERAL SCIATICA: ICD-10-CM

## 2020-10-14 DIAGNOSIS — Z79.899 ENCOUNTER FOR LONG-TERM (CURRENT) USE OF HIGH-RISK MEDICATION: Primary | ICD-10-CM

## 2020-10-14 DIAGNOSIS — G89.29 CHRONIC PAIN OF LEFT KNEE: ICD-10-CM

## 2020-10-14 DIAGNOSIS — G89.29 OTHER CHRONIC PAIN: ICD-10-CM

## 2020-10-14 DIAGNOSIS — M54.16 LUMBAR RADICULOPATHY: ICD-10-CM

## 2020-10-14 DIAGNOSIS — M54.42 CHRONIC MIDLINE LOW BACK PAIN WITH BILATERAL SCIATICA: ICD-10-CM

## 2020-10-14 PROCEDURE — 99214 OFFICE O/P EST MOD 30 MIN: CPT | Performed by: NURSE PRACTITIONER

## 2020-10-28 ENCOUNTER — OFFICE VISIT (OUTPATIENT)
Dept: GASTROENTEROLOGY | Facility: CLINIC | Age: 75
End: 2020-10-28

## 2020-10-28 VITALS — WEIGHT: 227.4 LBS | BODY MASS INDEX: 42.93 KG/M2 | HEIGHT: 61 IN | TEMPERATURE: 97.6 F

## 2020-10-28 DIAGNOSIS — K62.5 RECTAL BLEEDING: Primary | ICD-10-CM

## 2020-10-28 PROCEDURE — 99213 OFFICE O/P EST LOW 20 MIN: CPT | Performed by: INTERNAL MEDICINE

## 2020-10-28 NOTE — PROGRESS NOTES
Chief Complaint   Patient presents with   • Black or Bloody Stool        Iraida Elizabeth is a  75 y.o. female here for an initial visit for rectal bleeding    HPI this 75-year-old white female patient of Dr. Claudio Anne states she was sent to be evaluated for rectal bleeding.  This is been an off-and-on process for the past several years.  She had initially attributed this to hemorrhoids although she denies any rectal pain.  She also has some sense of urgency to have bowel movements and her bowel pattern ranges from 5-6 stools per day the stools vary in consistency from louise to small caliber bowel movements but no liquid stool.  She denies any weight changes she does admit to occasional reflux which is controlled with antacids.  She recalls having previous colonoscopy 10 years ago with negative findings.  There does seem to be some association with her back pain and that when the pain escalates she seems to note more bleeding.    Past Medical History:   Diagnosis Date   • Anemia of unknown etiology 1/2/2020   • Arthropathy of knee 05/22/2014    unspecified   • Arthropathy, multiple sites 04/12/2012    unspecified   • Bulging lumbar disc    • Cancer (CMS/HCC)     breast   • Chronic kidney disease, stage III (moderate) 05/26/2011   • Controlled diabetes mellitus type II without complication (CMS/HCC)    • Deep vein blood clot of right lower extremity (CMS/HCC) 03/2019   • Depression 04/02/2001   • Dyspnea    • Essential hypertension    • Fall 2015    FRACTURED ANKLE   • History of complete eye exam 03/29/2012   • History of gynecological procedure 03/01/2010    special invesitation and examinations; gynecological examination; routine gynecological examination   • Hyperlipidemia     other and unspecified   • Hypothyroidism     unspecified   • Low back pain    • Moderate single current episode of major depressive disorder (CMS/HCC) 4/2/2001   • Peripheral neuropathy    • Personal history of malignant neoplasm of  "breast 2001    R breast (negative nodes) TX with radiation and Tamoxifen   • Thyroid disease    • Type II diabetes mellitus with neurological manifestations, uncontrolled (CMS/[facility]) 01/03/2013   • Wrist fracture, left 2008       Current Outpatient Medications   Medication Sig Dispense Refill   • amLODIPine (NORVASC) 10 MG tablet Take 1 tablet by mouth Daily. 90 tablet 1   • atorvastatin (LIPITOR) 20 MG tablet Take 1 tablet by mouth Every Night for 180 days. 90 tablet 1   • Buprenorphine HCl (BELBUCA) film Apply 1 film to cheek 2 (two) times a day. DNF 10/23/2020 60 each 0   • EPINEPHrine (EPIPEN) 0.3 MG/0.3ML solution auto-injector injection      • Foot Care Products (DIABETIC INSOLES) misc 1 pair of diabetic shoe  Dx E11.45 1 each 0   • glucose blood (ONE TOUCH ULTRA TEST) test strip PT TO TESTS BS  each 3   • hydroCHLOROthiazide (HYDRODIURIL) 25 MG tablet Take 1 tablet by mouth Daily. 90 tablet 1   • insulin NPH (HUMULIN N) 100 UNIT/ML injection Inject 24 Units under the skin into the appropriate area as directed 2 (Two) Times a Day Before Meals. 20 mL 5   • insulin regular (NOVOLIN R) 100 UNIT/ML injection 14 UNITS AT BREAKFAST, 16 UNITS AT DINNER 10 mL 5   • Insulin Syringe 31G X 5/16\" 0.3 ML misc 4 SC INJECTIONS OF INSULIN DAILY 200 each 4   • Insulin Syringe-Needle U-100 (TRUEPLUS INSULIN SYRINGE) 31G X 5/16\" 1 ML misc USE FOUR TIMES A DAY AS DIRECTED 100 each 3   • levothyroxine (SYNTHROID, LEVOTHROID) 75 MCG tablet Take 1 tablet by mouth Daily for 180 days. 90 tablet 1   • PARoxetine (PAXIL) 20 MG tablet Take 1 tablet by mouth Every Night for 180 days. 90 tablet 1   • RELION INSULIN SYRINGE 31G X 15/64\" 0.3 ML misc USE 1 SYRINGE EACH TO INJECT INSULIN SUBCUTANEOUSLY 4 TIMES DAILY 100 each 0   • valsartan (DIOVAN) 320 MG tablet Take 1 tablet by mouth Daily. 90 tablet 1     No current facility-administered medications for this visit.        PRN Meds:.    Allergies   Allergen Reactions   • Tizanidine " Hallucinations   • Tree Nut Anaphylaxis     almonds   • Vancomycin Itching     Pt c/o itiching and redness in arm after starting vancomycin ivpb       Social History     Socioeconomic History   • Marital status:      Spouse name: Not on file   • Number of children: 4   • Years of education: 12   • Highest education level: High school graduate   Occupational History   • Occupation: RETIRED   Social Needs   • Financial resource strain: Patient refused   • Food insecurity     Worry: Patient refused     Inability: Patient refused   • Transportation needs     Medical: Patient refused     Non-medical: Patient refused   Tobacco Use   • Smoking status: Never Smoker   • Smokeless tobacco: Never Used   Substance and Sexual Activity   • Alcohol use: No   • Drug use: No   • Sexual activity: Defer   Social History Narrative    LIVES ALONE       Family History   Problem Relation Age of Onset   • Hypertension Sister    • Cancer Sister    • Thyroid disease Daughter    • Cancer Mother    • Heart disease Father    • Bleeding Disorder Sister    • Malig Hyperthermia Neg Hx        Review of Systems   Constitutional: Negative for activity change, appetite change, fatigue and unexpected weight change.   HENT: Negative for congestion, facial swelling, sore throat, trouble swallowing and voice change.    Eyes: Negative for photophobia and visual disturbance.   Respiratory: Negative for cough and choking.    Cardiovascular: Negative for chest pain.   Gastrointestinal: Positive for anal bleeding. Negative for abdominal distention, abdominal pain, blood in stool, constipation, diarrhea, nausea, rectal pain and vomiting.        Change in bowel function   Endocrine: Negative for polyphagia.   Musculoskeletal: Positive for back pain. Negative for arthralgias, gait problem and joint swelling.   Skin: Negative for color change, pallor and rash.   Allergic/Immunologic: Negative for food allergies.   Neurological: Negative for speech  difficulty and headaches.   Hematological: Does not bruise/bleed easily.   Psychiatric/Behavioral: Negative for agitation, confusion and sleep disturbance.       Vitals:    10/28/20 1533   Temp: 97.6 °F (36.4 °C)       Physical Exam  Vitals signs reviewed.   Constitutional:       General: She is not in acute distress.     Appearance: She is well-developed. She is not diaphoretic.   HENT:      Head: Normocephalic.      Mouth/Throat:      Pharynx: No oropharyngeal exudate.   Eyes:      General: No scleral icterus.     Conjunctiva/sclera: Conjunctivae normal.   Neck:      Musculoskeletal: Normal range of motion.      Thyroid: No thyromegaly.   Cardiovascular:      Rate and Rhythm: Normal rate and regular rhythm.      Heart sounds: No murmur.   Pulmonary:      Effort: No respiratory distress.      Breath sounds: Normal breath sounds. No wheezing or rales.   Abdominal:      General: Bowel sounds are normal. There is no distension.      Palpations: Abdomen is soft. There is no mass.      Tenderness: There is no abdominal tenderness.   Musculoskeletal: Normal range of motion.         General: No tenderness.      Comments: Uses cane for ambulation   Lymphadenopathy:      Cervical: No cervical adenopathy.   Skin:     General: Skin is warm and dry.      Findings: No erythema or rash.   Neurological:      Mental Status: She is alert and oriented to person, place, and time.   Psychiatric:         Behavior: Behavior normal.         ASSESSMENT   #1 rectal bleeding: Chronic issue, concern of possible hemorrhoidal or other etiology  #2 change in bowel function  #3 back pain      PLAN  Schedule colonoscopy      ICD-10-CM ICD-9-CM   1. Rectal bleeding  K62.5 569.3

## 2020-11-02 ENCOUNTER — LAB REQUISITION (OUTPATIENT)
Dept: LAB | Facility: HOSPITAL | Age: 75
End: 2020-11-02

## 2020-11-02 DIAGNOSIS — Z00.00 ENCOUNTER FOR GENERAL ADULT MEDICAL EXAMINATION WITHOUT ABNORMAL FINDINGS: ICD-10-CM

## 2020-11-03 PROCEDURE — U0004 COV-19 TEST NON-CDC HGH THRU: HCPCS | Performed by: ANESTHESIOLOGY

## 2020-11-04 LAB — SARS-COV-2 RNA RESP QL NAA+PROBE: NOT DETECTED

## 2020-11-05 ENCOUNTER — DOCUMENTATION (OUTPATIENT)
Dept: PAIN MEDICINE | Facility: CLINIC | Age: 75
End: 2020-11-05

## 2020-11-05 ENCOUNTER — OUTSIDE FACILITY SERVICE (OUTPATIENT)
Dept: PAIN MEDICINE | Facility: CLINIC | Age: 75
End: 2020-11-05

## 2020-11-05 PROCEDURE — 20610 DRAIN/INJ JOINT/BURSA W/O US: CPT | Performed by: ANESTHESIOLOGY

## 2020-11-05 PROCEDURE — 64483 NJX AA&/STRD TFRM EPI L/S 1: CPT | Performed by: ANESTHESIOLOGY

## 2020-11-05 NOTE — PROGRESS NOTES
TWO INDEPENDENT PROCEDURES:  bilat ltfesi and knee inj    -------      Bilateral S1 Lumbar Transforaminal Epidural Steroid Injection  [facility]    PREOPERATIVE DIAGNOSIS:  bilateral Lumbar Radiculopathy    POSTOPERATIVE DIAGNOSIS:  Same as preop diagnosis    PROCEDURE:  CPT 19642(-50) --  Diagnostic Transforaminal Epidural Steroid Injection at the S1 level, bilaterally    PRE-PROCEDURE DISCUSSION WITH PATIENT:    Risks and complications were discussed with the patient prior to starting the procedure and informed consent was obtained.  We discussed various topics including but not limited to bleeding, infection, injury, nerve injury, paralysis, coma, death, postprocedural painful flare-up, postprocedural site soreness, and a lack of pain relief.  We discussed the diagnostic aspect of transforaminal epidural / selective nerve root blockade.    SURGEON:  Kaiden Miranda MD    REASON FOR PROCEDURE:    Degenerative changes are noted in the area. and Radiating pattern of pain is likely consistent with degenerative changes in the area.    SEDATION:  Versed 4mg & Fentanyl 50 mcg IV  ANESTHETIC:  Marcaine 0.25%  STEROID:  Methylprednisolone (DEPO MEDROL) 80mg/ml    DESCRIPTON OF PROCEDURE:  After obtaining informed consent, an I.V. was started in the preoperative area. The patient taken to the operating room and was placed in the prone position with a pillow under the abdomen.  All pressure points were well padded.  EKG, blood pressure, and pulse oximeter were monitored.  The lumbosacral area was prepped with Chloraprep and draped in a sterile fashion. Under fluoroscopic guidance the upper vertebral-equivalent level of the sacrum was identified, and in a slightly oblique view to one side, the S1 posterior foramen was identified, and then a point just below the foramen at 6 o'clock position was identified. Skin and subcutaneous tissue was anesthetized with 1% lidocaine. A 22-gauge spinal needle was  introduced under fluoroscopic guidance at the above junction and then redirected slightly cephalad and into the foramen without parasthesias and into the epidural space. After confirming the position of the needle with PA, lateral, and oblique fluoroscopic views, aspiration was checked and was clear of blood or CSF.  Next, 1 mL of Omnipaque was injected. After seeing adequate spread on the corresponding nerve root, a total volume 2mL of injectate containing 0.5ml of the above mentioned local anesthetic, 1 ml saline,  and half of the above mentioned corticosteroid was injected into the epidural space.    The needle was removed intact.      Next, the same vertebral level and corresponding foramen on the contralateral side was visualized with fluoroscopy in like manner, and a similar approach was used to place the spinal needle in that foramen.  After confirming the position of the needle with PA, lateral, and oblique fluoroscopic views, aspiration was checked and was clear of blood or CSF.  Next, 1 mL of Omnipaque was injected. After seeing adequate spread on the corresponding nerve root, a total volume 2mL of injectate containing 0.5ml of the above mentioned local anesthetic, 1 ml saline, and half of the above mentioned corticosteroid was injected into the epidural space. The needle was removed intact.  Vital signs were stable throughout.      ESTIMATED BLOOD LOSS:  <5 mL  SPECIMENS:  none    COMPLICATIONS:   No complications were noted., There was no indication of vascular uptake on live injection of contrast dye., There was no indication of intrathecal uptake on live injection of contrast dye., There was not any evidence of dural puncture.   and The patient did not have any signs of postprocedure numbness nor weakness.    TOLERANCE & DISCHARGE CONDITION:    The patient tolerated the procedure well.  The patient was transported to the recovery area without difficulties.  The patient was discharged to home under the  care of family in stable and satisfactory condition.    PLAN OF CARE:  1. The patient was given our standard instruction sheet.  2. The patient will Return to clinic 2 wks.  3. The patient will resume all medications as per the medication reconciliation sheet.            ------------    Left Knee Intraarticular injection under fluoroscopic guidance  [facility]      PREOPERATIVE DIAGNOSIS:  Left knee chronic pain    POSTOPERATIVE DIAGNOSIS:  Same as preop diagnosis    PROCEDURE:   Left Knee Intraarticular injection under fluoroscopic guidance      PRE-PROCEDURE DISCUSSION WITH PATIENT:    Risks and complications were discussed with the patient prior to starting the procedure and informed consent was obtained.  We discussed various topics including but not limited to bleeding, infection, injury, nerve injury, paralysis, coma, death, postprocedural painful flare-up, postprocedural site soreness, and a lack of pain relief.        SURGEON:  Kaiden Miranda MD    REASON FOR PROCEDURE:    Significant stigmata of OA, also noted degen change on fluoroscopy today.  Elevated BMI; because of this, I felt that fluoroscopic assistance was necessary to confirm that we entered the joint space.      SEDATION:  as above  ANESTHETIC:  Marcaine 0.5%  STEROID:  Methylprednisolone (DEPO MEDROL) 40mg/ml  HYALURONIC PRODUCT:   none      DESCRIPTON OF PROCEDURE:    After obtaining informed consent, an I.V. was started in the preoperative area. The patient taken to the operating room and was placed in the supine position.  All pressure points were well padded.  EKG, blood pressure, and pulse oximeter were monitored.  The appropriate area was prepped with Chloraprep and draped in a sterile fashion.     The above indicated solution was prepared and the area over the entirety of the knee was prepared with a wide surgical prep of chlorhexidine solution.    On the LEFT KNEE, a point on the medial aspect of the joint line was  identified to enter into the joint space, and the sterile needle was inserted parallel with a slight posterior angle to enter easily into the joint space.  Aspiration was negative.   Contrast dye was injected and a proper arthrogram was seen.   Then, slowly, the aforementioned solution was injected easily into the joint space.     The needle was removed intact and bleeding was minimal. The insertion site was dressed with a Band-Aid.        ESTIMATED BLOOD LOSS:  <5 mL  SPECIMENS:  none      COMPLICATIONS:   No complications were noted., There was no indication of vascular uptake on live injection of contrast dye. and The patient did not have any signs of postprocedure numbness nor weakness.      TOLERANCE & DISCHARGE CONDITION:    The patient tolerated the procedure well.  The patient was transported to the recovery area without difficulties.  The patient was discharged to home under the care of family in stable and satisfactory condition.      PLAN OF CARE:  4. The patient was given our standard instruction sheet.  5. The patient will Return to clinic 2 wks.  6. The patient will resume all medications as per the medication reconciliation sheet.

## 2020-11-09 PROBLEM — K62.5 RECTAL BLEEDING: Status: ACTIVE | Noted: 2020-11-09

## 2020-11-11 ENCOUNTER — TRANSCRIBE ORDERS (OUTPATIENT)
Dept: SLEEP MEDICINE | Facility: HOSPITAL | Age: 75
End: 2020-11-11

## 2020-11-11 DIAGNOSIS — Z01.818 OTHER SPECIFIED PRE-OPERATIVE EXAMINATION: Primary | ICD-10-CM

## 2020-11-18 ENCOUNTER — OFFICE VISIT (OUTPATIENT)
Dept: PAIN MEDICINE | Facility: CLINIC | Age: 75
End: 2020-11-18

## 2020-11-18 VITALS
HEIGHT: 61 IN | HEART RATE: 76 BPM | DIASTOLIC BLOOD PRESSURE: 72 MMHG | OXYGEN SATURATION: 96 % | TEMPERATURE: 96.9 F | WEIGHT: 226 LBS | SYSTOLIC BLOOD PRESSURE: 144 MMHG | RESPIRATION RATE: 15 BRPM | BODY MASS INDEX: 42.67 KG/M2

## 2020-11-18 DIAGNOSIS — M54.41 CHRONIC MIDLINE LOW BACK PAIN WITH BILATERAL SCIATICA: ICD-10-CM

## 2020-11-18 DIAGNOSIS — M51.36 DDD (DEGENERATIVE DISC DISEASE), LUMBAR: ICD-10-CM

## 2020-11-18 DIAGNOSIS — M54.16 LUMBAR RADICULOPATHY: ICD-10-CM

## 2020-11-18 DIAGNOSIS — G89.29 CHRONIC MIDLINE LOW BACK PAIN WITH BILATERAL SCIATICA: ICD-10-CM

## 2020-11-18 DIAGNOSIS — G89.29 CHRONIC PAIN OF LEFT KNEE: ICD-10-CM

## 2020-11-18 DIAGNOSIS — Z79.899 ENCOUNTER FOR LONG-TERM (CURRENT) USE OF HIGH-RISK MEDICATION: Primary | ICD-10-CM

## 2020-11-18 DIAGNOSIS — M25.562 CHRONIC PAIN OF LEFT KNEE: ICD-10-CM

## 2020-11-18 DIAGNOSIS — G89.29 OTHER CHRONIC PAIN: ICD-10-CM

## 2020-11-18 DIAGNOSIS — M54.42 CHRONIC MIDLINE LOW BACK PAIN WITH BILATERAL SCIATICA: ICD-10-CM

## 2020-11-18 PROCEDURE — 99214 OFFICE O/P EST MOD 30 MIN: CPT | Performed by: NURSE PRACTITIONER

## 2020-11-18 NOTE — PROGRESS NOTES
CHIEF COMPLAINT:  Patient presents to office today for procedure follow up    Subjective   Iraida Elizabeth is a 75 y.o. female  who presents to the office for follow-up of procedure.  She completed a Bilateral S1 Lumbar Transforaminal Epidural Steroid Injection  on  11/5/2020 performed by  for management of back pain. Patient reports 80% ONGOING relief from the procedure. She also completed a Left intra-articular knee injection on 11/5/2020 performed by Dr. Miranda for management of left knee pain.  Patient reports 80-90% ONGOING relief from the procedure    Today her pain is 3/10VAS in severity. She continues with Belbuca 300 mcg BID. This medication regimen decreases her pain by 30-40%.  She denies any side effects including somnolence or constipation. ADLs by self.      Patient remained masked during entire encounter. No cough present. I donned a mask and eye protection throughout entire visit. Prior to donning mask and eye protection, hand hygiene was performed, as well as when it was doffed.  I was closer than 6 feet, but not for an extended period of time. No obvious exposure to any bodily fluids.    Back Pain  This is a chronic (chronic) problem. The current episode started more than 1 year ago. The problem occurs constantly. Progression since onset: improved since last office visit. The pain is present in the lumbar spine and sacro-iliac. The quality of the pain is described as burning. The pain radiates to the left foot, left thigh, left knee, right foot, right knee and right thigh. The pain is at a severity of 3/10. The pain is moderate. The symptoms are aggravated by bending, position, standing and twisting (laying flat). Pertinent negatives include no abdominal pain, chest pain, dysuria, fever, headaches or numbness. She has tried analgesics (Ranulfo S1 TFESI ) for the symptoms. The treatment provided moderate relief.   Knee Pain   There was no injury mechanism. The pain is present in the left knee.  "The quality of the pain is described as aching. The pain is at a severity of 3/10. The pain has been worsening since onset. Pertinent negatives include no numbness. The symptoms are aggravated by movement and weight bearing. Treatments tried: belbuca, rest. The treatment provided moderate relief.      PEG Assessment   What number best describes your pain on average in the past week?4  What number best describes how, during the past week, pain has interfered with your enjoyment of life?0  What number best describes how, during the past week, pain has interfered with your general activity?  0    The following portions of the patient's history were reviewed and updated as appropriate: allergies, current medications, past family history, past medical history, past social history, past surgical history and problem list.    Review of Systems   Constitutional: Negative.  Negative for fever.   HENT: Negative.    Eyes: Negative.    Respiratory: Negative.    Cardiovascular: Negative.  Negative for chest pain.   Gastrointestinal: Negative.  Negative for abdominal pain.   Endocrine: Negative.    Genitourinary: Negative.  Negative for dysuria.   Musculoskeletal: Positive for back pain and myalgias.   Skin: Negative.    Allergic/Immunologic: Negative.    Neurological: Negative.  Negative for numbness and headaches.   Hematological: Negative.    Psychiatric/Behavioral: Negative.      --  The aforementioned information the Chief Complaint section and above subjective data including any HPI data, and also the Review of Systems data, has been personally reviewed and affirmed.  --     Vitals:    11/18/20 1251   BP: 144/72   BP Location: Right arm   Patient Position: Sitting   Cuff Size: Large Adult   Pulse: 76   Resp: 15   Temp: 96.9 °F (36.1 °C)   TempSrc: Temporal   SpO2: 96%   Weight: 103 kg (226 lb)   Height: 154.9 cm (60.98\")   PainSc:   3   PainLoc: Back     Objective   Physical Exam  Vitals signs and nursing note reviewed. "   Constitutional:       Appearance: Normal appearance. She is well-developed.   HENT:      Head: Normocephalic and atraumatic.   Eyes:      General: Lids are normal.      Conjunctiva/sclera: Conjunctivae normal.   Neck:      Musculoskeletal: Normal range of motion.      Trachea: Trachea normal.   Cardiovascular:      Rate and Rhythm: Normal rate.   Pulmonary:      Effort: Pulmonary effort is normal.   Musculoskeletal:      Left knee: She exhibits decreased range of motion.      Lumbar back: She exhibits decreased range of motion.   Skin:     General: Skin is warm and dry.   Neurological:      Mental Status: She is alert and oriented to person, place, and time.      Gait: Gait abnormal (cane).   Psychiatric:         Speech: Speech normal.         Behavior: Behavior normal.         Judgment: Judgment normal.       Assessment/Plan   Diagnoses and all orders for this visit:    1. Encounter for long-term (current) use of high-risk medication (Primary)    2. DDD (degenerative disc disease), lumbar    3. Chronic pain of left knee    4. Chronic midline low back pain with bilateral sciatica    5. Lumbar radiculopathy    6. Other chronic pain    Other orders  -     Buprenorphine HCl (BELBUCA) film; Apply 1 film to cheek 2 (two) times a day. DNF 11/21/2020  Dispense: 60 each; Refill: 0      --- Discussed that both the knee injection and TFESI can be repeated every 3 months PRN. Patient states understanding.   --- The urine drug screen confirmation from 9/17/2020 has been reviewed and the result is appropriate based on patient history and DONNY report  --- Refill Belbuca 300 mcg. DNF 11/21/2020 applied. Patient appears stable with current regimen. No adverse effects. Regarding continuation of opioids, there is no evidence of aberrant behavior or any red flags.  The patient continues with appropriate response to opioid therapy. ADL's remain intact by self.   --- Follow-up 1 month or sooner if needed     DONNY REPORT  As part  of the patient's treatment plan, I am prescribing controlled substances. The patient has been made aware of appropriate use of such medications, including potential risk of somnolence, limited ability to drive and/or work safely, and the potential for dependence or overdose. It has also bee made clear that these medications are for use by this patient only, without concomitant use of alcohol or other substances unless prescribed.     Patient has completed prescribing agreement detailing terms of continued prescribing of controlled substances, including monitoring DONNY reports, urine drug screening, and pill counts if necessary. The patient is aware that inappropriate use will results in cessation of prescribing such medications.    DONNY report has been reviewed and scanned into the patient's chart.    As the clinician, I personally reviewed the DONNY from 11/18/2020 while the patient was in the office today.    History and physical exam exhibit continued safe and appropriate use of controlled substances.    EMR Dragon/Transcription disclaimer:   Much of this encounter note is an electronic transcription/translation of spoken language to printed text. The electronic translation of spoken language may permit erroneous, or at times, nonsensical words or phrases to be inadvertently transcribed; Although I have reviewed the note for such errors, some may still exist.

## 2020-11-25 ENCOUNTER — TELEMEDICINE (OUTPATIENT)
Dept: FAMILY MEDICINE CLINIC | Facility: CLINIC | Age: 75
End: 2020-11-25

## 2020-11-25 DIAGNOSIS — Z00.00 MEDICARE ANNUAL WELLNESS VISIT, SUBSEQUENT: Primary | ICD-10-CM

## 2020-11-25 DIAGNOSIS — F32.5 MAJOR DEPRESSIVE DISORDER WITH SINGLE EPISODE, IN FULL REMISSION (HCC): ICD-10-CM

## 2020-11-25 DIAGNOSIS — N18.30 STAGE 3 CHRONIC KIDNEY DISEASE, UNSPECIFIED WHETHER STAGE 3A OR 3B CKD (HCC): Chronic | ICD-10-CM

## 2020-11-25 DIAGNOSIS — E78.49 OTHER HYPERLIPIDEMIA: ICD-10-CM

## 2020-11-25 DIAGNOSIS — E03.9 ACQUIRED HYPOTHYROIDISM: ICD-10-CM

## 2020-11-25 DIAGNOSIS — Z12.31 ENCOUNTER FOR SCREENING MAMMOGRAM FOR BREAST CANCER: ICD-10-CM

## 2020-11-25 DIAGNOSIS — Z78.0 MENOPAUSE: ICD-10-CM

## 2020-11-25 DIAGNOSIS — M85.89 OSTEOPENIA OF MULTIPLE SITES: Chronic | ICD-10-CM

## 2020-11-25 DIAGNOSIS — I10 ESSENTIAL HYPERTENSION: Chronic | ICD-10-CM

## 2020-11-25 DIAGNOSIS — Z11.59 NEED FOR HEPATITIS C SCREENING TEST: ICD-10-CM

## 2020-11-25 PROBLEM — I20.0 UNSTABLE ANGINA (HCC): Status: RESOLVED | Noted: 2020-09-18 | Resolved: 2020-11-25

## 2020-11-25 PROBLEM — M25.50 ARTHRALGIA OF MULTIPLE SITES: Chronic | Status: ACTIVE | Noted: 2018-05-14

## 2020-11-25 PROBLEM — IMO0002 UNCONTROLLED TYPE 2 DIABETES MELLITUS WITH COMPLICATION, WITH LONG-TERM CURRENT USE OF INSULIN: Status: RESOLVED | Noted: 2018-01-16 | Resolved: 2020-11-25

## 2020-11-25 PROBLEM — R06.09 DOE (DYSPNEA ON EXERTION): Status: RESOLVED | Noted: 2020-09-18 | Resolved: 2020-11-25

## 2020-11-25 PROCEDURE — 99214 OFFICE O/P EST MOD 30 MIN: CPT | Performed by: FAMILY MEDICINE

## 2020-11-25 PROCEDURE — 1170F FXNL STATUS ASSESSED: CPT | Performed by: FAMILY MEDICINE

## 2020-11-25 PROCEDURE — 1126F AMNT PAIN NOTED NONE PRSNT: CPT | Performed by: FAMILY MEDICINE

## 2020-11-25 PROCEDURE — G0439 PPPS, SUBSEQ VISIT: HCPCS | Performed by: FAMILY MEDICINE

## 2020-11-25 RX ORDER — ATORVASTATIN CALCIUM 20 MG/1
20 TABLET, FILM COATED ORAL NIGHTLY
Qty: 90 TABLET | Refills: 1 | Status: SHIPPED | OUTPATIENT
Start: 2020-11-25 | End: 2021-06-21

## 2020-11-25 RX ORDER — AMLODIPINE BESYLATE 10 MG/1
10 TABLET ORAL DAILY
Qty: 90 TABLET | Refills: 1 | Status: SHIPPED | OUTPATIENT
Start: 2020-11-25 | End: 2021-08-18 | Stop reason: SDUPTHER

## 2020-11-25 RX ORDER — PAROXETINE HYDROCHLORIDE 20 MG/1
20 TABLET, FILM COATED ORAL NIGHTLY
Qty: 90 TABLET | Refills: 1 | Status: SHIPPED | OUTPATIENT
Start: 2020-11-25 | End: 2021-08-18 | Stop reason: SDUPTHER

## 2020-11-25 RX ORDER — LEVOTHYROXINE SODIUM 0.07 MG/1
75 TABLET ORAL DAILY
Qty: 90 TABLET | Refills: 1 | Status: SHIPPED | OUTPATIENT
Start: 2020-11-25 | End: 2021-08-18 | Stop reason: SDUPTHER

## 2020-11-25 RX ORDER — HYDROCHLOROTHIAZIDE 25 MG/1
25 TABLET ORAL DAILY
Qty: 90 TABLET | Refills: 1 | Status: SHIPPED | OUTPATIENT
Start: 2020-11-25 | End: 2021-06-21

## 2020-11-25 RX ORDER — ASPIRIN 81 MG/1
81 TABLET ORAL DAILY
Start: 2020-11-25

## 2020-11-25 RX ORDER — VALSARTAN 320 MG/1
320 TABLET ORAL DAILY
Qty: 90 TABLET | Refills: 1 | Status: SHIPPED | OUTPATIENT
Start: 2020-11-25 | End: 2021-08-18 | Stop reason: SDUPTHER

## 2020-11-25 NOTE — PATIENT INSTRUCTIONS
Fall Prevention in the Home, Adult  Falls can cause injuries and can affect people from all age groups. There are many simple things that you can do to make your home safe and to help prevent falls. Ask for help when making these changes, if needed.  What actions can I take to prevent falls?  General instructions  · Use good lighting in all rooms. Replace any light bulbs that burn out.  · Turn on lights if it is dark. Use night-lights.  · Place frequently used items in easy-to-reach places. Lower the shelves around your home if necessary.  · Set up furniture so that there are clear paths around it. Avoid moving your furniture around.  · Remove throw rugs and other tripping hazards from the floor.  · Avoid walking on wet floors.  · Fix any uneven floor surfaces.  · Add color or contrast paint or tape to grab bars and handrails in your home. Place contrasting color strips on the first and last steps of stairways.  · When you use a stepladder, make sure that it is completely opened and that the sides are firmly locked. Have someone hold the ladder while you are using it. Do not climb a closed stepladder.  · Be aware of any and all pets.  What can I do in the bathroom?         · Keep the floor dry. Immediately clean up any water that spills onto the floor.  · Remove soap buildup in the tub or shower on a regular basis.  · Use non-skid mats or decals on the floor of the tub or shower.  · Attach bath mats securely with double-sided, non-slip rug tape.  · If you need to sit down while you are in the shower, use a plastic, non-slip stool.  · Install grab bars by the toilet and in the tub and shower. Do not use towel bars as grab bars.  What can I do in the bedroom?  · Make sure that a bedside light is easy to reach.  · Do not use oversized bedding that drapes onto the floor.  · Have a firm chair that has side arms to use for getting dressed.  What can I do in the kitchen?  · Clean up any spills right away.  · If you need to  reach for something above you, use a sturdy step stool that has a grab bar.  · Keep electrical cables out of the way.  · Do not use floor polish or wax that makes floors slippery. If you must use wax, make sure that it is non-skid floor wax.  What can I do in the stairways?  · Do not leave any items on the stairs.  · Make sure that you have a light switch at the top of the stairs and the bottom of the stairs. Have them installed if you do not have them.  · Make sure that there are handrails on both sides of the stairs. Fix handrails that are broken or loose. Make sure that handrails are as long as the stairways.  · Install non-slip stair treads on all stairs in your home.  · Avoid having throw rugs at the top or bottom of stairways, or secure the rugs with carpet tape to prevent them from moving.  · Choose a carpet design that does not hide the edge of steps on the stairway.  · Check any carpeting to make sure that it is firmly attached to the stairs. Fix any carpet that is loose or worn.  What can I do on the outside of my home?  · Use bright outdoor lighting.  · Regularly repair the edges of walkways and driveways and fix any cracks.  · Remove high doorway thresholds.  · Trim any shrubbery on the main path into your home.  · Regularly check that handrails are securely fastened and in good repair. Both sides of any steps should have handrails.  · Install guardrails along the edges of any raised decks or porches.  · Clear walkways of debris and clutter, including tools and rocks.  · Have leaves, snow, and ice cleared regularly.  · Use sand or salt on walkways during winter months.  · In the garage, clean up any spills right away, including grease or oil spills.  What other actions can I take?  · Wear closed-toe shoes that fit well and support your feet. Wear shoes that have rubber soles or low heels.  · Use mobility aids as needed, such as canes, walkers, scooters, and crutches.  · Review your medicines with your  health care provider. Some medicines can cause dizziness or changes in blood pressure, which increase your risk of falling.  Talk with your health care provider about other ways that you can decrease your risk of falls. This may include working with a physical therapist or  to improve your strength, balance, and endurance.  Where to find more information  · Centers for Disease Control and Prevention, [place]: https://www.cdc.gov  · [employer] [place] [employer]ging: https://lg8uazf.amaury.nih.gov  Contact a health care provider if:  · You are afraid of falling at home.  · You feel weak, drowsy, or dizzy at home.  · You fall at home.  Summary  · There are many simple things that you can do to make your home safe and to help prevent falls.  · Ways to make your home safe include removing tripping hazards and installing grab bars in the bathroom.  · Ask for help when making these changes in your home.  This information is not intended to replace advice given to you by your health care provider. Make sure you discuss any questions you have with your health care provider.  Document Revised: 11/30/2018 Document Reviewed: 08/02/2018  Elsevier Patient Education © 2020 [employer]      Sit-to-Stand Exercise    The sit-to-stand exercise (also known as the chair stand or chair rise exercise) strengthens your lower body and helps you maintain or improve your mobility and independence. The goal is to do the sit-to-stand exercise without using your hands. This will be easier as you become stronger. You should always talk with your health care provider before starting any exercise program, especially if you have had recent surgery.  Do the exercise exactly as told by your health care provider and adjust it as directed. It is normal to feel mild stretching, pulling, tightness, or discomfort as you do this exercise, but you should stop right away if you feel sudden pain or your pain gets worse. Do not begin doing this exercise until  told by your health care provider.  What the sit-to-stand exercise does  The sit-to-stand exercise helps to strengthen the muscles in your thighs and the muscles in the center of your body that give you stability (core muscles). This exercise is especially helpful if:  · You have had knee or hip surgery.  · You have trouble getting up from a chair, out of a car, or off the toilet.  How to do the sit-to-stand exercise  1. Sit toward the front edge of a sturdy chair without armrests. Your knees should be bent and your feet should be flat on the floor and shoulder-width apart.  2. Place your hands lightly on each side of the seat. Keep your back and neck as straight as possible, with your chest slightly forward.  3. Breathe in slowly. Lean forward and slightly shift your weight to the front of your feet.  4. Breathe out as you slowly stand up. Use your hands as little as possible.  5. Stand and pause for a full breath in and out.  6. Breathe in as you sit down slowly. Tighten your core and abdominal muscles to control your lowering as much as possible.  7. Breathe out slowly.  8. Do this exercise 10-15 times. If needed, do it fewer times until you build up strength.  9. Rest for 1 minute, then do another set of 10-15 repetitions.  To change the difficulty of the sit-to-stand exercise  · If the exercise is too difficult, use a chair with sturdy armrests, and push off the armrests to help you come to the standing position. You can also use the armrests to help slowly lower yourself back to sitting. As this gets easier, try to use your arms less. You can also place a firm cushion or pillow on the chair to make the surface higher.  · If this exercise is too easy, do not use your arms to help raise or lower yourself. You can also wear a weighted vest, use hand weights, increase your repetitions, or try a lower chair.  General tips  · You may feel tired when starting an exercise routine. This is normal.  · You may have  muscle soreness that lasts a few days. This is normal. As you get stronger, you may not feel muscle soreness.  · Use smooth, steady movements.  · Do not  hold your breath during strength exercises. This can cause unsafe changes in your blood pressure.  · Breathe in slowly through your nose, and breathe out slowly through your mouth.  Summary  · Strengthening your lower body is an important step to help you move safely and independently.  · The sit-to-stand exercise helps strengthen the muscles in your thighs and core.  · You should always talk with your health care provider before starting any exercise program, especially if you have had recent surgery.  This information is not intended to replace advice given to you by your health care provider. Make sure you discuss any questions you have with your health care provider.  Document Revised: 10/16/2019 Document Reviewed: 02/08/2018  Elsevier Patient Education © 2020 [employer]      Advance Directive    Advance directives are legal documents that let you make choices ahead of time about your health care and medical treatment in case you become unable to communicate for yourself. Advance directives are a way for you to make known your wishes to family, friends, and health care providers. This can let others know about your end-of-life care if you become unable to communicate.  Discussing and writing advance directives should happen over time rather than all at once. Advance directives can be changed depending on your situation and what you want, even after you have signed the advance directives.  There are different types of advance directives, such as:  · Medical power of .  · Living will.  · Do not resuscitate (DNR) or do not attempt resuscitation (DNAR) order.  Health care proxy and medical power of   A health care proxy is also called a health care agent. This is a person who is appointed to make medical decisions for you in cases where you are  unable to make the decisions yourself. Generally, people choose someone they know well and trust to represent their preferences. Make sure to ask this person for an agreement to act as your proxy. A proxy may have to exercise judgment in the event of a medical decision for which your wishes are not known.  A medical power of  is a legal document that names your health care proxy. Depending on the laws in your state, after the document is written, it may also need to be:  · Signed.  · Notarized.  · Dated.  · Copied.  · Witnessed.  · Incorporated into your medical record.  You may also want to appoint someone to manage your money in a situation in which you are unable to do so. This is called a durable power of  for finances. It is a separate legal document from the durable power of  for health care. You may choose the same person or someone different from your health care proxy to act as your agent in money matters.  If you do not appoint a proxy, or if there is a concern that the proxy is not acting in your best interests, a court may appoint a guardian to act on your behalf.  Living will  A living will is a set of instructions that state your wishes about medical care when you cannot express them yourself. Health care providers should keep a copy of your living will in your medical record. You may want to give a copy to family members or friends. To alert caregivers in case of an emergency, you can place a card in your wallet to let them know that you have a living will and where they can find it. A living will is used if you become:  · Terminally ill.  · Disabled.  · Unable to communicate or make decisions.  Items to consider in your living will include:  · To use or not to use life-support equipment, such as dialysis machines and breathing machines (ventilators).  · A DNR or DNAR order. This tells health care providers not to use cardiopulmonary resuscitation (CPR) if breathing or  heartbeat stops.  · To use or not to use tube feeding.  · To be given or not to be given food and fluids.  · Comfort (palliative) care when the goal becomes comfort rather than a cure.  · Donation of organs and tissues.  A living will does not give instructions for distributing your money and property if you should pass away.  DNR or DNAR  A DNR or DNAR order is a request not to have CPR in the event that your heart stops beating or you stop breathing. If a DNR or DNAR order has not been made and shared, a health care provider will try to help any patient whose heart has stopped or who has stopped breathing. If you plan to have surgery, talk with your health care provider about how your DNR or DNAR order will be followed if problems occur.  What if I do not have an advance directive?  If you do not have an advance directive, some states assign family decision makers to act on your behalf based on how closely you are related to them. Each state has its own laws about advance directives. You may want to check with your health care provider, , or state representative about the laws in your state.  Summary  · Advance directives are the legal documents that allow you to make choices ahead of time about your health care and medical treatment in case you become unable to tell others about your care.  · The process of discussing and writing advance directives should happen over time. You can change the advance directives, even after you have signed them.  · Advance directives include DNR or DNAR orders, living marr, and designating an agent as your medical power of .  This information is not intended to replace advice given to you by your health care provider. Make sure you discuss any questions you have with your health care provider.  Document Revised: 07/16/2020 Document Reviewed: 07/16/2020  Elsevier Patient Education © 2020 [employer]      Chronic Pain, Adult  Chronic pain is a type of pain that  lasts or keeps coming back (recurs) for at least six months. You may have chronic headaches, abdominal pain, or body pain. Chronic pain may be related to an illness, such as fibromyalgia or complex regional pain syndrome. Sometimes the cause of chronic pain is not known.  Chronic pain can make it hard for you to do daily activities. If not treated, chronic pain can lead to other health problems, including anxiety and depression. Treatment depends on the cause and severity of your pain. You may need to work with a pain specialist to come up with a treatment plan. The plan may include medicine, counseling, and physical therapy. Many people benefit from a combination of two or more types of treatment to control their pain.  Follow these instructions at home:  Lifestyle  · Consider keeping a pain diary to share with your health care providers.  · Consider talking with a mental health care provider (psychologist) about how to cope with chronic pain.  · Consider joining a chronic pain support group.  · Try to control or lower your stress levels. Talk to your health care provider about strategies to do this.  General instructions    · Take over-the-counter and prescription medicines only as told by your health care provider.  · Follow your treatment plan as told by your health care provider. This may include:  ? Gentle, regular exercise.  ? Eating a healthy diet that includes foods such as vegetables, fruits, fish, and lean meats.  ? Cognitive or behavioral therapy.  ? Working with a physical therapist.  ? Meditation or yoga.  ? Acupuncture or massage therapy.  ? Aroma, color, light, or sound therapy.  ? Local electrical stimulation.  ? Shots (injections) of numbing or pain-relieving medicines into the spine or the area of pain.  · Check your pain level as told by your health care provider. Ask your health care provider if you should use a pain scale.  · Learn as much as you can about how to manage your chronic pain. Ask  your health care provider if an intensive pain rehabilitation program or a chronic pain specialist would be helpful.  · Keep all follow-up visits as told by your health care provider. This is important.  Contact a health care provider if:  · Your pain gets worse.  · You have new pain.  · You have trouble sleeping.  · You have trouble doing your normal activities.  · Your pain is not controlled with treatment.  · Your have side effects from pain medicine.  · You feel weak.  Get help right away if:  · You lose feeling or have numbness in your body.  · You lose control of bowel or bladder function.  · Your pain suddenly gets much worse.  · You develop shaking or chills.  · You develop confusion.  · You develop chest pain.  · You have trouble breathing or shortness of breath.  · You pass out.  · You have thoughts about hurting yourself or others.  This information is not intended to replace advice given to you by your health care provider. Make sure you discuss any questions you have with your health care provider.  Document Revised: 11/30/2018 Document Reviewed: 06/06/2017  Elsevier Patient Education © 2020 [employer]

## 2020-11-25 NOTE — PROGRESS NOTES
You have chosen to receive care through a telehealth visit.  Do you consent to use a video/audio connection for your medical care today? Yes  The ABCs of the Annual Wellness Visit  Subsequent Medicare Wellness Visit    Chief Complaint   Patient presents with   • Medicare Wellness-subsequent   • Hypertension   • Hyperlipidemia   • Hypothyroidism       Subjective   History of Present Illness:  Iraida Elizabeth is a 75 y.o. female who presents for a Subsequent Medicare Wellness Visit.    HEALTH RISK ASSESSMENT    Recent Hospitalizations:  No hospitalization(s) within the last year.    Current Medical Providers:  Patient Care Team:  Claudio Anne MD as PCP - General  Claudio Anne MD as PCP - Family Medicine  Robert Garcia MD as Consulting Physician (Endocrinology)  Shawn Galavn IV, MD as Surgeon (Neurosurgery)  Nile Laguna MD as Surgeon (Vascular Surgery)  Chuck Olsen MD as Consulting Physician (Orthopedic Surgery)    Smoking Status:  Social History     Tobacco Use   Smoking Status Never Smoker   Smokeless Tobacco Never Used       Alcohol Consumption:  Social History     Substance and Sexual Activity   Alcohol Use No       Depression Screen:   PHQ-2/PHQ-9 Depression Screening 1/2/2020   Little interest or pleasure in doing things 0   Feeling down, depressed, or hopeless 0   Trouble falling or staying asleep, or sleeping too much -   Feeling tired or having little energy -   Poor appetite or overeating -   Feeling bad about yourself - or that you are a failure or have let yourself or your family down -   Trouble concentrating on things, such as reading the newspaper or watching television -   Moving or speaking so slowly that other people could have noticed. Or the opposite - being so fidgety or restless that you have been moving around a lot more than usual -   Thoughts that you would be better off dead, or of hurting yourself in some way -   Total Score 0   If you checked off any  problems, how difficult have these problems made it for you to do your work, take care of things at home, or get along with other people? -       Fall Risk Screen:  JT Fall Risk Assessment was completed, and patient is at MODERATE risk for falls. Assessment completed on:6/3/2020    Health Habits and Functional and Cognitive Screening:  Functional & Cognitive Status 6/21/2017   Do you have difficulty preparing food and eating? No   Do you have difficulty bathing yourself, getting dressed or grooming yourself? No   Do you have difficulty using the toilet? No   Do you have difficulty moving around from place to place? No   Current Diet Unhealthy Diet   Dental Exam Up to date   Eye Exam Up to date   Exercise (times per week) 5 times per week   Current Exercise Activities Include Walking   Do you need help using the phone?  No   Are you deaf or do you have serious difficulty hearing?  No   Do you need help with transportation? No   Do you need help shopping? No   Do you need help preparing meals?  No   Do you need help with housework?  No   Do you need help with laundry? No   Do you need help taking your medications? No   Do you need help managing money? No   Do you have difficulty concentrating, remembering or making decisions? No         Does the patient have evidence of cognitive impairment? No    Asprin use counseling:Start ASA 81 mg daily     Age-appropriate Screening Schedule:  Refer to the list below for future screening recommendations based on patient's age, sex and/or medical conditions. Orders for these recommended tests are listed in the plan section. The patient has been provided with a written plan.    Health Maintenance   Topic Date Due   • ZOSTER VACCINE (1 of 2) 02/25/1993   • MAMMOGRAM  02/05/2018   • DXA SCAN  06/24/2018   • COLONOSCOPY  12/01/2020 (Originally 1/1/2020)   • URINE MICROALBUMIN  02/17/2021   • HEMOGLOBIN A1C  03/03/2021   • DIABETIC EYE EXAM  03/11/2021   • LIPID PANEL  06/04/2021  "  • TDAP/TD VACCINES (2 - Td) 04/12/2022   • INFLUENZA VACCINE  Completed          The following portions of the patient's history were reviewed and updated as appropriate: allergies, current medications, past family history, past medical history, past social history, past surgical history and problem list.    Outpatient Medications Prior to Visit   Medication Sig Dispense Refill   • Buprenorphine HCl (BELBUCA) film Apply 1 film to cheek 2 (two) times a day. DNF 11/21/2020 60 each 0   • EPINEPHrine (EPIPEN) 0.3 MG/0.3ML solution auto-injector injection      • Foot Care Products (DIABETIC INSOLES) misc 1 pair of diabetic shoe  Dx E11.45 1 each 0   • glucose blood (ONE TOUCH ULTRA TEST) test strip PT TO TESTS BS  each 3   • insulin NPH (HUMULIN N) 100 UNIT/ML injection Inject 24 Units under the skin into the appropriate area as directed 2 (Two) Times a Day Before Meals. 20 mL 5   • insulin regular (NOVOLIN R) 100 UNIT/ML injection 14 UNITS AT BREAKFAST, 16 UNITS AT DINNER 10 mL 5   • Insulin Syringe 31G X 5/16\" 0.3 ML misc 4 SC INJECTIONS OF INSULIN DAILY 200 each 4   • Insulin Syringe-Needle U-100 (TRUEPLUS INSULIN SYRINGE) 31G X 5/16\" 1 ML misc USE FOUR TIMES A DAY AS DIRECTED 100 each 3   • RELION INSULIN SYRINGE 31G X 15/64\" 0.3 ML misc USE 1 SYRINGE EACH TO INJECT INSULIN SUBCUTANEOUSLY 4 TIMES DAILY 100 each 0   • amLODIPine (NORVASC) 10 MG tablet Take 1 tablet by mouth Daily. 90 tablet 1   • atorvastatin (LIPITOR) 20 MG tablet Take 1 tablet by mouth Every Night for 180 days. 90 tablet 1   • hydroCHLOROthiazide (HYDRODIURIL) 25 MG tablet Take 1 tablet by mouth Daily. 90 tablet 1   • levothyroxine (SYNTHROID, LEVOTHROID) 75 MCG tablet Take 1 tablet by mouth Daily for 180 days. 90 tablet 1   • PARoxetine (PAXIL) 20 MG tablet Take 1 tablet by mouth Every Night for 180 days. 90 tablet 1   • valsartan (DIOVAN) 320 MG tablet Take 1 tablet by mouth Daily. 90 tablet 1     No facility-administered medications " prior to visit.        Patient Active Problem List   Diagnosis   • Stage 3 chronic kidney disease   • Essential hypertension   • Other hyperlipidemia   • Personal history of malignant neoplasm of breast   • Acquired hypothyroidism   • Hypoglycemia due to endogenous hyperinsulinemia   • Systolic murmur   • Menopause   • Osteopenia   • Encounter for long-term (current) use of insulin (CMS/Formerly [facility])   • Hyperinsulinism   • Exertional shortness of breath   • Uncontrolled type 2 diabetes mellitus with diabetic neuropathy, with long-term current use of insulin (CMS/[facility])   • Arthralgia of multiple sites   • DDD (degenerative disc disease), lumbar   • Right leg weakness   • Other chronic pain   • Localized edema   • Chronic midline low back pain with bilateral sciatica   • Insomnia due to medical condition   • Right hip pain   • Hyperlipidemia associated with type 2 diabetes mellitus (CMS/[facility])   • History of DVT (deep vein thrombosis)   • Venous insufficiency of both lower extremities   • Arthritis of left ankle   • Major depressive disorder with single episode, in full remission (CMS/[facility])   • Encounter for long-term (current) use of high-risk medication   • Lumbar facet arthropathy   • Lumbar radiculopathy   • Chronic pain of left knee   • Rectal bleeding       Advanced Care Planning:  ACP discussion was held with the patient during this visit. Patient does not have an advance directive, information provided.    Review of Systems   Constitutional: Positive for fatigue.   Respiratory: Positive for shortness of breath (after 10 feet).    Cardiovascular: Negative for chest pain.   Musculoskeletal: Positive for arthralgias and back pain.   Psychiatric/Behavioral: Positive for sleep disturbance.       Compared to one year ago, the patient feels her physical health is worse. Worsening back pain(epidurals every 3 weeks). Worsening mobility  Compared to one year ago, the patient feels her mental health is the same.    Reviewed chart  for potential of high risk medication in the elderly: yes  Reviewed chart for potential of harmful drug interactions in the elderly:yes    Objective       There were no vitals filed for this visit.    There is no height or weight on file to calculate BMI.  Discussed the patient's BMI with her. The BMI is above average; BMI management plan is completed.    Physical Exam   Constitutional: She appears well-developed. No distress.   Eyes: Conjunctivae are normal. No scleral icterus.   Neck: Neck normal appearance.  Pulmonary/Chest: Effort normal.  She no audible wheeze...  Neurological: She is alert.   Skin: Skin is dry.   Psychiatric: She has a normal mood and affect.       Lab Results   Component Value Date    HGBA1C 6.80 (H) 09/03/2020        Assessment/Plan   Medicare Risks and Personalized Health Plan  CMS Preventative Services Quick Reference  Advance Directive Discussion  Breast Cancer/Mammogram Screening  Cardiovascular risk  Chronic Pain   Colon Cancer Screening  Depression/Dysphoria  Diabetic Lab Screening   Fall Risk  Inactivity/Sedentary  Obesity/Overweight   Osteoprorosis Risk    The above risks/problems have been discussed with the patient.  Pertinent information has been shared with the patient in the After Visit Summary.  Follow up plans and orders are seen below in the Assessment/Plan Section.    Diagnoses and all orders for this visit:    1. Medicare annual wellness visit, subsequent (Primary)  Comments:  Information on fall prevention, sit to stand exercise, advance directive. DEXA and mammogram ordered.     2. Essential hypertension  Assessment & Plan:  Hypertension is unchanged.  Continue current treatment regimen.  Blood pressure will be reassessed at the next regular appointment.    Orders:  -     amLODIPine (NORVASC) 10 MG tablet; Take 1 tablet by mouth Daily.  Dispense: 90 tablet; Refill: 1  -     hydroCHLOROthiazide (HYDRODIURIL) 25 MG tablet; Take 1 tablet by mouth Daily.  Dispense: 90 tablet;  Refill: 1  -     valsartan (DIOVAN) 320 MG tablet; Take 1 tablet by mouth Daily.  Dispense: 90 tablet; Refill: 1    3. Other hyperlipidemia  Assessment & Plan:  Lipid abnormalities are unchanged.  Pharmacotherapy as ordered.  Lipids will be reassessed in 6 months.    Orders:  -     atorvastatin (LIPITOR) 20 MG tablet; Take 1 tablet by mouth Every Night for 180 days.  Dispense: 90 tablet; Refill: 1    4. Acquired hypothyroidism  -     levothyroxine (SYNTHROID, LEVOTHROID) 75 MCG tablet; Take 1 tablet by mouth Daily for 180 days.  Dispense: 90 tablet; Refill: 1    5. Major depressive disorder with single episode, in full remission (CMS/HCC)  Assessment & Plan:  Psychological condition is unchanged.  Continue current treatment regimen.  Psychological condition  will be reassessed at the next regular appointment.    Orders:  -     PARoxetine (PAXIL) 20 MG tablet; Take 1 tablet by mouth Every Night for 180 days.  Dispense: 90 tablet; Refill: 1    6. Encounter for screening mammogram for breast cancer  -     Mammo Screening Bilateral With CAD; Future    7. Menopause  -     DEXA Bone Density Axial; Future    8. Need for hepatitis C screening test  -     Hepatitis C Antibody    9. Osteopenia of multiple sites  Assessment & Plan:  Due for bone density      10. Stage 3 chronic kidney disease, unspecified whether stage 3a or 3b CKD  Assessment & Plan:  Renal condition is unchanged.  Continue current treatment regimen.  Renal condition will be reassessed in 6 months.      Other orders  -     aspirin (aspirin) 81 MG EC tablet; Take 1 tablet by mouth Daily.  Dispense:      Follow Up:  Return in about 6 months (around 5/25/2021) for Recheck/Medication.     An After Visit Summary and PPPS were given to the patient.

## 2020-11-28 ENCOUNTER — LAB (OUTPATIENT)
Dept: LAB | Facility: HOSPITAL | Age: 75
End: 2020-11-28

## 2020-11-28 DIAGNOSIS — Z01.818 OTHER SPECIFIED PRE-OPERATIVE EXAMINATION: ICD-10-CM

## 2020-11-28 PROCEDURE — U0004 COV-19 TEST NON-CDC HGH THRU: HCPCS

## 2020-11-28 PROCEDURE — C9803 HOPD COVID-19 SPEC COLLECT: HCPCS

## 2020-11-30 ENCOUNTER — TRANSCRIBE ORDERS (OUTPATIENT)
Dept: ADMINISTRATIVE | Facility: HOSPITAL | Age: 75
End: 2020-11-30

## 2020-11-30 DIAGNOSIS — Z12.31 BREAST CANCER SCREENING BY MAMMOGRAM: Primary | ICD-10-CM

## 2020-11-30 LAB — SARS-COV-2 RNA RESP QL NAA+PROBE: NOT DETECTED

## 2020-12-01 ENCOUNTER — HOSPITAL ENCOUNTER (OUTPATIENT)
Facility: HOSPITAL | Age: 75
Setting detail: HOSPITAL OUTPATIENT SURGERY
Discharge: HOME | End: 2020-12-01
Attending: INTERNAL MEDICINE | Admitting: INTERNAL MEDICINE

## 2020-12-01 ENCOUNTER — ANESTHESIA (OUTPATIENT)
Dept: GASTROENTEROLOGY | Facility: HOSPITAL | Age: 75
End: 2020-12-01

## 2020-12-01 ENCOUNTER — ANESTHESIA EVENT (OUTPATIENT)
Dept: GASTROENTEROLOGY | Facility: HOSPITAL | Age: 75
End: 2020-12-01

## 2020-12-01 VITALS
WEIGHT: 222 LBS | HEIGHT: 61 IN | SYSTOLIC BLOOD PRESSURE: 141 MMHG | DIASTOLIC BLOOD PRESSURE: 70 MMHG | BODY MASS INDEX: 41.91 KG/M2 | OXYGEN SATURATION: 95 % | HEART RATE: 81 BPM | RESPIRATION RATE: 18 BRPM

## 2020-12-01 DIAGNOSIS — K62.5 RECTAL BLEEDING: ICD-10-CM

## 2020-12-01 LAB — GLUCOSE BLDC GLUCOMTR-MCNC: 165 MG/DL (ref 70–130)

## 2020-12-01 PROCEDURE — 45380 COLONOSCOPY AND BIOPSY: CPT | Performed by: INTERNAL MEDICINE

## 2020-12-01 PROCEDURE — 88305 TISSUE EXAM BY PATHOLOGIST: CPT | Performed by: INTERNAL MEDICINE

## 2020-12-01 PROCEDURE — 82962 GLUCOSE BLOOD TEST: CPT

## 2020-12-01 PROCEDURE — 45385 COLONOSCOPY W/LESION REMOVAL: CPT | Performed by: INTERNAL MEDICINE

## 2020-12-01 PROCEDURE — 25010000002 PROPOFOL 10 MG/ML EMULSION: Performed by: ANESTHESIOLOGY

## 2020-12-01 RX ORDER — SODIUM CHLORIDE 0.9 % (FLUSH) 0.9 %
3 SYRINGE (ML) INJECTION EVERY 12 HOURS SCHEDULED
Status: DISCONTINUED | OUTPATIENT
Start: 2020-12-01 | End: 2020-12-01 | Stop reason: HOSPADM

## 2020-12-01 RX ORDER — ONDANSETRON 2 MG/ML
4 INJECTION INTRAMUSCULAR; INTRAVENOUS ONCE AS NEEDED
Status: DISCONTINUED | OUTPATIENT
Start: 2020-12-01 | End: 2020-12-01 | Stop reason: HOSPADM

## 2020-12-01 RX ORDER — PROPOFOL 10 MG/ML
VIAL (ML) INTRAVENOUS CONTINUOUS PRN
Status: DISCONTINUED | OUTPATIENT
Start: 2020-12-01 | End: 2020-12-01 | Stop reason: SURG

## 2020-12-01 RX ORDER — LIDOCAINE HYDROCHLORIDE 20 MG/ML
INJECTION, SOLUTION INFILTRATION; PERINEURAL AS NEEDED
Status: DISCONTINUED | OUTPATIENT
Start: 2020-12-01 | End: 2020-12-01 | Stop reason: SURG

## 2020-12-01 RX ORDER — PROMETHAZINE HYDROCHLORIDE 25 MG/1
25 TABLET ORAL ONCE AS NEEDED
Status: DISCONTINUED | OUTPATIENT
Start: 2020-12-01 | End: 2020-12-01 | Stop reason: HOSPADM

## 2020-12-01 RX ORDER — PROMETHAZINE HYDROCHLORIDE 25 MG/1
25 SUPPOSITORY RECTAL ONCE AS NEEDED
Status: DISCONTINUED | OUTPATIENT
Start: 2020-12-01 | End: 2020-12-01 | Stop reason: HOSPADM

## 2020-12-01 RX ORDER — SODIUM CHLORIDE 0.9 % (FLUSH) 0.9 %
10 SYRINGE (ML) INJECTION AS NEEDED
Status: DISCONTINUED | OUTPATIENT
Start: 2020-12-01 | End: 2020-12-01 | Stop reason: HOSPADM

## 2020-12-01 RX ORDER — PROPOFOL 10 MG/ML
VIAL (ML) INTRAVENOUS AS NEEDED
Status: DISCONTINUED | OUTPATIENT
Start: 2020-12-01 | End: 2020-12-01 | Stop reason: SURG

## 2020-12-01 RX ORDER — SODIUM CHLORIDE, SODIUM LACTATE, POTASSIUM CHLORIDE, CALCIUM CHLORIDE 600; 310; 30; 20 MG/100ML; MG/100ML; MG/100ML; MG/100ML
30 INJECTION, SOLUTION INTRAVENOUS CONTINUOUS PRN
Status: DISCONTINUED | OUTPATIENT
Start: 2020-12-01 | End: 2020-12-01 | Stop reason: HOSPADM

## 2020-12-01 RX ADMIN — LIDOCAINE HYDROCHLORIDE 40 MG: 20 INJECTION, SOLUTION INFILTRATION; PERINEURAL at 14:39

## 2020-12-01 RX ADMIN — PROPOFOL 70 MG: 10 INJECTION, EMULSION INTRAVENOUS at 14:39

## 2020-12-01 RX ADMIN — PROPOFOL 180 MCG/KG/MIN: 10 INJECTION, EMULSION INTRAVENOUS at 14:39

## 2020-12-01 RX ADMIN — SODIUM CHLORIDE, POTASSIUM CHLORIDE, SODIUM LACTATE AND CALCIUM CHLORIDE 30 ML/HR: 600; 310; 30; 20 INJECTION, SOLUTION INTRAVENOUS at 13:07

## 2020-12-01 NOTE — ANESTHESIA PREPROCEDURE EVALUATION
Anesthesia Evaluation     Patient summary reviewed and Nursing notes reviewed   NPO Solid Status: > 8 hours  NPO Liquid Status: > 2 hours           Airway   Mallampati: II  TM distance: >3 FB  Neck ROM: full  No difficulty expected  Dental - normal exam     Pulmonary - normal exam   (+) shortness of breath,   Cardiovascular   Exercise tolerance: poor (<4 METS)    ECG reviewed  Rhythm: regular  Rate: normal    (+) hypertension 2 medications or greater, valvular problems/murmurs murmur, CAD, angina with exertion, DVT resolved, hyperlipidemia,     ROS comment: Cath Sept 2020  1. Left main: Normal  2. LAD: Calcified 40% mid vessel stenosis  3. LCX: Normal  4. RCA: Discrete 50% proximal stenosis.  5.  Normal left ventricular size and systolic function.  6.  Normal filling pressures.  No pulmonary hypertension  7.  RFR of the RCA 0.97.  Not hemodynamically significant.   no valvular anomalies to account for murmur on echo in 2017    Neuro/Psych  (+) numbness, psychiatric history Depression,     GI/Hepatic/Renal/Endo    (+)  GI bleeding resolved, renal disease CRI, diabetes mellitus type 2 poorly controlled using insulin,     Musculoskeletal     Abdominal   (+) obese,    Substance History      OB/GYN          Other   arthritis,    history of cancer remission                  Anesthesia Plan    ASA 3     MAC     intravenous induction     Anesthetic plan, all risks, benefits, and alternatives have been provided, discussed and informed consent has been obtained with: patient.

## 2020-12-01 NOTE — H&P
[facility]  Pre Procedure History & Physical    Chief Complaint:   Rectal bleeding, change in bowel function    Subjective     HPI:   This 75-year-old female presents the endoscopy suite for colonoscopic evaluation.  She has had longstanding intermittent issues of rectal bleeding and change in bowel pattern.  Last colonoscopy per her recollection 10 years or more.    Past Medical History:   Past Medical History:   Diagnosis Date   • Anemia of unknown etiology 2020   • Arthropathy of knee 2014    unspecified   • Arthropathy, multiple sites 2012    unspecified   • Bulging lumbar disc    • Cancer (CMS/Formerly [facility])     breast   • Chronic kidney disease, stage III (moderate) 2011   • Controlled diabetes mellitus type II without complication (CMS/Formerly [facility])    • Deep vein blood clot of right lower extremity (CMS/Formerly [facility]) 2019   • Depression 2001   • Dyspnea    • Essential hypertension    • Fall 2015    FRACTURED ANKLE   • Grief reaction 2016      May 10,2016   • History of complete eye exam 2012   • History of gynecological procedure 2010    special invesitation and examinations; gynecological examination; routine gynecological examination   • Hyperlipidemia     other and unspecified   • Hypothyroidism     unspecified   • Low back pain    • Moderate single current episode of major depressive disorder (CMS/HCC) 2001   • Peripheral neuropathy    • Personal history of malignant neoplasm of breast 2001    R breast (negative nodes) TX with radiation and Tamoxifen   • Rectal bleeding    • Thyroid disease    • Type II diabetes mellitus with neurological manifestations, uncontrolled (CMS/HCC) 2013   • Unstable angina (CMS/Formerly [facility]) 2020    Added automatically from request for surgery [id number]   • Wrist fracture, left        Past Surgical History:  Past Surgical History:   Procedure Laterality Date   • ANKLE ARTHROPLASTY Left 2019   • BREAST  LUMPECTOMY Right 2001   • CARDIAC CATHETERIZATION N/A 9/29/2020    Procedure: RIGHT HEART CATH;  Surgeon: Alex Snell MD;  Location: [facility] CATH INVASIVE LOCATION;  Service: Cardiovascular;  Laterality: N/A;   • CARDIAC CATHETERIZATION N/A 9/29/2020    Procedure: Coronary angiography;  Surgeon: Alex Snell MD;  Location: [facility] CATH INVASIVE LOCATION;  Service: Cardiovascular;  Laterality: N/A;   • CARDIAC CATHETERIZATION N/A 9/29/2020    Procedure: Left Heart Cath;  Surgeon: Alex Snell MD;  Location: [facility] CATH INVASIVE LOCATION;  Service: Cardiovascular;  Laterality: N/A;   • CARDIAC CATHETERIZATION N/A 9/29/2020    Procedure: Left ventriculography;  Surgeon: Alex Snell MD;  Location: [facility] CATH INVASIVE LOCATION;  Service: Cardiovascular;  Laterality: N/A;   • CARDIAC CATHETERIZATION N/A 9/29/2020    Procedure: Resting Full Cycle Ratio;  Surgeon: Alex Snell MD;  Location: [facility] CATH INVASIVE LOCATION;  Service: Cardiovascular;  Laterality: N/A;   • OTHER SURGICAL HISTORY Left 01/2015    ankle fracture repair   • VARICOSE VEIN SURGERY     • VEIN LIGATION AND STRIPPING Right 8/26/2019    Procedure: RIGHT LEG PHLEBECTOMY WITH SAPHENO FEMORAL JUNCTION LIGATION;  Surgeon: Jimenez Young MD;  Location: [facility];  Service: Vascular       Family History:  Family History   Problem Relation Age of Onset   • Hypertension Sister    • Cancer Sister    • Thyroid disease Daughter    • Cancer Mother    • Heart disease Father    • Bleeding Disorder Sister    • Malig Hyperthermia Neg Hx        Social History:   reports that she has never smoked. She has never used smokeless tobacco. She reports that she does not drink alcohol or use drugs.    Medications:   Medications Prior to Admission   Medication Sig Dispense Refill Last Dose   • amLODIPine (NORVASC) 10 MG tablet Take 1 tablet by mouth Daily. 90 tablet 1    • atorvastatin (LIPITOR) 20 MG tablet Take 1  "tablet by mouth Every Night for 180 days. 90 tablet 1    • Buprenorphine HCl (BELBUCA) film Apply 1 film to cheek 2 (two) times a day. DNF 11/21/2020 60 each 0    • EPINEPHrine (EPIPEN) 0.3 MG/0.3ML solution auto-injector injection       • Foot Care Products (DIABETIC INSOLES) misc 1 pair of diabetic shoe  Dx E11.45 1 each 0    • glucose blood (ONE TOUCH ULTRA TEST) test strip PT TO TESTS BS  each 3    • hydroCHLOROthiazide (HYDRODIURIL) 25 MG tablet Take 1 tablet by mouth Daily. 90 tablet 1    • insulin NPH (HUMULIN N) 100 UNIT/ML injection Inject 24 Units under the skin into the appropriate area as directed 2 (Two) Times a Day Before Meals. 20 mL 5    • insulin regular (NOVOLIN R) 100 UNIT/ML injection 14 UNITS AT BREAKFAST, 16 UNITS AT DINNER 10 mL 5    • Insulin Syringe 31G X 5/16\" 0.3 ML misc 4 SC INJECTIONS OF INSULIN DAILY 200 each 4    • Insulin Syringe-Needle U-100 (TRUEPLUS INSULIN SYRINGE) 31G X 5/16\" 1 ML misc USE FOUR TIMES A DAY AS DIRECTED 100 each 3    • levothyroxine (SYNTHROID, LEVOTHROID) 75 MCG tablet Take 1 tablet by mouth Daily for 180 days. 90 tablet 1    • PARoxetine (PAXIL) 20 MG tablet Take 1 tablet by mouth Every Night for 180 days. 90 tablet 1    • RELION INSULIN SYRINGE 31G X 15/64\" 0.3 ML misc USE 1 SYRINGE EACH TO INJECT INSULIN SUBCUTANEOUSLY 4 TIMES DAILY 100 each 0    • valsartan (DIOVAN) 320 MG tablet Take 1 tablet by mouth Daily. 90 tablet 1    • aspirin (aspirin) 81 MG EC tablet Take 1 tablet by mouth Daily.          Allergies:  Tizanidine, Tree nut, and Vancomycin    ROS:    Pertinent items are noted in HPI, all other systems reviewed and negative     Objective     There were no vitals taken for this visit.    Physical Exam   Constitutional: Pt is oriented to person, place, and time and well-developed, well-nourished, and in no distress.   Mouth/Throat: Oropharynx is clear and moist.   Neck: Normal range of motion.   Cardiovascular: Normal rate, regular rhythm and normal " heart sounds.    Pulmonary/Chest: Effort normal and breath sounds normal.   Abdominal: Soft. Nontender  Skin: Skin is warm and dry.   Psychiatric: Mood, memory, affect and judgment normal.     Assessment/Plan     Diagnosis:  Rectal bleeding  Change in bowel function    Anticipated Surgical Procedure:  Colonoscopy    The risks, benefits, and alternatives of this procedure have been discussed with the patient or the responsible party- the patient understands and agrees to proceed.

## 2020-12-01 NOTE — ANESTHESIA POSTPROCEDURE EVALUATION
Patient: Iraida Elizabeth    Procedure Summary     Date: 12/01/20 Room / [facility]:  [facility]    Anesthesia Start: 1437 Anesthesia Stop: 1519    Procedure: COLONOSCOPY TO CECUM AND TI WITH HOT AND COLD POLYPECTOMIES (N/A ) Diagnosis:       Rectal bleeding      (Rectal bleeding [K62.5])    Surgeon: Chuck Stone MD Provider: Sergio Encarnacion MD    Anesthesia Type: MAC ASA Status: 3          Anesthesia Type: MAC    Vitals  No vitals data found for the desired time range.          Post Anesthesia Care and Evaluation    Patient location during evaluation: bedside  Patient participation: complete - patient participated  Level of consciousness: awake  Pain management: adequate  Airway patency: patent  Anesthetic complications: No anesthetic complications  PONV Status: none  Cardiovascular status: acceptable  Respiratory status: acceptable  Hydration status: acceptable  Post Neuraxial Block status: Motor and sensory function returned to baseline

## 2020-12-01 NOTE — DISCHARGE INSTRUCTIONS
For the next 24 hours patient needs to be with a responsible adult.    For 24 hours DO NOT drive, operate machinery, appliances, drink alcohol, make important decisions or sign legal documents.    Start with a light or bland diet if you are feeling sick to your stomach otherwise advance to regular diet as tolerated.    Follow recommendations on procedure report if provided by your doctor.    Call Dr Stone for problems [phone]    Problems may include but not limited to: large amounts of bleeding, trouble breathing, repeated vomiting, severe unrelieved pain, fever or chills.      NO ASPIRIN OR ASPIRIN BASED PRODUCTS FOR 10 DAYS

## 2020-12-02 ENCOUNTER — RESULTS ENCOUNTER (OUTPATIENT)
Dept: ENDOCRINOLOGY | Age: 75
End: 2020-12-02

## 2020-12-02 DIAGNOSIS — IMO0002 UNCONTROLLED TYPE 2 DIABETES MELLITUS WITH COMPLICATION, WITH LONG-TERM CURRENT USE OF INSULIN: ICD-10-CM

## 2020-12-02 DIAGNOSIS — E03.9 ACQUIRED HYPOTHYROIDISM: ICD-10-CM

## 2020-12-02 DIAGNOSIS — E78.5 HYPERLIPIDEMIA ASSOCIATED WITH TYPE 2 DIABETES MELLITUS (HCC): ICD-10-CM

## 2020-12-02 DIAGNOSIS — E11.69 HYPERLIPIDEMIA ASSOCIATED WITH TYPE 2 DIABETES MELLITUS (HCC): ICD-10-CM

## 2020-12-03 LAB
CYTO UR: NORMAL
LAB AP CASE REPORT: NORMAL
PATH REPORT.FINAL DX SPEC: NORMAL
PATH REPORT.GROSS SPEC: NORMAL

## 2020-12-14 ENCOUNTER — TELEPHONE (OUTPATIENT)
Dept: GASTROENTEROLOGY | Facility: CLINIC | Age: 75
End: 2020-12-14

## 2020-12-14 NOTE — TELEPHONE ENCOUNTER
----- Message from Chuck SHINE MD sent at 12/4/2020  3:52 PM EST -----  Regarding: Biopsy results  Okay to call results, recommend follow-up colonoscopy in 3 years.  Office follow-up sooner as needed.  ----- Message -----  From: Lab, Background User  Sent: 12/3/2020   3:09 PM EST  To: Chuck SHINE MD

## 2020-12-14 NOTE — TELEPHONE ENCOUNTER
Call to pt.  Advise per path report that polyps that were removed were not cancerous, but precancerous.     Advise per Dr Stone note.  Verb understanding.     C/s for 12/1/23 placed in recall.

## 2020-12-14 NOTE — TELEPHONE ENCOUNTER
----- Message from Yonatan Linn sent at 12/14/2020 12:21 PM EST -----  Regarding: results  Contact: [phone]  PT is calling for results

## 2020-12-18 ENCOUNTER — OFFICE VISIT (OUTPATIENT)
Dept: PAIN MEDICINE | Facility: CLINIC | Age: 75
End: 2020-12-18

## 2020-12-18 VITALS
HEART RATE: 68 BPM | HEIGHT: 61 IN | DIASTOLIC BLOOD PRESSURE: 70 MMHG | BODY MASS INDEX: 42.29 KG/M2 | TEMPERATURE: 97.1 F | RESPIRATION RATE: 18 BRPM | WEIGHT: 224 LBS | SYSTOLIC BLOOD PRESSURE: 122 MMHG | OXYGEN SATURATION: 92 %

## 2020-12-18 DIAGNOSIS — Z79.899 ENCOUNTER FOR LONG-TERM (CURRENT) USE OF HIGH-RISK MEDICATION: Primary | ICD-10-CM

## 2020-12-18 DIAGNOSIS — M47.816 LUMBAR FACET ARTHROPATHY: ICD-10-CM

## 2020-12-18 DIAGNOSIS — M54.41 CHRONIC MIDLINE LOW BACK PAIN WITH BILATERAL SCIATICA: ICD-10-CM

## 2020-12-18 DIAGNOSIS — M54.42 CHRONIC MIDLINE LOW BACK PAIN WITH BILATERAL SCIATICA: ICD-10-CM

## 2020-12-18 DIAGNOSIS — G89.29 CHRONIC PAIN OF LEFT KNEE: ICD-10-CM

## 2020-12-18 DIAGNOSIS — M25.562 CHRONIC PAIN OF LEFT KNEE: ICD-10-CM

## 2020-12-18 DIAGNOSIS — M51.36 DDD (DEGENERATIVE DISC DISEASE), LUMBAR: ICD-10-CM

## 2020-12-18 DIAGNOSIS — M54.16 LUMBAR RADICULOPATHY: ICD-10-CM

## 2020-12-18 DIAGNOSIS — G89.29 CHRONIC MIDLINE LOW BACK PAIN WITH BILATERAL SCIATICA: ICD-10-CM

## 2020-12-18 PROCEDURE — 99214 OFFICE O/P EST MOD 30 MIN: CPT | Performed by: NURSE PRACTITIONER

## 2020-12-18 NOTE — PROGRESS NOTES
CHIEF COMPLAINT  Back pain has increased since last visit    Subjective   Iraida Elizabeth is a 75 y.o. female  who presents for follow-up.  She has a history of back pain. She states that her back pain has worsened since her last office visit. She states that the Bilateral S1 TFESI performed on 11/5/2020 only provided 2-3 weeks of pain relief and then her pain returned. She continues with Belbuca 300 mcg BID. She states that she feels the belbuca isn't helping like it has in the past. She has previously stated that Norco was not helpful either.  We will get a PGT on her today.      Unfortunately the patient states that PT is cost-prohibitive.     Patient remained masked during entire encounter. No cough present. I donned a mask and eye protection throughout entire visit. Prior to donning mask and eye protection, hand hygiene was performed, as well as when it was doffed.  I was closer than 6 feet, but not for an extended period of time. No obvious exposure to any bodily fluids.    Back Pain  This is a chronic (chronic) problem. The current episode started more than 1 year ago. The problem occurs constantly. Progression since onset: worsened since last office visit. The pain is present in the lumbar spine and sacro-iliac. The quality of the pain is described as burning. The pain radiates to the left foot, left thigh, left knee, right foot, right knee and right thigh (posterior). The pain is at a severity of 8/10. The pain is moderate. The symptoms are aggravated by bending, position, standing and twisting (laying flat). Associated symptoms include weakness. Pertinent negatives include no abdominal pain, chest pain, dysuria, fever, headaches or numbness. She has tried analgesics (Ranulfo S1 TFESI ) for the symptoms. The treatment provided moderate relief.   Knee Pain   There was no injury mechanism. The pain is present in the left knee. The quality of the pain is described as aching. The pain is at a severity of 3/10. The  "pain has been worsening since onset. Pertinent negatives include no numbness. The symptoms are aggravated by movement and weight bearing. Treatments tried: belbuca, rest. The treatment provided moderate relief.      PEG Assessment   What number best describes your pain on average in the past week?7  What number best describes how, during the past week, pain has interfered with your enjoyment of life?8  What number best describes how, during the past week, pain has interfered with your general activity?  8    The following portions of the patient's history were reviewed and updated as appropriate: allergies, current medications, past family history, past medical history, past social history, past surgical history and problem list.    Review of Systems   Constitutional: Negative for chills and fever.   Respiratory: Positive for shortness of breath.    Cardiovascular: Negative for chest pain.   Gastrointestinal: Negative for abdominal pain, constipation, diarrhea, nausea and vomiting.   Genitourinary: Negative for difficulty urinating, dyspareunia and dysuria.   Musculoskeletal: Positive for back pain.   Neurological: Positive for weakness. Negative for dizziness, light-headedness, numbness and headaches.   Psychiatric/Behavioral: Negative for confusion, hallucinations, self-injury, sleep disturbance and suicidal ideas. The patient is not nervous/anxious.    All other systems reviewed and are negative.    --  The aforementioned information the Chief Complaint section and above subjective data including any HPI data, and also the Review of Systems data, has been personally reviewed and affirmed.  --    Vitals:    12/18/20 1139   BP: 122/70   Pulse: 68   Resp: 18   Temp: 97.1 °F (36.2 °C)   SpO2: 92%   Weight: 102 kg (224 lb)   Height: 154.9 cm (61\")   PainSc:   8   PainLoc: Back     Objective   Physical Exam  Vitals signs and nursing note reviewed.   Constitutional:       Appearance: Normal appearance. She is " well-developed.   HENT:      Head: Normocephalic and atraumatic.   Eyes:      General: Lids are normal.      Conjunctiva/sclera: Conjunctivae normal.   Neck:      Musculoskeletal: Normal range of motion.      Trachea: Trachea normal.   Cardiovascular:      Rate and Rhythm: Normal rate.   Pulmonary:      Effort: Pulmonary effort is normal.   Musculoskeletal:      Left knee: She exhibits decreased range of motion. Tenderness found.      Lumbar back: She exhibits decreased range of motion and tenderness.      Comments: POS Ranulfo SLR   Skin:     General: Skin is warm and dry.   Neurological:      Mental Status: She is alert and oriented to person, place, and time.      Gait: Gait abnormal (wheelchair).   Psychiatric:         Speech: Speech normal.         Behavior: Behavior normal.         Judgment: Judgment normal.       Assessment/Plan   Diagnoses and all orders for this visit:    1. Encounter for long-term (current) use of high-risk medication (Primary)    2. Chronic pain of left knee    3. Lumbar facet arthropathy    4. DDD (degenerative disc disease), lumbar  -     Ambulatory Referral to Neurosurgery    5. Lumbar radiculopathy  -     Ambulatory Referral to Neurosurgery    6. Chronic midline low back pain with bilateral sciatica  -     Ambulatory Referral to Neurosurgery    Other orders  -     Buprenorphine HCl (BELBUCA) film; Apply 1 film to cheek 2 (two) times a day. DNF 12/21/2020  Dispense: 60 each; Refill: 0      --- Referral to neurosurgeon for increasing leg weakness or pain. If nothing surgical we may consider SCS for her low back pain and lumbar radiculopathy.   --- PGT test today  --- Continue Belbuca 300 mcg film BID, we will check a PGT and see if we need to consider a different medication for her pain.   --- The urine drug screen confirmation from 9/14/2020 has been reviewed and the result is appropriate based on patient history and DONNY report  --- PT is cost-prohibitive at this time.   --- Follow-up 1  month or sooner if needed     DONNY REPORT  As part of the patient's treatment plan, I am prescribing controlled substances. The patient has been made aware of appropriate use of such medications, including potential risk of somnolence, limited ability to drive and/or work safely, and the potential for dependence or overdose. It has also bee made clear that these medications are for use by this patient only, without concomitant use of alcohol or other substances unless prescribed.     Patient has completed prescribing agreement detailing terms of continued prescribing of controlled substances, including monitoring DONNY reports, urine drug screening, and pill counts if necessary. The patient is aware that inappropriate use will results in cessation of prescribing such medications.    DONNY report has been reviewed and scanned into the patient's chart.    As the clinician, I personally reviewed the DONNY from 12/18/2020 while the patient was in the office today.    History and physical exam exhibit continued safe and appropriate use of controlled substances.    EMR Dragon/Transcription disclaimer:   Much of this encounter note is an electronic transcription/translation of spoken language to printed text. The electronic translation of spoken language may permit erroneous, or at times, nonsensical words or phrases to be inadvertently transcribed; Although I have reviewed the note for such errors, some may still exist.

## 2020-12-21 ENCOUNTER — TELEPHONE (OUTPATIENT)
Dept: NEUROSURGERY | Facility: CLINIC | Age: 75
End: 2020-12-21

## 2020-12-21 NOTE — TELEPHONE ENCOUNTER
PT CALLED AND STATED THAT SHE HAS GONE TO PAIN MANAGEMENT AND THEY CAN'T GET HER PAIN UNDER CONTROL/PT WAS ADVISED TO CONTACT DR TAVERAS TO BE RE-EVALUATED FOR HER BACK/PT WAS PREVIOUS SCHEDULED IN 2019 FOR A 3M FOLLOW UP BUT WAS CX/PT BLADDER ISSUE BUT DOES FEEL LIKE SHE HAS TO HAVE A BOWEL MOVEMENT ALL THE TIME/PT'S PAIN IS LOCATED IN LOWER BACK AND UPPER LEFT SHOULDER AREA/PT STATES THE LOWER BACK IS VERY SEVERE/PAIN LEVEL AT: 8-8.5/10/PT STATES SHE IS CURRENTLY TAKING BELDUCA 300MG 2X DAILY BUT IT IS NOT HELPING THE PAIN.    PLEASE CONTACT PT AT: [phone].    THANK YOU!

## 2021-01-06 ENCOUNTER — TELEPHONE (OUTPATIENT)
Dept: PAIN MEDICINE | Facility: CLINIC | Age: 76
End: 2021-01-06

## 2021-01-06 NOTE — TELEPHONE ENCOUNTER
Patient LM stating she went off of Belbucca for 3-4 days to see if it was even working and states that it is NOT helping - she wanted to know if it would be ok to try some CBD oil

## 2021-01-06 NOTE — TELEPHONE ENCOUNTER
"If she would like to trial CBD oil she can, but it is possible this will cause a positive THC level in her urine.  I don't \"prescribe\" or recommend CBD oil to anyone. Please caution her that stopping Belbuca abruptly may cause withdraw symptoms including nausea, vomiting, diarrhea, chills, and shakes. "

## 2021-01-07 NOTE — TELEPHONE ENCOUNTER
Spoke to patient and she states she is NOT having any withdrawal symptoms, states she has been using the CBD oil since Monday and that she is NOT having any pain in her back. She states was able to walk outside to her car and back for the first time.

## 2021-01-13 ENCOUNTER — OFFICE VISIT (OUTPATIENT)
Dept: NEUROSURGERY | Facility: CLINIC | Age: 76
End: 2021-01-13

## 2021-01-13 VITALS
WEIGHT: 225 LBS | HEART RATE: 76 BPM | HEIGHT: 61 IN | SYSTOLIC BLOOD PRESSURE: 133 MMHG | DIASTOLIC BLOOD PRESSURE: 82 MMHG | TEMPERATURE: 97.1 F | BODY MASS INDEX: 42.48 KG/M2

## 2021-01-13 DIAGNOSIS — M54.2 CHRONIC NECK PAIN: ICD-10-CM

## 2021-01-13 DIAGNOSIS — G89.29 CHRONIC NECK PAIN: ICD-10-CM

## 2021-01-13 DIAGNOSIS — G89.29 CHRONIC MIDLINE LOW BACK PAIN WITH BILATERAL SCIATICA: Primary | ICD-10-CM

## 2021-01-13 DIAGNOSIS — M54.41 CHRONIC MIDLINE LOW BACK PAIN WITH BILATERAL SCIATICA: Primary | ICD-10-CM

## 2021-01-13 DIAGNOSIS — M54.42 CHRONIC MIDLINE LOW BACK PAIN WITH BILATERAL SCIATICA: Primary | ICD-10-CM

## 2021-01-13 DIAGNOSIS — M51.36 DDD (DEGENERATIVE DISC DISEASE), LUMBAR: ICD-10-CM

## 2021-01-13 PROCEDURE — 99213 OFFICE O/P EST LOW 20 MIN: CPT | Performed by: NURSE PRACTITIONER

## 2021-01-13 NOTE — PROGRESS NOTES
"Subjective   Patient ID: Iraida Elizabeth is a 75 y.o. female is here today for back and shoulder pain follow-up.    Patient was last seen 06.10.2019 by Dr. Hdez for low back pain and bilateral leg pain. Patient had MRI Lumbar Spine 6.30.2020 at [facility].    History of Present Illness  Patient reports today that she has chronic low back that is severe and constant. It is aggravated by standing and position change. She reports associated \"unbelievable\" bilateral leg pain. It is a heaviness and sensitvity. She feels better when she sits. She feels weak when walking- she uses a wheelchair even in the house so she can rest while doing chores. No B/B issues.     She also reports chronic right lateral neck/upper back/posterior shoulder pain. It is mild compared to back pain. It does not radiates in arms/ no numbness in arms.     Patient has great results with back and leg pain with LESI but only lasts 2-3 weeks and then returns to baseline pain. She is currently taking Belbuca for pain. She tried CBD oil and had great pain relief but she had terrible insomina so she stopped and returned to Belbucca. She is not convinced that it helps though. She has declined PT in past due to cost.     The following portions of the patient's history were reviewed and updated as appropriate: allergies, current medications, past family history, past medical history, past social history, past surgical history and problem list.    Review of Systems   Constitutional: Negative for chills and fever.   HENT: Negative for congestion.    Genitourinary: Negative for difficulty urinating and dysuria.   Musculoskeletal: Positive for back pain, gait problem, neck pain and neck stiffness.   Neurological: Positive for weakness and numbness.        N/T R arm/hand     Objective     Vitals:    01/13/21 1025   BP: 133/82   Cuff Size: Adult   Pulse: 76   Temp: 97.1 °F (36.2 °C)   Weight: 102 kg (225 lb)   Height: 154.9 cm (61\")     Body mass index is 42.51 " kg/m².      Physical Exam  Vitals signs reviewed.   Constitutional:       Comments: Body mass index is 42.51 kg/m².     Pulmonary:      Effort: Pulmonary effort is normal.   Musculoskeletal:      Right shoulder: She exhibits decreased range of motion ( Mild with external rotation).      Cervical back: She exhibits decreased range of motion ( right rotation), tenderness ( Upper trapezius) and pain (  right rotation; negative Spurling). She exhibits no bony tenderness.      Thoracic back: She exhibits no tenderness and no bony tenderness.      Lumbar back: She exhibits tenderness. She exhibits no pain ( Negative straight leg raise).   Skin:     General: Skin is warm and dry.   Neurological:      General: No focal deficit present.      Mental Status: She is alert.      Deep Tendon Reflexes: Strength normal.      Reflex Scores:       Tricep reflexes are 1+ on the right side and 1+ on the left side.       Bicep reflexes are 1+ on the right side and 1+ on the left side.       Brachioradialis reflexes are 1+ on the right side and 1+ on the left side.       Patellar reflexes are 1+ on the right side and 1+ on the left side.       Achilles reflexes are 1+ on the right side.  Psychiatric:         Mood and Affect: Mood normal.         Thought Content: Thought content normal.       Neurologic Exam     Mental Status   Level of consciousness: alert  Knowledge: good.   Normal comprehension.     Motor Exam   Muscle bulk: normal    Strength   Strength 5/5 throughout.     Sensory Exam   Right arm light touch: normal  Left arm light touch: normal  Right leg light touch: Hypersensitivity.  Left leg light touch: Hypersensitivity.    Gait, Coordination, and Reflexes     Gait  Gait: wide-based (Wide-based and waddling due to body habitus; stable in room without cane)    Reflexes   Right brachioradialis: 1+  Left brachioradialis: 1+  Right biceps: 1+  Left biceps: 1+  Right triceps: 1+  Left triceps: 1+  Right patellar: 1+  Left patellar:  1+  Right achilles: 1+  Right Crane: absent  Left Crane: absent        Assessment/Plan   Independent Review of Radiographic Studies:      I personally reviewed the images from the following studies.    MRI lumbar spine With and without contrast June 30, 2020 reviewed.  No evidence of abnormal enhancement.  Mild to moderate multilevel degenerative changes likely from L3-S1.  There is a posterior directed synovial cyst on the left at L4/5 that is present previously and unchanged.  There is mild anterolisthesis of L4 on 5 and L5 on S1.  There is disc osteophyte complex at L3/4, L4/5 with no significant canal stenosis.  I believe that there is moderate right neuroforaminal narrowing at L 3/4 but no significant compression on the nerve root.  At L4/5 there is moderate facet disease left greater than right.     Medical Decision Making:      Patient presents for follow-up of chronic low back pain and leg pain.  She states the back and leg pain is fairly equal and is constant but is most severe when up and moving.  She sits with some relief but still has pain.  She is tried multiple pain management measures.  She has good success with epidurals but they do not last longer than a couple of weeks.  She also complains of some right-sided neck and posterior shoulder pain.  She has some mild rotation limitations but overall has pretty good range of motion.  She has tenderness and spasm present in the right upper trapezius.  She has no radicular complaints and a negative Spurling.  She does have some shoulder height discrepancy but I do not feel any notable thoracic scoliosis on exam.  We will obtain a cervical x-ray and I recommended heat, home exercise program which she already has and she should consider a massage.  She declines physical therapy due to cost.  Advised her to notify us if she begins to have any upper extremity related symptoms or weakness.  With regard to her low back, MRI is fairly unremarkable for any  significant canal stenosis or neural foraminal stenosis.  She is spoken with pain management regarding a spinal cord stimulator.  We will get lumbar x-rays with flexion-extension to evaluate the mild spondylolisthesis present.  If she has no significant motion, I believe that she should speak with the pain management regarding moving forward with spinal cord stimulator assessment.  If there is motion, we will have her see Dr. Hdez to discuss the findings.  I will have her notified of her x-ray results.  She will otherwise return as needed.     Diagnoses and all orders for this visit:    1. Chronic midline low back pain with bilateral sciatica (Primary)  -     XR Spine Lumbar Complete With Flex & Ext; Future    2. DDD (degenerative disc disease), lumbar  -     XR Spine Lumbar Complete With Flex & Ext; Future    3. Chronic neck pain  -     XR Spine Cervical Complete 4 or 5 View; Future      Return if symptoms worsen or fail to improve, for Call patient with plan.

## 2021-01-14 DIAGNOSIS — E78.49 OTHER HYPERLIPIDEMIA: ICD-10-CM

## 2021-01-14 RX ORDER — ATORVASTATIN CALCIUM 20 MG/1
TABLET, FILM COATED ORAL
Qty: 90 TABLET | Refills: 0 | OUTPATIENT
Start: 2021-01-14

## 2021-01-18 ENCOUNTER — OFFICE VISIT (OUTPATIENT)
Dept: PAIN MEDICINE | Facility: CLINIC | Age: 76
End: 2021-01-18

## 2021-01-18 VITALS
DIASTOLIC BLOOD PRESSURE: 65 MMHG | HEART RATE: 74 BPM | HEIGHT: 61 IN | RESPIRATION RATE: 18 BRPM | WEIGHT: 225 LBS | SYSTOLIC BLOOD PRESSURE: 118 MMHG | OXYGEN SATURATION: 96 % | TEMPERATURE: 96.7 F | BODY MASS INDEX: 42.48 KG/M2

## 2021-01-18 DIAGNOSIS — Z79.899 ENCOUNTER FOR LONG-TERM (CURRENT) USE OF HIGH-RISK MEDICATION: ICD-10-CM

## 2021-01-18 DIAGNOSIS — G89.29 OTHER CHRONIC PAIN: ICD-10-CM

## 2021-01-18 DIAGNOSIS — M51.36 DDD (DEGENERATIVE DISC DISEASE), LUMBAR: ICD-10-CM

## 2021-01-18 DIAGNOSIS — M47.816 LUMBAR FACET ARTHROPATHY: Primary | ICD-10-CM

## 2021-01-18 DIAGNOSIS — E78.49 OTHER HYPERLIPIDEMIA: ICD-10-CM

## 2021-01-18 DIAGNOSIS — M54.16 LUMBAR RADICULOPATHY: ICD-10-CM

## 2021-01-18 PROCEDURE — 99214 OFFICE O/P EST MOD 30 MIN: CPT | Performed by: NURSE PRACTITIONER

## 2021-01-18 RX ORDER — BUPRENORPHINE HYDROCHLORIDE 75 UG/1
75 FILM, SOLUBLE BUCCAL TAKE AS DIRECTED
Qty: 60 EACH | Refills: 0 | Status: SHIPPED | OUTPATIENT
Start: 2021-01-18 | End: 2021-01-18

## 2021-01-18 RX ORDER — BUPRENORPHINE HYDROCHLORIDE 75 UG/1
75 FILM, SOLUBLE BUCCAL TAKE AS DIRECTED
Qty: 60 EACH | Refills: 0 | Status: SHIPPED | OUTPATIENT
Start: 2021-01-18 | End: 2021-02-08

## 2021-01-18 RX ORDER — ATORVASTATIN CALCIUM 20 MG/1
20 TABLET, FILM COATED ORAL NIGHTLY
Qty: 90 TABLET | Refills: 1 | OUTPATIENT
Start: 2021-01-18 | End: 2021-07-17

## 2021-01-18 RX ORDER — ATORVASTATIN CALCIUM 20 MG/1
TABLET, FILM COATED ORAL
Qty: 90 TABLET | Refills: 0 | OUTPATIENT
Start: 2021-01-18

## 2021-01-18 NOTE — PROGRESS NOTES
"CHIEF COMPLAINT  Back pain has decreased since last visit, states she has went off the Belbucca and was taking CBD oil, which has helped, but has since went back on the Belbucca due to withdrawls symptoms. States she was NOT sleeping, but she states she has been back on the Belbucca for a week now    Subjective   Iraida Elizabeth is a 75 y.o. female  who presents for follow-up.  She has a history of back pain. Today her pain is 6/10VAS in severity.  Office visit from 1/13/2020 with ART Allan reviewed.  Patient was seen in follow-up for chronic low back pain which is severe and constant with \"unbelievable\" bilateral leg pain.  MRI fairly unremarkable for any significant canal stenosis or neuroforaminal stenosis.  Lumbar x-rays with flexion and extension ordered to evaluate mild spondylolisthesis.  If no significant motion on x-ray plan to discuss SCS with pain management.  If motion on x-ray will follow up with Dr. Hdez to discuss findings.    Patient states that she has not completed the x-rays ordered by neurosurgery.  Discussed with patient that these need to be completed.  If nothing surgical from a neurosurgical perspective following completion of x-rays we can proceed with SCS.  Discussed SCS therapy in detail today, [facility] pamphlet given to patient at this office visit. Patient is still contemplating whether she would want to proceed with SCS at this time.     She states that she stopped her Belbuca and started utilizing CBD oil which helped her pain significantly.  She states she went back on her Belbuca because she felt she was having withdraw symptoms.  She states that when she resumed her belbuca she did not seen any improvement in her pain.  Discussed with patient that we will wean her off of her Belbuca and that she can resume the CBD oil if she desires.  Discussed with patient that if she were to require returning to an opioid for her pain relief we would need to verify a " negative UDS prior to this.  Discussed with patient that I am not personally recommending or prescribing the CBD oil.  Patient states understanding.     Patient remained masked during entire encounter. No cough present. I donned a mask and eye protection throughout entire visit. Prior to donning mask and eye protection, hand hygiene was performed, as well as when it was doffed.  I was closer than 6 feet, but not for an extended period of time. No obvious exposure to any bodily fluids.    Back Pain  This is a chronic (chronic) problem. The current episode started more than 1 year ago. The problem occurs constantly. Progression since onset: worsened since last office visit. The pain is present in the lumbar spine and sacro-iliac. The quality of the pain is described as burning. The pain radiates to the left foot, left thigh, left knee, right foot, right knee and right thigh (posterior). The pain is at a severity of 6/10. The pain is moderate. The symptoms are aggravated by bending, position, standing and twisting (laying flat). Pertinent negatives include no abdominal pain, chest pain, dysuria, fever, headaches, numbness or weakness. She has tried analgesics (Ranulfo S1 TFESI ) for the symptoms. The treatment provided moderate relief.   Knee Pain   There was no injury mechanism. The pain is present in the left knee. The quality of the pain is described as aching. The pain is at a severity of 3/10. The pain has been worsening since onset. Pertinent negatives include no numbness. The symptoms are aggravated by movement and weight bearing. Treatments tried: belbuca, rest. The treatment provided moderate relief.      PEG Assessment   What number best describes your pain on average in the past week?5  What number best describes how, during the past week, pain has interfered with your enjoyment of life?5  What number best describes how, during the past week, pain has interfered with your general activity?  6    The following  "portions of the patient's history were reviewed and updated as appropriate: allergies, current medications, past family history, past medical history, past social history, past surgical history and problem list.    Review of Systems   Constitutional: Negative for chills and fever.   Respiratory: Negative for shortness of breath.    Cardiovascular: Negative for chest pain.   Gastrointestinal: Negative for abdominal pain, constipation, diarrhea, nausea and vomiting.   Genitourinary: Negative for difficulty urinating, dyspareunia and dysuria.   Musculoskeletal: Positive for back pain.   Neurological: Negative for dizziness, weakness, light-headedness, numbness and headaches.   Psychiatric/Behavioral: Negative for confusion, hallucinations, self-injury, sleep disturbance and suicidal ideas. The patient is not nervous/anxious.    All other systems reviewed and are negative.    --  The aforementioned information the Chief Complaint section and above subjective data including any HPI data, and also the Review of Systems data, has been personally reviewed and affirmed.  --    Vitals:    01/18/21 1124   BP: 118/65   Pulse: 74   Resp: 18   Temp: 96.7 °F (35.9 °C)   SpO2: 96%   Weight: 102 kg (225 lb)   Height: 154.9 cm (61\")   PainSc:   6   PainLoc: Back     Objective   Physical Exam  Vitals signs and nursing note reviewed.   Constitutional:       Appearance: Normal appearance. She is well-developed. She is obese.   Eyes:      General: Lids are normal.   Neck:      Musculoskeletal: Normal range of motion.   Cardiovascular:      Rate and Rhythm: Normal rate.   Pulmonary:      Effort: Pulmonary effort is normal.   Musculoskeletal:      Cervical back: She exhibits decreased range of motion.      Lumbar back: She exhibits decreased range of motion and tenderness.   Neurological:      Mental Status: She is alert and oriented to person, place, and time.      Gait: Gait abnormal (cane).   Psychiatric:         Attention and " Perception: Attention normal.         Mood and Affect: Mood normal.         Speech: Speech normal.         Behavior: Behavior normal.         Judgment: Judgment normal.       Assessment/Plan   Diagnoses and all orders for this visit:    1. Lumbar facet arthropathy (Primary)    2. Lumbar radiculopathy    3. Encounter for long-term (current) use of high-risk medication    4. Other chronic pain    5. DDD (degenerative disc disease), lumbar    Other orders  -     Discontinue: Buprenorphine HCl (Belbuca) 75 MCG film; Apply 75 mcg to cheek Take As Directed. 2 films BID x 7 days, then 1 film BID x 7 days, then 1 film daily x 7 days, then 1 film every other day until finished  Dispense: 60 each; Refill: 0  -     Buprenorphine HCl (Belbuca) 75 MCG film; Apply 75 mcg to cheek Take As Directed. 2 films BID x 7 days; 1 film BID x 7 days;1 film daily x 7 days; 1 film every other day until out  Dispense: 60 each; Refill: 0      --- The urine drug screen confirmation from 9/14/2020 has been reviewed and the result is appropriate based on patient history and DONNY report  --- Belbuca 75 mcg film weaning dose prescribed   2 films in the morning and 2 films at night x 7 days, then 1 film in the morning and 1 film at night x 7 days, then 1 film a day x 7 days, then 1 film every other day until prescription is finished.   --- Patient instructed to take her remaining Belbuca 300 mcg films to pharmacy for disposal. Patient states understanding.   --- May continue CBD oil, Patient understands that I am not prescribing this treatment. Explained to patient that if she were to need to resume any opioids she would need a negative UDS for THC prior to this.   ----------  Education about SCS therapy:    -  This was an extended office visit in which we entered into discussion about advanced pain relieving techniques, and discussed implantable pain therapies.  We discussed advanced neuromodulation in the form of Spinal Cord Stimulation.  This is  a reasonable therapy for patients who have exhausted basic nonnarcotic options, basic modalities and physical therapies, and do not have any other reasonable surgical options.  This therapy as an alternative to long term high dose opioid therapy.    -  Risks include but are not limited to bleeding, infection, injury, paralysis, nerve injury, dural puncture, and risk for postprocedural pain.  Implanted equipment risks include but are not limited to lead migration, lead fracture, risk of loss of pain relieving stimulation, risk of electrical shock, and risk of system failure.    - We discussed the theory and basic science behind SCS therapy including but not limited to energy delivery and relevant anatomy, in terms that are easy to understand and also with use of illustrative devices.  Spinal Cord Stimulation therapies apply an electromagnetic field to a specific area on the spinal cord (Dorsal Column) to attempt to block transmission of painful signals from the peripheral nerves to the brain.    -  We discussed that prior to trialing, I request that patients review relevant materials and perform some research, and also have a follow up education session with a device specialist from the .  Also, insurance requires a presurgical psychological evaluation.  When these have been completed, and all the patient's questions have been answered to their satisfaction, then we will plan to request authorization for trialing.   - We discussed the trialing process (aka Phase 1)  that usually lasts a week, and the temporary nature of this trial.  Trial success will determine whether or not we proceed to implant.  We discussed reasonable expectations, and that I feel that consistent 50% pain relief is medically successful and is a reasonable expectation to justify moving forward to permanent implant.    -  Additional risks of Phase 1 include but are not limited to bleeding on insertion, bleeding on lead removal, and  procedural site soreness.  - We discussed the percutaneous surgical implant, including postsurgical restrictions, risks, and alternatives.   For spinal cord stimulation implanted device (aka SCS Phase 2) there is usually a midline vertical incision for the spinally implanted leads, and also a horizontal incision in the posterior lumbar flank for implantation of the battery & computer (aka IPG).  The leads are tunneled from the midline incision to the medial aspect of the battery pocket incision.    -  Postoperative restrictions include limiting the following activity as much as possible for 90 days:  Lifting >10 lbs, bending at the waist, stretching/reaching overhead, and twisting.  ----------  --- [facility] SCS pamphlet given to patient in office today.   --- Follow-up after completion of imaging and f/u with neurosurgery.      DONNY REPORT  As part of the patient's treatment plan, I am prescribing controlled substances. The patient has been made aware of appropriate use of such medications, including potential risk of somnolence, limited ability to drive and/or work safely, and the potential for dependence or overdose. It has also bee made clear that these medications are for use by this patient only, without concomitant use of alcohol or other substances unless prescribed.     Patient has completed prescribing agreement detailing terms of continued prescribing of controlled substances, including monitoring DONNY reports, urine drug screening, and pill counts if necessary. The patient is aware that inappropriate use will results in cessation of prescribing such medications.    DONNY report has been reviewed and scanned into the patient's chart.    As the clinician, I personally reviewed the DONNY from 1/18/2021 while the patient was in the office today.    History and physical exam exhibit continued safe and appropriate use of controlled substances.    EMR Dragon/Transcription disclaimer:   Much of this  encounter note is an electronic transcription/translation of spoken language to printed text. The electronic translation of spoken language may permit erroneous, or at times, nonsensical words or phrases to be inadvertently transcribed; Although I have reviewed the note for such errors, some may still exist.

## 2021-01-20 ENCOUNTER — HOSPITAL ENCOUNTER (OUTPATIENT)
Dept: GENERAL RADIOLOGY | Facility: HOSPITAL | Age: 76
Discharge: HOME | End: 2021-01-20
Admitting: NURSE PRACTITIONER

## 2021-01-20 DIAGNOSIS — G89.29 CHRONIC NECK PAIN: ICD-10-CM

## 2021-01-20 DIAGNOSIS — M54.2 CHRONIC NECK PAIN: ICD-10-CM

## 2021-01-20 PROCEDURE — 72050 X-RAY EXAM NECK SPINE 4/5VWS: CPT

## 2021-01-21 ENCOUNTER — TELEPHONE (OUTPATIENT)
Dept: NEUROSURGERY | Facility: CLINIC | Age: 76
End: 2021-01-21

## 2021-01-21 NOTE — TELEPHONE ENCOUNTER
Torri Gutierrez, Sear Urban MA             Please advise patient that cervical x-ray showed degenerative changes but nothing concerning.  However, she was supposed to have both cervical and lumbar x-rays.  The lumbar x-rays were to help determine whether she needed to see one of our surgeons or see pain management for consideration of spinal cord stimulator.  Please ask her to return for those x-rays if she would like further follow-up.      Patient is aware, she states that she tried telling them when she had her Cervical that she should have had back XR too but they did not listen to her. She will return to radiology and have them done and we will call her.

## 2021-02-03 LAB
CHOLEST SERPL-MCNC: 147 MG/DL (ref 100–199)
CREAT SERPL-MCNC: 1.2 MG/DL (ref 0.57–1)
HBA1C MFR BLD: 7.4 % (ref 4.8–5.6)
HDLC SERPL-MCNC: 62 MG/DL
INTERPRETATION: NORMAL
INTERPRETATION: NORMAL
LDLC SERPL CALC-MCNC: 63 MG/DL (ref 0–99)
Lab: NORMAL
Lab: NORMAL
T4 FREE SERPL-MCNC: 1.45 NG/DL (ref 0.82–1.77)
TRIGL SERPL-MCNC: 127 MG/DL (ref 0–149)
TSH SERPL DL<=0.005 MIU/L-ACNC: 1.91 UIU/ML (ref 0.45–4.5)
VLDLC SERPL CALC-MCNC: 22 MG/DL (ref 5–40)

## 2021-02-05 ENCOUNTER — TELEPHONE (OUTPATIENT)
Dept: PAIN MEDICINE | Facility: CLINIC | Age: 76
End: 2021-02-05

## 2021-02-05 NOTE — TELEPHONE ENCOUNTER
Ms. Elizabeth called today and wanted to know if Eden would restart her pain medication Belbuca. She states that she has stopped using the CBD oil as it wasn't helping her pain that much. I told her she would need to come in for an office visit to discuss. She states that she had some of her pain medication left over from when she was weaned off of it at her last appt. She wanted to know if she could take her Belbuca 75 mcg until she was seen in the office. Per Eden it's ok to restart the Belbuca 75 mg BID until she is seen.

## 2021-02-08 ENCOUNTER — OFFICE VISIT (OUTPATIENT)
Dept: PAIN MEDICINE | Facility: CLINIC | Age: 76
End: 2021-02-08

## 2021-02-08 VITALS
HEIGHT: 61 IN | RESPIRATION RATE: 18 BRPM | WEIGHT: 224 LBS | DIASTOLIC BLOOD PRESSURE: 72 MMHG | BODY MASS INDEX: 42.29 KG/M2 | SYSTOLIC BLOOD PRESSURE: 136 MMHG | OXYGEN SATURATION: 96 % | TEMPERATURE: 97.8 F | HEART RATE: 68 BPM

## 2021-02-08 DIAGNOSIS — G89.29 CHRONIC PAIN OF LEFT KNEE: ICD-10-CM

## 2021-02-08 DIAGNOSIS — G89.29 CHRONIC MIDLINE LOW BACK PAIN WITH BILATERAL SCIATICA: ICD-10-CM

## 2021-02-08 DIAGNOSIS — M54.42 CHRONIC MIDLINE LOW BACK PAIN WITH BILATERAL SCIATICA: ICD-10-CM

## 2021-02-08 DIAGNOSIS — M54.41 CHRONIC MIDLINE LOW BACK PAIN WITH BILATERAL SCIATICA: ICD-10-CM

## 2021-02-08 DIAGNOSIS — G89.29 OTHER CHRONIC PAIN: ICD-10-CM

## 2021-02-08 DIAGNOSIS — M25.562 CHRONIC PAIN OF LEFT KNEE: ICD-10-CM

## 2021-02-08 DIAGNOSIS — Z79.899 ENCOUNTER FOR LONG-TERM (CURRENT) USE OF HIGH-RISK MEDICATION: ICD-10-CM

## 2021-02-08 DIAGNOSIS — M54.2 CHRONIC NECK PAIN: Primary | ICD-10-CM

## 2021-02-08 DIAGNOSIS — M47.816 LUMBAR FACET ARTHROPATHY: ICD-10-CM

## 2021-02-08 DIAGNOSIS — G89.29 CHRONIC NECK PAIN: Primary | ICD-10-CM

## 2021-02-08 DIAGNOSIS — M54.16 LUMBAR RADICULOPATHY: ICD-10-CM

## 2021-02-08 PROCEDURE — 99214 OFFICE O/P EST MOD 30 MIN: CPT | Performed by: NURSE PRACTITIONER

## 2021-02-08 PROCEDURE — 80305 DRUG TEST PRSMV DIR OPT OBS: CPT | Performed by: NURSE PRACTITIONER

## 2021-02-08 RX ORDER — BUPRENORPHINE HYDROCHLORIDE 75 UG/1
1 FILM, SOLUBLE BUCCAL 2 TIMES DAILY
Qty: 60 EACH | Refills: 0 | Status: SHIPPED | OUTPATIENT
Start: 2021-02-08 | End: 2021-03-09 | Stop reason: SDUPTHER

## 2021-02-08 NOTE — PROGRESS NOTES
CHIEF COMPLAINT  Back pain is unchanged since last visit    Subjective   Iraida Elizabeth is a 75 y.o. female  who presents for follow-up.  She has a history of back pain. She states that now that she has been off of the belbuca for a while she has seen a significant increase in her pain.  She would like to resume belbuca for her pain control.  She began utilizing Belbuca 75 mcg film BID 1 week ago.  This medication regimen is decreasing her pain by 50%.  ADLs by self.  She denies any side effects including somnolence or constipation.     She states she feels the Belbuca 75 mcg film works as well as the 300 mcg film and does not cause any drowsiness. Patient states she stopped using CBD oil approximately 2 weeks ago. With her 50% reduction in pain we will continue with the Belbuca 75 mcg film BID.     Patient remained masked during entire encounter. No cough present. I donned a mask and eye protection throughout entire visit. Prior to donning mask and eye protection, hand hygiene was performed, as well as when it was doffed.  I was closer than 6 feet, but not for an extended period of time. No obvious exposure to any bodily fluids.    Back Pain  This is a chronic (chronic) problem. The current episode started more than 1 year ago. The problem occurs constantly. Progression since onset: worsened since last office visit. The pain is present in the lumbar spine and sacro-iliac. The quality of the pain is described as burning. The pain radiates to the left foot, left thigh, left knee, right foot, right knee and right thigh (posterior). The pain is at a severity of 7/10. The pain is moderate. The symptoms are aggravated by bending, position, standing and twisting (laying flat). Pertinent negatives include no abdominal pain, chest pain, dysuria, fever, headaches, numbness or weakness. She has tried analgesics (Ranulfo S1 TFESI ) for the symptoms. The treatment provided moderate relief.   Knee Pain   There was no injury  "mechanism. The pain is present in the left knee. The quality of the pain is described as aching. The pain is at a severity of 7/10. The pain has been worsening since onset. Pertinent negatives include no numbness. The symptoms are aggravated by movement and weight bearing. Treatments tried: belbuca, rest. The treatment provided moderate relief.      PEG Assessment   What number best describes your pain on average in the past week?5  What number best describes how, during the past week, pain has interfered with your enjoyment of life?4  What number best describes how, during the past week, pain has interfered with your general activity?  4    The following portions of the patient's history were reviewed and updated as appropriate: allergies, current medications, past family history, past medical history, past social history, past surgical history and problem list.    Review of Systems   Constitutional: Negative for chills and fever.   Respiratory: Negative for shortness of breath.    Cardiovascular: Negative for chest pain.   Gastrointestinal: Negative for abdominal pain, constipation, diarrhea, nausea and vomiting.   Genitourinary: Negative for difficulty urinating, dyspareunia and dysuria.   Musculoskeletal: Positive for back pain.   Neurological: Negative for dizziness, weakness, light-headedness, numbness and headaches.   Psychiatric/Behavioral: Negative for confusion, hallucinations, self-injury, sleep disturbance and suicidal ideas. The patient is not nervous/anxious.    All other systems reviewed and are negative.    --  The aforementioned information the Chief Complaint section and above subjective data including any HPI data, and also the Review of Systems data, has been personally reviewed and affirmed.  --    Vitals:    02/08/21 1445   BP: 136/72   Pulse: 68   Resp: 18   Temp: 97.8 °F (36.6 °C)   SpO2: 96%   Weight: 102 kg (224 lb)   Height: 154.9 cm (61\")   PainSc:   7   PainLoc: Back     Objective "   Physical Exam  Vitals signs and nursing note reviewed.   Constitutional:       Appearance: Normal appearance. She is well-developed.   Eyes:      General: Lids are normal.   Neck:      Musculoskeletal: Normal range of motion.   Cardiovascular:      Rate and Rhythm: Normal rate.   Pulmonary:      Effort: Pulmonary effort is normal.   Musculoskeletal:      Left knee: She exhibits decreased range of motion and swelling.      Left ankle: She exhibits swelling.      Lumbar back: She exhibits decreased range of motion and tenderness.   Neurological:      Mental Status: She is alert and oriented to person, place, and time.      Gait: Gait abnormal (cane).   Psychiatric:         Speech: Speech normal.         Behavior: Behavior normal.         Judgment: Judgment normal.       Assessment/Plan   Diagnoses and all orders for this visit:    1. Chronic neck pain (Primary)    2. Lumbar radiculopathy    3. Lumbar facet arthropathy    4. Chronic pain of left knee    5. Encounter for long-term (current) use of high-risk medication    6. Chronic midline low back pain with bilateral sciatica    7. Other chronic pain    Other orders  -     Buprenorphine HCl (Belbuca) 75 MCG film; Apply 1 film to cheek 2 (two) times a day.  Dispense: 60 each; Refill: 0  -     Cancel: POC Urine Drug Screen, Triage  -     Cancel: Urine Drug Screen Confirmation - Urine, Clean Catch; Future      --- Routine UDS in office today as part of monitoring requirements for controlled substances.  The specimen was viewed and the immunoassay result reviewed and is +BUP.  This specimen will be sent to [facility] laboratory for confirmation.     --- Resume Belbuca 75 mcg film BID. Discussed medication with the patient.  Included in this discussion was the potential for side effects and adverse events.  Patient verbalized understanding and wished to proceed.  Prescription will be sent to pharmacy.  --- Follow-up 1 month or sooner if needed     DONNY DORMAN  As part  of the patient's treatment plan, I am prescribing controlled substances. The patient has been made aware of appropriate use of such medications, including potential risk of somnolence, limited ability to drive and/or work safely, and the potential for dependence or overdose. It has also bee made clear that these medications are for use by this patient only, without concomitant use of alcohol or other substances unless prescribed.     Patient has completed prescribing agreement detailing terms of continued prescribing of controlled substances, including monitoring DONNY reports, urine drug screening, and pill counts if necessary. The patient is aware that inappropriate use will results in cessation of prescribing such medications.    DONNY report has been reviewed and scanned into the patient's chart.    As the clinician, I personally reviewed the DONNY from 2/8/2021 while the patient was in the office today.    History and physical exam exhibit continued safe and appropriate use of controlled substances.    EMR Dragon/Transcription disclaimer:   Much of this encounter note is an electronic transcription/translation of spoken language to printed text. The electronic translation of spoken language may permit erroneous, or at times, nonsensical words or phrases to be inadvertently transcribed; Although I have reviewed the note for such errors, some may still exist.

## 2021-02-09 LAB
POC AMPHETAMINES: NEGATIVE
POC BARBITURATES: NEGATIVE
POC BENZODIAZEPHINES: NEGATIVE
POC COCAINE: NEGATIVE
POC METHADONE: NEGATIVE
POC METHAMPHETAMINE SCREEN URINE: NEGATIVE
POC OPIATES: NEGATIVE
POC OXYCODONE: NEGATIVE
POC PHENCYCLIDINE: NEGATIVE
POC PROPOXYPHENE: NEGATIVE
POC THC: NEGATIVE
POC TRICYCLIC ANTIDEPRESSANTS: NEGATIVE

## 2021-02-10 ENCOUNTER — TELEPHONE (OUTPATIENT)
Dept: PAIN MEDICINE | Facility: CLINIC | Age: 76
End: 2021-02-10

## 2021-02-10 NOTE — TELEPHONE ENCOUNTER
Spoke to patient and she states she cannot afford $418 for her medication and she will  on 2/12/21

## 2021-02-10 NOTE — TELEPHONE ENCOUNTER
Please call the pharmacy and let them know that what was previously prescribed was a weaning dose.  She weaned completed off of this and we are now resuming it. This is an appropriate refill.

## 2021-02-10 NOTE — TELEPHONE ENCOUNTER
Caller: PATIENT    Relationship: SELF    Best call back number: [phone]    Medication needed: BELBUCA 75 MG    When do you need the refill by: 02.13.21    What details did the patient provide when requesting the medication: PATIENT STATED HER PHARMACY WILL NOT ALLOW HER TO  PRESCRIPTION OF BELBUCA 75 MG TIL 02.18.21 . PATIENT STATED THE AMOUNT OF TABLETS SHE WAS TAKING INCREASED PER THE RECOMMENDATION OF MS. AUGUSTINE SILVEIRA AND THAT MESSED WITH THE REFILL DATE AND HOW SOON THE PATIENT WOULD NEED HER REFILL OF BELBUCA.  PATIENT STATED THAT SHE WILL BE OUT OF HER PRESCRIPTION ON Saturday, 02.13.21 AND WANTED TO KNOW WHAT MS. AUGUSTINE SILVEIRA MIGHT RECOMMENDED REGARDING THAT ISSUE WITH REFILL. PLEASE ADVISE.    Does the patient have less than a 3 day supply:  [] Yes  [x] No    What is the patient's preferred pharmacy:    [facility] AT [address] [place] [address]  [phone]

## 2021-02-10 NOTE — TELEPHONE ENCOUNTER
Per Perico, Pharmacist at [facility], states that with the weaning script that was filled on 1-20-21, the insurance will NOT pay for medication until 2/12/21. Patient is able to pay out of pocket for medication that will cost her $418.00

## 2021-02-11 ENCOUNTER — OFFICE VISIT (OUTPATIENT)
Dept: ENDOCRINOLOGY | Age: 76
End: 2021-02-11

## 2021-02-11 DIAGNOSIS — E11.69 HYPERLIPIDEMIA ASSOCIATED WITH TYPE 2 DIABETES MELLITUS (HCC): ICD-10-CM

## 2021-02-11 DIAGNOSIS — IMO0002 UNCONTROLLED TYPE 2 DIABETES MELLITUS WITH COMPLICATION, WITH LONG-TERM CURRENT USE OF INSULIN: Primary | ICD-10-CM

## 2021-02-11 DIAGNOSIS — E03.9 ACQUIRED HYPOTHYROIDISM: ICD-10-CM

## 2021-02-11 DIAGNOSIS — E78.5 HYPERLIPIDEMIA ASSOCIATED WITH TYPE 2 DIABETES MELLITUS (HCC): ICD-10-CM

## 2021-02-11 PROCEDURE — 99214 OFFICE O/P EST MOD 30 MIN: CPT | Performed by: INTERNAL MEDICINE

## 2021-02-11 RX ORDER — INSULIN DEGLUDEC INJECTION 100 U/ML
45 INJECTION, SOLUTION SUBCUTANEOUS DAILY
Qty: 13.5 ML | Refills: 11 | Status: SHIPPED | OUTPATIENT
Start: 2021-02-11 | End: 2021-03-01

## 2021-02-11 NOTE — PROGRESS NOTES
Chief Complaint  Chief Complaint   Patient presents with   • Diabetes   Follow up/ type 2 dm      Subjective          History of Present Illness    Iraida Elizabeth 75 y.o. presents with Type 2 dm as a F/u patient.      Type 2 dm - Diagnosed long time back.   Today in clinic pt reports being on NPH 24 units bid, Regular insulin 14 units in the morning and 16 units with dinner.   FBG - 150 - 180  Pre meals - 160 - 170  Checks BG - 4 - 5 times, has been noticing high bg in the afternoon and low Bg in the middle night at times if she doesn't snack.   Sensor -   Dm retinopathy - none ,Last eye exam - march 20th   Dm nephropathy - CKD stage 3  Dm neuropathy -yes ,Dm neuropathy meds - no meds  CAD - no  CVA - no  Episodes of hypoglycemia - none  Pt is physically active. weight has been stable.   Pt tries to follow DM diet for most part.   On Ace inb.    Hypothyroidism - on levothyroxine 75 mcg oral daily.     HLP -   On lipitor 20 mg po daily.     Reviewed primary care physician's/consulting physician documentation and lab results     You have chosen to receive care through a telehealth visit.  Do you consent to use a video/audio connection for your medical care today? Yes               I have reviewed the patient's allergies, medicines, past medical hx, family hx and social hx in detail.    Objective   Vital Signs:   There were no vitals taken for this visit. - tele health      Physical Exam   General appearance - no distress  Eyes- anicteric sclera  Ear nose and throat-external ears and nose normal.    Respiratory-normal chest on inspection.  No respiratory distress noted.  Musculoskeletal-no edema.    Skin-no rashes.  Neuro-alert and oriented x3      Result Review :   The following data was reviewed by: Ronald Lambert MD on 02/11/2021:  Office Visit on 02/08/2021   Component Date Value Ref Range Status   • Methamphetaine Screen, Urine 02/09/2021 Negative  Negative Final   • POC Amphetamines 02/09/2021 Negative  Negative  Final   • Barbiturates Screen 02/09/2021 Negative  Negative Final   • Benzodiazepine Screen 02/09/2021 Negative  Negative Final   • Cocaine Screen 02/09/2021 Negative  Negative Final   • Methadone Screen 02/09/2021 Negative  Negative Final   • Opiate Screen 02/09/2021 Negative  Negative Final   • Oxycodone, Screen 02/09/2021 Negative  Negative Final   • Phencyclidine (PCP) Screen 02/09/2021 Negative  Negative Final   • Propoxyphene Screen 02/09/2021 Negative  Negative Final   • THC, Screen 02/09/2021 Negative  Negative Final   • Tricyclic Antidepressants Screen 02/09/2021 Negative  Negative Final     Data reviewed: PCP documentation        Results Review:    I reviewed the patient's new clinical results.     Assessment and Plan    Problem List Items Addressed This Visit        Other    Acquired hypothyroidism (Chronic)    Relevant Orders    Basic Metabolic Panel    Hemoglobin A1c    Lipid Panel    TSH    Vitamin B12 & Folate    T4, Free    Hyperlipidemia associated with type 2 diabetes mellitus (CMS/[facility])    Relevant Medications    insulin degludec (Tresiba FlexTouch) 100 UNIT/ML solution pen-injector injection    Other Relevant Orders    Basic Metabolic Panel    Hemoglobin A1c    Lipid Panel    TSH    Vitamin B12 & Folate    T4, Free      Other Visit Diagnoses     Uncontrolled type 2 diabetes mellitus with complication, with long-term current use of insulin (CMS/[facility])    -  Primary    Relevant Medications    insulin degludec (Tresiba FlexTouch) 100 UNIT/ML solution pen-injector injection    Other Relevant Orders    Basic Metabolic Panel    Hemoglobin A1c    Lipid Panel    TSH    Vitamin B12 & Folate    T4, Free        Type 2 dm - uncontrolled   Hab1c worse since last visit.   Increase NPH to 26 units in the am and 24 units in the pm.   Continue regular insulin at the above doses.     Will try tresiba in the place of NPH to help with her low and high BG, pt will et us know if the medication is not covered by the  "insurance.     Hyperlipidemia   Continue lipitor 20 mg po daily.     Hypothyroidism -  Continue levothyroxine 75 mcg oral daily.     Contacted [facility] for sensor for the pt.     Interpreted the blood work-up/imaging results performed by the primary care/consulting physician -    Refills sent to pharmacy    Follow Up     Patient was given instructions and counseling regarding her condition or for health maintenance advice. Please see specific information pulled into the AVS if appropriate.       Thank you for asking me to see your patient, Iraida Elizabeth in consultation.     I spent 30 minutes caring for Iraida on this date of service. This time includes time spent by me in the following activities:care coordination and medication changes, benefit of jake Lambert MD  02/11/21      EMR Dragon / transcription disclaimer:     \"Dictated utilizing Dragon dictation\".         "

## 2021-02-15 ENCOUNTER — PRIOR AUTHORIZATION (OUTPATIENT)
Dept: ENDOCRINOLOGY | Age: 76
End: 2021-02-15

## 2021-03-01 RX ORDER — INSULIN GLARGINE 100 [IU]/ML
INJECTION, SOLUTION SUBCUTANEOUS
Qty: 30 ML | Refills: 3 | Status: SHIPPED | OUTPATIENT
Start: 2021-03-01

## 2021-03-01 NOTE — TELEPHONE ENCOUNTER
Lets change Tresiba to Lantus and the same dosage as we were thinking of doing the Tresiba at.  We could consider diabetic medications which could help weight loss during her next visit with us because most of these medications might not be covered by the insurance.    Please let her know about the sensor situation that it was not approved.

## 2021-03-01 NOTE — TELEPHONE ENCOUNTER
Patient called stating that the tresiba is to anne  And would like to try something else  It looks like may lantus or levemir was covered also she wanting to use a insulin that would help her loose weight  She did not qualify for medtronic sensor  Will sent her information to a diabetic supply company

## 2021-03-02 DIAGNOSIS — Z23 IMMUNIZATION DUE: ICD-10-CM

## 2021-03-08 RX ORDER — DULAGLUTIDE 1.5 MG/.5ML
1.5 INJECTION, SOLUTION SUBCUTANEOUS WEEKLY
Qty: 4 PEN | Refills: 6 | Status: SHIPPED | OUTPATIENT
Start: 2021-03-08 | End: 2021-06-21

## 2021-03-09 ENCOUNTER — OFFICE VISIT (OUTPATIENT)
Dept: PAIN MEDICINE | Facility: CLINIC | Age: 76
End: 2021-03-09

## 2021-03-09 ENCOUNTER — HOSPITAL ENCOUNTER (OUTPATIENT)
Dept: GENERAL RADIOLOGY | Facility: HOSPITAL | Age: 76
Discharge: HOME | End: 2021-03-09
Admitting: NURSE PRACTITIONER

## 2021-03-09 ENCOUNTER — PREP FOR SURGERY (OUTPATIENT)
Dept: SURGERY | Facility: SURGERY CENTER | Age: 76
End: 2021-03-09

## 2021-03-09 VITALS
HEIGHT: 61 IN | RESPIRATION RATE: 20 BRPM | TEMPERATURE: 97.5 F | SYSTOLIC BLOOD PRESSURE: 128 MMHG | OXYGEN SATURATION: 94 % | DIASTOLIC BLOOD PRESSURE: 60 MMHG | HEART RATE: 85 BPM | BODY MASS INDEX: 42.48 KG/M2 | WEIGHT: 225 LBS

## 2021-03-09 DIAGNOSIS — M54.42 CHRONIC MIDLINE LOW BACK PAIN WITH BILATERAL SCIATICA: ICD-10-CM

## 2021-03-09 DIAGNOSIS — G89.29 CHRONIC MIDLINE LOW BACK PAIN WITH BILATERAL SCIATICA: ICD-10-CM

## 2021-03-09 DIAGNOSIS — M47.816 LUMBAR FACET ARTHROPATHY: ICD-10-CM

## 2021-03-09 DIAGNOSIS — G89.29 CHRONIC NECK PAIN: Primary | ICD-10-CM

## 2021-03-09 DIAGNOSIS — M53.3 SACROILIAC JOINT DYSFUNCTION OF RIGHT SIDE: ICD-10-CM

## 2021-03-09 DIAGNOSIS — G89.29 OTHER CHRONIC PAIN: ICD-10-CM

## 2021-03-09 DIAGNOSIS — M25.562 CHRONIC PAIN OF LEFT KNEE: ICD-10-CM

## 2021-03-09 DIAGNOSIS — M54.41 CHRONIC MIDLINE LOW BACK PAIN WITH BILATERAL SCIATICA: ICD-10-CM

## 2021-03-09 DIAGNOSIS — M54.16 LUMBAR RADICULOPATHY: ICD-10-CM

## 2021-03-09 DIAGNOSIS — M51.36 DDD (DEGENERATIVE DISC DISEASE), LUMBAR: ICD-10-CM

## 2021-03-09 DIAGNOSIS — G89.29 CHRONIC PAIN OF LEFT KNEE: ICD-10-CM

## 2021-03-09 DIAGNOSIS — Z79.899 ENCOUNTER FOR LONG-TERM (CURRENT) USE OF HIGH-RISK MEDICATION: ICD-10-CM

## 2021-03-09 DIAGNOSIS — M54.2 CHRONIC NECK PAIN: Primary | ICD-10-CM

## 2021-03-09 DIAGNOSIS — G89.29 CHRONIC PAIN OF LEFT KNEE: Primary | ICD-10-CM

## 2021-03-09 DIAGNOSIS — M25.562 CHRONIC PAIN OF LEFT KNEE: Primary | ICD-10-CM

## 2021-03-09 PROCEDURE — 99214 OFFICE O/P EST MOD 30 MIN: CPT | Performed by: NURSE PRACTITIONER

## 2021-03-09 PROCEDURE — 72114 X-RAY EXAM L-S SPINE BENDING: CPT

## 2021-03-09 RX ORDER — SODIUM CHLORIDE 0.9 % (FLUSH) 0.9 %
10 SYRINGE (ML) INJECTION AS NEEDED
Status: CANCELLED | OUTPATIENT
Start: 2021-03-09

## 2021-03-09 RX ORDER — BUPRENORPHINE HYDROCHLORIDE 75 UG/1
1 FILM, SOLUBLE BUCCAL 2 TIMES DAILY
Qty: 60 EACH | Refills: 0 | Status: SHIPPED | OUTPATIENT
Start: 2021-03-09 | End: 2021-04-12 | Stop reason: DRUGHIGH

## 2021-03-09 RX ORDER — SODIUM CHLORIDE 0.9 % (FLUSH) 0.9 %
10 SYRINGE (ML) INJECTION EVERY 12 HOURS SCHEDULED
Status: CANCELLED | OUTPATIENT
Start: 2021-03-09

## 2021-03-09 NOTE — PROGRESS NOTES
"CHIEF COMPLAINT  F/u back, left knee, and right hip pain. Pt sts pain has worsened since last ov.     Subjective   Iraida Elizabeth is a 76 y.o. female  who presents for follow-up.  She has a history of back, left knee, and right hip pain.  Today her pain is 7/10VAS in severity.  She states her left knee and right hip pain has worsened, she also states her back pain has worsened.      She continues with Belbuca 75 mcg film BID.  At her last office visit she states that this dosage was helpful to her pain and was just as helpful as the 300 mcg film dosage.  She states today that her pain has increased but \"this medication must be helping some\".  She denies any side effects including somnolence or constipation. She completed a left intra-articular knee injection on 11/5/2020 which provided 80-90% relief to her knee pain. I recommend that we repeat this procedure.  Her back/hip pain is primarily located over her right SI joint.  We discussed proceeding with a Right SI injection.     We also discussed that ART [facility] ordered Flexion and Extension Lumbar Xrays in January and these have not been completed.  Encouraged patient to complete this imaging, patient states understanding.     Patient remained masked during entire encounter. No cough present. I donned a mask and eye protection throughout entire visit. Prior to donning mask and eye protection, hand hygiene was performed, as well as when it was doffed.  I was closer than 6 feet, but not for an extended period of time. No obvious exposure to any bodily fluids.    Back Pain  This is a chronic (chronic) problem. The current episode started more than 1 year ago. The problem occurs constantly. Progression since onset: worsened since last office visit. The pain is present in the lumbar spine and sacro-iliac. The quality of the pain is described as burning. The pain radiates to the right thigh and left thigh (posterior). The pain is at a severity of 7/10. The pain " is moderate. The symptoms are aggravated by bending, position, standing and twisting (laying flat). Pertinent negatives include no abdominal pain, chest pain, dysuria, fever, headaches, numbness or weakness. She has tried analgesics (Ranulfo S1 TFESI ) for the symptoms. The treatment provided moderate relief.   Knee Pain   There was no injury mechanism. The pain is present in the left knee. The quality of the pain is described as aching. The pain is at a severity of 7/10. The pain has been worsening since onset. Pertinent negatives include no numbness. The symptoms are aggravated by movement and weight bearing. Treatments tried: belbuca, rest. The treatment provided moderate relief.      PEG Assessment   What number best describes your pain on average in the past week?7  What number best describes how, during the past week, pain has interfered with your enjoyment of life?7  What number best describes how, during the past week, pain has interfered with your general activity?  7    The following portions of the patient's history were reviewed and updated as appropriate: allergies, current medications, past family history, past medical history, past social history, past surgical history and problem list.    Review of Systems   Constitutional: Positive for activity change (dec) and fatigue (occ). Negative for fever and unexpected weight change.   HENT: Negative for congestion.    Eyes: Negative for visual disturbance.   Respiratory: Positive for shortness of breath (occ). Negative for cough.    Cardiovascular: Negative for chest pain.   Gastrointestinal: Negative for abdominal pain, constipation and diarrhea.   Genitourinary: Negative for difficulty urinating and dysuria.   Musculoskeletal: Positive for arthralgias (left knee and right hip), back pain and gait problem. Negative for joint swelling (cane).   Allergic/Immunologic: Negative for immunocompromised state.   Neurological: Negative for dizziness, weakness,  "light-headedness, numbness and headaches.   Psychiatric/Behavioral: Positive for sleep disturbance (occ). Negative for agitation and suicidal ideas. The patient is not nervous/anxious.      --  The aforementioned information the Chief Complaint section and above subjective data including any HPI data, and also the Review of Systems data, has been personally reviewed and affirmed.  --    Vitals:    03/09/21 1434   BP: 128/60   Pulse: 85   Resp: 20   Temp: 97.5 °F (36.4 °C)   SpO2: 94%   Weight: 102 kg (225 lb)   Height: 154.9 cm (61\")   PainSc:   7   PainLoc: Back  Comment: and left knee     Objective   Physical Exam  Vitals and nursing note reviewed.   Constitutional:       Appearance: Normal appearance. She is well-developed.   Eyes:      General: Lids are normal.   Cardiovascular:      Rate and Rhythm: Normal rate.   Pulmonary:      Effort: Pulmonary effort is normal.   Musculoskeletal:      Cervical back: Normal range of motion.      Lumbar back: Bony tenderness present. Decreased range of motion.      Left knee: Swelling, bony tenderness and crepitus present. Decreased range of motion.      Comments: POS Right SI Compression  POS Right Thigh Thrust  NEG Right Jenna   Neurological:      Mental Status: She is alert and oriented to person, place, and time.      Gait: Gait abnormal (cane).   Psychiatric:         Attention and Perception: Attention normal.         Mood and Affect: Mood normal.         Speech: Speech normal.         Behavior: Behavior normal.         Judgment: Judgment normal.       Assessment/Plan   Diagnoses and all orders for this visit:    1. Chronic neck pain (Primary)    2. Lumbar radiculopathy    3. Encounter for long-term (current) use of high-risk medication    4. Lumbar facet arthropathy    5. Chronic pain of left knee    6. Other chronic pain    7. Chronic midline low back pain with bilateral sciatica    Other orders  -     Buprenorphine HCl (Belbuca) 75 MCG film; Apply 1 film to cheek 2 " (two) times a day. DNF 3/14/2021  Dispense: 60 each; Refill: 0      --- Complete previously ordered lumbar xray  --- Continue Belbuca 75 mcg film. DNF 3/14/2021 appled. Patient appears stable with current regimen. No adverse effects. Regarding continuation of opioids, there is no evidence of aberrant behavior or any red flags.  The patient continues with appropriate response to opioid therapy. ADL's remain intact by self.   --- Left knee injection and Right SI joint injection   Reviewed the procedure at length with the patient.  Included in the review was expectations, complications, risk and benefits.The procedure was described in detail and the risks, benefits and alternatives were discussed with the patient (including but not limited to: bleeding, infection, nerve damage, worsening of pain, inability to perform injection, paralysis, seizures, and death) who agreed to proceed.  Discussed the potential for sedation if warranted/wanted.  The procedure will plan to be performed at [facility] with fluoroscopic guidance(unless ultrasound is indicated) and could potentially have steroids and contrast dye used. Questions were answered and in a way the patient could understand.  Patient verbalized understanding and wishes to proceed.  This intervention will be ordered.  Discussed with patient that all procedures are part of a multimodal plan of care and include either formal PT or a home exercise program.  Patient has no evidence of coagulopathy or current infection.  --- CSA updated today  --- The urine drug screen confirmation from 2/9/2021 has been reviewed and the result is appropriate based on patient history and DONNY report  --- Follow-up after procedures     DONNY REPORT  As part of the patient's treatment plan, I am prescribing controlled substances. The patient has been made aware of appropriate use of such medications, including potential risk of somnolence, limited ability to drive  and/or work safely, and the potential for dependence or overdose. It has also bee made clear that these medications are for use by this patient only, without concomitant use of alcohol or other substances unless prescribed.     Patient has completed prescribing agreement detailing terms of continued prescribing of controlled substances, including monitoring DONNY reports, urine drug screening, and pill counts if necessary. The patient is aware that inappropriate use will results in cessation of prescribing such medications.    DONNY report has been reviewed and scanned into the patient's chart.    As the clinician, I personally reviewed the DONNY from 3/9/2021 while the patient was in the office today.    History and physical exam exhibit continued safe and appropriate use of controlled substances.    EMR Dragon/Transcription disclaimer:   Much of this encounter note is an electronic transcription/translation of spoken language to printed text. The electronic translation of spoken language may permit erroneous, or at times, nonsensical words or phrases to be inadvertently transcribed; Although I have reviewed the note for such errors, some may still exist.

## 2021-03-15 ENCOUNTER — APPOINTMENT (OUTPATIENT)
Dept: BONE DENSITY | Facility: HOSPITAL | Age: 76
End: 2021-03-15

## 2021-03-16 ENCOUNTER — TELEPHONE (OUTPATIENT)
Dept: PAIN MEDICINE | Facility: HOSPITAL | Age: 76
End: 2021-03-16

## 2021-03-16 ENCOUNTER — TELEPHONE (OUTPATIENT)
Dept: NEUROSURGERY | Facility: CLINIC | Age: 76
End: 2021-03-16

## 2021-03-16 NOTE — TELEPHONE ENCOUNTER
Ms. Clara Underwood called about her XR results and would like you to call her regarding the outcome of the XR.

## 2021-03-16 NOTE — TELEPHONE ENCOUNTER
I did review the x-rays.  There is mild motion at L4/5, but likely physiologic.  She is following with pain management and anticipating SI joint injection soon.  I advised her that I like to review the imaging with Dr. Hdez to ensure he agrees with me but that surgery is likely not recommended to consider with the minimal degree of motion seen.  Patient concurs with this plan.  I will contact her after review of the x-rays with Dr. Hdez.

## 2021-03-18 PROBLEM — M53.3 SACROILIAC JOINT DYSFUNCTION OF RIGHT SIDE: Status: ACTIVE | Noted: 2021-03-18

## 2021-03-18 NOTE — TELEPHONE ENCOUNTER
Please advise patient that I reviewed the x-rays with Dr. Hdez and he saw no need for any consideration of surgical intervention at this time and agrees with proceeding with pain management evaluation and treatment.  This was discussed at her last office visit.  She is already following with pain management.

## 2021-03-19 NOTE — TELEPHONE ENCOUNTER
"Tried calling patient to advise that per Torri Gutierrez \" I reviewed the x-rays with Dr. Hdez and he saw no need for any consideration of surgical intervention at this time and agrees with proceeding with pain management evaluation and treatment.  This was discussed at her last office visit.  She is already following with pain management.\"     Patient Voicemail has not been setup  "

## 2021-04-01 ENCOUNTER — TRANSCRIBE ORDERS (OUTPATIENT)
Dept: LAB | Facility: SURGERY CENTER | Age: 76
End: 2021-04-01

## 2021-04-01 DIAGNOSIS — Z01.818 OTHER SPECIFIED PRE-OPERATIVE EXAMINATION: Primary | ICD-10-CM

## 2021-04-03 ENCOUNTER — LAB (OUTPATIENT)
Dept: LAB | Facility: SURGERY CENTER | Age: 76
End: 2021-04-03

## 2021-04-03 DIAGNOSIS — Z01.818 OTHER SPECIFIED PRE-OPERATIVE EXAMINATION: ICD-10-CM

## 2021-04-03 LAB — SARS-COV-2 ORF1AB RESP QL NAA+PROBE: NOT DETECTED

## 2021-04-03 PROCEDURE — U0004 COV-19 TEST NON-CDC HGH THRU: HCPCS | Performed by: SURGERY

## 2021-04-03 PROCEDURE — C9803 HOPD COVID-19 SPEC COLLECT: HCPCS

## 2021-04-05 ENCOUNTER — TELEPHONE (OUTPATIENT)
Dept: ENDOCRINOLOGY | Age: 76
End: 2021-04-05

## 2021-04-05 NOTE — TELEPHONE ENCOUNTER
Pt has been on lantus and trulicity. She states that medication is giving her abdominal pain,  Vomiting and  Low blood sugar. She wants to just go back on novolin n and novolin r

## 2021-04-06 ENCOUNTER — HOSPITAL ENCOUNTER (OUTPATIENT)
Dept: GENERAL RADIOLOGY | Facility: SURGERY CENTER | Age: 76
Setting detail: HOSPITAL OUTPATIENT SURGERY
End: 2021-04-06

## 2021-04-06 ENCOUNTER — TRANSCRIBE ORDERS (OUTPATIENT)
Dept: SURGERY | Facility: SURGERY CENTER | Age: 76
End: 2021-04-06

## 2021-04-06 ENCOUNTER — HOSPITAL ENCOUNTER (OUTPATIENT)
Facility: SURGERY CENTER | Age: 76
Setting detail: HOSPITAL OUTPATIENT SURGERY
Discharge: HOME | End: 2021-04-06
Attending: ANESTHESIOLOGY | Admitting: ANESTHESIOLOGY

## 2021-04-06 VITALS
TEMPERATURE: 97.7 F | HEART RATE: 73 BPM | BODY MASS INDEX: 41.54 KG/M2 | DIASTOLIC BLOOD PRESSURE: 83 MMHG | WEIGHT: 220 LBS | HEIGHT: 61 IN | RESPIRATION RATE: 20 BRPM | SYSTOLIC BLOOD PRESSURE: 135 MMHG | OXYGEN SATURATION: 93 %

## 2021-04-06 DIAGNOSIS — Z41.9 SURGERY, ELECTIVE: ICD-10-CM

## 2021-04-06 DIAGNOSIS — M53.3 SACROILIAC JOINT DYSFUNCTION OF RIGHT SIDE: ICD-10-CM

## 2021-04-06 DIAGNOSIS — G89.29 CHRONIC PAIN OF LEFT KNEE: ICD-10-CM

## 2021-04-06 DIAGNOSIS — Z41.9 SURGERY, ELECTIVE: Primary | ICD-10-CM

## 2021-04-06 DIAGNOSIS — M25.562 CHRONIC PAIN OF LEFT KNEE: ICD-10-CM

## 2021-04-06 PROCEDURE — 3E0U3BZ INTRODUCTION OF ANESTHETIC AGENT INTO JOINTS, PERCUTANEOUS APPROACH: ICD-10-PCS | Performed by: ANESTHESIOLOGY

## 2021-04-06 PROCEDURE — 3E0U33Z INTRODUCTION OF ANTI-INFLAMMATORY INTO JOINTS, PERCUTANEOUS APPROACH: ICD-10-PCS | Performed by: ANESTHESIOLOGY

## 2021-04-06 PROCEDURE — 25010000002 MIDAZOLAM PER 1 MG: Performed by: ANESTHESIOLOGY

## 2021-04-06 PROCEDURE — 20610 DRAIN/INJ JOINT/BURSA W/O US: CPT | Performed by: ANESTHESIOLOGY

## 2021-04-06 PROCEDURE — 27096 INJECT SACROILIAC JOINT: CPT | Performed by: ANESTHESIOLOGY

## 2021-04-06 PROCEDURE — G0260 INJ FOR SACROILIAC JT ANESTH: HCPCS | Performed by: ANESTHESIOLOGY

## 2021-04-06 PROCEDURE — 25010000002 METHYLPREDNISOLONE PER 80 MG: Performed by: ANESTHESIOLOGY

## 2021-04-06 PROCEDURE — 25010000002 METHYLPREDNISOLONE PER 40 MG: Performed by: ANESTHESIOLOGY

## 2021-04-06 RX ORDER — SODIUM CHLORIDE 0.9 % (FLUSH) 0.9 %
10 SYRINGE (ML) INJECTION EVERY 12 HOURS SCHEDULED
Status: DISCONTINUED | OUTPATIENT
Start: 2021-04-06 | End: 2021-04-06 | Stop reason: HOSPADM

## 2021-04-06 RX ORDER — SODIUM CHLORIDE 0.9 % (FLUSH) 0.9 %
10 SYRINGE (ML) INJECTION AS NEEDED
Status: DISCONTINUED | OUTPATIENT
Start: 2021-04-06 | End: 2021-04-06 | Stop reason: HOSPADM

## 2021-04-06 RX ORDER — MIDAZOLAM HYDROCHLORIDE 1 MG/ML
INJECTION INTRAMUSCULAR; INTRAVENOUS AS NEEDED
Status: DISCONTINUED | OUTPATIENT
Start: 2021-04-06 | End: 2021-04-06 | Stop reason: HOSPADM

## 2021-04-06 NOTE — DISCHARGE INSTRUCTIONS
[facility] - Post-procedure Instructions          --  While there are no absolute restrictions, it is recommended that you do not perform strenuous activity today. In the morning, you may resume your level of activity as before your block.    --  If you have a band-aid at your injection site, please remove it later today. Observe the area for any redness, swelling, pus-like drainage, or a temperature over 101°. If any of these symptoms occur, please call your doctor at [phone]. If after office hours, leave a message and the on-call provider will return your call.    --  Ice may be applied to your injection site. It is recommended you avoid direct heat (heating pad; hot tub) for 1-2 days.    --  Call [facility] at [phone] if you experience persistent headache, persistent bleeding from the injection site, or severe pain not relieved by heat or oral medication.    --  Do not make important decisions today.    --  Due to the effects of the block and/or the I.V. Sedation, DO NOT drive or operate hazardous machinery for 12 hours.    --  Do not drink alcohol for 12 hours.    -- You may return to work tomorrow, or as directed by your referring doctor.    --  Occasionally you may notice a slight increase in your pain after the procedure. This should start to improve within the next 24-48 hours. Radiofrequency ablation procedure pain may last 3-4 weeks.    --  It may take as long as 3-4 days before you notice a gradual improvement in your pain and/or other symptoms.    -- You may continue to take your prescribed pain medication as needed.    --  Some normal possible side effects of steroid use could include fluid retention, increased blood sugar, dull headache, increased sweating, increased appetite, mood swings and flushing.    --  Diabetics are recommended to watch their blood glucose level closely for 24-48 hours after the injection.    --  Must stay in PACU for 20 min upon arrival and  prove no leg weakness before being discharged.    --  IN THE EVENT OF A LIFE THREATENING EMERGENCY, (CHEST PAIN, BREATHING DIFFICULTIES, PARALYSIS…) YOU SHOULD GO TO YOUR NEAREST EMERGENCY ROOM.    --  You should be contacted by our office within 2-3 days to schedule follow up or next appointment date.  If not contacted within 7 days, please call the office at [phone]

## 2021-04-07 ENCOUNTER — TELEPHONE (OUTPATIENT)
Dept: ENDOCRINOLOGY | Age: 76
End: 2021-04-07

## 2021-04-12 ENCOUNTER — OFFICE VISIT (OUTPATIENT)
Dept: PAIN MEDICINE | Facility: CLINIC | Age: 76
End: 2021-04-12

## 2021-04-12 VITALS
WEIGHT: 219 LBS | DIASTOLIC BLOOD PRESSURE: 79 MMHG | OXYGEN SATURATION: 99 % | BODY MASS INDEX: 41.35 KG/M2 | HEIGHT: 61 IN | HEART RATE: 79 BPM | RESPIRATION RATE: 18 BRPM | SYSTOLIC BLOOD PRESSURE: 128 MMHG | TEMPERATURE: 96.9 F

## 2021-04-12 DIAGNOSIS — M54.16 LUMBAR RADICULOPATHY: ICD-10-CM

## 2021-04-12 DIAGNOSIS — G89.29 OTHER CHRONIC PAIN: ICD-10-CM

## 2021-04-12 DIAGNOSIS — M51.36 DDD (DEGENERATIVE DISC DISEASE), LUMBAR: ICD-10-CM

## 2021-04-12 DIAGNOSIS — Z79.899 ENCOUNTER FOR LONG-TERM (CURRENT) USE OF HIGH-RISK MEDICATION: ICD-10-CM

## 2021-04-12 DIAGNOSIS — M25.562 CHRONIC PAIN OF LEFT KNEE: Primary | ICD-10-CM

## 2021-04-12 DIAGNOSIS — M25.50 ARTHRALGIA OF MULTIPLE SITES: Chronic | ICD-10-CM

## 2021-04-12 DIAGNOSIS — M53.3 SACROILIAC JOINT DYSFUNCTION OF RIGHT SIDE: ICD-10-CM

## 2021-04-12 DIAGNOSIS — G89.29 CHRONIC PAIN OF LEFT KNEE: Primary | ICD-10-CM

## 2021-04-12 PROCEDURE — 99214 OFFICE O/P EST MOD 30 MIN: CPT | Performed by: NURSE PRACTITIONER

## 2021-04-12 RX ORDER — BUPRENORPHINE HYDROCHLORIDE 150 UG/1
1 FILM, SOLUBLE BUCCAL 2 TIMES DAILY
Qty: 60 EACH | Refills: 0 | Status: SHIPPED | OUTPATIENT
Start: 2021-04-12 | End: 2021-05-12

## 2021-04-12 NOTE — PROGRESS NOTES
CHIEF COMPLAINT  Back, hip and knee pain are unchanged since last visit    Subjective   Iraida Elizabeth is a 76 y.o. female  who presents to the office for follow-up of procedure.  She completed a RIGHT Sacroiliac Joint Injection and Left Knee Intraarticular injection under fluoroscopic guidance   on  4/6/21 performed by Dr. Miranda for management of back, hip and knee pain. Patient reports 50% relief to her left knee pain.  She reports 80% relief x 5 days to her right sided low back pain and then her pain returned to baseline.      Today her pain is 7/10VAS in severity. She continues with Belbuca 75 mcg film BID.  This medication regimen decreases her pain by 30%.  She denies any side effects including somnolence or constipation. ADLs by self. She states that her pain does interfere with her ability to do housework. Patient asks if we could consider trialing a higher dose of her belbuca as she would like to increase her activity level. Discussed with patient that I am willing to trial Belbuca 150 mcg film BID, but if her activity level does not increase with this higher dosage then the risk does not outweigh the benefit and we will return to 75 mcg film BID dosing.  Patient states understanding and is in agreement.     She has lost 6 lbs. She states that she is now on a different medication for her diabetes and she feels her weight loss is attributed to this.      Patient remained masked during entire encounter. No cough present. I donned a mask and eye protection throughout entire visit. Prior to donning mask and eye protection, hand hygiene was performed, as well as when it was doffed.  I was closer than 6 feet, but not for an extended period of time. No obvious exposure to any bodily fluids.    Back Pain  This is a chronic (chronic) problem. The current episode started more than 1 year ago. The problem occurs constantly. Progression since onset: worsened since last office visit. The pain is present in the lumbar  spine and sacro-iliac. The quality of the pain is described as burning. The pain radiates to the right thigh and left thigh (posterior). The pain is at a severity of 7/10. The pain is moderate. The symptoms are aggravated by bending, position, standing and twisting (laying flat). Associated symptoms include numbness. Pertinent negatives include no abdominal pain, chest pain, dysuria, fever, headaches or weakness. She has tried analgesics (Ranulfo S1 TFESI, Butrans) for the symptoms. The treatment provided moderate relief.   Knee Pain   There was no injury mechanism. The pain is present in the left knee. The quality of the pain is described as aching. The pain is at a severity of 2/10. The pain has been worsening since onset. Associated symptoms include numbness. The symptoms are aggravated by movement and weight bearing. Treatments tried: belbuca, rest. The treatment provided moderate (Knee injections) relief.      PEG Assessment   What number best describes your pain on average in the past week?4  What number best describes how, during the past week, pain has interfered with your enjoyment of life?5  What number best describes how, during the past week, pain has interfered with your general activity?  5    The following portions of the patient's history were reviewed and updated as appropriate: allergies, current medications, past family history, past medical history, past social history, past surgical history and problem list.    Review of Systems   Constitutional: Negative for chills and fever.   Respiratory: Negative for shortness of breath.    Cardiovascular: Negative for chest pain.   Gastrointestinal: Negative for abdominal pain, constipation, diarrhea, nausea and vomiting.   Genitourinary: Negative for difficulty urinating, dyspareunia and dysuria.   Musculoskeletal: Positive for back pain.        Hip and knee pain   Neurological: Positive for numbness. Negative for dizziness, weakness, light-headedness and  "headaches.   Psychiatric/Behavioral: Negative for confusion, hallucinations, self-injury, sleep disturbance and suicidal ideas. The patient is not nervous/anxious.    All other systems reviewed and are negative.    --  The aforementioned information the Chief Complaint section and above subjective data including any HPI data, and also the Review of Systems data, has been personally reviewed and affirmed.  --     Vitals:    04/12/21 1357   BP: 128/79   Pulse: 79   Resp: 18   Temp: 96.9 °F (36.1 °C)   SpO2: 99%   Weight: 99.3 kg (219 lb)   Height: 154.9 cm (61\")   PainSc:   7   PainLoc: Hip     Objective   Physical Exam  Vitals and nursing note reviewed.   Constitutional:       Appearance: Normal appearance. She is well-developed.   Eyes:      General: Lids are normal.   Cardiovascular:      Rate and Rhythm: Normal rate.   Pulmonary:      Effort: Pulmonary effort is normal.   Musculoskeletal:      Cervical back: Normal range of motion.      Lumbar back: Tenderness and bony tenderness present.      Left knee: Bony tenderness and crepitus present. Tenderness present.   Neurological:      Mental Status: She is alert and oriented to person, place, and time.   Psychiatric:         Speech: Speech normal.         Behavior: Behavior normal.         Judgment: Judgment normal.       Assessment/Plan   Diagnoses and all orders for this visit:    1. Chronic pain of left knee (Primary)    2. Lumbar radiculopathy    3. Encounter for long-term (current) use of high-risk medication    4. Other chronic pain    5. DDD (degenerative disc disease), lumbar    6. Arthralgia of multiple sites    7. Sacroiliac joint dysfunction of right side    Other orders  -     Buprenorphine HCl (Belbuca) 150 MCG film; Apply 1 film to cheek 2 (two) times a day. DNF 4/14/2021  Dispense: 60 each; Refill: 0      --- The urine drug screen confirmation from 2/9/2021 has been reviewed and the result is appropriate based on patient history and DONNY report  --- " Increase Belbuca to 150 mcg film BID.  Goal of increasing patient's functionality and physical activity level.  If no improvement in functionality patient understands that we will de-escalate back to Belbuca 75 mcg film BID.   --- CSA updated 12/18/2020  --- Follow-up 1 month or sooner if needed.      DONNY REPORT  As part of the patient's treatment plan, I am prescribing controlled substances. The patient has been made aware of appropriate use of such medications, including potential risk of somnolence, limited ability to drive and/or work safely, and the potential for dependence or overdose. It has also bee made clear that these medications are for use by this patient only, without concomitant use of alcohol or other substances unless prescribed.     Patient has completed prescribing agreement detailing terms of continued prescribing of controlled substances, including monitoring DONNY reports, urine drug screening, and pill counts if necessary. The patient is aware that inappropriate use will results in cessation of prescribing such medications.    DONNY report has been reviewed and scanned into the patient's chart.    As the clinician, I personally reviewed the DONNY from 4/12/2021 while the patient was in the office today.    History and physical exam exhibit continued safe and appropriate use of controlled substances.    EMR Dragon/Transcription disclaimer:   Much of this encounter note is an electronic transcription/translation of spoken language to printed text. The electronic translation of spoken language may permit erroneous, or at times, nonsensical words or phrases to be inadvertently transcribed; Although I have reviewed the note for such errors, some may still exist.

## 2021-05-12 ENCOUNTER — OFFICE VISIT (OUTPATIENT)
Dept: PAIN MEDICINE | Facility: CLINIC | Age: 76
End: 2021-05-12

## 2021-05-12 VITALS
RESPIRATION RATE: 18 BRPM | DIASTOLIC BLOOD PRESSURE: 73 MMHG | HEART RATE: 77 BPM | WEIGHT: 223 LBS | BODY MASS INDEX: 42.1 KG/M2 | TEMPERATURE: 96.6 F | HEIGHT: 61 IN | SYSTOLIC BLOOD PRESSURE: 130 MMHG | OXYGEN SATURATION: 95 %

## 2021-05-12 DIAGNOSIS — G89.29 CHRONIC PAIN OF LEFT KNEE: ICD-10-CM

## 2021-05-12 DIAGNOSIS — M54.2 CHRONIC NECK PAIN: ICD-10-CM

## 2021-05-12 DIAGNOSIS — Z79.899 ENCOUNTER FOR LONG-TERM (CURRENT) USE OF HIGH-RISK MEDICATION: ICD-10-CM

## 2021-05-12 DIAGNOSIS — M54.16 LUMBAR RADICULOPATHY: ICD-10-CM

## 2021-05-12 DIAGNOSIS — M54.41 CHRONIC MIDLINE LOW BACK PAIN WITH BILATERAL SCIATICA: ICD-10-CM

## 2021-05-12 DIAGNOSIS — M53.3 SACROILIAC JOINT DYSFUNCTION OF RIGHT SIDE: Primary | ICD-10-CM

## 2021-05-12 DIAGNOSIS — G89.29 CHRONIC MIDLINE LOW BACK PAIN WITH BILATERAL SCIATICA: ICD-10-CM

## 2021-05-12 DIAGNOSIS — M47.816 LUMBAR FACET ARTHROPATHY: ICD-10-CM

## 2021-05-12 DIAGNOSIS — G89.29 CHRONIC NECK PAIN: ICD-10-CM

## 2021-05-12 DIAGNOSIS — M25.562 CHRONIC PAIN OF LEFT KNEE: ICD-10-CM

## 2021-05-12 DIAGNOSIS — M54.42 CHRONIC MIDLINE LOW BACK PAIN WITH BILATERAL SCIATICA: ICD-10-CM

## 2021-05-12 PROCEDURE — 99214 OFFICE O/P EST MOD 30 MIN: CPT | Performed by: NURSE PRACTITIONER

## 2021-05-12 NOTE — PROGRESS NOTES
"CHIEF COMPLAINT  Back pain has decreased since last visit    Subjective   Iraida Elizabeth is a 76 y.o. female  who presents to the office for follow-up of procedure.  She completed a Right SI injection & Left knee injection   on  4/6/21 performed by Dr. Miranda for management of back pain. Patient states that the Right SI injection \"the best injection I've had\" and reports ONGOING relief to her right low back pain.  She continues to report ONGOING relief to her left knee as well.     At her last office visit we discussed that we would trial increasing her Belbuca to 150 mcg film BID, but if she saw no functional improvement we would return to Belbuca 75 mcg film BID.  Today she states her pain relief level is similar now compared to the lower dose Belbuca.  She also states this increased dosage is making her drowsy which decreases her motivation to be active. Discussed that we will return to Belbuca 75 mcg film BID and that we have NO plans on increasing this medication in the future. Patient states understanding.     Patient remained masked during entire encounter. No cough present. I donned a mask and eye protection throughout entire visit. Prior to donning mask and eye protection, hand hygiene was performed, as well as when it was doffed.  I was closer than 6 feet, but not for an extended period of time. No obvious exposure to any bodily fluids.    Back Pain  This is a chronic (chronic) problem. The current episode started more than 1 year ago. The problem occurs constantly. Progression since onset: worsened since last office visit. The pain is present in the lumbar spine and sacro-iliac (Right). The quality of the pain is described as burning. The pain radiates to the right thigh and left thigh (posterior). The pain is at a severity of 7/10. The pain is moderate. The symptoms are aggravated by bending, position, standing and twisting (laying flat). Associated symptoms include numbness. Pertinent negatives include " no abdominal pain, chest pain, dysuria, fever, headaches or weakness. She has tried analgesics (Ranulfo S1 TFESI, Butrans) for the symptoms. The treatment provided moderate relief.   Knee Pain   There was no injury mechanism. The pain is present in the left knee. The quality of the pain is described as aching. The pain is at a severity of 3/10. The pain has been worsening since onset. Associated symptoms include numbness. The symptoms are aggravated by movement and weight bearing. Treatments tried: belbuca, rest. The treatment provided moderate (Knee injections) relief.      PEG Assessment   What number best describes your pain on average in the past week?5  What number best describes how, during the past week, pain has interfered with your enjoyment of life?4  What number best describes how, during the past week, pain has interfered with your general activity?  5    The following portions of the patient's history were reviewed and updated as appropriate: allergies, current medications, past family history, past medical history, past social history, past surgical history and problem list.    Review of Systems   Constitutional: Negative for chills and fever.   Respiratory: Negative for shortness of breath.    Cardiovascular: Negative for chest pain.   Gastrointestinal: Negative for abdominal pain, constipation, diarrhea, nausea and vomiting.   Genitourinary: Negative for difficulty urinating, dyspareunia and dysuria.   Musculoskeletal: Positive for back pain.   Neurological: Positive for numbness. Negative for dizziness, weakness, light-headedness and headaches.   Psychiatric/Behavioral: Negative for confusion, hallucinations, self-injury, sleep disturbance and suicidal ideas. The patient is not nervous/anxious.    All other systems reviewed and are negative.    --  The aforementioned information the Chief Complaint section and above subjective data including any HPI data, and also the Review of Systems data, has been  "personally reviewed and affirmed.  --     Vitals:    05/12/21 1417   BP: 130/73   Pulse: 77   Resp: 18   Temp: 96.6 °F (35.9 °C)   SpO2: 95%   Weight: 101 kg (223 lb)   Height: 154.9 cm (61\")   PainSc:   7   PainLoc: Back     Objective   Physical Exam  Vitals and nursing note reviewed.   Constitutional:       Appearance: Normal appearance. She is well-developed.   Eyes:      General: Lids are normal.   Cardiovascular:      Rate and Rhythm: Normal rate.   Pulmonary:      Effort: Pulmonary effort is normal.   Musculoskeletal:      Cervical back: Normal range of motion.      Lumbar back: Tenderness and bony tenderness present.      Left knee: Tenderness present.   Neurological:      Mental Status: She is alert and oriented to person, place, and time.      Gait: Gait abnormal (cane).   Psychiatric:         Attention and Perception: Attention normal.         Mood and Affect: Mood normal.         Speech: Speech normal.         Behavior: Behavior normal.         Judgment: Judgment normal.       Assessment/Plan   Diagnoses and all orders for this visit:    1. Sacroiliac joint dysfunction of right side (Primary)    2. Chronic neck pain    3. Chronic pain of left knee    4. Lumbar facet arthropathy    5. Encounter for long-term (current) use of high-risk medication    6. Lumbar radiculopathy    7. Chronic midline low back pain with bilateral sciatica    Other orders  -     Buprenorphine HCl 75 MCG film; Apply 1 film to cheek 2 (two) times a day.  Dispense: 60 each; Refill: 0      --- The urine drug screen confirmation from 2/9/2021 has been reviewed and the result is appropriate based on patient history and DONNY report  --- Decrease Belbuca to 75 mcg film BID with NO plans to escalate this medication dosage in the future.   --- Discussed that she may repeat the Right SI and Left Knee injections every 3 months as needed. Patient states understanding.   --- Follow-up 1 month or sooner if needed.      DONNY REPORT  As part of " the patient's treatment plan, I am prescribing controlled substances. The patient has been made aware of appropriate use of such medications, including potential risk of somnolence, limited ability to drive and/or work safely, and the potential for dependence or overdose. It has also bee made clear that these medications are for use by this patient only, without concomitant use of alcohol or other substances unless prescribed.     Patient has completed prescribing agreement detailing terms of continued prescribing of controlled substances, including monitoring DONNY reports, urine drug screening, and pill counts if necessary. The patient is aware that inappropriate use will results in cessation of prescribing such medications.    DONNY report has been reviewed and scanned into the patient's chart.    As the clinician, I personally reviewed the DONNY from 5/12/2021 while the patient was in the office today.    History and physical exam exhibit continued safe and appropriate use of controlled substances.'      EMR Dragon/Transcription disclaimer:   Much of this encounter note is an electronic transcription/translation of spoken language to printed text. The electronic translation of spoken language may permit erroneous, or at times, nonsensical words or phrases to be inadvertently transcribed; Although I have reviewed the note for such errors, some may still exist.

## 2021-06-11 ENCOUNTER — OFFICE VISIT (OUTPATIENT)
Dept: PAIN MEDICINE | Facility: CLINIC | Age: 76
End: 2021-06-11

## 2021-06-11 ENCOUNTER — PREP FOR SURGERY (OUTPATIENT)
Dept: SURGERY | Facility: SURGERY CENTER | Age: 76
End: 2021-06-11

## 2021-06-11 VITALS
HEART RATE: 92 BPM | HEIGHT: 61 IN | SYSTOLIC BLOOD PRESSURE: 161 MMHG | WEIGHT: 221 LBS | DIASTOLIC BLOOD PRESSURE: 71 MMHG | BODY MASS INDEX: 41.72 KG/M2 | TEMPERATURE: 95.3 F | OXYGEN SATURATION: 93 % | RESPIRATION RATE: 20 BRPM

## 2021-06-11 DIAGNOSIS — M53.3 SACROILIAC JOINT DYSFUNCTION OF RIGHT SIDE: Primary | ICD-10-CM

## 2021-06-11 DIAGNOSIS — M25.562 CHRONIC PAIN OF LEFT KNEE: ICD-10-CM

## 2021-06-11 DIAGNOSIS — M47.816 LUMBAR FACET ARTHROPATHY: ICD-10-CM

## 2021-06-11 DIAGNOSIS — G89.29 OTHER CHRONIC PAIN: ICD-10-CM

## 2021-06-11 DIAGNOSIS — G89.29 CHRONIC PAIN OF LEFT KNEE: ICD-10-CM

## 2021-06-11 DIAGNOSIS — Z79.899 ENCOUNTER FOR LONG-TERM (CURRENT) USE OF HIGH-RISK MEDICATION: ICD-10-CM

## 2021-06-11 DIAGNOSIS — M51.36 DDD (DEGENERATIVE DISC DISEASE), LUMBAR: ICD-10-CM

## 2021-06-11 PROCEDURE — 99214 OFFICE O/P EST MOD 30 MIN: CPT | Performed by: NURSE PRACTITIONER

## 2021-06-11 RX ORDER — SODIUM CHLORIDE 0.9 % (FLUSH) 0.9 %
10 SYRINGE (ML) INJECTION AS NEEDED
Status: CANCELLED | OUTPATIENT
Start: 2021-06-11

## 2021-06-11 RX ORDER — SODIUM CHLORIDE 0.9 % (FLUSH) 0.9 %
10 SYRINGE (ML) INJECTION EVERY 12 HOURS SCHEDULED
Status: CANCELLED | OUTPATIENT
Start: 2021-06-11

## 2021-06-11 NOTE — PROGRESS NOTES
CHIEF COMPLAINT  F/u back and left knee pain. Pt sts pain has worsened since last ov. Pt would like to discuss repeating Left knee steroid injection AND Right Sacroiliac joint injection. Pt sts leaving 6/29/21 for vacation and would like inj before then.     Subjective   Iraida Elizabeth is a 76 y.o. female  who presents for follow-up.  She has a history of back and left knee pain.  Today her pain is 7/10VAS in severity. She continues with Belbuca 75 mcg film BID.  She is also using heat and ice. She states this regimen helps her pain and that she knows her pain would be much worse without it.  She denies any side effects including somnolence.     Left knee and Right SI on 4/6/2021 performed by Dr. Miranda.  She reports 80% relief from these injection x 1.5 months.     Patient remained masked during entire encounter. No cough present. I donned a mask and eye protection throughout entire visit. Prior to donning mask and eye protection, hand hygiene was performed, as well as when it was doffed.  I was closer than 6 feet, but not for an extended period of time. No obvious exposure to any bodily fluids.    Back Pain  This is a chronic (chronic) problem. The current episode started more than 1 year ago. The problem occurs constantly. Progression since onset: worsened since last office visit. The pain is present in the lumbar spine and sacro-iliac (Right). The quality of the pain is described as burning. The pain radiates to the right thigh and left thigh (posterior). The pain is at a severity of 7/10. The pain is moderate. The symptoms are aggravated by bending, position, standing and twisting (laying flat). Pertinent negatives include no abdominal pain, chest pain, dysuria, fever, headaches, numbness or weakness. She has tried analgesics (Ranulfo S1 TFESI, Butrans) for the symptoms. The treatment provided moderate relief.   Knee Pain   There was no injury mechanism. The pain is present in the left knee. The quality of the  pain is described as aching. The pain is at a severity of 7/10. The pain has been worsening since onset. Pertinent negatives include no numbness. The symptoms are aggravated by movement and weight bearing. Treatments tried: belbuca, rest. The treatment provided moderate (Knee injections) relief.      PEG Assessment   What number best describes your pain on average in the past week?7  What number best describes how, during the past week, pain has interfered with your enjoyment of life?7  What number best describes how, during the past week, pain has interfered with your general activity?  7    The following portions of the patient's history were reviewed and updated as appropriate: allergies, current medications, past family history, past medical history, past social history, past surgical history and problem list.    Review of Systems   Constitutional: Positive for activity change (dec) and fatigue (occ). Negative for fever.   HENT: Negative for congestion.    Eyes: Negative for visual disturbance.   Respiratory: Negative for apnea, cough and shortness of breath.    Cardiovascular: Negative for chest pain.   Gastrointestinal: Negative for abdominal pain, constipation and diarrhea.   Genitourinary: Negative for difficulty urinating and dysuria.   Musculoskeletal: Positive for arthralgias (left knee), back pain and gait problem (cane). Negative for joint swelling.   Allergic/Immunologic: Negative for immunocompromised state.   Neurological: Negative for dizziness, weakness, light-headedness, numbness and headaches.   Psychiatric/Behavioral: Negative for agitation, sleep disturbance and suicidal ideas. The patient is not nervous/anxious.      --  The aforementioned information the Chief Complaint section and above subjective data including any HPI data, and also the Review of Systems data, has been personally reviewed and affirmed.  --    Vitals:    06/11/21 1343   BP: 161/71  Comment: pt sts had bp meds today   Pulse:  "92   Resp: 20   Temp: 95.3 °F (35.2 °C)   SpO2: 93%   Weight: 100 kg (221 lb)   Height: 154.9 cm (61\")   PainSc:   7   PainLoc: Back  Comment: and left knee     Objective   Physical Exam  Vitals and nursing note reviewed.   Constitutional:       Appearance: Normal appearance. She is well-developed.   Eyes:      General: Lids are normal.   Cardiovascular:      Rate and Rhythm: Normal rate.   Pulmonary:      Effort: Pulmonary effort is normal.   Musculoskeletal:      Cervical back: Normal range of motion.      Lumbar back: Tenderness and bony tenderness present. Decreased range of motion.      Comments: POS Right SI Compression  POS Right Jenna   Neurological:      Mental Status: She is alert and oriented to person, place, and time.      Gait: Gait abnormal (cane).   Psychiatric:         Attention and Perception: Attention normal.         Mood and Affect: Mood normal.         Speech: Speech normal.         Behavior: Behavior normal.         Judgment: Judgment normal.       Assessment/Plan   Diagnoses and all orders for this visit:    1. Sacroiliac joint dysfunction of right side (Primary)    2. Encounter for long-term (current) use of high-risk medication    3. DDD (degenerative disc disease), lumbar    4. Chronic pain of left knee    5. Lumbar facet arthropathy    6. Other chronic pain    Other orders  -     Buprenorphine HCl 75 MCG film; Apply 1 film to cheek 2 (two) times a day. DNF 6/13/2021  Dispense: 60 each; Refill: 0      --- Repeat Right SI Injection  Reviewed the procedure at length with the patient.  Included in the review was expectations, complications, risk and benefits.The procedure was described in detail and the risks, benefits and alternatives were discussed with the patient (including but not limited to: bleeding, infection, nerve damage, worsening of pain, inability to perform injection, paralysis, seizures, and death) who agreed to proceed.  Discussed the potential for sedation if " warranted/wanted.  The procedure will plan to be performed at [facility] with fluoroscopic guidance(unless ultrasound is indicated) and could potentially have steroids and contrast dye used. Questions were answered and in a way the patient could understand.  Patient verbalized understanding and wishes to proceed.  This intervention will be ordered.  Discussed with patient that all procedures are part of a multimodal plan of care and include either formal PT or a home exercise program.  Patient has no evidence of coagulopathy or current infection.  --- Discussed with patient that she can have 4 SI injection in a 12 month time frame. Discussed that if she gets significant temporary relief from this Right SI injection I will recommend Right S1-S3 RFL. Patient states understanding  --- Repeat Left knee injection every 12 weeks PRN.   --- Refill Belbuca 75 mcg film BID. DNF 6/13/2021 applied. Patient appears stable with current regimen. No adverse effects. Regarding continuation of opioids, there is no evidence of aberrant behavior or any red flags.  The patient continues with appropriate response to opioid therapy. ADL's remain intact by self.   --- Follow-up 2 months or sooner if needed.      DONNY REPORT  As part of the patient's treatment plan, I am prescribing controlled substances. The patient has been made aware of appropriate use of such medications, including potential risk of somnolence, limited ability to drive and/or work safely, and the potential for dependence or overdose. It has also bee made clear that these medications are for use by this patient only, without concomitant use of alcohol or other substances unless prescribed.     Patient has completed prescribing agreement detailing terms of continued prescribing of controlled substances, including monitoring DONNY reports, urine drug screening, and pill counts if necessary. The patient is aware that inappropriate use will results  in cessation of prescribing such medications.    As the clinician, I personally reviewed the DONNY from 6/11/2021 while the patient was in the office today.    History and physical exam exhibit continued safe and appropriate use of controlled substances.    EMR Dragon/Transcription disclaimer:   Much of this encounter note is an electronic transcription/translation of spoken language to printed text. The electronic translation of spoken language may permit erroneous, or at times, nonsensical words or phrases to be inadvertently transcribed; Although I have reviewed the note for such errors, some may still exist.

## 2021-06-14 ENCOUNTER — TELEPHONE (OUTPATIENT)
Dept: PAIN MEDICINE | Facility: CLINIC | Age: 76
End: 2021-06-14

## 2021-06-14 NOTE — TELEPHONE ENCOUNTER
Please notify MsKristi Parkeros that Dr. Miranda will fill her prescription in [place] while she is on vacation. She will need to notify us of the appropriate pharmacy at the time her refill is due. Thanks, TT

## 2021-06-14 NOTE — TELEPHONE ENCOUNTER
----- Message from Kaiden Miranda MD sent at 6/14/2021  1:21 PM EDT -----  No problem, thanks for asking.      ----- Message -----  From: Eden Babcock APRN  Sent: 6/11/2021   4:08 PM EDT  To: Kaiden Miranda MD    Patient is going out of town in July before her refill is due. Are you OK with prescribing her Belbuca for 1 month in [place]? TT

## 2021-06-15 NOTE — TELEPHONE ENCOUNTER
Provider:     Caller: PATIENT    Relationship to Patient: SELF      Phone Number:  [phone]    Reason for Call: PT. STATES THAT SHE SPOKE TO BEATA THE NURSE EARLIER. IS GOING TO BE OUT OF TOWN IN July AROUND 07/12/21.   PT. IS ASKING IF DR. TOMLIN WILL BE ABLE TO SEND PRESCRIPTION REFILLS TO [place].   PLEASE CALL TO ADVISE.

## 2021-06-15 NOTE — TELEPHONE ENCOUNTER
Called and informed pt of above message. She sts she will call 7 days in advance to let us know the [place] pharmacy to send belbuca to.

## 2021-06-16 NOTE — TELEPHONE ENCOUNTER
Pt called today stg she talked to her sister about the travel details. Her sister told her they will be traveling to [place] first then [place] around 7/12/21 for a memorial service. Pt sts she was unaware of travel details and will now need her refill sent to [place] instead of [place]. Are you willing to send Rx belbuca to [place] instead now that pt has informed us of her travel plan? Please advise. Thank you.

## 2021-06-16 NOTE — TELEPHONE ENCOUNTER
has provided [place] pharmacy for July refill. I've added pharmacy to pt profile for you. Please advise. Thank you.

## 2021-06-16 NOTE — TELEPHONE ENCOUNTER
PATIENT CALLING TO PROVIDE PHARMACY-  [facility]   [address] [place] [id number]    P# [phone]

## 2021-06-20 DIAGNOSIS — E78.49 OTHER HYPERLIPIDEMIA: ICD-10-CM

## 2021-06-20 DIAGNOSIS — I10 ESSENTIAL HYPERTENSION: Chronic | ICD-10-CM

## 2021-06-21 RX ORDER — ATORVASTATIN CALCIUM 20 MG/1
TABLET, FILM COATED ORAL
Qty: 30 TABLET | Refills: 0 | Status: SHIPPED | OUTPATIENT
Start: 2021-06-21 | End: 2021-08-18 | Stop reason: SDUPTHER

## 2021-06-21 RX ORDER — BUPRENORPHINE HYDROCHLORIDE 75 UG/1
FILM, SOLUBLE BUCCAL
Refills: 0 | OUTPATIENT
Start: 2021-06-21

## 2021-06-21 RX ORDER — HYDROCHLOROTHIAZIDE 25 MG/1
TABLET ORAL
Qty: 30 TABLET | Refills: 0 | Status: SHIPPED | OUTPATIENT
Start: 2021-06-21 | End: 2021-08-02

## 2021-06-24 ENCOUNTER — TRANSCRIBE ORDERS (OUTPATIENT)
Dept: SURGERY | Facility: SURGERY CENTER | Age: 76
End: 2021-06-24

## 2021-06-24 DIAGNOSIS — Z41.9 SURGERY, ELECTIVE: Primary | ICD-10-CM

## 2021-07-06 ENCOUNTER — TELEPHONE (OUTPATIENT)
Dept: PAIN MEDICINE | Facility: CLINIC | Age: 76
End: 2021-07-06

## 2021-07-06 NOTE — TELEPHONE ENCOUNTER
Medication Refill Request    Date of phone call: 7/6/21    Medication being requested: buprenorphine HCL 75mcg sig: Apply 1 film to cheek 2 (two) times a day  Qty: 60    Date of last visit: 6/11/21    Date of last refill:     DONNY up to date?:     Next Follow up?: none     Any new pertinent information? (i.e, new medication allergies, new use of medications, change in patient's health or condition, non-compliance or inconsistency with prescribing agreement?): pt traveling to [place] leaving around 7/12/21. Pt calling in advance to be sent to [facility] [place] pharmacy. Please advise. Thank you.

## 2021-07-06 NOTE — TELEPHONE ENCOUNTER
Caller:  MRS MCCOY     Relationship: PATIENT     Best call back number: [phone]    Medication needed: hydroCHLOROthiazide (HYDRODIURIL) 25 MG tablet  Requested Prescriptions      No prescriptions requested or ordered in this encounter       When do you need the refill by: 7/12/21    What additional details did the patient provide when requesting the medication: N/A     Does the patient have less than a 3 day supply:  [] Yes  [x] No    What is the patient's preferred pharmacy:  PATIENT CALLED IN A WEEK IN ADVANCE NOT DUE UNTIL 7/12/21 BUT PATIENT WAS ADVISED TO CALL IN SOONER BECAUSE DR TOMLIN IS SUPPOSE TO HAVE THIS MEDICATION SENT TO AN OUT OF STATE PHARMACY IN  [place] FOR THE PATIENT WHEN OUT OF TOWN. PATIENT STATES THAT DR TOMLIN HAS ALL THE INFO NEEDED FOR THE PHARMACY.

## 2021-07-31 DIAGNOSIS — I10 ESSENTIAL HYPERTENSION: Chronic | ICD-10-CM

## 2021-08-02 RX ORDER — HYDROCHLOROTHIAZIDE 25 MG/1
TABLET ORAL
Qty: 15 TABLET | Refills: 0 | Status: SHIPPED | OUTPATIENT
Start: 2021-08-02 | End: 2021-08-18 | Stop reason: SDUPTHER

## 2021-08-04 ENCOUNTER — TELEPHONE (OUTPATIENT)
Dept: PAIN MEDICINE | Facility: CLINIC | Age: 76
End: 2021-08-04

## 2021-08-04 NOTE — TELEPHONE ENCOUNTER
HUB STAFF ATTEMPTED CLINICAL WARM TRANSFER - NO ANSWER     Caller: Iraida Elizabeth    Relationship: Self    Best call back number: [phone]     Medication needed:   Buprenorphine HCL 75 mcg    When do you need the refill by: NEXT WED 08/11/21     What additional details did the patient provide when requesting the medication: HAS 7 DAYS LEFT. PREVIOUS SCRIPT WAS SENT 07/06/21 TO A [place] PHARMACY WHILE PATIENT WAS TRAVELING. PATIENT DID NOT WANT TO CALL TOO LATE / DID NOT WANT TO CALL WHEN SHE ONLY HAD A FEW DAYS LEFT     Does the patient have less than a 3 day supply:  [] Yes  [x] No    What is the patient's preferred pharmacy:    [id number] PH: [phone]    Best call back number: [phone] (PLEASE CALL / LEAVE VMAIL WHEN SCRIPT SENT TO PHARMACY)

## 2021-08-10 ENCOUNTER — OFFICE VISIT (OUTPATIENT)
Dept: PAIN MEDICINE | Facility: CLINIC | Age: 76
End: 2021-08-10

## 2021-08-10 VITALS
WEIGHT: 226.2 LBS | TEMPERATURE: 96.1 F | DIASTOLIC BLOOD PRESSURE: 50 MMHG | HEIGHT: 61 IN | HEART RATE: 97 BPM | RESPIRATION RATE: 16 BRPM | SYSTOLIC BLOOD PRESSURE: 176 MMHG | OXYGEN SATURATION: 95 % | BODY MASS INDEX: 42.71 KG/M2

## 2021-08-10 DIAGNOSIS — M47.816 LUMBAR FACET ARTHROPATHY: ICD-10-CM

## 2021-08-10 DIAGNOSIS — M54.2 CHRONIC NECK PAIN: ICD-10-CM

## 2021-08-10 DIAGNOSIS — M53.3 SACROILIAC JOINT DYSFUNCTION OF RIGHT SIDE: Primary | ICD-10-CM

## 2021-08-10 DIAGNOSIS — M54.16 LUMBAR RADICULOPATHY: ICD-10-CM

## 2021-08-10 DIAGNOSIS — M25.562 CHRONIC PAIN OF LEFT KNEE: ICD-10-CM

## 2021-08-10 DIAGNOSIS — G89.29 CHRONIC NECK PAIN: ICD-10-CM

## 2021-08-10 DIAGNOSIS — Z79.899 ENCOUNTER FOR LONG-TERM (CURRENT) USE OF HIGH-RISK MEDICATION: ICD-10-CM

## 2021-08-10 DIAGNOSIS — G89.29 CHRONIC PAIN OF LEFT KNEE: ICD-10-CM

## 2021-08-10 PROCEDURE — 99214 OFFICE O/P EST MOD 30 MIN: CPT | Performed by: NURSE PRACTITIONER

## 2021-08-10 PROCEDURE — 80305 DRUG TEST PRSMV DIR OPT OBS: CPT | Performed by: NURSE PRACTITIONER

## 2021-08-10 RX ORDER — GABAPENTIN 100 MG/1
100 CAPSULE ORAL NIGHTLY
Qty: 30 CAPSULE | Refills: 0 | Status: SHIPPED | OUTPATIENT
Start: 2021-08-10

## 2021-08-10 NOTE — PROGRESS NOTES
CHIEF COMPLAINT  F/U back and knee pain- patient states that her pain has worsened since her last visit.     Subjective   Iraida Elizabeth is a 76 y.o. female  who presents for follow-up.  She has a history of back and knee pain.  Today her pain is 8/10VAS in severity. She states her low back pain is worsening.  She continues with Belbuca 75 mcg film BID.  This medication regimen does decrease her pain compared to when she has been without it in the past.  She denies any side effects including somnolence or constipation. ADLs by self.     At her last office visit the Plan was to repeat Right SI joint injection, unfortunately this was denied by insurance and currently is in the process of appeal.    Discussed that with her worsening low back pain we had previously introduced SCS and she was  Not interested in this treatment. I do recommend that she reconsider this.  She was evaluated by neurosurgery who also recommended SCS. With her pain being worse at night will trial Gabapentin 100 mg nightly.  Discussed that side effects including drowsiness and dizziness. Patient is to notify our office if she experiences side effects.    Procedure list:  4/6/2021-right SI joint injection, left knee injection-80% ongoing relief to her right SI joint, 50% relief to her left knee  11/5/2020-bilateral S1 TFESI, left knee injection-80% relief from the epidural x2 to 3 weeks, 80 to 90% relief to left knee  7/30/2020-bilateral S1 TFESI-85% relief x2 weeks  6/8/2020-bilateral L3-L5 RFA-No Relief  3/5/2020-bilateral L3-L5 MBB-90% relief x1 week  1/28/2020-bilateral L3-L5 MBB-80% relief x1 week    Patient remained masked during entire encounter. No cough present. I donned a mask and eye protection throughout entire visit. Prior to donning mask and eye protection, hand hygiene was performed, as well as when it was doffed.  I was closer than 6 feet, but not for an extended period of time. No obvious exposure to any bodily fluids.    Back  Pain  This is a chronic (chronic) problem. The current episode started more than 1 year ago. The problem occurs constantly. Progression since onset: worsened since last office visit. The pain is present in the lumbar spine and sacro-iliac (Right). The quality of the pain is described as burning. The pain radiates to the right thigh and left thigh (posterior). The pain is at a severity of 8/10. The pain is moderate. The symptoms are aggravated by bending, position, standing and twisting (laying flat). Associated symptoms include numbness and weakness. Pertinent negatives include no abdominal pain, chest pain, dysuria, fever or headaches. She has tried analgesics (Ranulfo S1 TFESI, Butrans) for the symptoms. The treatment provided moderate relief.   Knee Pain   There was no injury mechanism. The pain is present in the left knee. The quality of the pain is described as aching. The pain is at a severity of 8/10. The pain has been worsening since onset. Associated symptoms include numbness. The symptoms are aggravated by movement and weight bearing. Treatments tried: belbuca, rest. The treatment provided moderate (Knee injections) relief.      PEG Assessment   What number best describes your pain on average in the past week?7  What number best describes how, during the past week, pain has interfered with your enjoyment of life?8  What number best describes how, during the past week, pain has interfered with your general activity?  8    The following portions of the patient's history were reviewed and updated as appropriate: allergies, current medications, past family history, past medical history, past social history, past surgical history and problem list.    Review of Systems   Constitutional: Positive for activity change (decreased) and fatigue. Negative for chills and fever.   HENT: Negative for congestion.    Eyes: Negative for visual disturbance.   Respiratory: Positive for shortness of breath. Negative for chest  "tightness.    Cardiovascular: Negative for chest pain.   Gastrointestinal: Negative for abdominal pain, constipation and diarrhea.   Genitourinary: Negative for difficulty urinating, dyspareunia and dysuria.   Musculoskeletal: Positive for back pain.   Neurological: Positive for weakness and numbness. Negative for dizziness, light-headedness and headaches.   Psychiatric/Behavioral: Positive for sleep disturbance. Negative for agitation. The patient is not nervous/anxious.      --  The aforementioned information the Chief Complaint section and above subjective data including any HPI data, and also the Review of Systems data, has been personally reviewed and affirmed.  --    Vitals:    08/10/21 1420   BP: 176/50   Pulse: 97   Resp: 16   Temp: 96.1 °F (35.6 °C)   SpO2: 95%   Weight: 103 kg (226 lb 3.2 oz)   Height: 154.9 cm (61\")   PainSc:   8   PainLoc: Back     Objective   Physical Exam  Vitals and nursing note reviewed.   Constitutional:       Appearance: Normal appearance. She is well-developed.   Eyes:      General: Lids are normal.   Cardiovascular:      Rate and Rhythm: Normal rate.   Pulmonary:      Effort: Pulmonary effort is normal.   Musculoskeletal:      Cervical back: Normal range of motion.      Lumbar back: Tenderness and bony tenderness present. Decreased range of motion.      Comments: POS Right SI Compression  POS Right Jenna   Neurological:      Mental Status: She is alert and oriented to person, place, and time.      Gait: Gait abnormal (cane).   Psychiatric:         Attention and Perception: Attention normal.         Mood and Affect: Mood normal.         Speech: Speech normal.         Behavior: Behavior normal.         Judgment: Judgment normal.       Assessment/Plan   Diagnoses and all orders for this visit:    1. Sacroiliac joint dysfunction of right side (Primary)    2. Chronic pain of left knee    3. Chronic neck pain    4. Lumbar facet arthropathy    5. Lumbar radiculopathy    6. Encounter for " long-term (current) use of high-risk medication    Other orders  -     gabapentin (NEURONTIN) 100 MG capsule; Take 1 capsule by mouth Every Night.  Dispense: 30 capsule; Refill: 0  -     Buprenorphine HCl 75 MCG film; Apply 1 film to cheek 2 (two) times a day. DNF 8/11/2021  Dispense: 60 each; Refill: 0      --- Routine UDS in office today as part of monitoring requirements for controlled substances.  The specimen was viewed and the immunoassay result reviewed and is +BUP.  This specimen will be sent to [facility] laboratory for confirmation.     --- CSA updated 3/9/2021  --- Refill Belbuca 75 mcg BID. DNF 8/11/2021 applied. Patient appears stable with current regimen. No adverse effects. Regarding continuation of opioids, there is no evidence of aberrant behavior or any red flags.  The patient continues with appropriate response to opioid therapy. ADL's remain intact by self.   --- Start Gabapentin 100 mg nightly.   --- Follow-up 1 month or sooner if needed.  Patient states she is moving to [place] this month, if this falls through I will see her back, otherwise she will establish with pain management in [place]. Explained that I will not refill controlled substances in [place] and she states understanding.      DONNY REPORT  As part of the patient's treatment plan, I am prescribing controlled substances. The patient has been made aware of appropriate use of such medications, including potential risk of somnolence, limited ability to drive and/or work safely, and the potential for dependence or overdose. It has also bee made clear that these medications are for use by this patient only, without concomitant use of alcohol or other substances unless prescribed.     Patient has completed prescribing agreement detailing terms of continued prescribing of controlled substances, including monitoring DONNY reports, urine drug screening, and pill counts if necessary. The patient is aware that inappropriate use will results in cessation  of prescribing such medications.    As the clinician, I personally reviewed the DONNY from 8/10/2021 while the patient was in the office today.    History and physical exam exhibit continued safe and appropriate use of controlled substances.     Dictated utilizing Dragon dictation.

## 2021-08-18 ENCOUNTER — TELEMEDICINE (OUTPATIENT)
Dept: FAMILY MEDICINE CLINIC | Facility: CLINIC | Age: 76
End: 2021-08-18

## 2021-08-18 VITALS — HEIGHT: 61 IN | WEIGHT: 226 LBS | BODY MASS INDEX: 42.67 KG/M2

## 2021-08-18 DIAGNOSIS — E03.9 ACQUIRED HYPOTHYROIDISM: ICD-10-CM

## 2021-08-18 DIAGNOSIS — I10 ESSENTIAL HYPERTENSION: Primary | Chronic | ICD-10-CM

## 2021-08-18 DIAGNOSIS — N18.30 STAGE 3 CHRONIC KIDNEY DISEASE, UNSPECIFIED WHETHER STAGE 3A OR 3B CKD (HCC): Chronic | ICD-10-CM

## 2021-08-18 DIAGNOSIS — E78.49 OTHER HYPERLIPIDEMIA: ICD-10-CM

## 2021-08-18 DIAGNOSIS — F32.5 MAJOR DEPRESSIVE DISORDER WITH SINGLE EPISODE, IN FULL REMISSION (HCC): ICD-10-CM

## 2021-08-18 PROBLEM — Z79.899 ENCOUNTER FOR LONG-TERM (CURRENT) USE OF HIGH-RISK MEDICATION: Status: RESOLVED | Noted: 2020-01-03 | Resolved: 2021-08-18

## 2021-08-18 PROBLEM — E11.69 HYPERLIPIDEMIA ASSOCIATED WITH TYPE 2 DIABETES MELLITUS: Status: RESOLVED | Noted: 2018-12-19 | Resolved: 2021-08-18

## 2021-08-18 PROBLEM — K62.5 RECTAL BLEEDING: Status: RESOLVED | Noted: 2020-11-09 | Resolved: 2021-08-18

## 2021-08-18 PROBLEM — E78.5 HYPERLIPIDEMIA ASSOCIATED WITH TYPE 2 DIABETES MELLITUS (HCC): Status: RESOLVED | Noted: 2018-12-19 | Resolved: 2021-08-18

## 2021-08-18 PROCEDURE — 99214 OFFICE O/P EST MOD 30 MIN: CPT | Performed by: FAMILY MEDICINE

## 2021-08-18 RX ORDER — ATORVASTATIN CALCIUM 20 MG/1
20 TABLET, FILM COATED ORAL NIGHTLY
Qty: 90 TABLET | Refills: 1 | Status: SHIPPED | OUTPATIENT
Start: 2021-08-18 | End: 2022-02-03 | Stop reason: SDUPTHER

## 2021-08-18 RX ORDER — VALSARTAN 320 MG/1
320 TABLET ORAL DAILY
Qty: 90 TABLET | Refills: 1 | Status: SHIPPED | OUTPATIENT
Start: 2021-08-18 | End: 2022-02-03 | Stop reason: SDUPTHER

## 2021-08-18 RX ORDER — LEVOTHYROXINE SODIUM 0.07 MG/1
75 TABLET ORAL DAILY
Qty: 90 TABLET | Refills: 1 | Status: SHIPPED | OUTPATIENT
Start: 2021-08-18 | End: 2022-02-03 | Stop reason: SDUPTHER

## 2021-08-18 RX ORDER — HYDROCHLOROTHIAZIDE 25 MG/1
25 TABLET ORAL DAILY
Qty: 90 TABLET | Refills: 1 | Status: SHIPPED | OUTPATIENT
Start: 2021-08-18 | End: 2022-02-03 | Stop reason: SDUPTHER

## 2021-08-18 RX ORDER — PAROXETINE HYDROCHLORIDE 20 MG/1
20 TABLET, FILM COATED ORAL NIGHTLY
Qty: 90 TABLET | Refills: 1 | Status: SHIPPED | OUTPATIENT
Start: 2021-08-18 | End: 2022-02-03 | Stop reason: SDUPTHER

## 2021-08-18 RX ORDER — AMLODIPINE BESYLATE 10 MG/1
10 TABLET ORAL DAILY
Qty: 90 TABLET | Refills: 1 | Status: SHIPPED | OUTPATIENT
Start: 2021-08-18 | End: 2022-02-03 | Stop reason: SDUPTHER

## 2021-08-18 NOTE — ASSESSMENT & PLAN NOTE
Patient's depression is single episode and is mild without psychosis. Their depression is currently in full remission and the condition is unchanged. This will be reassessed at the next regular appointment. F/U as described:patient will continue current medication therapy.

## 2021-08-18 NOTE — PATIENT INSTRUCTIONS
Check on insurance coverage and cost for Shingrix (newest shingles vaccine) and get the immunization at your local pharmacy. It is more effective than the old Zostavax vaccine and is recommended even if you have had the Zostavax vaccine in the past. For more information, please look at the website below:  https://www.cdc.gov/vaccines/vpd/shingles/public/shingrix/index.html    Annual flu shot has been recommended to patient. Optimal timing of this vaccination is in mid October of each year.

## 2021-08-18 NOTE — PROGRESS NOTES
"Chief Complaint  Hypertension (med refills)    Subjective          Iraida presents to [facility] to refill medicines.  Labs are due.  Patient will be moving to [facility] in the near future.  Her current medicine list has been reviewed and has been effective.  She still remains under the care of endocrinology for diabetes.  You have chosen to receive care through a telehealth visit.  Do you consent to use a video/audio connection for your medical care today? Yes        Objective   Vital Signs:   Vitals:    08/18/21 1347   Weight: 103 kg (226 lb)   Height: 154.9 cm (61\")        Physical Exam   Constitutional: She appears well-developed. No distress.   Eyes: Conjunctivae are normal. No scleral icterus.   Neck: Neck normal appearance.  Pulmonary/Chest: Effort normal.  She no audible wheeze...  Neurological: She is alert.   Skin: Skin is dry.   Psychiatric: She has a normal mood and affect.       Result Review :                Assessment and Plan    Diagnoses and all orders for this visit:    1. Essential hypertension (Primary)  Assessment & Plan:  Hypertension is unchanged.  Continue current treatment regimen.  Blood pressure will be reassessed at the next regular appointment.    Orders:  -     amLODIPine (NORVASC) 10 MG tablet; Take 1 tablet by mouth Daily.  Dispense: 90 tablet; Refill: 1  -     hydroCHLOROthiazide (HYDRODIURIL) 25 MG tablet; Take 1 tablet by mouth Daily for 180 days.  Dispense: 90 tablet; Refill: 1  -     valsartan (DIOVAN) 320 MG tablet; Take 1 tablet by mouth Daily.  Dispense: 90 tablet; Refill: 1    2. Other hyperlipidemia  Assessment & Plan:  Lipid abnormalities are unchanged.  Pharmacotherapy as ordered.  Lipids will be reassessed in 6 months.    Orders:  -     atorvastatin (LIPITOR) 20 MG tablet; Take 1 tablet by mouth Every Night for 180 days.  Dispense: 90 tablet; Refill: 1    3. Acquired hypothyroidism  Assessment & Plan:  The current medical regimen is " effective;  continue present plan and medications.      Orders:  -     levothyroxine (SYNTHROID, LEVOTHROID) 75 MCG tablet; Take 1 tablet by mouth Daily for 180 days.  Dispense: 90 tablet; Refill: 1    4. Major depressive disorder with single episode, in full remission (CMS/[facility])  Assessment & Plan:  Patient's depression is single episode and is mild without psychosis. Their depression is currently in full remission and the condition is unchanged. This will be reassessed at the next regular appointment. F/U as described:patient will continue current medication therapy.    Orders:  -     PARoxetine (PAXIL) 20 MG tablet; Take 1 tablet by mouth Every Night for 180 days.  Dispense: 90 tablet; Refill: 1    5. Stage 3 chronic kidney disease, unspecified whether stage 3a or 3b CKD (CMS/[facility])  Assessment & Plan:  Renal condition is unchanged.  Continue current treatment regimen.  Renal condition will be reassessed in 6 months.        Follow Up   Return if symptoms worsen or fail to improve.  Patient was given instructions and counseling regarding her condition or for health maintenance advice. Please see specific information pulled into the AVS if appropriate.

## 2021-11-01 ENCOUNTER — TELEPHONE (OUTPATIENT)
Dept: FAMILY MEDICINE CLINIC | Facility: CLINIC | Age: 76
End: 2021-11-01

## 2022-02-03 DIAGNOSIS — F32.5 MAJOR DEPRESSIVE DISORDER WITH SINGLE EPISODE, IN FULL REMISSION: ICD-10-CM

## 2022-02-03 DIAGNOSIS — E03.9 ACQUIRED HYPOTHYROIDISM: ICD-10-CM

## 2022-02-03 DIAGNOSIS — E78.49 OTHER HYPERLIPIDEMIA: ICD-10-CM

## 2022-02-03 DIAGNOSIS — I10 ESSENTIAL HYPERTENSION: Chronic | ICD-10-CM

## 2022-02-03 RX ORDER — PAROXETINE HYDROCHLORIDE 20 MG/1
20 TABLET, FILM COATED ORAL NIGHTLY
Qty: 90 TABLET | Refills: 0 | Status: SHIPPED | OUTPATIENT
Start: 2022-02-03 | End: 2022-08-02

## 2022-02-03 RX ORDER — ATORVASTATIN CALCIUM 20 MG/1
20 TABLET, FILM COATED ORAL NIGHTLY
Qty: 90 TABLET | Refills: 0 | Status: SHIPPED | OUTPATIENT
Start: 2022-02-03 | End: 2022-08-02

## 2022-02-03 RX ORDER — AMLODIPINE BESYLATE 10 MG/1
10 TABLET ORAL DAILY
Qty: 90 TABLET | Refills: 0 | Status: SHIPPED | OUTPATIENT
Start: 2022-02-03

## 2022-02-03 RX ORDER — LEVOTHYROXINE SODIUM 0.07 MG/1
75 TABLET ORAL DAILY
Qty: 90 TABLET | Refills: 0 | Status: SHIPPED | OUTPATIENT
Start: 2022-02-03 | End: 2022-08-02

## 2022-02-03 RX ORDER — HYDROCHLOROTHIAZIDE 25 MG/1
25 TABLET ORAL DAILY
Qty: 90 TABLET | Refills: 0 | Status: SHIPPED | OUTPATIENT
Start: 2022-02-03 | End: 2022-08-02

## 2022-02-03 RX ORDER — VALSARTAN 320 MG/1
320 TABLET ORAL DAILY
Qty: 90 TABLET | Refills: 0 | Status: SHIPPED | OUTPATIENT
Start: 2022-02-03

## 2022-02-11 ENCOUNTER — TELEPHONE (OUTPATIENT)
Dept: ENDOCRINOLOGY | Age: 77
End: 2022-02-11

## 2022-07-29 NOTE — TELEPHONE ENCOUNTER
Meme salmeron, can you please call Ms Elizabeth to schedule with Dr Snell?    Thank you  Viv FERRO   29-Jul-2022 05:26

## 2022-12-21 NOTE — PROGRESS NOTES
"CHIEF COMPLAINT  F/u back pain. Pt sts pain has worsened since last ov. Pt also c/o bilat leg pain, left leg hurting more than the right.    Subjective   Iraida Elizabeth is a 75 y.o. female  who presents for follow-up.  She has a history of back pain.  Today her pain is 8/10VAS in severity. She complains of back pain that radiates into her bilateral lower extremities.  She completed a Bilateral S1 TFESI on 7/30/2020 performed by Dr. Miranda.  She reported 85% relief from this procedure x 2 weeks and then her pain returned to baseline.  Plan to repeat this procedure was put on hold due to work up with pulmonology and cardiology for shortness of breath.  Patient completed a Right and Left cardiac catheterization with Dr. Snell on 9/29/2020.  No cardiac etiology for dyspnea was found.  Patient states that Dr. Ruiz (pul) does not believe her dyspnea is related to any condition with her lungs.       Patient states PT is cost-prohibitive ($50/session).  We discussed the importance of weight loss as this will contribute to her back pain and her knee pain.  Patient states that she is unable to exercise due to her pain.  We discussed that changing her diet to including lean meats, vegetables, and fruit will help with weight loss and then as she loses weight she may become more mobile.  Patient states understanding.       She continues with Belbuca 300 mcg film BID.  Patient states that this decreases her pain a moderate amount.  Patient states she still has pain, educated that with opioid medication alone all we can expect is a 30% reduction in chronic pain.  Patient states understanding.  She denies any side effects including somnolence. ADLs by self.  She is concerned regarding the cost as she is in the \"donut hole\".  Patient's daughter-in-law is with her today and states that if she is stable on this medication that cost is not a concern as they will help her with this.     Today the patient also complains of left knee " FEMALE ANNUAL EXAM/ VISIT    HISTORY    Ernestina Rodriguez is a 51 year old female who presents for her annual physical examination.     Patient is also coming for a follow-up visit on multiple medical problems and concerns, see below for discussion and recommendations.    Patient was last seen in our office on 9/28/22 for a 3 month follow up visit.     History of diabetes with obesity. She is currently on glimepiride 1 mg daily, Ozempic 0.5 mg weekly and metformin 500 mg daily. A1C of 6.2 from 9/2022 which has significantly decreased from  5.8 from 12/2022. History of proteinuria. We talked about her weight.  Recommended regular diabetic eye examination.  We talked about potential side effects.  patient reports that she had a few episodes of hypoglycemia. With patient recent weight loss and adequate A1C until prevent further hypoglycemia we agreed to discontinue Amaryl. Will repeat BMP and A1C in 6 months.      History of hypertension complicating diabetes mellitus. The patient is currently on losartan 50 mg daily. Blood pressure is adequate in office. No chest pains, shortness of breath, or dizziness noted. Adequate potassium of 4.2 and creatinine of 0.66 from 12/2022. Goal blood pressure less than 130/80.  Follow low salt diet. Stay active. Encouraged to work on lifestyle modifications. Will reassess in 6 months     History of obesity. Current BMI is 36.28%. Follow low fat diet and exercise 4-5 days weekly. The patient needs to adjust her diet.  Low sugar low carb diet.  Lose weight.  patient is going to the gym 4 days per week. Patient has lost about 18 pounds since last year.  Patient was congratulated on her weight loss.    History of elevated liver enzymes.  Resolved .  Most recent labs showed ALT normal at 29 ALT and 21 AST.  Most likely related to fatty liver.  Workup for secondary causes of elevated liver enzymes was negative.  She was not able to tolerate alpha lipoic acid.  Currently taking  atorvastatin 40 mg daily, Fish oil and Vitamin D. Ultrasound of liver with elastography  was completed in 12/2020. See below. We need to better control her HbA1c and blood sugars. Patient is highly encouraged to lose weight, follow low carb diet, and exercise 4-5 days weekly. We talked about the pathophysiology of fatty liver. Encouraged patient to work on her lifestyle .  We will continue to monitor liver enzymes and liver ultrasound.  Patient agrees to proceed.      IMPRESSION: liver ultrasound from 12/2020.   1.  Hepatomegaly with hepatic steatosis.  2.  Elastography findings:          Metavir Score: F3         Liver Fibrosis Score: Moderate - Severe  3. Dilatation of the common bile duct up to 10 mm. Consider MRCP for  further evaluation.     History of IBSD. Currently taking hyoscyamine 0.125 mg three times daily prn. symptoms are stable.      History of major depression.  Currently on remission.  She is currently on citalopram 40 mg daily. Tolerating well. Denies any worsening symptoms.       History of hyperlipidemia associated with diabetes mellitus. She is currently taking atorvastatin 40 mg daily and fish oil 1200 mg daily. No myalgia noted. LDL of 86 and HDL of 53 from 12/2022. Patient was previously on atorvastatin 80 mg daily.  Follow low fat diet. Exercise 4-5 days weekly.       History of GERD. The patient is currently on rabeprazole 20 mg daily and pepcid 20 mg BID. No dysphagia or red flags noted. She was previously on omeprazole, however she did not like the medication and she was switched back to aciphex in January 2018.       History of hyperparathyroidism. Patient underwent parathyroidectomy in 7/1/2011 due to hypercalcemia and adenoma of parathyroid.  Normal Calcium at 9.2 from 12/2022.       History of smoking. Patient is a former 1 pack per day smoker. She was prescribed chantix with success. The patient quit smoking in April 2019. She was congratulated. Encouraged the patient  pain. She is requesting a left intra-articular knee injection today.  Due to body habitus I will order for this to be performed by Dr. Miranda under fluoro.     Not a candidate for NSAIDs due to Kidney disease.     Patient remained masked during entire encounter. No cough present. I donned a mask and eye protection throughout entire visit. Prior to donning mask and eye protection, hand hygiene was performed, as well as when it was doffed.  I was closer than 6 feet, but not for an extended period of time. No obvious exposure to any bodily fluids.    Back Pain  This is a chronic (chronic) problem. The current episode started more than 1 year ago. The problem occurs constantly. The problem has been gradually worsening since onset. The pain is present in the lumbar spine and sacro-iliac. The quality of the pain is described as burning. The pain radiates to the left foot, left thigh, left knee, right foot, right knee and right thigh. The pain is at a severity of 8/10. The pain is moderate. The symptoms are aggravated by bending, position, standing and twisting (laying flat). Associated symptoms include weakness. Pertinent negatives include no abdominal pain, chest pain, dysuria, fever, headaches or numbness. She has tried analgesics (Ranulfo S1 TFESI ) for the symptoms. The treatment provided moderate relief.   Knee Pain   There was no injury mechanism. The pain is present in the left knee. The quality of the pain is described as aching. The pain has been worsening since onset. Pertinent negatives include no numbness. The symptoms are aggravated by movement and weight bearing. Treatments tried: belbuca, rest. The treatment provided moderate relief.      Procedure list:  7/30/2020-bilateral S1 TFESI-85% relief x2 weeks  6/8/2020-bilateral L3-L5 RFL-no relief  3/5/2020-bilateral L3-L5 MBB-80% relief x1 week  1/28/2020-bilateral L3-L5 MBB-80% relief x1 week    PEG Assessment   What number best describes your pain on average in  "the past week?8  What number best describes how, during the past week, pain has interfered with your enjoyment of life?8  What number best describes how, during the past week, pain has interfered with your general activity?  8    The following portions of the patient's history were reviewed and updated as appropriate: allergies, current medications, past family history, past medical history, past social history, past surgical history and problem list.    Review of Systems   Constitutional: Positive for activity change (dec) and fatigue (occ). Negative for fever.   HENT: Negative for congestion.    Eyes: Negative for visual disturbance.   Respiratory: Positive for shortness of breath (occ). Negative for cough.    Cardiovascular: Negative for chest pain.   Gastrointestinal: Negative for abdominal pain, constipation and diarrhea.   Endocrine: Negative for polyuria.   Genitourinary: Negative for difficulty urinating and dysuria.   Musculoskeletal: Positive for arthralgias (bilat legs), back pain and gait problem (w/c).   Allergic/Immunologic: Negative for immunocompromised state.   Neurological: Positive for weakness. Negative for dizziness, light-headedness, numbness and headaches.   Psychiatric/Behavioral: Positive for sleep disturbance. Negative for agitation and suicidal ideas. The patient is not nervous/anxious.      --  The aforementioned information the Chief Complaint section and above subjective data including any HPI data, and also the Review of Systems data, has been personally reviewed and affirmed.  --    Vitals:    10/14/20 1257   BP: 136/65   Pulse: 68   Resp: 20   Temp: 97.1 °F (36.2 °C)   SpO2: 97%   Weight: 103 kg (226 lb)   Height: 157.5 cm (62\")   PainSc:   8   PainLoc: Back     Objective   Physical Exam  Vitals signs and nursing note reviewed.   Constitutional:       Appearance: Normal appearance. She is well-developed. She is obese.   HENT:      Head: Normocephalic and atraumatic.   Eyes:      " to remain abstinent.      History of mild intermittent asthma. No recent exacerbation. She currently uses an albuterol inhaler, 2 puffs every 4 hours as needed for relief of her symptoms. Patient stated that since she quit smoking in April 2019, she has not had to use her inhaler very often. Patient is up to date on pneumovax.     History of vitamin D deficiency. Currently taking Vitamin D 47015 units monthly. Last Vitamin D level of 38.6 from 12/2022.       History of fatty liver, found on previous ultrasound.  See discussion above.  See above for discussion.  Patient is highly encouraged the to lose weight. Follow low fat diet and exercise 4-5 days weekly. The patient was scheduled for liver ultrasound with elastography for further evaluation and follow-up. This was completed on 12/23/2020. See below. Patient was previously referred to GI for further recommendations. Currently taking Vitamin D&E and Alpha Lipoic acid.  Again we talked about the pathophysiology today in the office.  Continue with antioxidants.  The patient is doing great with her exercising going to the gym 4 times a week and losing weight.     Screening for CAD. Patient underwent CT with a score of 4 from 12/2020. Continue taking atorvastatin 40 mg daily and fish oil 1200 mg daily.     History of colonic polyps.  Adenomatous polyps.  Colon cancer screening: up to date from 7/30/2021 which showed Adenomatous polyps and was recommended to repeat in 5 years in the absence of symptoms.      Up to date on PAP smear. Normal from  7/2018 with negative HPV. Repeat in 7/2023 in the absence of symptoms.      Up to date on vaccinations. Discussed Hep B vaccination with patient. Patient declined. Discussed and recommended Shingrix vaccination. Patient did receive covid-19 vaccine and annual flu vaccine.  Recommend COVID-19 booster per CDC guidelines. Patient declines.      Up to date on mammogram. Normal from 6/2022. Repeat annually.      Up-to-date on dental  General: Lids are normal.      Conjunctiva/sclera: Conjunctivae normal.   Neck:      Musculoskeletal: Normal range of motion.      Trachea: Trachea normal.   Cardiovascular:      Rate and Rhythm: Normal rate.   Pulmonary:      Effort: Pulmonary effort is normal.      Breath sounds: Normal breath sounds.   Musculoskeletal:      Left knee: She exhibits decreased range of motion. Tenderness found.      Lumbar back: She exhibits decreased range of motion and tenderness.      Comments: Left Knee Crepitus  POS Ranulfo SLR   Skin:     General: Skin is warm and dry.   Neurological:      Mental Status: She is alert and oriented to person, place, and time.      Gait: Gait abnormal (wheelchair).   Psychiatric:         Speech: Speech normal.         Behavior: Behavior normal.         Judgment: Judgment normal.       Assessment/Plan   Diagnoses and all orders for this visit:    1. Encounter for long-term (current) use of high-risk medication (Primary)    2. Chronic midline low back pain with bilateral sciatica  -     Cancel: Case Request  -     Case Request    3. Other chronic pain    4. Lumbar radiculopathy  -     Cancel: Case Request  -     Case Request    5. Chronic pain of left knee  -     Case Request    Other orders  -     Buprenorphine HCl (BELBUCA) film; Apply 1 film to cheek 2 (two) times a day. DNF 10/23/2020  Dispense: 60 each; Refill: 0      --- The urine drug screen confirmation from 9/15/2020 has been reviewed and the result is appropriate based on patient history and DONNY report  --- Refill Belbuca 300 mcg film. DNF 10/23/2020 applied. Patient appears stable with current regimen. No adverse effects. Regarding continuation of opioids, there is no evidence of aberrant behavior or any red flags.  The patient continues with appropriate response to opioid therapy. ADL's remain intact by self.   --- Bilateral S1 TFESI and Left knee intra-articular injection   Reviewed the procedure at length with the patient.  Included in  and vision examinations.    SMOKING  Former smoker, quit 4/10/2019     ALCOHOL  None     ILLICIT DRUGS  None     SOCIAL HISTORY:  , remarried  Children 1   Lives in [place]  [employer]  Works at [employer].   Hobbies painting, jewerly making.     FAMILY HISTORY:  F  at age 74 brain CA. Lung CA, smoker, DM  M  at age 68  MI.CIPD cervical CA  B 2 half Bs  S none     SURGICAL HISTORY:  Parathyroidectomy 9  D/C   Endometrial ablation   C section   Right carpal tunnel release 18  Tubal ligation   Left shoulder rotator cuff 19 Dr Bryant     PROCEDURES   Colonoscopy at age 26 normal for constipation     EKG done in the office on 2019 which showed normal sinus rhythm.     MEDICAL HISTORY    Past Medical History:   Diagnosis Date   • Anxiety    • Asthma    • Depression    • Diabetes (CMS/HCC)    • Essential (primary) hypertension    • Gastroesophageal reflux disease    • Hyperlipidemia    • IBS (irritable bowel syndrome)    • Obesity        SURGICAL HISTORY    Past Surgical History:   Procedure Laterality Date   • Carpal tunnel release Right 2018   •  section, classic     • Colonoscopy  2021    repeat colon in 5 yrs, adenoma polyps   • D and c     • Laparoscopy, surgical, ablation of uterine fibroid(s)         endometrial ablation   • Parathyroidectomy  2011   • Tubal ligation         SOCIAL HISTORY    Social History     Tobacco Use   • Smoking status: Former     Packs/day: 1.50     Types: Cigarettes     Start date: 1980     Quit date: 4/10/2019     Years since quitting: 3.7   • Smokeless tobacco: Never   Vaping Use   • Vaping Use: never used   Substance Use Topics   • Alcohol use: No       FAMILY HISTORY    Family History   Problem Relation Age of Onset   • Heart disease Mother    • Cancer Mother         cervical   • Diabetes Father    • Kidney disease Father    • Hypertension Father    • Cancer Father    • Cancer,  Prostate Maternal Grandfather 60   • Cancer, Colon Maternal Grandfather        MEDICATIONS    Current Outpatient Medications   Medication Sig   • metFORMIN (GLUCOPHAGE-XR) 500 MG 24 hr tablet Take 1 tablet by mouth daily (with breakfast).   • semaglutide,0.25 or 0.5 mg/DOSE, (Ozempic, 0.25 or 0.5 MG/DOSE,) (1.34 mg/ml) 0.25 or 0.5 MG/DOSE injection Inject 0.5 mg into the skin daily.   • citalopram (CeleXA) 40 MG tablet Take 1 tablet by mouth daily.   • glimepiride (AMARYL) 1 MG tablet Take 1 tablet by mouth daily (before breakfast).   • losartan (COZAAR) 50 MG tablet Take 1 tablet by mouth daily.   • famotidine (PEPCID) 20 MG tablet Take 1 tablet by mouth 2 times daily.   • atorvastatin (LIPITOR) 80 MG tablet Take 0.5 tablets by mouth daily.   • rabeprazole (ACIPHEX) 20 MG tablet Take 1 tablet by mouth daily.   • triamcinolone (ARISTOCORT) 0.1 % cream Apply topically 2 times daily.   • Vitamin E 400 units Tab Take 400 Units by mouth daily.   • Blood Glucose Monitoring Suppl w/Device Kit Test 1 times daily. Give 3 month supply of meter/strips/lancets as preferred by insurance. Diagnosis: E11.9   • albuterol 108 (90 Base) MCG/ACT inhaler Inhale 2 puffs into the lungs every 4 hours as needed for Shortness of Breath or Wheezing.   • Vitamin D, Ergocalciferol, 86378 units capsule Take 1 capsule by mouth every 30 days.   • Coenzyme Q10 (CO Q10) 100 MG Cap Take 1 capsule by mouth nightly.   • Omega-3 Fatty Acids (FISH OIL) 1200 MG capsule Take 1,200 mg by mouth daily.     No current facility-administered medications for this visit.       ALLERGIES    ALLERGIES:   Allergen Reactions   • Azithromycin RASH   • Nsaids Other (See Comments)     Stomach pain   • Penicillin G RASH   • Sulfa Antibiotics Other (See Comments)     Stomach pains       I have reviewed the patient's medications and allergies, past medical, surgical, social and family history, updating these as appropriate.  See Histories section of the electronic medical  the review was expectations, complications, risk and benefits.The procedure was described in detail and the risks, benefits and alternatives were discussed with the patient (including but not limited to: bleeding, infection, nerve damage, worsening of pain, inability to perform injection, paralysis, seizures, and death) who agreed to proceed.  Discussed the potential for sedation if warranted/wanted.  The procedure will plan to be performed at [facility] with fluoroscopic guidance(unless ultrasound is indicated) and could potentially have steroids and contrast dye used. Questions were answered and in a way the patient could understand.  Patient verbalized understanding and wishes to proceed.  This intervention will be ordered.  Discussed with patient that all procedures are part of a multimodal plan of care and include either formal PT or a home exercise program.  Patient has no evidence of coagulopathy or current infection.  --- Follow-up 1 month or sooner if needed     DONNY REPORT  As part of the patient's treatment plan, I am prescribing controlled substances. The patient has been made aware of appropriate use of such medications, including potential risk of somnolence, limited ability to drive and/or work safely, and the potential for dependence or overdose. It has also bee made clear that these medications are for use by this patient only, without concomitant use of alcohol or other substances unless prescribed.     Patient has completed prescribing agreement detailing terms of continued prescribing of controlled substances, including monitoring DONNY reports, urine drug screening, and pill counts if necessary. The patient is aware that inappropriate use will results in cessation of prescribing such medications.    DONNY report has been reviewed within Epic.     As the clinician, I personally reviewed the DONNY from 10/14/2020 while the patient was in the office today.    History and  physical exam exhibit continued safe and appropriate use of controlled substances.      EMR Dragon/Transcription disclaimer:   Much of this encounter note is an electronic transcription/translation of spoken language to printed text. The electronic translation of spoken language may permit erroneous, or at times, nonsensical words or phrases to be inadvertently transcribed; Although I have reviewed the note for such errors, some may still exist.     record for a display of this information.    REVIEW OF SYSTEMS    See above for details.   Constitutional:  Denies fevers, chills, weakness, fatigue, loss of appetite, abnormal weight gain or abnormal weight loss.  Eyes:  Denies blindness, blurred vision, double vision, pain, itching or burning.  HENT:  Denies facial pain, ear pain, hearing loss, tinnitus, nasal congestion, rhinorrhea, epistaxis, sinus pain, mouth lesions or sore throat.  Respiratory:  Denies shortness of breath, cough, sputum production, hemoptysis or wheezing.  Cardiovascular:  Denies chest pains, palpitations, tachycardia, edema, cyanosis or vertigo.  No near-syncope or syncope. No exertional chest pains or shortness of breath. No orthopnea or paroxysmal nocturnal dyspnea. No leg swelling.  No exertional symptomatology. No palpitations, no syncope, no near syncope, no orthopnea no PND. could walk up 2 flights of stairs with no exertional chest pains.  No history of  COPD or sleep apnea or chronic lung disease.  History of asthma   History of hypertension   History of hyperlipidemia.  Gastrointestinal:  Denies abdominal pain, heartburn, nausea, vomiting, diarrhea, constipation or blood in stool.  No hematemesis, hematochezia or rectal bleeding.  History of fatty liver   History of colonic polyps.  History of IBS.  Stable at this time.    History of GERD.  Musculoskeletal:  Denies back pain, joint pain, joint swelling or tenderness, muscle pains or spasms.  Denies neck pain, stiffness or swelling.  Neurologic:  Denies numbness, tingling, other sensory changes, sudden weakness in arms or legs.  Denies confusion, headache, dizziness, memory loss or tremors.  Genitourinary:  Denies urinary frequency, nocturia, urgency, incontinence, dysuria or hematuria.  No problems with impotence or libido.   No discharge.  Hematologic/Lymphatic:  Denies easy bruising or bleeding, swollen lymph glands.  Endocrine:  Denies heat or cold intolerance, polydipsia or  polyuria.  Denies changes in hair or skin texture.  History of obesity with intentional weight loss   History of diabetes mellitus with occasional low blood sugars, see above for discussion.  Integument:  Denies new rashes or lesions, pruritus or dryness of skin.  Psychiatric:  Denies anxiety, hallucinations, irritability or sleeping problems.  History of depression.  Allergic/Immunologic:  Denies recurrent infections, hypersensitivity.  All other Review of Systems negative.    ADVANCE DIRECTIVES:  Full code status.   No living will on file.     PHYSICAL EXAM    Vital Signs:    Visit Vitals  /76   Pulse 80   Resp 12   Ht 5' 1\" (1.549 m)   Wt 87.1 kg (192 lb)   BMI 36.28 kg/m²     Wt Readings from Last 3 Encounters:   12/21/22 87.1 kg (192 lb)   09/28/22 90.1 kg (198 lb 9.6 oz)   06/16/22 93.4 kg (206 lb)     General:  pleasant. In no apparent distress.  Eyes:  Pupils equal, round, reactive to light; extraocular movements intact. Conjunctivae pink. Sclerae anicteric.  HENT:  Normocephalic, atraumatic. Bilateral external ears are normal. Mucosal membranes moist. External nose is normal. Oropharynx is clear. Hearing grossly normal to whisper exam.  Neck:  Supple. Nontender. Normal range of motion. No masses. No thyromegaly.  Trachea midline.  Respiratory:  Normal respiratory effort. No chest wall tenderness. Lungs clear to auscultation bilaterally. Symmetrical chest expansion.  Cardiovascular:  Regular rate and rhythm. No murmurs, rubs, or gallops. Normal S1 and S2. No S3 or S4. No jugular venous distention. No carotid bruits. Good dorsalis pedis pulses bilaterally. No peripheral edema.  Gastrointestinal:  Soft. Nontender. Nondistended. Normal bowel sounds. No pulsatile or other abdominal masses. No hepatosplenomegaly or splenomegaly.  Breasts:  Symmetric. No masses. No nipple discharge.  Genitourinary:  Deferred.  Musculoskeletal:  No clubbing or cyanosis. Full range of motion in all 4 extremities, proximal and  distal. No joint swelling or tenderness in right and left shoulders, elbows, wrists and fingers. No joint swelling or tenderness in left and right knees, ankles and toes.  Bare foot exam shows no deformity or skin lesions. 10 g monofilament is normal for sensation.   Normal peripheral pulses.  Neurologic:  Alert and oriented x 3. Gait is normal. Normal sensory function. No motor deficits in all 4 extremities. Cranial nerves II-XII intact.  Normal fine sense in feet.  Mental status normal with no gross cognitive impairment.  Integumentary:  Warm. Dry. Pink. No rashes or lesions.  Lymphatic:  No lymphadenopathy in submental, submandibular or cervical chain. No supraclavicular or infraclavicular lymphadenopathy. No axillary or inguinal/groin lymphadenopathy.  Psychiatric: Cooperative. Appropriate mood and affect. Normal judgment.    RESULTS    Pertinent labs and imaging studies reviewed.  Discussed risks, possible side effects, benefits of vaccines.    Lab Results   Component Value Date    HGBA1C 5.8 (H) 12/14/2022    CHOLESTEROL 179 12/14/2022    HDL 53 12/14/2022    CALCLDL 86 12/14/2022    TRIGLYCERIDE 198 (H) 12/14/2022    CREATININE 0.66 12/14/2022    MALBCR 130.4 (H) 12/14/2022    AST 21 12/14/2022    BILIRUBIN 1.1 (H) 12/14/2022    TSH 1.765 03/23/2018    WBC 7.4 12/14/2022    HGB 14.5 12/14/2022     12/14/2022    VITD25 38.6 12/14/2022     The 10-year ASCVD risk score (Ary ROY, et al., 2019) is: 3.2%    Values used to calculate the score:      Age: 51 years      Sex: Female      Is Non- : No      Diabetic: Yes      Tobacco smoker: No      Systolic Blood Pressure: 116 mmHg      Is BP treated: Yes      HDL Cholesterol: 53 mg/dL      Total Cholesterol: 179 mg/dL    Recent Review Flowsheet Data     Date 12/21/2022    Adult PHQ 2 Score 0    Adult PHQ 2 Interpretation No further screening needed    Little interest or pleasure in activity? Not at all    Feeling down, depressed or  hopeless? Not at all         ASSESSMENT AND PLAN    1. Annual physical exam. Recommended vision and dental examinations annually. Avoid excessive sun exposure. Use sunscreen and wear protective clothing. Low fat diet. Stay active. Exercise 4-5 times weekly or 150 minutes weekly.  Repeat physical in 1 year.  Pap smear next year.  See me in 6 months for a follow-up visit.  Up-to-date on breast cancer screening.   2. Hypertension complicating diabetes (CMS/HCC) adequate blood pressure today. Continue with losartan. No dizziness or lightheadedness noted. Follow low salt diet. Will reassess in 6 months.    3. Hyperlipidemia associated with type 2 diabetes mellitus (CMS/HCC) adequate lipid panel. LDL at 86. Continue with atorvastatin. Follow low fat diet.    4. Hyperparathyroidism, primary (CMS/HCC) underwent parathyroidectomy in 20211. Normal Calcium level.    5. Vitamin D deficiency, normal vitamin D level. Continue with vitamin D supplements.     6. Severe obesity (BMI 35.0-39.9) with comorbidity (CMS/[facility]) BMI at 36.28%. congratulated patient on her weight loss. continue with weight reduction diet and continue to exercise 4-5 times weekly.     7. Type 2 diabetes mellitus with obesity (CMS/HCC) Adequate A1C at 5.8. due to issues with hypoglycemia will discontinue Amaryl. Continue with Ozempic and metformin. Follow low sugar/low carb diet. Stay active. Will repeat BMP and A1C in 6 months.    8. Gastroesophageal reflux disease without esophagitis, stable asymptomatic with no dysphagia. Continue with aciphex and famotidine.    9. Irritable bowel syndrome with diarrhea, stable asymptomatic. No current medication.    10. NAFLD (nonalcoholic fatty liver disease) stable with normal liver functions. Continue with low fat/weight reduction diet. continue with antioxidants.    11. Elevated liver enzymes, resolved with normal liver functions. Continue to follow low fat diet.     12. History of colonic polyps, up to date from  7/30/2021 which showed Adenomatous polyps and was recommended to repeat in 5 years in the absence of symptoms.     13. Mild major depression (CMS/HCC) stable with no red flags. Mood is good, screening is normal. Continue with citalopram.  Tolerating the medications very well with no side effects.   14. Mild intermittent asthma without complication, stable with no recent exacerbations. Continue to use albuterol inhaler prn. Up to date on vaccinations.    15. Former smoker, quit many years ago.    16.   Immunization, discussed and recommended Shingrix and COVID booster. Patient declines.   17.   Breast cancer screening. Normal mammogram from 6/2022, repeat annually.   18.   Cervical cancer screening. Normal from July 2018 with negative HPV. Repeat in 2023 in the absence of symptoms.     Patient Instructions   Discontinue glimepiride because of low blood sugars and low Blood  sugar  Other medications stay the same.  Congratulations on your weight loss  Congratulations on excellent blood pressure , cholesterol and blood  sugar control.  Lose weight  Low sugar low carb diet.  Low-salt diet.  Stay active.  Exercise regularly and safely 4 to 5 times a week or 150 minutes a week.  Recommend regular dental exam.  Recommend regular eye exam.  Please protect your skin from excessive sun exposure, use sunscreen,  wear protective clothing.  Discussed and recommended a new shingle vaccination, please discuss  with the pharmacist.  Call if any questions or concerns at any time.  See me in 6 months with a pre visit fasting BMP HbA1c.    All the above was discussed with the patient in detail; questions were answered to the patient's satisfaction.  Patient left the office in stable condition with verbal and written instructions.    On 12/21/2022, I, Simi Jean RN attest that I performed the duties of a scribe for this encounter, in the presence of Dr. Penelope Knight.    “The documentation recorded by the scribe accurately and  completely reflects the service(s) I personally performed and the decisions made by me.     I, Dr. Knight, was present with the scribe during the entire visit and agree with all the above.  History of Present Illness, Review of Systems, Medication Review, Physical Examination and Assessment/Plan were all performed by myself.    Portions of this note are brought forward from previous notes, edited and reviewed by myself as appropriate.

## 2023-12-16 NOTE — PROGRESS NOTES
See a dentist for evaluation, treatment, and surgery if needed to take care of your Dental problem    You might need referral to a maxillofacial surgeon, but you will need to be evaluated by a dentist 1st for that.    Take medicines as prescribed until seen by the dentist        Take medicines as prescribed    See your family doctor in one to 2 days for further evaluation, workup, and treatment as necessary    Avoid driving or operating machinery while taking medicines as some medicines might cause drowsiness and may cause problems. Also pain medicines have potential of being addictive  so use Pain meds specially Narcotics Sparingly.    The exam and treatment you received in Emergency Room was for an urgent problem and NOT INTENDED AS COMPLETE CARE. It is important that you FOLLOW UP with a doctor for ongoing care. If your symptoms become WORSE or you DO NOT IMPROVE and you are unable to reach your health care provider, you should RETURN to the emergency department. The Emergency Room doctor has provided a PRELIMINARY INTERPRETATION of all your STUDIES. A final interpretation may be done after you are discharged. IF A CHANGE in your diagnosis or treatment is needed WE WILL CONTACT YOU. It is critical that we have a CURRENT PHONE NUMBER FOR YOU.     RIGHT Sacroiliac Joint Injection  [facility]    PREOPERATIVE DIAGNOSIS:   Sacroiliac joint dysfunction on the RIGHT    POSTOPERATIVE DIAGNOSIS:  Sacroiliac joint dysfunction on the RIGHT    PROCEDURE:  Sacroiliac Joint Injection, on the RIGHT, with fluoroscopic guidance    PRE-PROCEDURE DISCUSSION WITH PATIENT:    Risks and complications were discussed with the patient prior to starting the procedure and informed consent was obtained.  We discussed various topics including but not limited to bleeding, infection, injury, postprocedural site soreness, painful flareup, worsening of clinical picture, paralysis, coma, and death.     SURGEON:  Eve Casarez MD    REASON FOR PROCEDURE:    Patient has pain consistent with SI pathology on history and physical exam. Positive sacroiliac provocation maneuvers noted.      SEDATION:  Versed 2mg & Fentanyl 50 mcg IV  ANESTHETIC AGENT:  Marcaine 0.5%  STEROID AGENT:  40mg DepoMedrol    DESCRIPTON OF PROCEDURE:  After obtaining informed consent, IV access was obtained in the preoperative area.  The patient was transported to the operative suite and placed in the prone position with a pillow under the pelvic area. EKG, blood pressure, and pulse oximeter were monitored. The lumbosacral area was prepped with Chloraprep and draped in a sterile fashion.     Under fluoroscopic guidance the inferior most portion of the RIGHT sacroiliac joint was identified. The overlying skin and subcutaneous tissue was anesthetized with 1% lidocaine. A 22-gauge spinal needle was introduced from the inferior most portion of the joint into the sacroiliac joint under fluoroscopic guidance in the AP dimension with slight oblique rotation to the contralateral side.  Aspiration was negative.  After confirming the position of the needle with fluoroscopy, 1 mL of Omnipaque was injected and after seeing appropriate spread into the joint a total of 2.5 mL of Marcaine, with approximately 40  mg of DepoMedrol, was injected very slowly.  Vital signs remained stable.  The onset of analgesia was noted.    ESTIMATED BLOOD LOSS:  minimal  SPECIMENS:  None    COMPLICATIONS:  No complications were noted. and There was no indication of vascular uptake on live injection of contrast dye.    TOLERANCE & DISCHARGE CONDITION:    The patient tolerated the procedure well.  The patient was transported to the recovery area without difficulties.  The patient was discharged to home under the care of family in stable and satisfactory condition.    PLAN OF CARE:  1. The patient was given our standard instruction sheet and will resume all medications as per the medication reconciliation sheet.  2. The patient will Return to clinic 4 wks.  3. The patient is instructed to keep a pain log hourly for 8 hours after the procedure.

## 2024-01-26 NOTE — ASSESSMENT & PLAN NOTE
"EP NEW PATIENT VISIT    Chief Complaint  Atrial Fibrillation (NEW PATIENT )    Subjective        History of Present Illness    EP Problems:  1.  Paroxysmal atrial fibrillation    Cardiology Problems:  1.  Attention    Medical Problems:  1.  Pancreatitis following gallbladder surgery  2.  Asthma    Cyndie Victor is a 64 y.o. female with problem list as above who presents to the clinic for evaluation of paroxysmal atrial fibrillation.  She was found to have paroxysmal atrial fibrillation following gallbladder surgery at [facility] which was complicated by acute pancreatitis.  She is unsure of how long she stayed out of rhythm.  She notes that her Apple Watch gave her an approximately 35% atrial fibrillation burden initially which is now gone back down to normal.  She has a Kardia device at home which also shows no atrial fibrillation.  She has not had any symptoms of atrial fibrillation recently.    Objective   Vital Signs:  /80 (BP Location: Right arm, Patient Position: Sitting)   Pulse 72   Resp 18   Ht 167.6 cm (66\")   Wt 83.9 kg (185 lb)   SpO2 98%   BMI 29.86 kg/m²   Estimated body mass index is 29.86 kg/m² as calculated from the following:    Height as of this encounter: 167.6 cm (66\").    Weight as of this encounter: 83.9 kg (185 lb).      Physical Exam  Vitals reviewed.   Constitutional:       Appearance: Normal appearance.   Cardiovascular:      Rate and Rhythm: Normal rate and regular rhythm.      Pulses: Normal pulses.      Heart sounds: Normal heart sounds. No murmur heard.  Pulmonary:      Effort: Pulmonary effort is normal. No respiratory distress.      Breath sounds: Normal breath sounds.   Skin:     General: Skin is warm and dry.   Neurological:      General: No focal deficit present.      Mental Status: She is alert and oriented to person, place, and time.   Psychiatric:         Mood and Affect: Mood normal.         Judgment: Judgment normal.        Result Review :  The following " Psychological condition is unchanged.  Continue current treatment regimen.  Psychological condition  will be reassessed at the next regular appointment.   data was reviewed by: Greg Rodriguez MD on 01/26/2024:    CBC          7/29/2023    03:47 8/3/2023    09:27 10/27/2023    07:58   CBC   WBC 10.9     12.2     5.9       RBC 4.29     4.80     4.84       Hemoglobin 12.5     13.5     13.1       Hematocrit 40.3     42.1     41.3       MCV 93.9     87.7     85.3       MCH 29.1     28.1     27.1       MCHC 31.0     32.1     31.7       RDW 13.7     13.0     13.4       Platelets 253     431     295          Details          This result is from an external source.               Prior ECG results from 7/27/2023 at [facility] reviewed: Atrial fibrillation  Prior echo results from 7/28/2023 from [facility] reviewed: Normal EF, no significant valvular abnormalities    [id number] SCORE   [id number] Score: 3 (1/26/2024  9:50 AM)          ECG 12 Lead    Date/Time: 1/26/2024 9:50 AM  Performed by: Greg Rodriguez MD    Authorized by: Greg Rodriguez MD  Previous ECG: no previous ECG available  Rhythm: sinus rhythm  Rate: normal  Conduction: incomplete right bundle branch block  QRS axis: Borderline right axis deviation.  Other: no other findings    Clinical impression: non-specific ECG              Assessment and Plan   Diagnoses and all orders for this visit:    1. PAF (paroxysmal atrial fibrillation) (Primary)        Cyndie Victor is a 64 y.o. female with problem list as above who presents to the clinic for evaluation of paroxysmal atrial fibrillation.  She has had only a single episode with no known recurrence in the setting of gallbladder surgery.  Given only the known single episode, I do think that it is reasonable not to start any antiarrhythmics or ablation at this time.  We discussed risk factor modification and she will work on continued weight loss.  She is already lost more than 10% of her body weight which is excellent.  She should continue anticoagulation given her elevated [id number].  She is having some issues with metoprolol causing both hair loss as well  as fatigue.  Will plan to decrease this dose to 25 mg daily and see if this improves.    Plan:  -Continue anticoagulation given [id number] of 3  -Decrease metoprolol to 25 mg daily  -Continue to work on weight loss with goal BMI of less than 27  -No additional medication changes for now  -She would prefer to follow in our clinic for the atrial fibrillation, we will arrange for 6-month follow-up         Follow Up   No follow-ups on file.  Patient was given instructions and counseling regarding her condition or for health maintenance advice. Please see specific information pulled into the AVS if appropriate.     Part of this note may be an electronic transcription/translation of spoken language to printed text using the Dragon Dictation System.

## 2024-07-16 NOTE — PROGRESS NOTES
"Chief Complaint   Patient presents with   • Hypertension     6 month follow up    • Deep Vein Thrombosis       Subjective     Iraida Elizabeth presents to the office today to refill her medications. No medication side effects are reported.  She is here today to follow-up on recent treatment for deep vein thrombosis.  No side effects with Xarelto.  She does have an appointment with vascular surgeon on [place] fourth.  She will ask the vascular surgeon about length of therapy for her condition of non-provoked deep vein thrombosis.  Blood pressure is improved on losartan no side effects reported.    I have reviewed and updated her medications, medical history and problem list during today's office visit.     Patient Care Team:  Claudio Anne MD as PCP - General  Claudio Anne MD as PCP - Family Medicine  Robert Garcia MD as Consulting Physician (Endocrinology)  Shawn Galvan IV, MD as Surgeon (Neurosurgery)    Social History     Tobacco Use   • Smoking status: Never Smoker   • Smokeless tobacco: Never Used   Substance Use Topics   • Alcohol use: No       Review of Systems   Respiratory: Negative for cough.    Cardiovascular: Negative for chest pain.       Objective     /78   Pulse 93   Temp 97.5 °F (36.4 °C) (Oral)   Resp 20   Ht 154.9 cm (61\")   SpO2 95%   BMI 39.98 kg/m²     Body mass index is 39.98 kg/m².    Physical Exam   Constitutional: She is oriented to person, place, and time. She appears well-developed. No distress.   Eyes: Conjunctivae and lids are normal.   Neck: Carotid bruit is not present.   Cardiovascular: Normal rate, regular rhythm and normal heart sounds.   Pulmonary/Chest: Effort normal and breath sounds normal.   Neurological: She is alert and oriented to person, place, and time.   Skin: Skin is warm and dry.   Psychiatric: She has a normal mood and affect. Her behavior is normal.   Vitals reviewed.      Data Reviewed:                  Assessment/Plan     Diagnoses and all " orders for this visit:    1. Essential hypertension (Primary)  Assessment & Plan:  Hypertension is improving with treatment.  Continue current treatment regimen.  Blood pressure will be reassessed at the next regular appointment.      2. Exertional shortness of breath  Assessment & Plan:  Worsening for the past 6 months with walking short distances    Orders:  -     Ambulatory Referral to Cardiology      Return in about 4 months (around 9/16/2019) for Recheck.                #1:

## (undated) DEVICE — THE SINGLE USE ETRAP – POLYP TRAP IS USED FOR SUCTION RETRIEVAL OF ENDOSCOPICALLY REMOVED POLYPS.: Brand: ETRAP

## (undated) DEVICE — APPL DURAPREP IODOPHOR APL 26ML

## (undated) DEVICE — SNAR POLYP SENSATION STDOVL 27 240 BX40

## (undated) DEVICE — PK CATH CARD 40

## (undated) DEVICE — GLIDESHEATH BASIC HYDROPHILIC COATED INTRODUCER SHEATH: Brand: GLIDESHEATH

## (undated) DEVICE — KT ORCA ORCAPOD DISP STRL

## (undated) DEVICE — CATH DIAG IMPULSE PIG 5F 100CM

## (undated) DEVICE — DRSNG WND GZ PAD BORDERED 4X8IN STRL

## (undated) DEVICE — CATH DIAG IMPULSE FR5 5F 100CM

## (undated) DEVICE — TUBING, SUCTION, 1/4" X 10', STRAIGHT: Brand: MEDLINE

## (undated) DEVICE — GOWN,NON-REINFORCED,SIRUS,SET IN SLV,XXL: Brand: MEDLINE

## (undated) DEVICE — ANTIBACTERIAL UNDYED BRAIDED (POLYGLACTIN 910), SYNTHETIC ABSORBABLE SUTURE: Brand: COATED VICRYL

## (undated) DEVICE — CATH IV INSYTE AUTOGARD 14G 1 1/2IN ORNG

## (undated) DEVICE — NDL SPINE 22G 31/2IN BLK

## (undated) DEVICE — SUT SILK 3/0 TIES 18IN A184H

## (undated) DEVICE — ADAPT CLN BIOGUARD AIR/H2O DISP

## (undated) DEVICE — GLIDESHEATH SLENDER STAINLESS STEEL KIT: Brand: GLIDESHEATH SLENDER

## (undated) DEVICE — GW EMR FIX EXCHG J STD .035 3MM 260CM

## (undated) DEVICE — DEV INDEFLATOR

## (undated) DEVICE — THE TORRENT IRRIGATION SCOPE CONNECTOR IS USED WITH THE TORRENT IRRIGATION TUBING TO PROVIDE IRRIGATION FLUIDS SUCH AS STERILE WATER DURING GASTROINTESTINAL ENDOSCOPIC PROCEDURES WHEN USED IN CONJUNCTION WITH AN IRRIGATION PUMP (OR ELECTROSURGICAL UNIT).: Brand: TORRENT

## (undated) DEVICE — AMD ANTIMICROBIAL GAUZE SPONGES,12 PLY USP TYPE VII, 0.2% POLYHEXAMETHYLENE BIGUANIDE HCI (PHMB): Brand: CURITY

## (undated) DEVICE — DRAPE,REIN 53X77,STERILE: Brand: MEDLINE

## (undated) DEVICE — MSK ENDO PORT O2 POM ELITE CURAPLEX A/

## (undated) DEVICE — 6F .070 JR 4 100CM: Brand: CORDIS

## (undated) DEVICE — GLV SURG BIOGEL M LTX PF 7 1/2

## (undated) DEVICE — SENSR O2 OXIMAX FNGR A/ 18IN NONSTR

## (undated) DEVICE — NDL SPINE 22G 5IN BLK

## (undated) DEVICE — CATH DIAG IMPULSE FL3.5 5F 100CM

## (undated) DEVICE — LN SMPL CO2 SHTRM SD STREAM W/M LUER

## (undated) DEVICE — 3M™ STERI-STRIP™ REINFORCED ADHESIVE SKIN CLOSURES, R1547, 1/2 IN X 4 IN (12 MM X 100 MM), 6 STRIPS/ENVELOPE: Brand: 3M™ STERI-STRIP™

## (undated) DEVICE — INTENDED FOR TISSUE SEPARATION, AND OTHER PROCEDURES THAT REQUIRE A SHARP SURGICAL BLADE TO PUNCTURE OR CUT.: Brand: BARD-PARKER ® CARBON RIB-BACK BLADES

## (undated) DEVICE — EPIDURAL TRAY: Brand: MEDLINE INDUSTRIES, INC.

## (undated) DEVICE — GAUZE,SPONGE,FLUFF,6"X6.75",STRL,10/TRAY: Brand: MEDLINE

## (undated) DEVICE — BNDG ELAS CO-FLEX SLF ADHR 6IN 5YD LF STRL

## (undated) DEVICE — BALN PRESS WEDGE 5F 110CM

## (undated) DEVICE — BANDAGE,GAUZE,BULKEE II,4.5"X4.1YD,STRL: Brand: MEDLINE

## (undated) DEVICE — BLD SCLPL BEAVR MINI STR 2BVL 180D LF

## (undated) DEVICE — PK PROC MAJ 40

## (undated) DEVICE — GW PRESSUREWIRE AERIS W/ AGILE TP 175CM

## (undated) DEVICE — GLV SURG TRIUMPH PF LTX 7.5 STRL

## (undated) DEVICE — IRRIGATOR BULB ASEPTO 60CC STRL

## (undated) DEVICE — SUT SILK 2/0 TIES 18IN A185H

## (undated) DEVICE — CANN O2 ETCO2 FITS ALL CONN CO2 SMPL A/ 7IN DISP LF

## (undated) DEVICE — SINGLE-USE BIOPSY FORCEPS: Brand: RADIAL JAW 4

## (undated) DEVICE — KT MANIFLD CARDIAC

## (undated) DEVICE — ISOLATION BAG: Brand: CONVERTORS

## (undated) DEVICE — SCANLAN® SUTURE BOOT™ INSTRUMENT JAW COVERS - ORIGINAL YELLOW, STANDARD PKG (5 PAIR/CARTRIDGE, 1 CARTRIDGE/PKG): Brand: SCANLAN® SUTURE BOOT™ INSTRUMENT JAW COVERS

## (undated) DEVICE — SUT PROLN 5/0 C1 D/A 36IN 8720H